# Patient Record
Sex: MALE | Race: WHITE | NOT HISPANIC OR LATINO | Employment: FULL TIME | URBAN - METROPOLITAN AREA
[De-identification: names, ages, dates, MRNs, and addresses within clinical notes are randomized per-mention and may not be internally consistent; named-entity substitution may affect disease eponyms.]

---

## 2021-04-30 ENCOUNTER — HOSPITAL ENCOUNTER (OUTPATIENT)
Dept: ULTRASOUND IMAGING | Facility: HOSPITAL | Age: 56
Discharge: HOME | End: 2021-04-30
Admitting: FAMILY MEDICINE

## 2021-04-30 DIAGNOSIS — N45.1 EPIDIDYMITIS: ICD-10-CM

## 2021-04-30 PROCEDURE — 87491 CHLMYD TRACH DNA AMP PROBE: CPT | Performed by: FAMILY MEDICINE

## 2021-04-30 PROCEDURE — 87591 N.GONORRHOEAE DNA AMP PROB: CPT | Performed by: FAMILY MEDICINE

## 2021-04-30 PROCEDURE — 93976 VASCULAR STUDY: CPT

## 2021-04-30 PROCEDURE — 76870 US EXAM SCROTUM: CPT

## 2021-12-06 PROCEDURE — U0004 COV-19 TEST NON-CDC HGH THRU: HCPCS | Performed by: NURSE PRACTITIONER

## 2022-01-17 ENCOUNTER — PATIENT ROUNDING (BHMG ONLY) (OUTPATIENT)
Dept: ORTHOPEDIC SURGERY | Facility: CLINIC | Age: 57
End: 2022-01-17

## 2022-01-17 ENCOUNTER — OFFICE VISIT (OUTPATIENT)
Dept: PODIATRY | Facility: CLINIC | Age: 57
End: 2022-01-17

## 2022-01-17 VITALS
WEIGHT: 125 LBS | HEART RATE: 94 BPM | SYSTOLIC BLOOD PRESSURE: 147 MMHG | DIASTOLIC BLOOD PRESSURE: 95 MMHG | BODY MASS INDEX: 19.62 KG/M2 | HEIGHT: 67 IN

## 2022-01-17 DIAGNOSIS — M79.672 LEFT FOOT PAIN: ICD-10-CM

## 2022-01-17 DIAGNOSIS — M25.571 ACUTE RIGHT ANKLE PAIN: Primary | ICD-10-CM

## 2022-01-17 DIAGNOSIS — S93.401A MODERATE RIGHT ANKLE SPRAIN, INITIAL ENCOUNTER: ICD-10-CM

## 2022-01-17 DIAGNOSIS — M76.71 PERONEAL TENDINITIS, RIGHT: ICD-10-CM

## 2022-01-17 PROCEDURE — 99203 OFFICE O/P NEW LOW 30 MIN: CPT | Performed by: PODIATRIST

## 2022-01-17 RX ORDER — METHYLPREDNISOLONE 4 MG/1
TABLET ORAL
Qty: 21 TABLET | Refills: 0 | Status: SHIPPED | OUTPATIENT
Start: 2022-01-17 | End: 2022-02-23

## 2022-01-17 RX ORDER — BICTEGRAVIR SODIUM, EMTRICITABINE, AND TENOFOVIR ALAFENAMIDE FUMARATE 50; 200; 25 MG/1; MG/1; MG/1
1 TABLET ORAL DAILY
COMMUNITY

## 2022-01-17 NOTE — PROGRESS NOTES
A my chart message has been sent to the patient for PATIENT ROUNDING with [facility] [place]

## 2022-01-18 NOTE — PROGRESS NOTES
01/17/2022  Foot and Ankle Surgery - New Patient   Provider: Dr. Aris Castrejon DPM  Location: [facility]    Subjective:  Felipe Nieves is a 56 y.o. male.     Chief Complaint   Patient presents with   • Right Ankle - Pain   • Establish Care     Patient has a PCP but has not went to an appt yet       HPI: Patient is a 56-year-old male that presents after injury to the right ankle.  He states that approximately 3 weeks ago he rolled his ankle describing inversion type injury.  He did not think much about the injury after the event and did not seek immediate attention.  A few days later he noticed progressive pain swelling and limitation prompting him to report to an urgent care center.  Imaging was performed showing no obvious fracture or dislocation.  He was given a cam boot at that time.  He has been wearing the boot and notices mild improvement.  He states that he has been off work since the seventh.  He continues to have moderate limitation.  He mostly notices the symptoms are along the posterior lateral aspect of the ankle.  He also complains of increasing discomfort involving the left fifth toe.  He feels that he may have injured this toe at the same time.    No Known Allergies    Past Medical History:   Diagnosis Date   • HIV (human immunodeficiency virus infection) (HCC)    • Skin cancer        Past Surgical History:   Procedure Laterality Date   • HERNIA REPAIR     • SKIN CANCER EXCISION         Family History   Problem Relation Age of Onset   • Heart disease Mother    • Cancer Mother    • Heart disease Father    • Diabetes Brother    • Heart disease Brother        Social History     Socioeconomic History   • Marital status: Single   Tobacco Use   • Smoking status: Never Smoker   • Smokeless tobacco: Never Used   Vaping Use   • Vaping Use: Never used   Substance and Sexual Activity   • Alcohol use: Not Currently   • Drug use: Defer   • Sexual activity: Defer        Current Outpatient Medications  "on File Prior to Visit   Medication Sig Dispense Refill   • Bictegravir-Emtricitab-Tenofov (Biktarvy) -25 MG per tablet Take 1 tablet by mouth Daily.       No current facility-administered medications on file prior to visit.       Review of Systems:  General: Denies fever, chills, fatigue, and weakness.  Eyes: Denies vision loss, blurry vision, and excessive redness.  ENT: Denies hearing issues and difficulty swallowing.  Cardiovascular: Denies palpitations, chest pain, or syncopal episodes.  Respiratory: Denies shortness of breath, wheezing, and coughing.  GI: Denies abdominal pain, nausea, and vomiting.   : Denies frequency, hematuria, and urgency.  Musculoskeletal: + Right ankle and left fifth toe pain  Derm: Denies rash, open wounds, or suspicious lesions.  Neuro: Denies headaches, numbness, loss of coordination, and tremors.  Psych: Denies anxiety and depression.  Endocrine: Denies temperature intolerance and changes in appetite.  Heme: Denies bleeding disorders or abnormal bruising.     Objective   /95   Pulse 94   Ht 170.2 cm (67\")   Wt 56.7 kg (125 lb)   BMI 19.58 kg/m²     Foot/Ankle Exam:       General:   Appearance: appears stated age and healthy    Orientation: AAOx3    Affect: appropriate    Gait: antalgic      VASCULAR      Right Foot Vascularity   Normal vascular exam    Dorsalis pedis:  2+  Posterior tibial:  2+  Skin Temperature: warm    Edema Gradin+  CFT:  < 3 seconds  Pedal Hair Growth:  Present  Varicosities: none       Left Foot Vascularity   Normal vascular exam    Dorsalis pedis:  2+  Posterior tibial:  2+  Skin Temperature: warm    Edema Grading:  None  CFT:  < 3 seconds  Pedal Hair Growth:  Present  Varicosities: none        NEUROLOGIC     Right Foot Neurologic   Light touch sensation:  Normal  Hot/Cold sensation: normal    Achilles reflex:  2+     Left Foot Neurologic   Light touch sensation:  Normal  Hot/cold sensation: normal    Achilles reflex:  2+     " MUSCULOSKELETAL      Right Foot Musculoskeletal   Ecchymosis:  None  Tenderness: lateral malleolus and peroneal tendon    Arch:  Normal     Left Foot Musculoskeletal   Ecchymosis:  None  Tenderness: toe 5    Arch:  Normal     MUSCLE STRENGTH     Right Foot Muscle Strength   Normal strength    Foot dorsiflexion:  5  Foot plantar flexion:  5  Foot inversion:  5  Foot eversion:  5     Left Foot Muscle Strength   Normal strength    Foot dorsiflexion:  5  Foot plantar flexion:  5  Foot inversion:  5  Foot eversion:  5     DERMATOLOGIC     Right Foot Dermatologic   Skin: skin intact       Left Foot Dermatologic   Skin: skin intact        Right Foot Additional Comments No obvious deformity.  Instability testing deferred.  No proximal fibular pain.  Range of motion and muscle strength are limited secondary to guarding.      Left Foot Additional Comments: Mild swelling noted to the left fifth toe.  No gross deformity.      Assessment/Plan   Diagnoses and all orders for this visit:    1. Acute right ankle pain (Primary)  -     XR Ankle 3+ View Right  -     methylPREDNISolone (MEDROL) 4 MG dose pack; Take as directed on package instructions.  Dispense: 21 tablet; Refill: 0    2. Left foot pain  -     XR Foot 3+ View Left  -     methylPREDNISolone (MEDROL) 4 MG dose pack; Take as directed on package instructions.  Dispense: 21 tablet; Refill: 0    3. Moderate right ankle sprain, initial encounter    4. Peroneal tendinitis, right    Other orders  -     methylPREDNISolone (MEDROL) 4 MG dose pack; Take as directed on package instructions.  Dispense: 21 tablet; Refill: 0      Patient presents with symptoms involving his right ankle as well as his left fifth toe.  He states that he did sustain an injury approximately 3 weeks ago.  Imaging was repeated today to the right ankle showing no obvious fracture or dislocation.  I explained that his discomfort is likely soft tissue in nature.  I have prescribed a Medrol Dosepak for symptom  management.  I would also like him to gradually transition to a lace up ankle brace.  We did discuss use and effects.  I do feel that he should gradually discontinue the use of the cam boot.  He feels that he needs to remain off work for symptom management.  I would like him to start gentle range of motion, stretching, and manual therapy exercises.  We did discuss rigid soled shoes involving the fifth toe.  No obvious fracture or dislocation was noted to the left foot.  We reviewed rice therapy and I would like to see patient in 2 weeks for reevaluation.  If he continues to have significant pain and limitation, may need to consider MRI for further evaluation.  Greater than 30 minutes was spent before, during, and after evaluation for patient care.    Orders Placed This Encounter   Procedures   • XR Ankle 3+ View Right     Scheduling Instructions:      Room 14      wb     Order Specific Question:   Reason for Exam:     Answer:   right ankle pain     Order Specific Question:   Does this patient have a diabetic monitoring/medication delivering device on?     Answer:   No     Order Specific Question:   Release to patient     Answer:   Immediate   • XR Foot 3+ View Left     Scheduling Instructions:      Room 14 wb     Order Specific Question:   Reason for Exam:     Answer:   left foot pain     Order Specific Question:   Does this patient have a diabetic monitoring/medication delivering device on?     Answer:   No     Order Specific Question:   Release to patient     Answer:   Immediate        Note is dictated utilizing voice recognition software. Unfortunately this leads to occasional typographical errors. I apologize in advance if the situation occurs. If questions occur please do not hesitate to call our office.

## 2022-02-01 ENCOUNTER — OFFICE VISIT (OUTPATIENT)
Dept: PODIATRY | Facility: CLINIC | Age: 57
End: 2022-02-01

## 2022-02-01 VITALS
HEIGHT: 67 IN | SYSTOLIC BLOOD PRESSURE: 138 MMHG | BODY MASS INDEX: 19.62 KG/M2 | WEIGHT: 125 LBS | HEART RATE: 112 BPM | DIASTOLIC BLOOD PRESSURE: 64 MMHG

## 2022-02-01 DIAGNOSIS — M79.89 BILATERAL SWELLING OF FEET: ICD-10-CM

## 2022-02-01 DIAGNOSIS — M76.71 PERONEAL TENDINITIS, RIGHT: ICD-10-CM

## 2022-02-01 DIAGNOSIS — S93.401D MODERATE RIGHT ANKLE SPRAIN, SUBSEQUENT ENCOUNTER: ICD-10-CM

## 2022-02-01 DIAGNOSIS — M79.672 LEFT FOOT PAIN: Primary | ICD-10-CM

## 2022-02-01 PROCEDURE — 99213 OFFICE O/P EST LOW 20 MIN: CPT | Performed by: PODIATRIST

## 2022-02-01 RX ORDER — MELOXICAM 15 MG/1
TABLET ORAL
Qty: 30 TABLET | Refills: 0 | Status: SHIPPED | OUTPATIENT
Start: 2022-02-01

## 2022-02-02 NOTE — PROGRESS NOTES
"2022  Foot and Ankle Surgery - Established Patient/Follow-up  Provider: Dr. Aris Castrejon DPM  Location: [facility]    Subjective:  Felipe Nieves is a 56 y.o. male.     Chief Complaint   Patient presents with   • Right Ankle - Pain   • Left Foot - Toe Pain   • Right Foot - Toe Pain   • Follow-up     no pcp per patient       HPI: Patient returns for follow-up regarding his right ankle and left foot.  He states that his symptoms are unchanged.  He has noticed progressive swelling involving his left fifth toe and has even noticed progressive issues involving his right fifth toe.  He has pain with any closed toed shoes or increase in activity.  He continues to have pain involving his right ankle.  Symptoms are along the peroneal tendon course as well as anterior lateral aspect of the ankle.  He has been trying to wear the lace up ankle brace on the right lower extremity.    No Known Allergies    Current Outpatient Medications on File Prior to Visit   Medication Sig Dispense Refill   • Bictegravir-Emtricitab-Tenofov (Biktarvy) -25 MG per tablet Take 1 tablet by mouth Daily.     • methylPREDNISolone (MEDROL) 4 MG dose pack Take as directed on package instructions. 21 tablet 0   • methylPREDNISolone (MEDROL) 4 MG dose pack Take as directed on package instructions. 21 tablet 0     No current facility-administered medications on file prior to visit.       Objective   /64   Pulse 112   Ht 170.2 cm (67\")   Wt 56.7 kg (125 lb)   BMI 19.58 kg/m²     General:   Appearance: appears stated age and healthy    Orientation: AAOx3    Affect: appropriate    Gait: antalgic       VASCULAR       Right Foot Vascularity   Normal vascular exam    Dorsalis pedis:  2+  Posterior tibial:  2+  Skin Temperature: warm    Edema Gradin+  CFT:  < 3 seconds  Pedal Hair Growth:  Present  Varicosities: none        Left Foot Vascularity   Normal vascular exam    Dorsalis pedis:  2+  Posterior tibial:  2+  Skin " Temperature: warm    Edema Grading:  None  CFT:  < 3 seconds  Pedal Hair Growth:  Present  Varicosities: none        NEUROLOGIC      Right Foot Neurologic   Light touch sensation:  Normal  Hot/Cold sensation: normal    Achilles reflex:  2+      Left Foot Neurologic   Light touch sensation:  Normal  Hot/cold sensation: normal    Achilles reflex:  2+      MUSCULOSKELETAL       Right Foot Musculoskeletal   Ecchymosis:  None  Tenderness: lateral malleolus and peroneal tendon    Arch:  Normal      Left Foot Musculoskeletal   Ecchymosis:  None  Tenderness: toe 5    Arch:  Normal      MUSCLE STRENGTH      Right Foot Muscle Strength   Normal strength    Foot dorsiflexion:  5  Foot plantar flexion:  5  Foot inversion:  5  Foot eversion:  5      Left Foot Muscle Strength   Normal strength    Foot dorsiflexion:  5  Foot plantar flexion:  5  Foot inversion:  5  Foot eversion:  5      DERMATOLOGIC      Right Foot Dermatologic   Skin: Mild erythema involving the right fifth toe       Left Foot Dermatologic   Skin: Moderate erythema and swelling involving the left fifth toe.  No calor.  Symptoms extend to the metatarsophalangeal joint region    Assessment/Plan   Diagnoses and all orders for this visit:    1. Left foot pain (Primary)  -     XR Foot 3+ View Left  -     meloxicam (Mobic) 15 MG tablet; One po q day. Refills must come from pcp  Dispense: 30 tablet; Refill: 0  -     CBC & Differential; Future  -     Basic Metabolic Panel; Future  -     C-reactive Protein; Future  -     Sedimentation Rate; Future  -     Uric Acid; Future    2. Bilateral swelling of feet  -     CBC & Differential; Future  -     Basic Metabolic Panel; Future  -     C-reactive Protein; Future  -     Sedimentation Rate; Future  -     Uric Acid; Future    3. Peroneal tendinitis, right    4. Moderate right ankle sprain, subsequent encounter  -     MRI Ankle Right Without Contrast; Future      Patient returns with continued issues involving both lower  extremities.  He has had progressive swelling involving the left fifth toe.  The right fifth toe has also had swelling and redness.  Imaging was reviewed showing no obvious fractures or dislocations to these regions.  I have explained that issues and symptoms are highly consistent with inflammation.  Patient states that he has recently started his antiviral Biktarvy.  I have recommended that we proceed with lab work such as CBC, BMP, ESR, CRP, and uric acid.  I have also suggested that we proceed with an MRI involving his right ankle to rule out peroneal tendon tear or other soft tissue disruption.  I have placed him on Mobic for symptom management.  I would like him to monitor closely and return in 3 weeks for lab and imaging review and further discussion.  Greater than 20 minutes was spent before, during, and after evaluation for patient care.    Orders Placed This Encounter   Procedures   • XR Foot 3+ View Left     Can leave after xray     Scheduling Instructions:      Room 14 wb     Order Specific Question:   Reason for Exam:     Answer:   left foot pain     Order Specific Question:   Does this patient have a diabetic monitoring/medication delivering device on?     Answer:   No     Order Specific Question:   Release to patient     Answer:   Immediate   • MRI Ankle Right Without Contrast     Standing Status:   Future     Standing Expiration Date:   2/1/2023     Scheduling Instructions:      Mri right ankle no contrast   • Basic Metabolic Panel     Standing Status:   Future     Standing Expiration Date:   2/2/2023     Order Specific Question:   Release to patient     Answer:   Immediate   • C-reactive Protein     Standing Status:   Future     Standing Expiration Date:   2/2/2023     Order Specific Question:   Release to patient     Answer:   Immediate   • Sedimentation Rate     Standing Status:   Future     Standing Expiration Date:   2/2/2023     Order Specific Question:   Release to patient     Answer:    Immediate   • Uric Acid     Standing Status:   Future     Standing Expiration Date:   2/2/2023     Order Specific Question:   Release to patient     Answer:   Immediate   • CBC & Differential     Standing Status:   Future     Standing Expiration Date:   2/2/2023     Order Specific Question:   Manual Differential     Answer:   No          Note is dictated utilizing voice recognition software. Unfortunately this leads to occasional typographical errors. I apologize in advance if the situation occurs. If questions occur please do not hesitate to call our office.

## 2022-02-18 ENCOUNTER — HOSPITAL ENCOUNTER (OUTPATIENT)
Dept: MRI IMAGING | Facility: HOSPITAL | Age: 57
Discharge: HOME | End: 2022-02-18
Admitting: PODIATRIST

## 2022-02-18 DIAGNOSIS — S93.401D MODERATE RIGHT ANKLE SPRAIN, SUBSEQUENT ENCOUNTER: ICD-10-CM

## 2022-02-18 PROCEDURE — 73721 MRI JNT OF LWR EXTRE W/O DYE: CPT

## 2022-02-22 ENCOUNTER — TELEPHONE (OUTPATIENT)
Dept: PODIATRY | Facility: CLINIC | Age: 57
End: 2022-02-22

## 2022-02-22 NOTE — TELEPHONE ENCOUNTER
Hub staff attempted to follow warm transfer process and was unsuccessful     Caller: JALIL DAY    Relationship to patient: SELF    Best call back number: [phone]    Patient is needing: PT CALLING ABOUT FMLA PAPERWORK, SAID HIS EMPLOYER HASN'T RECEIVED IT. NEEDS TO GET IT REFAXED ASAP SO HE CAN GET HIS PAYCHECK. PLEASE CALL HIM BACK AT THE NUMBER ABOVE.

## 2022-02-23 ENCOUNTER — TELEPHONE (OUTPATIENT)
Dept: ORTHOPEDIC SURGERY | Facility: CLINIC | Age: 57
End: 2022-02-23

## 2022-02-23 ENCOUNTER — OFFICE VISIT (OUTPATIENT)
Dept: PODIATRY | Facility: CLINIC | Age: 57
End: 2022-02-23

## 2022-02-23 VITALS
HEART RATE: 103 BPM | WEIGHT: 125 LBS | DIASTOLIC BLOOD PRESSURE: 84 MMHG | BODY MASS INDEX: 19.62 KG/M2 | HEIGHT: 67 IN | SYSTOLIC BLOOD PRESSURE: 127 MMHG

## 2022-02-23 DIAGNOSIS — M79.89 BILATERAL SWELLING OF FEET: ICD-10-CM

## 2022-02-23 DIAGNOSIS — M76.71 PERONEAL TENDINITIS, RIGHT: ICD-10-CM

## 2022-02-23 DIAGNOSIS — M79.672 LEFT FOOT PAIN: Primary | ICD-10-CM

## 2022-02-23 PROCEDURE — 99213 OFFICE O/P EST LOW 20 MIN: CPT | Performed by: PODIATRIST

## 2022-02-23 RX ORDER — DOCUSATE SODIUM, SENNOSIDES 50; 8.6 MG/1; MG/1
TABLET ORAL
COMMUNITY
Start: 2022-01-20

## 2022-02-23 NOTE — PROGRESS NOTES
"2022  Foot and Ankle Surgery - Established Patient/Follow-up  Provider: Dr. Aris Castrejon DPM  Location: [facility]    Subjective:  Felipe Nieves is a 56 y.o. male.     Chief Complaint   Patient presents with   • Right Ankle - Pain   • Left Foot - Pain, Edema   • Follow-up     Lst pcp arlet Arguelles MD 1/15/2022       HPI: Patient returns for follow-up regarding his feet and right ankle pain.  He has noticed significant improvement since last exam.  Denies new issues or concerns.  Patient did obtain the MRI but forgot to obtain the lab work.    No Known Allergies    Current Outpatient Medications on File Prior to Visit   Medication Sig Dispense Refill   • Bictegravir-Emtricitab-Tenofov (Biktarvy) -25 MG per tablet Take 1 tablet by mouth Daily.     • meloxicam (Mobic) 15 MG tablet One po q day. Refills must come from pcp 30 tablet 0   • Senna-Plus 8.6-50 MG per tablet TAKE 1 TABLET BY MOUTH 1 TIME EACH DAY.     • [DISCONTINUED] methylPREDNISolone (MEDROL) 4 MG dose pack Take as directed on package instructions. 21 tablet 0   • [DISCONTINUED] methylPREDNISolone (MEDROL) 4 MG dose pack Take as directed on package instructions. 21 tablet 0     No current facility-administered medications on file prior to visit.       Objective   /84   Pulse 103   Ht 170.2 cm (67\")   Wt 56.7 kg (125 lb)   BMI 19.58 kg/m²     General:   Appearance: appears stated age and healthy    Orientation: AAOx3    Affect: appropriate    Gait: antalgic       VASCULAR       Right Foot Vascularity   Normal vascular exam    Dorsalis pedis:  2+  Posterior tibial:  2+  Skin Temperature: warm    Edema Gradin+  CFT:  < 3 seconds  Pedal Hair Growth:  Present  Varicosities: none        Left Foot Vascularity   Normal vascular exam    Dorsalis pedis:  2+  Posterior tibial:  2+  Skin Temperature: warm    Edema Grading:  None  CFT:  < 3 seconds  Pedal Hair Growth:  Present  Varicosities: none        NEUROLOGIC      Right " Foot Neurologic   Light touch sensation:  Normal  Hot/Cold sensation: normal    Achilles reflex:  2+      Left Foot Neurologic   Light touch sensation:  Normal  Hot/cold sensation: normal    Achilles reflex:  2+      MUSCULOSKELETAL       Right Foot Musculoskeletal   Ecchymosis:  None  Tenderness: lateral malleolus and peroneal tendon    Arch:  Normal      Left Foot Musculoskeletal   Ecchymosis:  None  Tenderness: toe 5    Arch:  Normal      MUSCLE STRENGTH      Right Foot Muscle Strength   Normal strength    Foot dorsiflexion:  5  Foot plantar flexion:  5  Foot inversion:  5  Foot eversion:  5      Left Foot Muscle Strength   Normal strength    Foot dorsiflexion:  5  Foot plantar flexion:  5  Foot inversion:  5  Foot eversion:  5      DERMATOLOGIC      Right Foot Dermatologic   Skin: Mild erythema involving the right fifth toe       Left Foot Dermatologic   Skin: Moderate erythema and swelling involving the left fifth toe.  No calor.  Symptoms extend to the metatarsophalangeal joint region    Assessment/Plan   Diagnoses and all orders for this visit:    1. Left foot pain (Primary)    2. Bilateral swelling of feet    3. Peroneal tendinitis, right      Patient's physical exam is unchanged.  He continues to have swelling isolated to the fifth toes.  Pain has diminished.  His discomfort to the ankle is also improved.  MRI was independently reviewed and discussed with patient.  Findings are relatively nonspecific but could be consistent with erosive arthropathy.  I did explain that his symptoms are highly consistent with inflammatory arthritis.  I have asked that he discuss the matter with his primary care physician.  Given that he is doing better at this time, I do not feel that any further treatment is required.  Patient is to continue to monitor and call with any additional issues or concerns.  I will see him on an as-needed basis.  Greater than 20 minutes was spent before, during, and after evaluation for patient  care.    No orders of the defined types were placed in this encounter.         Note is dictated utilizing voice recognition software. Unfortunately this leads to occasional typographical errors. I apologize in advance if the situation occurs. If questions occur please do not hesitate to call our office.

## 2022-07-19 ENCOUNTER — HOSPITAL ENCOUNTER (EMERGENCY)
Facility: HOSPITAL | Age: 57
Discharge: HOME | End: 2022-07-19
Attending: EMERGENCY MEDICINE | Admitting: EMERGENCY MEDICINE

## 2022-07-19 ENCOUNTER — APPOINTMENT (OUTPATIENT)
Dept: CT IMAGING | Facility: HOSPITAL | Age: 57
End: 2022-07-19

## 2022-07-19 VITALS
DIASTOLIC BLOOD PRESSURE: 70 MMHG | TEMPERATURE: 98.3 F | HEART RATE: 75 BPM | HEIGHT: 67 IN | BODY MASS INDEX: 20.83 KG/M2 | SYSTOLIC BLOOD PRESSURE: 115 MMHG | OXYGEN SATURATION: 100 % | RESPIRATION RATE: 16 BRPM | WEIGHT: 132.72 LBS

## 2022-07-19 DIAGNOSIS — K59.00 CONSTIPATION, UNSPECIFIED CONSTIPATION TYPE: Primary | ICD-10-CM

## 2022-07-19 DIAGNOSIS — R10.84 GENERALIZED ABDOMINAL PAIN: ICD-10-CM

## 2022-07-19 LAB
ALBUMIN SERPL-MCNC: 3.5 G/DL (ref 3.5–5.2)
ALBUMIN/GLOB SERPL: 0.7 G/DL
ALP SERPL-CCNC: 76 U/L (ref 39–117)
ALT SERPL W P-5'-P-CCNC: 12 U/L (ref 1–41)
ANION GAP SERPL CALCULATED.3IONS-SCNC: 6 MMOL/L (ref 5–15)
AST SERPL-CCNC: 13 U/L (ref 1–40)
BASOPHILS # BLD AUTO: 0 10*3/MM3 (ref 0–0.2)
BASOPHILS NFR BLD AUTO: 0.5 % (ref 0–1.5)
BILIRUB SERPL-MCNC: 0.3 MG/DL (ref 0–1.2)
BUN SERPL-MCNC: 11 MG/DL (ref 6–20)
BUN/CREAT SERPL: 11 (ref 7–25)
CALCIUM SPEC-SCNC: 9 MG/DL (ref 8.6–10.5)
CHLORIDE SERPL-SCNC: 103 MMOL/L (ref 98–107)
CO2 SERPL-SCNC: 30 MMOL/L (ref 22–29)
CREAT SERPL-MCNC: 1 MG/DL (ref 0.76–1.27)
DEPRECATED RDW RBC AUTO: 49.9 FL (ref 37–54)
EGFRCR SERPLBLD CKD-EPI 2021: 89.4 ML/MIN/1.73
EOSINOPHIL # BLD AUTO: 0.2 10*3/MM3 (ref 0–0.4)
EOSINOPHIL NFR BLD AUTO: 3.1 % (ref 0.3–6.2)
ERYTHROCYTE [DISTWIDTH] IN BLOOD BY AUTOMATED COUNT: 16 % (ref 12.3–15.4)
GLOBULIN UR ELPH-MCNC: 4.8 GM/DL
GLUCOSE SERPL-MCNC: 96 MG/DL (ref 65–99)
HCT VFR BLD AUTO: 37.7 % (ref 37.5–51)
HGB BLD-MCNC: 12.2 G/DL (ref 13–17.7)
LYMPHOCYTES # BLD AUTO: 2.6 10*3/MM3 (ref 0.7–3.1)
LYMPHOCYTES NFR BLD AUTO: 34.3 % (ref 19.6–45.3)
MCH RBC QN AUTO: 28.5 PG (ref 26.6–33)
MCHC RBC AUTO-ENTMCNC: 32.3 G/DL (ref 31.5–35.7)
MCV RBC AUTO: 88.1 FL (ref 79–97)
MONOCYTES # BLD AUTO: 0.7 10*3/MM3 (ref 0.1–0.9)
MONOCYTES NFR BLD AUTO: 9.7 % (ref 5–12)
NEUTROPHILS NFR BLD AUTO: 4 10*3/MM3 (ref 1.7–7)
NEUTROPHILS NFR BLD AUTO: 52.4 % (ref 42.7–76)
NRBC BLD AUTO-RTO: 0 /100 WBC (ref 0–0.2)
PLATELET # BLD AUTO: 357 10*3/MM3 (ref 140–450)
PMV BLD AUTO: 6.9 FL (ref 6–12)
POTASSIUM SERPL-SCNC: 4.3 MMOL/L (ref 3.5–5.2)
PROT SERPL-MCNC: 8.3 G/DL (ref 6–8.5)
RBC # BLD AUTO: 4.28 10*6/MM3 (ref 4.14–5.8)
SODIUM SERPL-SCNC: 139 MMOL/L (ref 136–145)
WBC NRBC COR # BLD: 7.6 10*3/MM3 (ref 3.4–10.8)

## 2022-07-19 PROCEDURE — 99283 EMERGENCY DEPT VISIT LOW MDM: CPT

## 2022-07-19 PROCEDURE — 85025 COMPLETE CBC W/AUTO DIFF WBC: CPT | Performed by: NURSE PRACTITIONER

## 2022-07-19 PROCEDURE — 80053 COMPREHEN METABOLIC PANEL: CPT | Performed by: NURSE PRACTITIONER

## 2022-07-19 PROCEDURE — 74177 CT ABD & PELVIS W/CONTRAST: CPT

## 2022-07-19 PROCEDURE — 0 IOPAMIDOL PER 1 ML: Performed by: EMERGENCY MEDICINE

## 2022-07-19 RX ORDER — SODIUM CHLORIDE 0.9 % (FLUSH) 0.9 %
10 SYRINGE (ML) INJECTION AS NEEDED
Status: DISCONTINUED | OUTPATIENT
Start: 2022-07-19 | End: 2022-07-19 | Stop reason: HOSPADM

## 2022-07-19 RX ADMIN — IOPAMIDOL 100 ML: 755 INJECTION, SOLUTION INTRAVENOUS at 19:26

## 2022-07-19 NOTE — ED PROVIDER NOTES
"Subjective    Chief Complaint   Patient presents with   • Constipation     Provider, No Known  No LMP for male patient.  No Known Allergies    Patient is a 56-year-old male presents the emergency department complaint of abdominal pain, constipation.  Patient reports that he was seen in urgent care per recommendation from his work, and they were concerned about a \"bowel blockage\".  Patient reports he does have issues with constipation, he reports he has had a bowel movement every day, but has not been a normal bowel movement.  He denies any abdominal surgeries.  Denies any nausea or vomiting.  No fever chills.  Onset: 2 weeks  Location: Abdomen  Duration: Consistent  Character: Aching  Aggravating Factors: None  Alleviating Factors: None  Radiation: None  Treatments Tried: None          Review of Systems   Constitutional: Negative for chills and fever.   Respiratory: Negative for shortness of breath.    Cardiovascular: Negative for chest pain.   Gastrointestinal: Positive for abdominal pain and constipation. Negative for blood in stool, diarrhea, nausea and vomiting.   Genitourinary: Negative for dysuria.   Musculoskeletal: Negative for back pain and neck pain.       Past Medical History:   Diagnosis Date   • HIV (human immunodeficiency virus infection) (HCC)    • Skin cancer        No Known Allergies    Past Surgical History:   Procedure Laterality Date   • HERNIA REPAIR     • SKIN CANCER EXCISION         Family History   Problem Relation Age of Onset   • Heart disease Mother    • Cancer Mother    • Heart disease Father    • Diabetes Brother    • Heart disease Brother        Social History     Socioeconomic History   • Marital status: Single   Tobacco Use   • Smoking status: Never Smoker   • Smokeless tobacco: Never Used   Vaping Use   • Vaping Use: Never used   Substance and Sexual Activity   • Alcohol use: Not Currently   • Drug use: Defer   • Sexual activity: Defer           Objective   Physical Exam  Vitals and " "nursing note reviewed.   Constitutional:       General: He is not in acute distress.     Appearance: Normal appearance. He is not ill-appearing, toxic-appearing or diaphoretic.      Comments: Patient sleeping but easily arousable on exam   HENT:      Head: Normocephalic and atraumatic.      Nose: Nose normal.      Mouth/Throat:      Mouth: Mucous membranes are moist.      Pharynx: Oropharynx is clear.   Eyes:      Extraocular Movements: Extraocular movements intact.      Conjunctiva/sclera: Conjunctivae normal.      Pupils: Pupils are equal, round, and reactive to light.   Cardiovascular:      Rate and Rhythm: Normal rate and regular rhythm.      Heart sounds: Normal heart sounds. No murmur heard.    No friction rub. No gallop.   Pulmonary:      Effort: Pulmonary effort is normal.      Breath sounds: Normal breath sounds.   Abdominal:      General: Bowel sounds are normal.      Palpations: Abdomen is soft.      Tenderness: There is no abdominal tenderness. There is no guarding or rebound.   Musculoskeletal:         General: Normal range of motion.   Skin:     General: Skin is warm and dry.      Capillary Refill: Capillary refill takes less than 2 seconds.   Neurological:      Mental Status: He is alert and oriented to person, place, and time.   Psychiatric:         Mood and Affect: Mood normal.         Behavior: Behavior normal.         Procedures           ED Course      /70   Pulse 75   Temp 98.3 °F (36.8 °C)   Resp 16   Ht 170.2 cm (67\")   Wt 60.2 kg (132 lb 11.5 oz)   SpO2 100%   BMI 20.79 kg/m²   Labs Reviewed   COMPREHENSIVE METABOLIC PANEL - Abnormal; Notable for the following components:       Result Value    CO2 30.0 (*)     All other components within normal limits    Narrative:     GFR Normal >60  Chronic Kidney Disease <60  Kidney Failure <15     CBC WITH AUTO DIFFERENTIAL - Abnormal; Notable for the following components:    Hemoglobin 12.2 (*)     RDW 16.0 (*)     All other components " within normal limits   CBC AND DIFFERENTIAL    Narrative:     The following orders were created for panel order CBC & Differential.  Procedure                               Abnormality         Status                     ---------                               -----------         ------                     CBC Auto Differential[[id number]]        Abnormal            Final result                 Please view results for these tests on the individual orders.     Medications   iopamidol (ISOVUE-370) 76 % injection 100 mL (100 mL Intravenous Given 7/19/22 1926)     CT Abdomen Pelvis With Contrast    Result Date: 7/19/2022  No findings to indicate intestinal obstruction. There is a large amount of stool in the right side of the colon otherwise generalized gaseous distention of the colon which appears nonobstructive. No acute process is demonstrated  Electronically Signed By-Frank Collado On:7/19/2022 7:34 PM This report was finalized on [id number] by  Frank Collado, .                                         MDM  Number of Diagnoses or Management Options     Amount and/or Complexity of Data Reviewed  Clinical lab tests: reviewed  Tests in the radiology section of CPT®: reviewed    Labs and imaging ordered and reviewed above.        --Differentials: Bowel obstruction, constipation  This list is not all inclusive and does not constitute the entireity of considered causes.        --Patient was brought back to the emergency department room for evaluation and placed on appropriate monitoring.  Patient had IV established and blood work obtained        Patient underwent the above exam and work-up.  CBC CMP reviewed.  CT abdomen pelvis reviewed.  No bowel obstruction.  Large amount of stool in colon.  Likely consistent with patient's report of constipation.  I advised use of over-the-counter medication for constipation.  Patient be discharged home.         --Disposition: I spoke with the patient at the bedside regarding their  plan of care, discharge instruction, home care, prescriptions, indications to return to the emergency department, and importance follow-up.  We discussed test results at the bedside, including incidental abnormal labs, radiological findings, understands need for follow-up with primary care or specialist if indicated.     Pt is aware that discharge does not mean that nothing is wrong but it indicates no emergency is present and they must continue care with follow-up as given below or physician of their choice      This document is intended for medical expert use only. Reading of this document by patients and/or patient's family without participating medical staff guidance may result in misinterpretation and unintended morbidity.  Any interpretation of such data is the responsibility of the patient and/or family member responsible for the patient in concert with their primary or specialist providers, not to be left for sources of online searches such as [employer], [employer] or similar queries. Relying on these approaches to knowledge may result in misinterpretation, misguided goals of care and even death should patients or family members try recommendations outside of the realm of professional medical care in a supervised inpatient environment.        Appropriate PPE was worn during the duration of the care for this patient while in the emergency department per [facility] Policy                                          Final diagnoses:   Constipation, unspecified constipation type   Generalized abdominal pain       ED Disposition  ED Disposition     ED Disposition   Discharge    Condition   Stable    Comment   --             [facility] EMERGENCY DEPARTMENT  [address] [facility] [address] [phone]    As needed, If symptoms worsen    PATIENT CONNECTION - [facility] [id number] [phone]  Schedule an appointment as soon as possible for a visit   Call for assistance with follow up  with Primary care provider-call tomorrow.         Medication List      No changes were made to your prescriptions during this visit.          Nevin Gracia, APRN  07/20/22 0135

## 2022-07-19 NOTE — ED NOTES
Patient complains of constipation for 2 weeks. Has had only small amounts of stool. Had xray last week that showed possible blockage

## 2022-07-20 NOTE — DISCHARGE INSTRUCTIONS
Please follow-up with your primary care provider, if you not have a primary care provider please utilize patient connection above to establish care  Return to the ED for new or worsening symptoms  Follow up with Specialist if indicated above-call for an appointment

## 2022-10-06 ENCOUNTER — APPOINTMENT (OUTPATIENT)
Dept: CT IMAGING | Facility: HOSPITAL | Age: 57
End: 2022-10-06

## 2022-10-06 ENCOUNTER — HOSPITAL ENCOUNTER (EMERGENCY)
Facility: HOSPITAL | Age: 57
Discharge: HOME | End: 2022-10-07
Attending: EMERGENCY MEDICINE | Admitting: EMERGENCY MEDICINE
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
## 2022-10-06 DIAGNOSIS — S16.1XXA STRAIN OF NECK MUSCLE, INITIAL ENCOUNTER: ICD-10-CM

## 2022-10-06 DIAGNOSIS — R51.9 NONINTRACTABLE HEADACHE, UNSPECIFIED CHRONICITY PATTERN, UNSPECIFIED HEADACHE TYPE: ICD-10-CM

## 2022-10-06 PROCEDURE — 99282 EMERGENCY DEPT VISIT SF MDM: CPT

## 2022-10-06 PROCEDURE — 72125 CT NECK SPINE W/O DYE: CPT

## 2022-10-06 PROCEDURE — 70450 CT HEAD/BRAIN W/O DYE: CPT

## 2022-10-07 VITALS
WEIGHT: 125 LBS | SYSTOLIC BLOOD PRESSURE: 132 MMHG | HEART RATE: 77 BPM | RESPIRATION RATE: 16 BRPM | OXYGEN SATURATION: 100 % | BODY MASS INDEX: 19.62 KG/M2 | DIASTOLIC BLOOD PRESSURE: 80 MMHG | HEIGHT: 67 IN | TEMPERATURE: 97.9 F

## 2022-10-07 RX ORDER — METHOCARBAMOL 750 MG/1
750 TABLET, FILM COATED ORAL 3 TIMES DAILY PRN
Qty: 20 TABLET | Refills: 0 | Status: SHIPPED | OUTPATIENT
Start: 2022-10-07
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
## 2024-05-29 ENCOUNTER — HOSPITAL ENCOUNTER (INPATIENT)
Facility: HOSPITAL | Age: 59
LOS: 2 days | Discharge: HOME | DRG: 660 | End: 2024-06-01
Attending: EMERGENCY MEDICINE | Admitting: STUDENT IN AN ORGANIZED HEALTH CARE EDUCATION/TRAINING PROGRAM
Payer: COMMERCIAL

## 2024-05-29 ENCOUNTER — APPOINTMENT (OUTPATIENT)
Dept: CT IMAGING | Facility: HOSPITAL | Age: 59
DRG: 660 | End: 2024-05-29
Payer: COMMERCIAL

## 2024-05-29 DIAGNOSIS — B20 SYMPTOMATIC HIV INFECTION: ICD-10-CM

## 2024-05-29 DIAGNOSIS — K56.7 ILEUS: ICD-10-CM

## 2024-05-29 DIAGNOSIS — N20.0 NEPHROLITHIASIS: ICD-10-CM

## 2024-05-29 DIAGNOSIS — D50.9 IRON DEFICIENCY ANEMIA, UNSPECIFIED IRON DEFICIENCY ANEMIA TYPE: ICD-10-CM

## 2024-05-29 DIAGNOSIS — R18.8 OTHER ASCITES: ICD-10-CM

## 2024-05-29 DIAGNOSIS — N39.0 ACUTE URINARY TRACT INFECTION: ICD-10-CM

## 2024-05-29 DIAGNOSIS — N13.2 HYDRONEPHROSIS WITH URINARY OBSTRUCTION DUE TO URETERAL CALCULUS: Primary | ICD-10-CM

## 2024-05-29 LAB
ALBUMIN SERPL-MCNC: 2.9 G/DL (ref 3.5–5.2)
ALBUMIN/GLOB SERPL: 0.5 G/DL
ALP SERPL-CCNC: 114 U/L (ref 39–117)
ALT SERPL W P-5'-P-CCNC: 28 U/L (ref 1–41)
ANION GAP SERPL CALCULATED.3IONS-SCNC: 9 MMOL/L (ref 5–15)
AST SERPL-CCNC: 27 U/L (ref 1–40)
BACTERIA UR QL AUTO: ABNORMAL /HPF
BASOPHILS # BLD AUTO: 0.04 10*3/MM3 (ref 0–0.2)
BASOPHILS NFR BLD AUTO: 0.5 % (ref 0–1.5)
BILIRUB SERPL-MCNC: 0.2 MG/DL (ref 0–1.2)
BILIRUB UR QL STRIP: NEGATIVE
BUN SERPL-MCNC: 17 MG/DL (ref 6–20)
BUN/CREAT SERPL: 15.2 (ref 7–25)
CALCIUM SPEC-SCNC: 8.7 MG/DL (ref 8.6–10.5)
CHLORIDE SERPL-SCNC: 101 MMOL/L (ref 98–107)
CLARITY UR: ABNORMAL
CO2 SERPL-SCNC: 26 MMOL/L (ref 22–29)
COD CRY URNS QL: ABNORMAL /HPF
COLOR UR: ABNORMAL
CREAT SERPL-MCNC: 1.12 MG/DL (ref 0.76–1.27)
DEPRECATED RDW RBC AUTO: 42.4 FL (ref 37–54)
EGFRCR SERPLBLD CKD-EPI 2021: 77.1 ML/MIN/1.73
EOSINOPHIL # BLD AUTO: 0.11 10*3/MM3 (ref 0–0.4)
EOSINOPHIL NFR BLD AUTO: 1.3 % (ref 0.3–6.2)
ERYTHROCYTE [DISTWIDTH] IN BLOOD BY AUTOMATED COUNT: 15.4 % (ref 12.3–15.4)
GLOBULIN UR ELPH-MCNC: 5.4 GM/DL
GLUCOSE SERPL-MCNC: 101 MG/DL (ref 65–99)
GLUCOSE UR STRIP-MCNC: NEGATIVE MG/DL
HCT VFR BLD AUTO: 29.8 % (ref 37.5–51)
HGB BLD-MCNC: 8.7 G/DL (ref 13–17.7)
HGB UR QL STRIP.AUTO: ABNORMAL
HOLD SPECIMEN: NORMAL
HOLD SPECIMEN: NORMAL
HYALINE CASTS UR QL AUTO: ABNORMAL /LPF
IMM GRANULOCYTES # BLD AUTO: 0.02 10*3/MM3 (ref 0–0.05)
IMM GRANULOCYTES NFR BLD AUTO: 0.2 % (ref 0–0.5)
KETONES UR QL STRIP: ABNORMAL
LEUKOCYTE ESTERASE UR QL STRIP.AUTO: ABNORMAL
LYMPHOCYTES # BLD AUTO: 1.35 10*3/MM3 (ref 0.7–3.1)
LYMPHOCYTES NFR BLD AUTO: 15.5 % (ref 19.6–45.3)
MCH RBC QN AUTO: 22.5 PG (ref 26.6–33)
MCHC RBC AUTO-ENTMCNC: 29.2 G/DL (ref 31.5–35.7)
MCV RBC AUTO: 77 FL (ref 79–97)
MONOCYTES # BLD AUTO: 0.77 10*3/MM3 (ref 0.1–0.9)
MONOCYTES NFR BLD AUTO: 8.9 % (ref 5–12)
NEUTROPHILS NFR BLD AUTO: 6.4 10*3/MM3 (ref 1.7–7)
NEUTROPHILS NFR BLD AUTO: 73.6 % (ref 42.7–76)
NITRITE UR QL STRIP: POSITIVE
NRBC BLD AUTO-RTO: 0 /100 WBC (ref 0–0.2)
PH UR STRIP.AUTO: <=5 [PH] (ref 5–8)
PLATELET # BLD AUTO: 718 10*3/MM3 (ref 140–450)
PMV BLD AUTO: 9 FL (ref 6–12)
POTASSIUM SERPL-SCNC: 4.4 MMOL/L (ref 3.5–5.2)
PROT SERPL-MCNC: 8.3 G/DL (ref 6–8.5)
PROT UR QL STRIP: ABNORMAL
RBC # BLD AUTO: 3.87 10*6/MM3 (ref 4.14–5.8)
RBC # UR STRIP: ABNORMAL /HPF
REF LAB TEST METHOD: ABNORMAL
SODIUM SERPL-SCNC: 136 MMOL/L (ref 136–145)
SP GR UR STRIP: 1.02 (ref 1–1.03)
SQUAMOUS #/AREA URNS HPF: ABNORMAL /HPF
UROBILINOGEN UR QL STRIP: ABNORMAL
WBC # UR STRIP: ABNORMAL /HPF
WBC NRBC COR # BLD AUTO: 8.69 10*3/MM3 (ref 3.4–10.8)
WHOLE BLOOD HOLD COAG: NORMAL
WHOLE BLOOD HOLD SPECIMEN: NORMAL

## 2024-05-29 PROCEDURE — 99285 EMERGENCY DEPT VISIT HI MDM: CPT

## 2024-05-29 PROCEDURE — 87086 URINE CULTURE/COLONY COUNT: CPT | Performed by: EMERGENCY MEDICINE

## 2024-05-29 PROCEDURE — 80053 COMPREHEN METABOLIC PANEL: CPT | Performed by: EMERGENCY MEDICINE

## 2024-05-29 PROCEDURE — 85025 COMPLETE CBC W/AUTO DIFF WBC: CPT | Performed by: EMERGENCY MEDICINE

## 2024-05-29 PROCEDURE — 74176 CT ABD & PELVIS W/O CONTRAST: CPT

## 2024-05-29 PROCEDURE — 87077 CULTURE AEROBIC IDENTIFY: CPT | Performed by: EMERGENCY MEDICINE

## 2024-05-29 PROCEDURE — 25810000003 LACTATED RINGERS SOLUTION: Performed by: EMERGENCY MEDICINE

## 2024-05-29 PROCEDURE — 25010000002 HYDROMORPHONE 1 MG/ML SOLUTION: Performed by: EMERGENCY MEDICINE

## 2024-05-29 PROCEDURE — 25010000002 KETOROLAC TROMETHAMINE PER 15 MG: Performed by: EMERGENCY MEDICINE

## 2024-05-29 PROCEDURE — 25010000002 ONDANSETRON PER 1 MG: Performed by: EMERGENCY MEDICINE

## 2024-05-29 PROCEDURE — 81001 URINALYSIS AUTO W/SCOPE: CPT | Performed by: EMERGENCY MEDICINE

## 2024-05-29 PROCEDURE — 87186 SC STD MICRODIL/AGAR DIL: CPT | Performed by: EMERGENCY MEDICINE

## 2024-05-29 RX ORDER — ONDANSETRON 2 MG/ML
4 INJECTION INTRAMUSCULAR; INTRAVENOUS ONCE
Status: COMPLETED | OUTPATIENT
Start: 2024-05-29 | End: 2024-05-29

## 2024-05-29 RX ORDER — KETOROLAC TROMETHAMINE 30 MG/ML
15 INJECTION, SOLUTION INTRAMUSCULAR; INTRAVENOUS ONCE
Status: COMPLETED | OUTPATIENT
Start: 2024-05-29 | End: 2024-05-29

## 2024-05-29 RX ORDER — SODIUM CHLORIDE 0.9 % (FLUSH) 0.9 %
10 SYRINGE (ML) INJECTION AS NEEDED
Status: DISCONTINUED | OUTPATIENT
Start: 2024-05-29 | End: 2024-06-01 | Stop reason: HOSPADM

## 2024-05-29 RX ADMIN — HYDROMORPHONE HYDROCHLORIDE 0.5 MG: 1 INJECTION, SOLUTION INTRAMUSCULAR; INTRAVENOUS; SUBCUTANEOUS at 20:44

## 2024-05-29 RX ADMIN — ONDANSETRON 4 MG: 2 INJECTION INTRAMUSCULAR; INTRAVENOUS at 20:44

## 2024-05-29 RX ADMIN — KETOROLAC TROMETHAMINE 15 MG: 30 INJECTION, SOLUTION INTRAMUSCULAR at 20:43

## 2024-05-29 RX ADMIN — SODIUM CHLORIDE, POTASSIUM CHLORIDE, SODIUM LACTATE AND CALCIUM CHLORIDE 1000 ML: 600; 310; 30; 20 INJECTION, SOLUTION INTRAVENOUS at 20:44

## 2024-05-29 NOTE — ED NOTES
Pt had passed a kidney stone yesterday and has still been having left sided flank pain. Ems gave 50mcg of fentanyl an hour ago. Pt has had a distended abd for about a week. Pt has felt SOA X 2 days. Pt has also felt nauseated. Pt is HIV positive

## 2024-05-29 NOTE — LETTER
June 1, 2024     Patient: Felipe Nieves   YOB: 1965   Date of Visit: 5/29/2024       To Whom It May Concern:    It is my medical opinion that Felipe Nieves may return to work on 06/03/2024 .           Sincerely,        Josh Muñiz MD

## 2024-05-30 ENCOUNTER — INPATIENT HOSPITAL (OUTPATIENT)
Dept: URBAN - METROPOLITAN AREA HOSPITAL 84 | Facility: HOSPITAL | Age: 59
End: 2024-05-30
Payer: COMMERCIAL

## 2024-05-30 ENCOUNTER — APPOINTMENT (OUTPATIENT)
Dept: GENERAL RADIOLOGY | Facility: HOSPITAL | Age: 59
DRG: 660 | End: 2024-05-30
Payer: COMMERCIAL

## 2024-05-30 ENCOUNTER — APPOINTMENT (OUTPATIENT)
Dept: ULTRASOUND IMAGING | Facility: HOSPITAL | Age: 59
DRG: 660 | End: 2024-05-30
Payer: COMMERCIAL

## 2024-05-30 ENCOUNTER — ANESTHESIA EVENT (OUTPATIENT)
Dept: PERIOP | Facility: HOSPITAL | Age: 59
End: 2024-05-30
Payer: COMMERCIAL

## 2024-05-30 ENCOUNTER — ANESTHESIA (OUTPATIENT)
Dept: PERIOP | Facility: HOSPITAL | Age: 59
End: 2024-05-30
Payer: COMMERCIAL

## 2024-05-30 DIAGNOSIS — D50.9 IRON DEFICIENCY ANEMIA, UNSPECIFIED: ICD-10-CM

## 2024-05-30 DIAGNOSIS — R10.9 UNSPECIFIED ABDOMINAL PAIN: ICD-10-CM

## 2024-05-30 DIAGNOSIS — R93.5 ABNORMAL FINDINGS ON DIAGNOSTIC IMAGING OF OTHER ABDOMINAL R: ICD-10-CM

## 2024-05-30 DIAGNOSIS — K62.5 HEMORRHAGE OF ANUS AND RECTUM: ICD-10-CM

## 2024-05-30 PROBLEM — N13.2 HYDRONEPHROSIS WITH URINARY OBSTRUCTION DUE TO URETERAL CALCULUS: Status: ACTIVE | Noted: 2024-05-29

## 2024-05-30 PROBLEM — N13.2 HYDRONEPHROSIS WITH OBSTRUCTING CALCULUS: Status: ACTIVE | Noted: 2024-05-30

## 2024-05-30 LAB
ABO GROUP BLD: NORMAL
AMMONIA BLD-SCNC: 22 UMOL/L (ref 16–60)
ANION GAP SERPL CALCULATED.3IONS-SCNC: 7 MMOL/L (ref 5–15)
BLD GP AB SCN SERPL QL: NEGATIVE
BUN SERPL-MCNC: 17 MG/DL (ref 6–20)
BUN/CREAT SERPL: 14.7 (ref 7–25)
CALCIUM SPEC-SCNC: 8.3 MG/DL (ref 8.6–10.5)
CHLORIDE SERPL-SCNC: 103 MMOL/L (ref 98–107)
CO2 SERPL-SCNC: 26 MMOL/L (ref 22–29)
CREAT SERPL-MCNC: 1.16 MG/DL (ref 0.76–1.27)
DEPRECATED RDW RBC AUTO: 43.7 FL (ref 37–54)
EGFRCR SERPLBLD CKD-EPI 2021: 73.9 ML/MIN/1.73
ERYTHROCYTE [DISTWIDTH] IN BLOOD BY AUTOMATED COUNT: 15.5 % (ref 12.3–15.4)
FERRITIN SERPL-MCNC: 155 NG/ML (ref 30–400)
GLUCOSE SERPL-MCNC: 85 MG/DL (ref 65–99)
HCT VFR BLD AUTO: 22.8 % (ref 37.5–51)
HCT VFR BLD AUTO: 27.7 % (ref 37.5–51)
HGB BLD-MCNC: 6.7 G/DL (ref 13–17.7)
HGB BLD-MCNC: 8.1 G/DL (ref 13–17.7)
HOLD SPECIMEN: NORMAL
INR PPP: 1.07 (ref 0.93–1.1)
IRON 24H UR-MRATE: 7 MCG/DL (ref 59–158)
IRON SATN MFR SERPL: 3 % (ref 20–50)
LDH SERPL-CCNC: 175 U/L (ref 135–225)
LIPASE SERPL-CCNC: 14 U/L (ref 13–60)
MCH RBC QN AUTO: 22.9 PG (ref 26.6–33)
MCHC RBC AUTO-ENTMCNC: 29.4 G/DL (ref 31.5–35.7)
MCV RBC AUTO: 77.8 FL (ref 79–97)
PLATELET # BLD AUTO: 710 10*3/MM3 (ref 140–450)
PMV BLD AUTO: 9.1 FL (ref 6–12)
POTASSIUM SERPL-SCNC: 4.6 MMOL/L (ref 3.5–5.2)
PROTHROMBIN TIME: 11.6 SECONDS (ref 9.6–11.7)
QT INTERVAL: 349 MS
QTC INTERVAL: 451 MS
RBC # BLD AUTO: 2.93 10*6/MM3 (ref 4.14–5.8)
RETICS # AUTO: 0.03 10*6/MM3 (ref 0.02–0.13)
RETICS/RBC NFR AUTO: 0.81 % (ref 0.7–1.9)
RH BLD: POSITIVE
RPR SER QL: REACTIVE
RPR SER-TITR: ABNORMAL {TITER}
SODIUM SERPL-SCNC: 136 MMOL/L (ref 136–145)
T&S EXPIRATION DATE: NORMAL
TIBC SERPL-MCNC: 253 MCG/DL (ref 298–536)
TRANSFERRIN SERPL-MCNC: 170 MG/DL (ref 200–360)
VIT B12 BLD-MCNC: 308 PG/ML (ref 211–946)
WBC NRBC COR # BLD AUTO: 9.39 10*3/MM3 (ref 3.4–10.8)
WHOLE BLOOD HOLD COAG: NORMAL
WHOLE BLOOD HOLD SPECIMEN: NORMAL

## 2024-05-30 PROCEDURE — 86900 BLOOD TYPING SEROLOGIC ABO: CPT

## 2024-05-30 PROCEDURE — 25010000002 DEXAMETHASONE PER 1 MG: Performed by: ANESTHESIOLOGIST ASSISTANT

## 2024-05-30 PROCEDURE — 93010 ELECTROCARDIOGRAM REPORT: CPT | Performed by: INTERNAL MEDICINE

## 2024-05-30 PROCEDURE — 25010000002 HYDROMORPHONE 1 MG/ML SOLUTION: Performed by: ANESTHESIOLOGIST ASSISTANT

## 2024-05-30 PROCEDURE — 86901 BLOOD TYPING SEROLOGIC RH(D): CPT

## 2024-05-30 PROCEDURE — 25010000002 CEFTRIAXONE PER 250 MG: Performed by: UROLOGY

## 2024-05-30 PROCEDURE — 76000 FLUOROSCOPY <1 HR PHYS/QHP: CPT

## 2024-05-30 PROCEDURE — 99222 1ST HOSP IP/OBS MODERATE 55: CPT | Performed by: NURSE PRACTITIONER

## 2024-05-30 PROCEDURE — 25810000003 LACTATED RINGERS PER 1000 ML: Performed by: ANESTHESIOLOGIST ASSISTANT

## 2024-05-30 PROCEDURE — 83690 ASSAY OF LIPASE: CPT | Performed by: EMERGENCY MEDICINE

## 2024-05-30 PROCEDURE — 25010000002 HYDROMORPHONE 1 MG/ML SOLUTION: Performed by: STUDENT IN AN ORGANIZED HEALTH CARE EDUCATION/TRAINING PROGRAM

## 2024-05-30 PROCEDURE — 82747 ASSAY OF FOLIC ACID RBC: CPT | Performed by: EMERGENCY MEDICINE

## 2024-05-30 PROCEDURE — 25810000003 SODIUM CHLORIDE 0.9 % SOLUTION: Performed by: STUDENT IN AN ORGANIZED HEALTH CARE EDUCATION/TRAINING PROGRAM

## 2024-05-30 PROCEDURE — 86780 TREPONEMA PALLIDUM: CPT | Performed by: EMERGENCY MEDICINE

## 2024-05-30 PROCEDURE — 93005 ELECTROCARDIOGRAM TRACING: CPT | Performed by: ANESTHESIOLOGY

## 2024-05-30 PROCEDURE — 85018 HEMOGLOBIN: CPT | Performed by: INTERNAL MEDICINE

## 2024-05-30 PROCEDURE — 86900 BLOOD TYPING SEROLOGIC ABO: CPT | Performed by: STUDENT IN AN ORGANIZED HEALTH CARE EDUCATION/TRAINING PROGRAM

## 2024-05-30 PROCEDURE — 87040 BLOOD CULTURE FOR BACTERIA: CPT | Performed by: EMERGENCY MEDICINE

## 2024-05-30 PROCEDURE — 86593 SYPHILIS TEST NON-TREP QUANT: CPT | Performed by: EMERGENCY MEDICINE

## 2024-05-30 PROCEDURE — 25010000002 PROPOFOL 200 MG/20ML EMULSION: Performed by: ANESTHESIOLOGIST ASSISTANT

## 2024-05-30 PROCEDURE — 82140 ASSAY OF AMMONIA: CPT | Performed by: EMERGENCY MEDICINE

## 2024-05-30 PROCEDURE — 25010000002 CEFTRIAXONE PER 250 MG: Performed by: EMERGENCY MEDICINE

## 2024-05-30 PROCEDURE — C1769 GUIDE WIRE: HCPCS | Performed by: UROLOGY

## 2024-05-30 PROCEDURE — 82728 ASSAY OF FERRITIN: CPT | Performed by: STUDENT IN AN ORGANIZED HEALTH CARE EDUCATION/TRAINING PROGRAM

## 2024-05-30 PROCEDURE — 0TC78ZZ EXTIRPATION OF MATTER FROM LEFT URETER, VIA NATURAL OR ARTIFICIAL OPENING ENDOSCOPIC: ICD-10-PCS | Performed by: UROLOGY

## 2024-05-30 PROCEDURE — 86901 BLOOD TYPING SEROLOGIC RH(D): CPT | Performed by: STUDENT IN AN ORGANIZED HEALTH CARE EDUCATION/TRAINING PROGRAM

## 2024-05-30 PROCEDURE — 85014 HEMATOCRIT: CPT | Performed by: EMERGENCY MEDICINE

## 2024-05-30 PROCEDURE — 86592 SYPHILIS TEST NON-TREP QUAL: CPT | Performed by: EMERGENCY MEDICINE

## 2024-05-30 PROCEDURE — 86850 RBC ANTIBODY SCREEN: CPT | Performed by: STUDENT IN AN ORGANIZED HEALTH CARE EDUCATION/TRAINING PROGRAM

## 2024-05-30 PROCEDURE — 25010000002 HYDROMORPHONE 1 MG/ML SOLUTION: Performed by: UROLOGY

## 2024-05-30 PROCEDURE — 76705 ECHO EXAM OF ABDOMEN: CPT

## 2024-05-30 PROCEDURE — 0T778DZ DILATION OF LEFT URETER WITH INTRALUMINAL DEVICE, VIA NATURAL OR ARTIFICIAL OPENING ENDOSCOPIC: ICD-10-PCS | Performed by: UROLOGY

## 2024-05-30 PROCEDURE — 25010000002 ONDANSETRON PER 1 MG: Performed by: STUDENT IN AN ORGANIZED HEALTH CARE EDUCATION/TRAINING PROGRAM

## 2024-05-30 PROCEDURE — 86360 T CELL ABSOLUTE COUNT/RATIO: CPT | Performed by: EMERGENCY MEDICINE

## 2024-05-30 PROCEDURE — 82365 CALCULUS SPECTROSCOPY: CPT | Performed by: UROLOGY

## 2024-05-30 PROCEDURE — 85610 PROTHROMBIN TIME: CPT | Performed by: EMERGENCY MEDICINE

## 2024-05-30 PROCEDURE — 83615 LACTATE (LD) (LDH) ENZYME: CPT | Performed by: EMERGENCY MEDICINE

## 2024-05-30 PROCEDURE — 25010000002 FENTANYL CITRATE (PF) 50 MCG/ML SOLUTION: Performed by: ANESTHESIOLOGIST ASSISTANT

## 2024-05-30 PROCEDURE — 83540 ASSAY OF IRON: CPT | Performed by: EMERGENCY MEDICINE

## 2024-05-30 PROCEDURE — 82607 VITAMIN B-12: CPT | Performed by: EMERGENCY MEDICINE

## 2024-05-30 PROCEDURE — 85045 AUTOMATED RETICULOCYTE COUNT: CPT | Performed by: EMERGENCY MEDICINE

## 2024-05-30 PROCEDURE — 25810000003 LACTATED RINGERS PER 1000 ML: Performed by: UROLOGY

## 2024-05-30 PROCEDURE — 80048 BASIC METABOLIC PNL TOTAL CA: CPT | Performed by: STUDENT IN AN ORGANIZED HEALTH CARE EDUCATION/TRAINING PROGRAM

## 2024-05-30 PROCEDURE — 85014 HEMATOCRIT: CPT | Performed by: INTERNAL MEDICINE

## 2024-05-30 PROCEDURE — 87536 HIV-1 QUANT&REVRSE TRNSCRPJ: CPT | Performed by: EMERGENCY MEDICINE

## 2024-05-30 PROCEDURE — 25010000002 SUCCINYLCHOLINE PER 20 MG: Performed by: ANESTHESIOLOGIST ASSISTANT

## 2024-05-30 PROCEDURE — 84466 ASSAY OF TRANSFERRIN: CPT | Performed by: EMERGENCY MEDICINE

## 2024-05-30 PROCEDURE — C2617 STENT, NON-COR, TEM W/O DEL: HCPCS | Performed by: UROLOGY

## 2024-05-30 PROCEDURE — 25010000002 ONDANSETRON PER 1 MG: Performed by: ANESTHESIOLOGIST ASSISTANT

## 2024-05-30 PROCEDURE — 85027 COMPLETE CBC AUTOMATED: CPT | Performed by: STUDENT IN AN ORGANIZED HEALTH CARE EDUCATION/TRAINING PROGRAM

## 2024-05-30 DEVICE — VARIABLE LENGTH URETERAL STENT
Type: IMPLANTABLE DEVICE | Site: URETER | Status: FUNCTIONAL
Brand: CONTOUR VL™

## 2024-05-30 RX ORDER — SODIUM CHLORIDE 0.9 % (FLUSH) 0.9 %
10 SYRINGE (ML) INJECTION AS NEEDED
Status: DISCONTINUED | OUTPATIENT
Start: 2024-05-30 | End: 2024-06-01 | Stop reason: HOSPADM

## 2024-05-30 RX ORDER — HYDRALAZINE HYDROCHLORIDE 20 MG/ML
5 INJECTION INTRAMUSCULAR; INTRAVENOUS
Status: DISCONTINUED | OUTPATIENT
Start: 2024-05-30 | End: 2024-05-30

## 2024-05-30 RX ORDER — IBUPROFEN 600 MG/1
600 TABLET ORAL ONCE AS NEEDED
Status: DISCONTINUED | OUTPATIENT
Start: 2024-05-30 | End: 2024-05-30

## 2024-05-30 RX ORDER — FENTANYL CITRATE 50 UG/ML
100 INJECTION, SOLUTION INTRAMUSCULAR; INTRAVENOUS
Status: DISCONTINUED | OUTPATIENT
Start: 2024-05-30 | End: 2024-05-30

## 2024-05-30 RX ORDER — IPRATROPIUM BROMIDE AND ALBUTEROL SULFATE 2.5; .5 MG/3ML; MG/3ML
3 SOLUTION RESPIRATORY (INHALATION) ONCE AS NEEDED
Status: DISCONTINUED | OUTPATIENT
Start: 2024-05-30 | End: 2024-05-30

## 2024-05-30 RX ORDER — NITROGLYCERIN 0.4 MG/1
0.4 TABLET SUBLINGUAL
Status: DISCONTINUED | OUTPATIENT
Start: 2024-05-30 | End: 2024-06-01 | Stop reason: HOSPADM

## 2024-05-30 RX ORDER — SODIUM CHLORIDE 0.9 % (FLUSH) 0.9 %
10 SYRINGE (ML) INJECTION EVERY 12 HOURS SCHEDULED
Status: DISCONTINUED | OUTPATIENT
Start: 2024-05-30 | End: 2024-06-01 | Stop reason: HOSPADM

## 2024-05-30 RX ORDER — PANTOPRAZOLE SODIUM 40 MG/10ML
40 INJECTION, POWDER, LYOPHILIZED, FOR SOLUTION INTRAVENOUS
Status: DISCONTINUED | OUTPATIENT
Start: 2024-05-30 | End: 2024-06-01 | Stop reason: HOSPADM

## 2024-05-30 RX ORDER — SODIUM CHLORIDE 0.9 % (FLUSH) 0.9 %
10 SYRINGE (ML) INJECTION EVERY 12 HOURS SCHEDULED
Status: DISCONTINUED | OUTPATIENT
Start: 2024-05-30 | End: 2024-05-30

## 2024-05-30 RX ORDER — PROPOFOL 10 MG/ML
INJECTION, EMULSION INTRAVENOUS AS NEEDED
Status: DISCONTINUED | OUTPATIENT
Start: 2024-05-30 | End: 2024-05-30 | Stop reason: SURG

## 2024-05-30 RX ORDER — FENTANYL CITRATE 50 UG/ML
INJECTION, SOLUTION INTRAMUSCULAR; INTRAVENOUS AS NEEDED
Status: DISCONTINUED | OUTPATIENT
Start: 2024-05-30 | End: 2024-05-30 | Stop reason: SURG

## 2024-05-30 RX ORDER — DIPHENHYDRAMINE HCL 25 MG
25 CAPSULE ORAL
Status: DISCONTINUED | OUTPATIENT
Start: 2024-05-30 | End: 2024-05-30

## 2024-05-30 RX ORDER — ACETAMINOPHEN 650 MG/1
650 SUPPOSITORY RECTAL EVERY 4 HOURS PRN
Status: DISCONTINUED | OUTPATIENT
Start: 2024-05-30 | End: 2024-06-01 | Stop reason: HOSPADM

## 2024-05-30 RX ORDER — SODIUM CHLORIDE 9 MG/ML
40 INJECTION, SOLUTION INTRAVENOUS AS NEEDED
Status: DISCONTINUED | OUTPATIENT
Start: 2024-05-30 | End: 2024-05-30

## 2024-05-30 RX ORDER — ONDANSETRON 2 MG/ML
4 INJECTION INTRAMUSCULAR; INTRAVENOUS EVERY 6 HOURS PRN
Status: DISCONTINUED | OUTPATIENT
Start: 2024-05-30 | End: 2024-06-01 | Stop reason: HOSPADM

## 2024-05-30 RX ORDER — POLYETHYLENE GLYCOL 3350 17 G/17G
17 POWDER, FOR SOLUTION ORAL DAILY
Status: DISCONTINUED | OUTPATIENT
Start: 2024-05-30 | End: 2024-06-01 | Stop reason: HOSPADM

## 2024-05-30 RX ORDER — DIPHENHYDRAMINE HYDROCHLORIDE 50 MG/ML
12.5 INJECTION INTRAMUSCULAR; INTRAVENOUS ONCE AS NEEDED
Status: DISCONTINUED | OUTPATIENT
Start: 2024-05-30 | End: 2024-05-30

## 2024-05-30 RX ORDER — LABETALOL HYDROCHLORIDE 5 MG/ML
5 INJECTION, SOLUTION INTRAVENOUS
Status: DISCONTINUED | OUTPATIENT
Start: 2024-05-30 | End: 2024-05-30

## 2024-05-30 RX ORDER — FLUMAZENIL 0.1 MG/ML
0.5 INJECTION INTRAVENOUS AS NEEDED
Status: DISCONTINUED | OUTPATIENT
Start: 2024-05-30 | End: 2024-05-30

## 2024-05-30 RX ORDER — DEXAMETHASONE SODIUM PHOSPHATE 4 MG/ML
INJECTION, SOLUTION INTRA-ARTICULAR; INTRALESIONAL; INTRAMUSCULAR; INTRAVENOUS; SOFT TISSUE AS NEEDED
Status: DISCONTINUED | OUTPATIENT
Start: 2024-05-30 | End: 2024-05-30 | Stop reason: SURG

## 2024-05-30 RX ORDER — SODIUM CHLORIDE, SODIUM LACTATE, POTASSIUM CHLORIDE, CALCIUM CHLORIDE 600; 310; 30; 20 MG/100ML; MG/100ML; MG/100ML; MG/100ML
20 INJECTION, SOLUTION INTRAVENOUS ONCE
Status: COMPLETED | OUTPATIENT
Start: 2024-05-30 | End: 2024-05-30

## 2024-05-30 RX ORDER — SODIUM CHLORIDE 9 MG/ML
75 INJECTION, SOLUTION INTRAVENOUS CONTINUOUS
Status: DISCONTINUED | OUTPATIENT
Start: 2024-05-30 | End: 2024-06-01 | Stop reason: HOSPADM

## 2024-05-30 RX ORDER — LIDOCAINE HYDROCHLORIDE 10 MG/ML
INJECTION, SOLUTION EPIDURAL; INFILTRATION; INTRACAUDAL; PERINEURAL AS NEEDED
Status: DISCONTINUED | OUTPATIENT
Start: 2024-05-30 | End: 2024-05-30 | Stop reason: SURG

## 2024-05-30 RX ORDER — OXYCODONE HYDROCHLORIDE 5 MG/1
10 TABLET ORAL ONCE AS NEEDED
Status: DISCONTINUED | OUTPATIENT
Start: 2024-05-30 | End: 2024-05-30

## 2024-05-30 RX ORDER — SODIUM CHLORIDE 0.9 % (FLUSH) 0.9 %
10 SYRINGE (ML) INJECTION AS NEEDED
Status: DISCONTINUED | OUTPATIENT
Start: 2024-05-30 | End: 2024-05-30

## 2024-05-30 RX ORDER — ACETAMINOPHEN 325 MG/1
650 TABLET ORAL ONCE AS NEEDED
Status: DISCONTINUED | OUTPATIENT
Start: 2024-05-30 | End: 2024-06-01 | Stop reason: HOSPADM

## 2024-05-30 RX ORDER — ONDANSETRON 4 MG/1
4 TABLET, FILM COATED ORAL EVERY 8 HOURS PRN
COMMUNITY
Start: 2024-05-23 | End: 2024-06-05

## 2024-05-30 RX ORDER — ONDANSETRON 2 MG/ML
4 INJECTION INTRAMUSCULAR; INTRAVENOUS ONCE AS NEEDED
Status: DISCONTINUED | OUTPATIENT
Start: 2024-05-30 | End: 2024-05-30

## 2024-05-30 RX ORDER — MIDAZOLAM HYDROCHLORIDE 1 MG/ML
1 INJECTION INTRAMUSCULAR; INTRAVENOUS
Status: DISCONTINUED | OUTPATIENT
Start: 2024-05-30 | End: 2024-05-30

## 2024-05-30 RX ORDER — SODIUM CHLORIDE, SODIUM LACTATE, POTASSIUM CHLORIDE, CALCIUM CHLORIDE 600; 310; 30; 20 MG/100ML; MG/100ML; MG/100ML; MG/100ML
INJECTION, SOLUTION INTRAVENOUS CONTINUOUS PRN
Status: DISCONTINUED | OUTPATIENT
Start: 2024-05-30 | End: 2024-05-30 | Stop reason: SURG

## 2024-05-30 RX ORDER — PROMETHAZINE HYDROCHLORIDE 25 MG/1
25 SUPPOSITORY RECTAL ONCE AS NEEDED
Status: DISCONTINUED | OUTPATIENT
Start: 2024-05-30 | End: 2024-05-30

## 2024-05-30 RX ORDER — DIPHENHYDRAMINE HYDROCHLORIDE 50 MG/ML
12.5 INJECTION INTRAMUSCULAR; INTRAVENOUS
Status: DISCONTINUED | OUTPATIENT
Start: 2024-05-30 | End: 2024-05-30

## 2024-05-30 RX ORDER — ERGOCALCIFEROL 1.25 MG/1
1 CAPSULE ORAL WEEKLY
COMMUNITY
Start: 2024-03-18 | End: 2025-03-18

## 2024-05-30 RX ORDER — PROMETHAZINE HYDROCHLORIDE 25 MG/1
25 TABLET ORAL ONCE AS NEEDED
Status: DISCONTINUED | OUTPATIENT
Start: 2024-05-30 | End: 2024-05-30

## 2024-05-30 RX ORDER — SUCCINYLCHOLINE CHLORIDE 20 MG/ML
INJECTION INTRAMUSCULAR; INTRAVENOUS AS NEEDED
Status: DISCONTINUED | OUTPATIENT
Start: 2024-05-30 | End: 2024-05-30 | Stop reason: SURG

## 2024-05-30 RX ORDER — NALOXONE HCL 0.4 MG/ML
0.4 VIAL (ML) INJECTION AS NEEDED
Status: DISCONTINUED | OUTPATIENT
Start: 2024-05-30 | End: 2024-05-30

## 2024-05-30 RX ORDER — SODIUM CHLORIDE 9 MG/ML
40 INJECTION, SOLUTION INTRAVENOUS AS NEEDED
Status: DISCONTINUED | OUTPATIENT
Start: 2024-05-30 | End: 2024-06-01 | Stop reason: HOSPADM

## 2024-05-30 RX ORDER — ONDANSETRON 4 MG/1
4 TABLET, ORALLY DISINTEGRATING ORAL EVERY 6 HOURS PRN
Status: DISCONTINUED | OUTPATIENT
Start: 2024-05-30 | End: 2024-06-01 | Stop reason: HOSPADM

## 2024-05-30 RX ORDER — DEXAMETHASONE SODIUM PHOSPHATE 4 MG/ML
8 INJECTION, SOLUTION INTRA-ARTICULAR; INTRALESIONAL; INTRAMUSCULAR; INTRAVENOUS; SOFT TISSUE ONCE AS NEEDED
Status: DISCONTINUED | OUTPATIENT
Start: 2024-05-30 | End: 2024-05-30

## 2024-05-30 RX ORDER — PROCHLORPERAZINE EDISYLATE 5 MG/ML
10 INJECTION INTRAMUSCULAR; INTRAVENOUS ONCE AS NEEDED
Status: DISCONTINUED | OUTPATIENT
Start: 2024-05-30 | End: 2024-05-30

## 2024-05-30 RX ORDER — ONDANSETRON 2 MG/ML
INJECTION INTRAMUSCULAR; INTRAVENOUS AS NEEDED
Status: DISCONTINUED | OUTPATIENT
Start: 2024-05-30 | End: 2024-05-30 | Stop reason: SURG

## 2024-05-30 RX ORDER — SODIUM CHLORIDE, SODIUM LACTATE, POTASSIUM CHLORIDE, CALCIUM CHLORIDE 600; 310; 30; 20 MG/100ML; MG/100ML; MG/100ML; MG/100ML
75 INJECTION, SOLUTION INTRAVENOUS CONTINUOUS
Status: DISCONTINUED | OUTPATIENT
Start: 2024-05-30 | End: 2024-05-30

## 2024-05-30 RX ORDER — EPHEDRINE SULFATE 5 MG/ML
5 INJECTION INTRAVENOUS ONCE AS NEEDED
Status: DISCONTINUED | OUTPATIENT
Start: 2024-05-30 | End: 2024-05-30

## 2024-05-30 RX ADMIN — SODIUM CHLORIDE, POTASSIUM CHLORIDE, SODIUM LACTATE AND CALCIUM CHLORIDE 20 ML/HR: 600; 310; 30; 20 INJECTION, SOLUTION INTRAVENOUS at 16:18

## 2024-05-30 RX ADMIN — CEFTRIAXONE 2000 MG: 2 INJECTION, POWDER, FOR SOLUTION INTRAMUSCULAR; INTRAVENOUS at 00:53

## 2024-05-30 RX ADMIN — HYDROMORPHONE HYDROCHLORIDE 0.5 MG: 1 INJECTION, SOLUTION INTRAMUSCULAR; INTRAVENOUS; SUBCUTANEOUS at 19:50

## 2024-05-30 RX ADMIN — HYDROMORPHONE HYDROCHLORIDE 0.5 MG: 1 INJECTION, SOLUTION INTRAMUSCULAR; INTRAVENOUS; SUBCUTANEOUS at 01:33

## 2024-05-30 RX ADMIN — Medication 10 ML: at 01:33

## 2024-05-30 RX ADMIN — PANTOPRAZOLE SODIUM 40 MG: 40 INJECTION, POWDER, FOR SOLUTION INTRAVENOUS at 18:00

## 2024-05-30 RX ADMIN — DEXAMETHASONE SODIUM PHOSPHATE 8 MG: 4 INJECTION, SOLUTION INTRAMUSCULAR; INTRAVENOUS at 16:35

## 2024-05-30 RX ADMIN — CEFTRIAXONE 2000 MG: 2 INJECTION, POWDER, FOR SOLUTION INTRAMUSCULAR; INTRAVENOUS at 23:56

## 2024-05-30 RX ADMIN — Medication 10 ML: at 02:25

## 2024-05-30 RX ADMIN — SODIUM CHLORIDE 75 ML/HR: 9 INJECTION, SOLUTION INTRAVENOUS at 02:26

## 2024-05-30 RX ADMIN — HYDROMORPHONE HYDROCHLORIDE 0.5 MG: 1 INJECTION, SOLUTION INTRAMUSCULAR; INTRAVENOUS; SUBCUTANEOUS at 09:11

## 2024-05-30 RX ADMIN — ONDANSETRON 4 MG: 2 INJECTION INTRAMUSCULAR; INTRAVENOUS at 09:13

## 2024-05-30 RX ADMIN — Medication 10 ML: at 20:00

## 2024-05-30 RX ADMIN — LIDOCAINE HYDROCHLORIDE 50 MG: 10 INJECTION, SOLUTION EPIDURAL; INFILTRATION; INTRACAUDAL; PERINEURAL at 16:25

## 2024-05-30 RX ADMIN — ONDANSETRON 4 MG: 2 INJECTION INTRAMUSCULAR; INTRAVENOUS at 16:35

## 2024-05-30 RX ADMIN — PROPOFOL 200 MG: 10 INJECTION, EMULSION INTRAVENOUS at 16:25

## 2024-05-30 RX ADMIN — HYDROMORPHONE HYDROCHLORIDE 1 MG: 1 INJECTION, SOLUTION INTRAMUSCULAR; INTRAVENOUS; SUBCUTANEOUS at 16:48

## 2024-05-30 RX ADMIN — FENTANYL CITRATE 100 MCG: 50 INJECTION, SOLUTION INTRAMUSCULAR; INTRAVENOUS at 16:23

## 2024-05-30 RX ADMIN — SODIUM CHLORIDE, SODIUM LACTATE, POTASSIUM CHLORIDE, AND CALCIUM CHLORIDE: .6; .31; .03; .02 INJECTION, SOLUTION INTRAVENOUS at 16:22

## 2024-05-30 RX ADMIN — SUCCINYLCHOLINE CHLORIDE 140 MG: 20 INJECTION, SOLUTION INTRAMUSCULAR; INTRAVENOUS at 16:25

## 2024-05-30 RX ADMIN — BICTEGRAVIR SODIUM, EMTRICITABINE, AND TENOFOVIR ALAFENAMIDE FUMARATE 1 TABLET: 50; 200; 25 TABLET ORAL at 09:11

## 2024-05-30 NOTE — H&P
[facility] Medicine Services    Hospitalist History and Physical     Felipe Nieves : 1965 MRN:[id number] LOS:0 ROOM: PO03/03     Chief Complaint: left flank pain    Assessment / Plan     #Left UVJ stone  #UTI    - CT a/p shows 4mm obstructing stone at the left UVJ with mild hydronephrosis and 3mm non-obstructing right sided kidney stone    - urology consulted    - UA shows UTI    - Rocephin 2g IV daily, monitor for toxicity    - blood cultures    - urine cultures    - NPO     - NS @ 75/hr    #HIV    - Patient on Biktarvy, will continue    - CD4 count and viral load sent off    - consider ID consult    #Ascites  #Abdominal distension    - CT a/p shows new small volume scattered ascites with unclear etiology; gas filled mildly distended small bowel and gas filled non-distended colon without obvious obstructing lesion that could be related to ileus    - suspect volume is to small for paracentesis    - LFTs and tbili WNL    - NPO    - NS @ 75/hr    - GI to assess      #Rectal bleeding  #Anemia    - Admits to frequent rectal bleeding that is chronic    - type and screen    - hgb 8.7 on admission, was 12.2 on 22    - MCV 77, check iron and ferritin    - PPI BID    - GI consulted to assess rectal bleeding    #Thrombocytosis    - plt 718 on admission    - elevated from base, monitor            DVT prophylaxis: SCD           History of Present illness     Felipe Nieves is a 58 y.o. male with PMHx of anemia, HIV presented to [facility] for Left flank pain.  Admits to left flank pain that is 8-9/10 that returned today, states he had a similar pain the night prior and thought he passed a kidney stone.  He also notes generalized abdominal pain and distension going on for the past week complicated by poor PO intake.  He also states that he has had rectal bleeding which is chronic and not new.  Denies chills, vomiting, diarrhea, hematemesis, melena.  Presented to [facility] for evaluation.    ED course: In the ER,  vitals 99.2F, , /97, RR 18, 100% RA.  Labs notable for glucose 101, albumin 2.9, lipase 14, hgb 8.7, mcv 77.  CT a/p shows 4mm obstructing stone at the left UVJ with mild hydronephrosis and 3mm non-obstructing right sided kidney stone; also shows new small volume scattered ascites with unclear etiology; gas filled mildly distended small bowel and gas filled non-distended colon without obvious obstructing lesion that could be related to ileus.  [facility].     Patient was seen and examined on 05/30/24 at 00:02 EDT .    Subjective / Review of systems     Review of Systems   12 point ROS reviewed and negative except as mentioned above      Past Medical/Surgical/Social/Family History & Allergies     Past Medical History:   Diagnosis Date    HIV (human immunodeficiency virus infection)     Skin cancer       Past Surgical History:   Procedure Laterality Date    HERNIA REPAIR      SKIN CANCER EXCISION        Social History     Socioeconomic History    Marital status:    Tobacco Use    Smoking status: Never    Smokeless tobacco: Never   Vaping Use    Vaping status: Never Used   Substance and Sexual Activity    Alcohol use: Not Currently    Drug use: Defer    Sexual activity: Defer      Family History   Problem Relation Age of Onset    Heart disease Mother     Cancer Mother     Heart disease Father     Diabetes Brother     Heart disease Brother       No Known Allergies   Social Determinants of Health     Tobacco Use: Low Risk  (3/13/2024)    Received from [facility]    Patient History     Smoking Tobacco Use: Never     Smokeless Tobacco Use: Never     Passive Exposure: Not on file   Alcohol Use: Not on file   Financial Resource Strain: Not on file   Food Insecurity: Food Insecurity Present (1/20/2022)    Received from [facility]    Hunger Vital Sign     Worried About Running Out of Food in the Last Year: Sometimes true     Ran Out of Food in the  Last Year: Sometimes true   Transportation Needs: No Transportation Needs (1/20/2022)    Received from [facility]    PRAPARE - Transportation     Lack of Transportation (Medical): No     Lack of Transportation (Non-Medical): No   Physical Activity: Not on file   Stress: Not on file (2/10/2021)   Social Connections: Unknown (10/12/2023)    Family and Community Support     Help with Day-to-Day Activities: Not on file     Lonely or Isolated: Not on file   Interpersonal Safety: Not At Risk (5/29/2024)    Abuse Screen     Unsafe at Home or Work/School: no     Feels Threatened by Someone?: no     Does Anyone Keep You from Contacting Others or Doint Things Outside the Home?: no     Physical Sign of Abuse Present: no   Depression: At risk (1/20/2022)    [facility] from [facility]    Depression     PHQ-9 Total Score: 11   Housing Stability: Unknown (10/12/2023)    Housing Stability     Current Living Arrangements: Not on file     Potentially Unsafe Housing Conditions: Not on file   Utilities: Not on file   Health Literacy: Unknown (10/12/2023)    Education     Help with school or training?: Not on file     Preferred Language: Not on file   Employment: Unknown (10/12/2023)    Employment     Do you want help finding or keeping work or a job?: Not on file   Disabilities: Unknown (10/12/2023)    Disabilities     Concentrating, Remembering, or Making Decisions Difficulty: Not on file     Doing Errands Independently Difficulty: Not on file        Home Medications     Prior to Admission medications    Medication Sig Start Date End Date Taking? Authorizing Provider   Bictegravir-Emtricitab-Tenofov (Biktarvy) -25 MG per tablet Take 1 tablet by mouth Daily.    Provider, MD Rachel   meloxicam (Mobic) 15 MG tablet One po q day. Refills must come from pcp 2/1/22   ANNMARIE Castrejon DPM   methocarbamol (ROBAXIN) 750 MG tablet Take 1 tablet by mouth 3 (Three) Times a Day As Needed for Muscle Spasms. 10/7/22   Nithin  Christiano ANGUIANO MD   Senna-Plus 8.6-50 MG per tablet TAKE 1 TABLET BY MOUTH 1 TIME EACH DAY. 1/20/22   Provider, MD Rachel        Objective / Physical Exam     Vital signs:  Temp: 99.2 °F (37.3 °C)  BP: 109/81  Heart Rate: 98  Resp: 18  SpO2: 97 %  Weight: 59 kg (130 lb)    Admission Weight: Weight: 59 kg (130 lb)    Physical Exam  Constitutional:       General: He is not in acute distress.     Appearance: He is not toxic-appearing.   HENT:      Head: Normocephalic and atraumatic.      Nose: Nose normal. No congestion.      Mouth/Throat:      Pharynx: Oropharynx is clear. No oropharyngeal exudate.   Eyes:      General: No scleral icterus.  Cardiovascular:      Rate and Rhythm: Normal rate and regular rhythm.      Heart sounds: No murmur heard.     No friction rub. No gallop.   Pulmonary:      Effort: No respiratory distress.      Breath sounds: No wheezing or rales.   Abdominal:      General: There is distension.      Tenderness: There is abdominal tenderness. There is no guarding.   Musculoskeletal:         General: No swelling or deformity.      Cervical back: Normal range of motion. No rigidity.      Right lower leg: No edema.      Left lower leg: No edema.   Skin:     Coloration: Skin is not jaundiced.      Findings: No bruising or lesion.   Neurological:      General: No focal deficit present.      Mental Status: He is alert and oriented to person, place, and time.      Motor: No weakness.            Labs     Results from last 7 days   Lab Units 05/29/24 1929   WBC 10*3/mm3 8.69   HEMOGLOBIN g/dL 8.7*   HEMATOCRIT % 29.8*   PLATELETS 10*3/mm3 718*      Results from last 7 days   Lab Units 05/29/24 1929   ALK PHOS U/L 114   AST (SGOT) U/L 27   ALT (SGPT) U/L 28           Results from last 7 days   Lab Units 05/29/24 1929   SODIUM mmol/L 136   POTASSIUM mmol/L 4.4   CHLORIDE mmol/L 101   CO2 mmol/L 26.0   BUN mg/dL 17   CREATININE mg/dL 1.12   GLUCOSE mg/dL 101*        Imaging     CT Abdomen Pelvis Without  Contrast    Result Date: 5/29/2024  CT ABDOMEN PELVIS WO CONTRAST Date of Exam: 5/29/2024 10:55 PM EDT Indication: Flank pain, stone protocol. Comparison: 7/19/2022. Technique: Axial CT images were obtained of the abdomen and pelvis without the administration of contrast. Sagittal and coronal reconstructions were performed.  Automated exposure control and iterative reconstruction methods were used. Findings: The lung bases are clear. Distal esophagus is unremarkable. Heart size is normal. There are no acute findings in the superficial soft tissues. No acute osseous abnormality or destructive bone lesion. There are mild lower lumbar degenerative changes. The liver, gallbladder, bile ducts, pancreas, stomach, duodenum, spleen and adrenal glands appear within normal limits. There is a 3 mm nonobstructing stone at the superior right kidney and there is a punctate nonobstructing stone in the inferior right kidney. There is a 4 mm obstructing stone at the left UVJ with mild left-sided hydronephrosis. Urinary bladder is nondistended. Prostate is normal size. The appendix is not seen. Aorta is normal diameter. No small bowel distention. There is a small volume of scattered ascites. There is mild diffuse mesenteric congestion. There are multiple mildly dilated small bowel loops measuring up to 30 mm. There is a gas-filled nondistended colon. The transverse colon measures up to 5 cm and the cecum measures  up to 7.3 cm. There is a large amount of bowel gas. No obvious obstructing lesion.     Impression: 1. 4 mm obstructing stone at the left UVJ with mild hydronephrosis. 3 mm nonobstructing right-sided kidney stone. 2. New small volume scattered ascites with unclear etiology. 3. Gas-filled mildly distended small bowel and gas-filled nondistended colon. There is no obvious obstructing lesion. This could be related to ileus. Electronically Signed: Wale Schroeder MD  5/29/2024 11:12 PM EDT  Workstation ID: [id number]        Current  Medications     Scheduled Meds:  cefTRIAXone, 2,000 mg, Intravenous, Once         Continuous Infusions:          Wilfrid Xiong DO   [facility]  05/30/24   00:02 EDT

## 2024-05-30 NOTE — ED PROVIDER NOTES
Subjective   History of Present Illness  58-year-old male reported compliant with Biktarvy therapy has had the onset of abdominal discomfort and distention over the last 7 to 10 days.  He states he developed acute left flank pain and felt as if he may have passed a stone but then the pain returned tonight.  He states that he has had subjective fever as well as chills.  He reports no wasting or persistent diarrhea.  He reports no melena hematemesis hematochezia.  He denies gross hematuria but reports frequency and dysuria.  He reports no recent weight changes.  He states that his appetite has decreased somewhat over the last few days      Review of Systems   Constitutional:  Positive for chills, fatigue and fever. Negative for diaphoresis.   HENT:  Negative for sore throat.    Eyes:  Negative for discharge and visual disturbance.   Respiratory:  Negative for chest tightness and shortness of breath.    Gastrointestinal:  Positive for abdominal distention, abdominal pain and nausea.   Genitourinary:  Positive for dysuria, flank pain and frequency. Negative for penile discharge.   Hematological:  Does not bruise/bleed easily.   All other systems reviewed and are negative.      Past Medical History:   Diagnosis Date    HIV (human immunodeficiency virus infection)     Skin cancer    Last observable viral titer was from 2021.  In March 24 of this year the patient had hemoglobin 11.4 CD4 percentage of 16.3% with overall CD4 count of 554.  The patient at that time also had an RPR that was positive at 1.1.  A year ago the patient's hemoglobin was 12.2 with a platelet count of 357 and a white count of 4.28 normal MCV at that time of 88    No Known Allergies    Past Surgical History:   Procedure Laterality Date    HERNIA REPAIR      SKIN CANCER EXCISION         Family History   Problem Relation Age of Onset    Heart disease Mother     Cancer Mother     Heart disease Father     Diabetes Brother     Heart disease Brother         Social History     Socioeconomic History    Marital status:    Tobacco Use    Smoking status: Never    Smokeless tobacco: Never   Vaping Use    Vaping status: Never Used   Substance and Sexual Activity    Alcohol use: Not Currently    Drug use: Defer    Sexual activity: Defer         Reports no unusual recent food water travel or activity  Objective   Physical Exam  Alert [place] Coma Scale 15 patient was given fentanyl by EMS and states he feels tired now   HEENT: Pupils equal and reactive to light. Conjunctivae are not injected. Normal tympanic membranes. Oropharynx and nares are normal.   Neck: Supple. Midline trachea. No JVD. No goiter.   Chest: Clear and equal breath sounds bilaterally, regular rate and rhythm without murmur or rub.   Abdomen: Positive bowel sounds, there is diffuse periumbilical discomfort to palpation, nondistended. No rebound or peritoneal signs. No CVA tenderness.  Localizes area of pain to his left flank however there is no point tenderness  Extremities no clubbing. cyanosis or edema. Motor sensory exam is normal. The full range of motion is intact   Skin: Warm and dry, no rashes or petechia.   Lymphatic: No regional lymphadenopathy. No calf pain, swelling or Homans sign    Procedures           ED Course  ED Course as of 05/30/24 0001   Wed May 29, 2024   2224 Due to significant overcrowding in the emergency department patient was evaluated by myself in a hallway bed.  This examination might be limited by privacy concerns, noise, exposure issues, and the patient not wearing a hospital gown.  Explained to the patient our limitations and our overcrowding.  They were in agreement to continue the exam and treatment at this time. [TH]      ED Course User Index  [TH] Tj Campbell MD                       Labs Reviewed   COMPREHENSIVE METABOLIC PANEL - Abnormal; Notable for the following components:       Result Value    Glucose 101 (*)     Albumin 2.9 (*)     All other  components within normal limits    Narrative:     GFR Normal >60  Chronic Kidney Disease <60  Kidney Failure <15     URINALYSIS W/ CULTURE IF INDICATED - Abnormal; Notable for the following components:    Color, UA Dark Yellow (*)     Appearance, UA Cloudy (*)     Ketones, UA Trace (*)     Blood, UA Moderate (2+) (*)     Protein, UA 30 mg/dL (1+) (*)     Leuk Esterase, UA Moderate (2+) (*)     Nitrite, UA Positive (*)     All other components within normal limits    Narrative:     In absence of clinical symptoms, the presence of pyuria, bacteria, and/or nitrites on the urinalysis result does not correlate with infection.   CBC WITH AUTO DIFFERENTIAL - Abnormal; Notable for the following components:    RBC 3.87 (*)     Hemoglobin 8.7 (*)     Hematocrit 29.8 (*)     MCV 77.0 (*)     MCH 22.5 (*)     MCHC 29.2 (*)     Platelets 718 (*)     Lymphocyte % 15.5 (*)     All other components within normal limits   URINALYSIS, MICROSCOPIC ONLY - Abnormal; Notable for the following components:    WBC, UA Too Numerous to Count (*)     Bacteria, UA 4+ (*)     All other components within normal limits   URINE CULTURE   BLOOD CULTURE   BLOOD CULTURE   RAINBOW DRAW    Narrative:     The following orders were created for panel order [place] Draw.  Procedure                               Abnormality         Status                     ---------                               -----------         ------                     Green Top (Gel)[[id number]]                                  Final result               Lavender Top[[id number]]                                     Final result               Gold Top - SST[[id number]]                                   Final result               Light Blue Top[[id number]]                                   Final result                 Please view results for these tests on the individual orders.   T-HELPER CELLS (CD4) COUNT   HIV-1 RNA, QUANTITATIVE, PCR (GRAPH)   RPR   PROTIME-INR   AMMONIA   LIPASE    T-HELPER CELLS CD4/CD8 %   GREEN TOP   LAVENDER TOP   GOLD TOP - SST   LIGHT BLUE TOP   CBC AND DIFFERENTIAL    Narrative:     The following orders were created for panel order CBC & Differential.  Procedure                               Abnormality         Status                     ---------                               -----------         ------                     CBC Auto Differential[[id number]]        Abnormal            Final result                 Please view results for these tests on the individual orders.     Medications   sodium chloride 0.9 % flush 10 mL (has no administration in time range)   cefTRIAXone (ROCEPHIN) 2,000 mg in sodium chloride 0.9 % 100 mL MBP (has no administration in time range)   lactated ringers bolus 1,000 mL (0 mL Intravenous Stopped 5/29/24 2132)   ondansetron (ZOFRAN) injection 4 mg (4 mg Intravenous Given 5/29/24 2044)   ketorolac (TORADOL) injection 15 mg (15 mg Intravenous Given 5/29/24 2043)   HYDROmorphone (DILAUDID) injection 0.5 mg (0.5 mg Intravenous Given 5/29/24 2044)     CT Abdomen Pelvis Without Contrast    Result Date: 5/29/2024  Impression: 1. 4 mm obstructing stone at the left UVJ with mild hydronephrosis. 3 mm nonobstructing right-sided kidney stone. 2. New small volume scattered ascites with unclear etiology. 3. Gas-filled mildly distended small bowel and gas-filled nondistended colon. There is no obvious obstructing lesion. This could be related to ileus. Electronically Signed: Wale Schroeder MD  5/29/2024 11:12 PM EDT  Workstation ID: [id number]                           Medical Decision Making  Cultures were obtained and the patient was started on ceftriaxone.  We will send a CD4 for total hander ratio and will also order HIV viral titers.  The patient's RPR will be repeated.  Pain was controlled in the emergency department as well as nausea.  Particular concerns the patient's hemoglobin decreased to 8.7 with a platelet count increased to 718,000 patient's  MCV is now low at 77 the patient case was discussed with hospitalist service the patient will be admitted and will need gastroenterology, infectious disease, and urologic consultation.  The patient was agreeable to this plan of treatment.    Amount and/or Complexity of Data Reviewed  Labs: ordered. Decision-making details documented in ED Course.  Radiology: ordered and independent interpretation performed.    Risk  OTC drugs.  Prescription drug management.  Parenteral controlled substances.  Decision regarding hospitalization.        Final diagnoses:   Hydronephrosis with urinary obstruction due to ureteral calculus   Nephrolithiasis   Acute urinary tract infection   Other ascites   Ileus   Symptomatic HIV infection       ED Disposition  ED Disposition       ED Disposition   Decision to Admit    Condition   --    Comment   Level of Care: Med/Surg [1]   Diagnosis: Hydronephrosis with obstructing calculus [002683]   Admitting Physician: DOMINGA BANKS [[id number]]   Attending Physician: DOMINGA BANKS [[id number]]                 No follow-up provider specified.       Medication List      No changes were made to your prescriptions during this visit.            Tj Campbell MD  05/30/24 0001

## 2024-05-30 NOTE — OP NOTE
Urology Operative Note    5/30/2024    Felipe Nieves  58 y.o.  1965  male  [id number]      Surgeon(s) and Role:  Wale Taylor MD - Primary     Pre-operative Diagnosis: 4 mm left UVJ stone, 3 mm right renal stone    Post-operative Diagnosis: Same    Complications: None    Procedures:  Cystoscopy  Left ureteroscopy  Laser Lithotripsy  Basket-extraction of stone fragments  Stent placement  Fluoroscopy with Interpretation     Indications   Felipe Nieves is a 58 y.o. male who was found to have 4 mm left UVJ stone admitted and added on for intervention    During the informed consent process, the procedure was discussed in detail including the risks of bleeding, infection, damage to surrounding structures and need for staged procedure.      Findings     Fluoroscopy with interpretation: Wire placed the upper pole the kidney.  Stent placed with redundant curl in the kidney    Description of procedure:  The patient was properly identified in the preoperative holding area and taken to the operating room where general anesthesia was induced. The patient was placed in the cystolithotomy position and prepped and draped in a sterile fashion. The patient was given antibiotics intravenously before the start of the surgery. After ensuring that all of the required equipment was ready and available a surgical timeout was performed.     A 21 Croatian rigid cystoscope was inserted into the bladder under direct visualization.   A Sensor wire was passed up the ureter under fluoroscopic guidance.  Dual-lumen used to dilate the UVJ  Semirigid ureteroscope was passed into the ureter. The stone was visualized. The stone was lasered into tiny fragments.   Basket was used to clear any remaining fragments.   No more stones were seen within the ureter under direct visualization.   The cystoscope was then replaced over the wire and a 7 Croatian x multi cm stent was placed under direct and fluoroscopic visualization.  The bladder was then  emptied and scope removed.    There were no apparent complications. The patient woke up in the operating room and was taken to the recovery room in stable condition.     I was present and scrubbed for the entire procedure.     Specimens: Stone    Estimated Blood Loss: minimal      Plan   -Okay for discharge today, he may remove his stent via the string at home on Monday or in our office  -Follow-up 2 months renal ultrasound and KUB prior         Wale Taylor MD  [facility]  [facility], [address]  [place] [address] [phone]

## 2024-05-30 NOTE — CASE MANAGEMENT/SOCIAL WORK
Social Work Assessment  [facility]     Patient Name: Felipe Nieves  MRN: [id number]  Today's Date: 5/30/2024    Admit Date: 5/29/2024         Discharge Plan       Row Name 05/30/24 1441       Plan    Plan Comments LSW met with Pt at bedside. Pt was asleep and opened eyes briefly for conversation. Pt stated he wanted to eat and has not eaten in two days. LSW discussed resources with Pt and provided him a guide for utility and food assistance for [facility]. No further needs identified.        JULIETA Howard, MSW    Phone: [phone]  Fax: [phone]  Email: [email or web address]

## 2024-05-30 NOTE — ANESTHESIA POSTPROCEDURE EVALUATION
Patient: Felipe Nieves    Procedure Summary       Date: 05/30/24 Room / Location: [facility] OR 07 / [facility] MAIN OR    Anesthesia Start: 1622 Anesthesia Stop: 1658    Procedure: CYSTOSCOPY URETEROSCOPY HOLMIUM LASER STENT INSERTION (Left) Diagnosis:       Hydronephrosis with urinary obstruction due to ureteral calculus      (Hydronephrosis with urinary obstruction due to ureteral calculus [N13.2])    Surgeons: Wale Taylor MD Provider: Shahnaz Gonsales MD    Anesthesia Type: general ASA Status: 3            Anesthesia Type: general    Vitals  Vitals Value Taken Time   BP 90/50 05/30/24 1704   Temp     Pulse 87 05/30/24 1704   Resp     SpO2 100 % 05/30/24 1704   Vitals shown include unfiled device data.        Post Anesthesia Care and Evaluation    Patient location during evaluation: PACU  Patient participation: complete - patient participated  Level of consciousness: awake and alert  Pain management: satisfactory to patient    Airway patency: patent  Anesthetic complications: No anesthetic complications  PONV Status: none  Cardiovascular status: acceptable  Respiratory status: acceptable  Hydration status: acceptable

## 2024-05-30 NOTE — CASE MANAGEMENT/SOCIAL WORK
Discharge Planning Assessment   [facility]     Patient Name: Felipe Nieves  MRN: [id number]  Today's Date: 5/30/2024    Admit Date: 5/29/2024    Plan: Routine home   Discharge Needs Assessment       Row Name 05/30/24 1302       Living Environment    People in Home spouse    Name(s) of People in Home Spouse Chandler    Current Living Arrangements home    Potentially Unsafe Housing Conditions none    In the past 12 months has the electric, gas, oil, or water company threatened to shut off services in your home? No    Primary Care Provided by self    Provides Primary Care For no one    Family Caregiver if Needed spouse    Family Caregiver Names Chandler    Quality of Family Relationships unable to assess  Patient states he does not know where spouse is and cannot get ahold of him.    Able to Return to Prior Arrangements yes       Resource/Environmental Concerns    Resource/Environmental Concerns financial    Financial Concerns food, unable to afford;rent or mortgage, unable to afford    Transportation Concerns none       Transportation Needs    In the past 12 months, has lack of transportation kept you from medical appointments or from getting medications? no    In the past 12 months, has lack of transportation kept you from meetings, work, or from getting things needed for daily living? No       Food Insecurity    Within the past 12 months, you worried that your food would run out before you got the money to buy more. Sometimes    Within the past 12 months, the food you bought just didn't last and you didn't have money to get more. Sometimes       Transition Planning    Patient/Family Anticipates Transition to home with family    Patient/Family Anticipated Services at Transition none    Transportation Anticipated car, drives self;family or friend will provide       Discharge Needs Assessment    Readmission Within the Last 30 Days no previous admission in last 30 days    Anticipated Changes Related to Illness none    Equipment  Needed After Discharge none                   Discharge Plan       Row Name 05/30/24 1304       Plan    Plan Routine home    Plan Comments CM met with patient at the bedside. Confirmed insurance and pharmacy. Patient is not currently established with a PCP but is in progress with trying to get in to Dr. Jose Rosa's office. Declined to have CM arrange another PCP at this time. Patient denies any difficulty affording medications. Patient is not current with any HHC/OPPT/OT services. Patient lives at home with spouse, is IADLS, and drives. May need help arranging DC transport. DC Barriers: GI and Urology following, cysto today, IV abx, IV pain medicine, NPO.                  Continued Care and Services - Admitted Since 5/29/2024    No active coordination exists for this encounter.       Expected Discharge Date and Time       Expected Discharge Date Expected Discharge Time    May 31, 2024            Demographic Summary       Row Name 05/30/24 1301       General Information    Admission Type inpatient    Arrived From emergency department    Referral Source admission list    Reason for Consult discharge planning    Preferred Language English       Contact Information    Permission Granted to Share Info With     Contact Information Obtained for                    Functional Status       Row Name 05/30/24 1302       Functional Status    Usual Activity Tolerance moderate    Current Activity Tolerance moderate       Functional Status, IADL    Medications independent    Meal Preparation independent    Housekeeping independent    Laundry independent    Shopping independent       Mental Status    General Appearance WDL WDL       Mental Status Summary    Recent Changes in Mental Status/Cognitive Functioning no changes           Jaquan Hansen RN     Cell number [phone]  Office number [phone]

## 2024-05-30 NOTE — CONSULTS
GI CONSULT  NOTE:    Referring Provider:  Dr. Xiong    Chief complaint: Rectal bleeding, anemia    Subjective .     History of present illness: Felipe Nieves is a 58 y.o. male with history of HIV and skin cancer who presents with complaints of left flank pain.  He has been diagnosed with UTI and left UVJ stone.  Urology has been consulted.  GI has been asked to consult due to findings of anemia and reports of intermittent rectal bleeding.  The patient reports that he has been having intermittent rectal bleeding as an outpatient for quite some time.  Denies melena.  States that he tends to be constipated at home and is not currently on any bowel regimen.  He does complain of some left-sided abdominal pain and left flank pain.  Unable to identify any exacerbating or alleviating factors.  Denies any nausea/vomiting, heartburn, or dysphagia.  No unintentional weight loss.  Does report some scant hematuria recently.      Endo History:  No previous endoscopy.    Past Medical History:  Past Medical History:   Diagnosis Date    HIV (human immunodeficiency virus infection)     Skin cancer        Past Surgical History:  Past Surgical History:   Procedure Laterality Date    HERNIA REPAIR      SKIN CANCER EXCISION         Social History:  Social History     Tobacco Use    Smoking status: Never    Smokeless tobacco: Never   Vaping Use    Vaping status: Never Used   Substance Use Topics    Alcohol use: Not Currently    Drug use: Defer       Family History:  Family History   Problem Relation Age of Onset    Heart disease Mother     Cancer Mother     Heart disease Father     Diabetes Brother     Heart disease Brother        Medications:  Medications Prior to Admission   Medication Sig Dispense Refill Last Dose    Bictegravir-Emtricitab-Tenofov (Biktarvy) -25 MG per tablet Take 1 tablet by mouth Daily.   5/29/2024    ergocalciferol (ERGOCALCIFEROL) 1.25 MG (76327 UT) capsule Take 1 capsule by mouth 1 (One) Time Per Week.    5/21/2024    ondansetron (ZOFRAN) 4 MG tablet Take 1 tablet by mouth Every 8 (Eight) Hours As Needed.   5/29/2024       Scheduled Meds:Bictegravir-Emtricitab-Tenofov, 1 tablet, Oral, Daily  [START ON 5/31/2024] cefTRIAXone (ROCEPHIN) 2,000 mg in sodium chloride 0.9 % 100 mL MBP, 2,000 mg, Intravenous, Q24H  sodium chloride, 10 mL, Intravenous, Q12H      Continuous Infusions:sodium chloride, 75 mL/hr, Last Rate: 75 mL/hr (05/30/24 0905)      PRN Meds:.  Calcium Replacement - Follow Nurse / BPA Driven Protocol    HYDROmorphone    Magnesium Standard Dose Replacement - Follow Nurse / BPA Driven Protocol    nitroglycerin    ondansetron ODT **OR** ondansetron    Phosphorus Replacement - Follow Nurse / BPA Driven Protocol    Potassium Replacement - Follow Nurse / BPA Driven Protocol    sodium chloride    sodium chloride    sodium chloride    ALLERGIES:  Patient has no known allergies.    ROS:  The following systems were reviewed and negative;   Constitution:  No fevers, chills, no unintentional weight loss  Skin: no rash, no jaundice  Eyes:  No blurry vision, no eye pain  HENT:  No change in hearing or smell  Resp:  No dyspnea or cough  CV:  No chest pain or palpitations  :  No dysuria, hematuria  Musculoskeletal:  No leg cramps or arthralgias  Neuro:  No tremor, no numbness  Psych:  No depression or confusion    Objective     Vital Signs:   Vitals:    05/30/24 0100 05/30/24 0223 05/30/24 0400 05/30/24 0800   BP:  110/71 107/70 117/68   BP Location:  Right arm Right arm Right arm   Patient Position:    Lying   Pulse:  85 86 88   Resp:  14 18 20   Temp:  98.1 °F (36.7 °C) 97.9 °F (36.6 °C) 98.3 °F (36.8 °C)   TempSrc:  Oral  Oral   SpO2:  100% 99% 99%   Weight: 57 kg (125 lb 10.6 oz)      Height:           Physical Exam:       General Appearance:    Awake and alert, in no acute distress   Head:    Normocephalic, without obvious abnormality, atraumatic   Throat:   No oral lesions, no thrush, oral mucosa moist   Lungs:      "Respirations regular, even and unlabored   Chest Wall:    No abnormalities observed   Abdomen:     Soft, mild left-sided tenderness, no rebound or guarding, non-distended   Rectal:     Deferred   Extremities:   Moves all extremities, no edema, no cyanosis   Pulses:   Pulses palpable and equal bilaterally   Skin:   No rash, no jaundice, normal palpation   Lymph nodes:   No cervical, supraclavicular or submandibular palpable adenopathy   Neurologic:   Cranial nerves 2 - 12 grossly intact, no asterixis       Results Review:   I reviewed the patient's labs and imaging.  CBC    Results from last 7 days   Lab Units 05/30/24  0925 05/30/24 0442 05/29/24  1929   WBC 10*3/mm3  --  9.39 8.69   HEMOGLOBIN g/dL 8.1* 6.7* 8.7*   PLATELETS 10*3/mm3  --  710* 718*     CMP   Results from last 7 days   Lab Units 05/30/24  0442 05/30/24  0019 05/30/24  0003 05/29/24  1929   SODIUM mmol/L 136  --   --  136   POTASSIUM mmol/L 4.6  --   --  4.4   CHLORIDE mmol/L 103  --   --  101   CO2 mmol/L 26.0  --   --  26.0   BUN mg/dL 17  --   --  17   CREATININE mg/dL 1.16  --   --  1.12   GLUCOSE mg/dL 85  --   --  101*   ALBUMIN g/dL  --   --   --  2.9*   BILIRUBIN mg/dL  --   --   --  0.2   ALK PHOS U/L  --   --   --  114   AST (SGOT) U/L  --   --   --  27   ALT (SGPT) U/L  --   --   --  28   LIPASE U/L  --  14  --   --    AMMONIA umol/L  --   --  22  --      Cr Clearance Estimated Creatinine Clearance: 57.3 mL/min (by C-G formula based on SCr of 1.16 mg/dL).  Coag   Results from last 7 days   Lab Units 05/30/24  0003   INR  1.07     HbA1C No results found for: \"HGBA1C\"  Blood Glucose No results found for: \"POCGLU\"  Infection     UA    Results from last 7 days   Lab Units 05/29/24  2141   NITRITE UA  Positive*   WBC UA /HPF Too Numerous to Count*   BACTERIA UA /HPF 4+*   SQUAM EPITHEL UA /HPF 0-2     Imaging Results (Last 72 Hours)       Procedure Component Value Units Date/Time    CT Abdomen Pelvis Without Contrast [[id number]] Collected: " 05/29/24 2308     Updated: 05/29/24 2314    Narrative:      CT ABDOMEN PELVIS WO CONTRAST    Date of Exam: 5/29/2024 10:55 PM EDT    Indication: Flank pain, stone protocol.    Comparison: 7/19/2022.    Technique: Axial CT images were obtained of the abdomen and pelvis without the administration of contrast. Sagittal and coronal reconstructions were performed.  Automated exposure control and iterative reconstruction methods were used.      Findings:  The lung bases are clear. Distal esophagus is unremarkable. Heart size is normal. There are no acute findings in the superficial soft tissues. No acute osseous abnormality or destructive bone lesion. There are mild lower lumbar degenerative changes.    The liver, gallbladder, bile ducts, pancreas, stomach, duodenum, spleen and adrenal glands appear within normal limits. There is a 3 mm nonobstructing stone at the superior right kidney and there is a punctate nonobstructing stone in the inferior right   kidney. There is a 4 mm obstructing stone at the left UVJ with mild left-sided hydronephrosis. Urinary bladder is nondistended. Prostate is normal size. The appendix is not seen. Aorta is normal diameter. No small bowel distention. There is a small   volume of scattered ascites. There is mild diffuse mesenteric congestion. There are multiple mildly dilated small bowel loops measuring up to 30 mm. There is a gas-filled nondistended colon. The transverse colon measures up to 5 cm and the cecum measures   up to 7.3 cm. There is a large amount of bowel gas. No obvious obstructing lesion.      Impression:      Impression:    1. 4 mm obstructing stone at the left UVJ with mild hydronephrosis. 3 mm nonobstructing right-sided kidney stone.  2. New small volume scattered ascites with unclear etiology.  3. Gas-filled mildly distended small bowel and gas-filled nondistended colon. There is no obvious obstructing lesion. This could be related to ileus.            Electronically  Signed: Wale Schroeder MD    5/29/2024 11:12 PM EDT    Workstation ID: [id number]            ASSESSMENT:  -Iron deficiency anemia  -Rectal bleeding  -Abnormal CT showing possible ileus  -Left-sided abdominal pain/left flank pain  -Left UVJ stone  -UTI  -HIV  -History of skin cancer    PLAN:  Patient is a 58-year-old male with history of HIV who presented on 5/29 with complaints of left flank pain.    CT abdomen/pelvis WO shows 4 mm obstructing stone at the left UVJ with mild hydronephrosis, new small volume of ascites, and possible ileus.  Hemoglobin 6.7 from 8.7.  Plan PRBC transfusion.  Continue to monitor H/H and transfuse as needed.  MCV 77.8, serum iron 17, iron saturation 3%.  Plan IV iron infusion.  Patient would benefit from EGD/colonoscopy due to complaints of iron deficiency anemia.  However, will await urology input prior to proceeding.  Clear liquid diet.  Start daily bowel regimen.  Analgesics as needed.  Start PPI.  New ascites noted on CT.  Unlikely that there is enough fluid for paracentesis.  LFTs and INR normal.  Platelets elevated at 710.  Will plan RUQ US to assess for possible cirrhosis despite unremarkable labs.  Supportive care.      I discussed the patients findings and my recommendations with the patient.  I will discuss case with Dr. Gant and change plan accordingly.    We appreciate the referral.    Electronically signed by DRU Fields, 05/30/24, 11:38 AM EDT.

## 2024-05-30 NOTE — ANESTHESIA PROCEDURE NOTES
Airway  Date/Time: 5/30/2024 4:33 PM    Indications and Patient Condition  Indications for airway management: airway protection    Preoxygenated: yes  MILS maintained throughout  Mask difficulty assessment: 0 - not attempted    Final Airway Details  Final airway type: endotracheal airway      Successful airway: ETT     Successful intubation technique: video laryngoscopy  Facilitating devices/methods: intubating stylet  Endotracheal tube insertion site: oral  Blade: Arias  ETT size (mm): 7.5  Cormack-Lehane Classification: grade I - full view of glottis  Measured from: teeth  ETT/EBT  to teeth (cm): 22  Number of attempts at approach: 1  Assessment: lips, teeth, and gum same as pre-op and atraumatic intubation

## 2024-05-30 NOTE — ANESTHESIA PREPROCEDURE EVALUATION
Anesthesia Evaluation     Patient summary reviewed and Nursing notes reviewed   NPO Solid Status: > 8 hours  NPO Liquid Status: > 8 hours           Airway   Mallampati: II  TM distance: >3 FB  Neck ROM: full  No difficulty expected  Dental - normal exam     Pulmonary - normal exam   Cardiovascular - normal exam    ECG reviewed        Neuro/Psych  GI/Hepatic/Renal/Endo    (+) renal disease-    Musculoskeletal     Abdominal  - normal exam    Bowel sounds: normal.   Substance History      OB/GYN          Other   blood dyscrasia anemia,   history of cancer    ROS/Med Hx Other: HIV    Sinus tachycardia  Consider left ventricular hypertrophy                      Anesthesia Plan    ASA 3     general     intravenous induction     Anesthetic plan, risks, benefits, and alternatives have been provided, discussed and informed consent has been obtained with: patient.    Plan discussed with CRNA.        CODE STATUS:

## 2024-05-30 NOTE — CONSULTS
[facility] CONSULT      Patient Identification:  NAME:  Felipe Nieves  Age:  58 y.o.   Sex:  male   :  1965   MRN:  [id number]       Chief complaint/Reason for consult: Left distal stone    History of present illness:  58 y.o. male history of stones states he has passed them on his own.  History of HIV.  Has a 4 mm left UVJ stone and 3 mm right-sided renal stone.  Urinalysis with possible UTI.  No fevers or sepsis      Past medical history:  Past Medical History:   Diagnosis Date    HIV (human immunodeficiency virus infection)     Skin cancer        Past surgical history:  Past Surgical History:   Procedure Laterality Date    HERNIA REPAIR      SKIN CANCER EXCISION         Allergies:  Patient has no known allergies.    Home medications:  Medications Prior to Admission   Medication Sig Dispense Refill Last Dose    Bictegravir-Emtricitab-Tenofov (Biktarvy) -25 MG per tablet Take 1 tablet by mouth Daily.   2024    ergocalciferol (ERGOCALCIFEROL) 1.25 MG (48245 UT) capsule Take 1 capsule by mouth 1 (One) Time Per Week.   2024    ondansetron (ZOFRAN) 4 MG tablet Take 1 tablet by mouth Every 8 (Eight) Hours As Needed.   2024        Hospital medications:  Bictegravir-Emtricitab-Tenofov, 1 tablet, Oral, Daily  [START ON 2024] cefTRIAXone (ROCEPHIN) 2,000 mg in sodium chloride 0.9 % 100 mL MBP, 2,000 mg, Intravenous, Q24H  [Transfer Hold] pantoprazole, 40 mg, Intravenous, BID AC  [Transfer Hold] polyethylene glycol, 17 g, Oral, Daily  sodium chloride, 10 mL, Intravenous, Q12H      sodium chloride, 75 mL/hr, Last Rate: 75 mL/hr (24 0905)        Calcium Replacement - Follow Nurse / BPA Driven Protocol    HYDROmorphone    Magnesium Standard Dose Replacement - Follow Nurse / BPA Driven Protocol    nitroglycerin    ondansetron ODT **OR** ondansetron    Phosphorus Replacement - Follow Nurse / BPA Driven Protocol    Potassium Replacement - Follow Nurse / BPA Driven Protocol     sodium chloride    sodium chloride    sodium chloride    Family history:  Family History   Problem Relation Age of Onset    Heart disease Mother     Cancer Mother     Heart disease Father     Diabetes Brother     Heart disease Brother        Social history:  Social History     Tobacco Use    Smoking status: Never    Smokeless tobacco: Never   Vaping Use    Vaping status: Never Used   Substance Use Topics    Alcohol use: Not Currently    Drug use: Defer       Objective:  TMax 24 hours:   Temp (24hrs), Av.6 °F (37 °C), Min:97.9 °F (36.6 °C), Max:99.9 °F (37.7 °C)      Vitals Ranges:   Temp:  [97.9 °F (36.6 °C)-99.9 °F (37.7 °C)] 99.9 °F (37.7 °C)  Heart Rate:  [] 97  Resp:  [14-21] 18  BP: (103-143)/(68-97) 121/71    Intake/Output Last 3 shifts:  I/O last 3 completed shifts:  In: 1000 [IV Piggyback:1000]  Out: -      Physical Exam:    General Appearance:    Alert, cooperative, NAD   Lungs:     Respirations unlabored, no audible wheezing    Heart:    No cyanosis   Abdomen:     Soft, ND    :    No suprapubic distention              Results review:   I reviewed the patient's new clinical results.    Data review:  Lab Results (last 24 hours)       Procedure Component Value Units Date/Time    RPR Titer [[id number]]  (Abnormal) Collected: 24 0003    Specimen: Blood Updated: 24 1401     RPR Titer Pos 1:1    RPR [[id number]]  (Abnormal) Collected: 24 0003    Specimen: Blood Updated: 24 1400     RPR Reactive    Treponema Pallidum, Confirmation [[id number]] Collected: 24 0003    Specimen: Blood Updated: 24 1400    Urine Culture - Urine, Urine, Clean Catch [[id number]]  (Normal) Collected: 24 2141    Specimen: Urine, Clean Catch Updated: 24 1146     Urine Culture Culture in progress    Hemoglobin & Hematocrit, Blood [[id number]]  (Abnormal) Collected: 24 0925    Specimen: Blood Updated: 24 1025     Hemoglobin 8.1 g/dL      Hematocrit 27.7 %      Comment: Result  checked         Extra Tubes [[id number]] Collected: 05/30/24 0925    Specimen: Blood Updated: 05/30/24 1015    Narrative:      The following orders were created for panel order Extra Tubes.  Procedure                               Abnormality         Status                     ---------                               -----------         ------                     Lavender Top[[id number]]                                     Final result               Green Top (Gel)[[id number]]                                  Final result               Light Blue Top[[id number]]                                   Final result                 Please view results for these tests on the individual orders.    Lavender Top [[id number]] Collected: 05/30/24 0925    Specimen: Blood Updated: 05/30/24 1015     Extra Tube hold for add-on     Comment: Auto resulted       Green Top (Gel) [[id number]] Collected: 05/30/24 0925    Specimen: Blood Updated: 05/30/24 1015     Extra Tube Hold for add-ons.     Comment: Auto resulted.       Light Blue Top [[id number]] Collected: 05/30/24 0925    Specimen: Blood Updated: 05/30/24 1015     Extra Tube Hold for add-ons.     Comment: Auto resulted       Vitamin B12 [[id number]]  (Normal) Collected: 05/30/24 0019    Specimen: Blood Updated: 05/30/24 0945     Vitamin B-12 308 pg/mL     Narrative:      Results may be falsely increased if patient taking Biotin.      Lactate Dehydrogenase [[id number]]  (Normal) Collected: 05/30/24 0019    Specimen: Blood Updated: 05/30/24 0929      U/L     Basic Metabolic Panel [[id number]]  (Abnormal) Collected: 05/30/24 0442    Specimen: Blood Updated: 05/30/24 0514     Glucose 85 mg/dL      BUN 17 mg/dL      Creatinine 1.16 mg/dL      Sodium 136 mmol/L      Potassium 4.6 mmol/L      Chloride 103 mmol/L      CO2 26.0 mmol/L      Calcium 8.3 mg/dL      BUN/Creatinine Ratio 14.7     Anion Gap 7.0 mmol/L      eGFR 73.9 mL/min/1.73     Narrative:      GFR Normal >60  Chronic Kidney  Disease <60  Kidney Failure <15      CBC (No Diff) [[id number]]  (Abnormal) Collected: 05/30/24 0442    Specimen: Blood Updated: 05/30/24 0507     WBC 9.39 10*3/mm3      RBC 2.93 10*6/mm3      Hemoglobin 6.7 g/dL      Hematocrit 22.8 %      MCV 77.8 fL      MCH 22.9 pg      MCHC 29.4 g/dL      RDW 15.5 %      RDW-SD 43.7 fl      MPV 9.1 fL      Platelets 710 10*3/mm3     Ferritin [[id number]]  (Normal) Collected: 05/30/24 0019    Specimen: Blood Updated: 05/30/24 0122     Ferritin 155.00 ng/mL     Narrative:      Results may be falsely decreased if patient taking Biotin.      Blood Culture - Blood, Arm, Left [[id number]] Collected: 05/30/24 0045    Specimen: Blood from Arm, Left Updated: 05/30/24 0057    T-helper Cells CD4 / CD8 % [[id number]] Collected: 05/30/24 0045    Specimen: Blood Updated: 05/30/24 0056    Lipase [[id number]]  (Normal) Collected: 05/30/24 0019    Specimen: Blood Updated: 05/30/24 0052     Lipase 14 U/L     Iron Profile [[id number]]  (Abnormal) Collected: 05/30/24 0019    Specimen: Blood Updated: 05/30/24 0052     Iron 7 mcg/dL      Iron Saturation (TSAT) 3 %      Transferrin 170 mg/dL      TIBC 253 mcg/dL     Reticulocytes [[id number]]  (Normal) Collected: 05/30/24 0019    Specimen: Blood Updated: 05/30/24 0029     Reticulocyte % 0.81 %      Reticulocyte Absolute 0.0292 10*6/mm3     Ammonia [[id number]]  (Normal) Collected: 05/30/24 0003    Specimen: Blood Updated: 05/30/24 0029     Ammonia 22 umol/L     Folate RBC [[id number]] Collected: 05/30/24 0019    Specimen: Blood Updated: 05/30/24 0026    Blood Culture - Blood, Arm, Left [[id number]] Collected: 05/30/24 0019    Specimen: Blood from Arm, Left Updated: 05/30/24 0026    Protime-INR [[id number]]  (Normal) Collected: 05/30/24 0003    Specimen: Blood Updated: 05/30/24 0021     Protime 11.6 Seconds      INR 1.07    HIV-1 RNA, Quantitative, PCR (graph) [[id number]] Collected: 05/30/24 0003    Specimen: Blood Updated: 05/30/24 0008    Urinalysis,  Microscopic Only - Urine, Clean Catch [[id number]]  (Abnormal) Collected: 05/29/24 2141    Specimen: Urine, Clean Catch Updated: 05/29/24 2214     RBC, UA 0-2 /HPF      WBC, UA Too Numerous to Count /HPF      Bacteria, UA 4+ /HPF      Squamous Epithelial Cells, UA 0-2 /HPF      Hyaline Casts, UA 3-6 /LPF      Calcium Oxalate Crystals, UA Small/1+ /HPF      Methodology Manual Light Microscopy    Urinalysis With Culture If Indicated - Urine, Clean Catch [[id number]]  (Abnormal) Collected: 05/29/24 2141    Specimen: Urine, Clean Catch Updated: 05/29/24 2151     Color, UA Dark Yellow     Appearance, UA Cloudy     Comment: Result checked          pH, UA <=5.0     Specific Gravity, UA 1.019     Glucose, UA Negative     Ketones, UA Trace     Bilirubin, UA Negative     Blood, UA Moderate (2+)     Protein, UA 30 mg/dL (1+)     Leuk Esterase, UA Moderate (2+)     Nitrite, UA Positive     Urobilinogen, UA 0.2 E.U./dL    Narrative:      In absence of clinical symptoms, the presence of pyuria, bacteria, and/or nitrites on the urinalysis result does not correlate with infection.    CBC & Differential [[id number]]  (Abnormal) Collected: 05/29/24 1929    Specimen: Blood Updated: 05/29/24 2053    Narrative:      The following orders were created for panel order CBC & Differential.  Procedure                               Abnormality         Status                     ---------                               -----------         ------                     CBC Auto Differential[[id number]]        Abnormal            Final result                 Please view results for these tests on the individual orders.    CBC Auto Differential [[id number]]  (Abnormal) Collected: 05/29/24 1929    Specimen: Blood Updated: 05/29/24 2053     WBC 8.69 10*3/mm3      RBC 3.87 10*6/mm3      Hemoglobin 8.7 g/dL      Hematocrit 29.8 %      MCV 77.0 fL      MCH 22.5 pg      MCHC 29.2 g/dL      RDW 15.4 %      RDW-SD 42.4 fl      MPV 9.0 fL      Platelets 718  10*3/mm3      Neutrophil % 73.6 %      Lymphocyte % 15.5 %      Monocyte % 8.9 %      Eosinophil % 1.3 %      Basophil % 0.5 %      Immature Grans % 0.2 %      Neutrophils, Absolute 6.40 10*3/mm3      Lymphocytes, Absolute 1.35 10*3/mm3      Monocytes, Absolute 0.77 10*3/mm3      Eosinophils, Absolute 0.11 10*3/mm3      Basophils, Absolute 0.04 10*3/mm3      Immature Grans, Absolute 0.02 10*3/mm3      nRBC 0.0 /100 WBC     Comprehensive Metabolic Panel [[id number]]  (Abnormal) Collected: 05/29/24 1929    Specimen: Blood Updated: 05/29/24 2046     Glucose 101 mg/dL      BUN 17 mg/dL      Creatinine 1.12 mg/dL      Sodium 136 mmol/L      Potassium 4.4 mmol/L      Chloride 101 mmol/L      CO2 26.0 mmol/L      Calcium 8.7 mg/dL      Total Protein 8.3 g/dL      Albumin 2.9 g/dL      ALT (SGPT) 28 U/L      AST (SGOT) 27 U/L      Alkaline Phosphatase 114 U/L      Total Bilirubin 0.2 mg/dL      Globulin 5.4 gm/dL      A/G Ratio 0.5 g/dL      BUN/Creatinine Ratio 15.2     Anion Gap 9.0 mmol/L      eGFR 77.1 mL/min/1.73     Narrative:      GFR Normal >60  Chronic Kidney Disease <60  Kidney Failure <15      [place] Draw [[id number]] Collected: 05/29/24 1929    Specimen: Blood Updated: 05/29/24 1946    Narrative:      The following orders were created for panel order [place] Draw.  Procedure                               Abnormality         Status                     ---------                               -----------         ------                     Green Top (Gel)[[id number]]                                  Final result               Lavender Top[[id number]]                                     Final result               Gold Top - SST[[id number]]                                   Final result               Light Blue Top[[id number]]                                   Final result                 Please view results for these tests on the individual orders.    Green Top (Gel) [[id number]] Collected: 05/29/24 1929    Specimen: Blood  Updated: 05/29/24 1946     Extra Tube Hold for add-ons.     Comment: Auto resulted.       Gold Top - SST [[id number]] Collected: 05/29/24 1929    Specimen: Blood Updated: 05/29/24 1946     Extra Tube Hold for add-ons.     Comment: Auto resulted.       Lavender Top [[id number]] Collected: 05/29/24 1929    Specimen: Blood Updated: 05/29/24 1946     Extra Tube hold for add-on     Comment: Auto resulted       Light Blue Top [[id number]] Collected: 05/29/24 1929    Specimen: Blood Updated: 05/29/24 1946     Extra Tube Hold for add-ons.     Comment: Auto resulted                Imaging:  Imaging Results (Last 24 Hours)       Procedure Component Value Units Date/Time    US Liver [[id number]] Collected: 05/30/24 1433     Updated: 05/30/24 1437    Narrative:      US LIVER    Date of Exam: 5/30/2024 1:35 PM EDT    Indication: New ascites, assess for cirrhosis.    Comparison: No comparisons available.    Technique: Grayscale and color Doppler ultrasound evaluation of the liver was performed.      Findings:  Homogeneous hepatic echotexture. Mild increase hepatic echogenicity. No discrete contour irregularity. Hepatopetal flow of main portal vein. Trace anechoic perihepatic ascites. No visualized intrapelvic duct dilation. Common bile duct measures up to 4   mm.      Impression:      Impression:  1. Mild increase hepatic echogenicity with otherwise homogeneous echotexture. This is suggestive but not diagnostic of mild steatosis.  2. Homogeneous hepatic echotexture without discrete contour irregularity.  3. Trace perihepatic ascites.        Electronically Signed: Aris Key MD    5/30/2024 2:35 PM EDT    Workstation ID: [id number]    CT Abdomen Pelvis Without Contrast [[id number]] Collected: 05/29/24 2308     Updated: 05/29/24 2314    Narrative:      CT ABDOMEN PELVIS WO CONTRAST    Date of Exam: 5/29/2024 10:55 PM EDT    Indication: Flank pain, stone protocol.    Comparison: 7/19/2022.    Technique: Axial CT images were obtained  of the abdomen and pelvis without the administration of contrast. Sagittal and coronal reconstructions were performed.  Automated exposure control and iterative reconstruction methods were used.      Findings:  The lung bases are clear. Distal esophagus is unremarkable. Heart size is normal. There are no acute findings in the superficial soft tissues. No acute osseous abnormality or destructive bone lesion. There are mild lower lumbar degenerative changes.    The liver, gallbladder, bile ducts, pancreas, stomach, duodenum, spleen and adrenal glands appear within normal limits. There is a 3 mm nonobstructing stone at the superior right kidney and there is a punctate nonobstructing stone in the inferior right   kidney. There is a 4 mm obstructing stone at the left UVJ with mild left-sided hydronephrosis. Urinary bladder is nondistended. Prostate is normal size. The appendix is not seen. Aorta is normal diameter. No small bowel distention. There is a small   volume of scattered ascites. There is mild diffuse mesenteric congestion. There are multiple mildly dilated small bowel loops measuring up to 30 mm. There is a gas-filled nondistended colon. The transverse colon measures up to 5 cm and the cecum measures   up to 7.3 cm. There is a large amount of bowel gas. No obvious obstructing lesion.      Impression:      Impression:    1. 4 mm obstructing stone at the left UVJ with mild hydronephrosis. 3 mm nonobstructing right-sided kidney stone.  2. New small volume scattered ascites with unclear etiology.  3. Gas-filled mildly distended small bowel and gas-filled nondistended colon. There is no obvious obstructing lesion. This could be related to ileus.            Electronically Signed: Wale Schroeder MD    5/29/2024 11:12 PM EDT    Workstation ID: [id number]               Assessment:       Hydronephrosis with obstructing calculus    Hydronephrosis with urinary obstruction due to ureteral calculus      4 mm left UVJ stone  3  mm right renal stone  Possible UTI    Plan:     CT images reviewed obstructing left distal stone  Urinalysis possible UTI but no leukocytosis fevers or sepsis  Continue antibiotics and follow cultures  Add on today left ureteroscopy laser lithotripsy and stent placement, we will just place stent if gross infection present  All risks, benefits and alternatives to surgery were discussed with the patient preoperatively including but not limited to need for multiple procedures, infection, sepsis, bleeding, risk of anesthesia and damage to other organs. The details of the procedure have been fully reviewed with the patient and informed consent has been obtained.      Wale Taylor MD  [facility]  [facility], [address]  [place] [address]  Office: [phone]  Available via [employer] Secure Chat  05/30/24  16:26 EDT

## 2024-05-30 NOTE — SIGNIFICANT NOTE
#Anemia    - hgb 8.7 > 6.7    - admits to chronic rectal bleeding    - on ppi bid    - type and screen ordered at admission    - BP stable     - Patient has declined blood transfusions despite R/B/A, he is of sound mind and judgement    - patient upset that he is NPO, explained that procedure may be necessary and should he eat it could delay necessary procedures    - continue current plan    Electronically signed by Wilfrid Xiong DO, 05/30/24, 5:46 AM EDT.

## 2024-05-30 NOTE — PAYOR COMM NOTE
"Observation order 5/29/24  Inpatient order 5/30/24    ER admit -   MD notes and clinical attached.   ===========  AUTHORIZATION PENDING:   PLEASE FAX OR CALL DETERMINATION TO CONTACT BELOW:       THANK YOU,    PHANI Reina, RN  Utilization Review  [facility]  Phone: [phone]  Fax: [phone]      NPI: [id number]  Tax ID: [id number]      Jalil Nieves (58 y.o. Male)       Date of Birth   1965    Social Security Number       Address   [address]  [place] [address]    Home Phone   [phone]    MRN   [id number]       Jain   None    Marital Status                               Admission Date   5/29/24    Admission Type   Emergency    Admitting Provider   Wilfrid Xiong DO    Attending Provider   Rosendo Newton MD    Department, Room/Bed   [facility], 1115/1       Discharge Date       Discharge Disposition       Discharge Destination                                 Attending Provider: Rosendo Newton MD    Allergies: No Known Allergies    Isolation: None   Infection: None   Code Status: Not on file    Ht: 170.2 cm (67\")   Wt: 57 kg (125 lb 10.6 oz)    Admission Cmt: None   Principal Problem: Hydronephrosis with obstructing calculus [N13.2]                   Active Insurance as of 5/29/2024       Primary Coverage       Payor Plan Insurance Group Employer/Plan Group    ANTHEM BLUE CROSS ANTHEM BLUE CROSS BLUE SHIELD PPO [id number]       Payor Plan Address Payor Plan Phone Number Payor Plan Fax Number Effective Dates     [id number] [phone]  1/1/2021 - None Entered    [facility] [address]         Subscriber Name Subscriber Birth Date Member ID       SHAYJALIL DANNY 1965 [id number]               Secondary Coverage       Payor Plan Insurance Group Employer/Plan Group    ANTHEM MEDICAID [facility] -ANTHEM [id number]       Payor Plan Address Payor Plan Phone Number Payor Plan Fax Number Effective Dates    MAIL STOP:   " 2018 - None Entered    [address] [address]       [facility] [address]         Subscriber Name Subscriber Birth Date Member ID       JALIL DAY 1965 [id number]                     Emergency Contacts        (Rel.) Home Phone Work Phone Mobile Phone    NAYELI DAVIS (Spouse) -- -- [phone]                 History & Physical        Wilfrid Xiong, DO at 24 0002              [facility] Medicine Services    Hospitalist History and Physical     Jalil Day : 1965 MRN:[id number] LOS:0 ROOM: [facility]/     Chief Complaint: left flank pain    Assessment / Plan     #Left UVJ stone  #UTI    - CT a/p shows 4mm obstructing stone at the left UVJ with mild hydronephrosis and 3mm non-obstructing right sided kidney stone    - urology consulted    - UA shows UTI    - Rocephin 2g IV daily, monitor for toxicity    - blood cultures    - urine cultures    - NPO     - NS @ 75/hr    #HIV    - Patient on Biktarvy, will continue    - CD4 count and viral load sent off    - consider ID consult    #Ascites  #Abdominal distension    - CT a/p shows new small volume scattered ascites with unclear etiology; gas filled mildly distended small bowel and gas filled non-distended colon without obvious obstructing lesion that could be related to ileus    - suspect volume is to small for paracentesis    - LFTs and tbili WNL    - NPO    - NS @ 75/hr    - GI to assess      #Rectal bleeding  #Anemia    - Admits to frequent rectal bleeding that is chronic    - type and screen    - hgb 8.7 on admission, was 12.2 on 22    - MCV 77, check iron and ferritin    - PPI BID    - GI consulted to assess rectal bleeding    #Thrombocytosis    - plt 718 on admission    - elevated from base, monitor            DVT prophylaxis: SCD           History of Present illness     Jalil Day is a 58 y.o. male with PMHx of anemia, HIV presented to [facility] for Left flank pain.  Admits to left flank pain that is 8-9/10 that  returned today, states he had a similar pain the night prior and thought he passed a kidney stone.  He also notes generalized abdominal pain and distension going on for the past week complicated by poor PO intake.  He also states that he has had rectal bleeding which is chronic and not new.  Denies chills, vomiting, diarrhea, hematemesis, melena.  Presented to [facility] for evaluation.    ED course: In the ER, vitals 99.2F, , /97, RR 18, 100% RA.  Labs notable for glucose 101, albumin 2.9, lipase 14, hgb 8.7, mcv 77.  CT a/p shows 4mm obstructing stone at the left UVJ with mild hydronephrosis and 3mm non-obstructing right sided kidney stone; also shows new small volume scattered ascites with unclear etiology; gas filled mildly distended small bowel and gas filled non-distended colon without obvious obstructing lesion that could be related to ileus.  He is to be admitted to [facility] for urological and GI evaluation.     Patient was seen and examined on 05/30/24 at 00:02 EDT .    Subjective / Review of systems     Review of Systems   12 point ROS reviewed and negative except as mentioned above      Past Medical/Surgical/Social/Family History & Allergies     Past Medical History:   Diagnosis Date    HIV (human immunodeficiency virus infection)     Skin cancer       Past Surgical History:   Procedure Laterality Date    HERNIA REPAIR      SKIN CANCER EXCISION        Social History     Socioeconomic History    Marital status:    Tobacco Use    Smoking status: Never    Smokeless tobacco: Never   Vaping Use    Vaping status: Never Used   Substance and Sexual Activity    Alcohol use: Not Currently    Drug use: Defer    Sexual activity: Defer      Family History   Problem Relation Age of Onset    Heart disease Mother     Cancer Mother     Heart disease Father     Diabetes Brother     Heart disease Brother       No Known Allergies   Social Determinants of Health     Tobacco Use: Low Risk  (3/13/2024)    Received from  [facility]    Patient History     Smoking Tobacco Use: Never     Smokeless Tobacco Use: Never     Passive Exposure: Not on file   Alcohol Use: Not on file   Financial Resource Strain: Not on file   Food Insecurity: Food Insecurity Present (1/20/2022)    Received from [facility]    Hunger Vital Sign     Worried About Running Out of Food in the Last Year: Sometimes true     Ran Out of Food in the Last Year: Sometimes true   Transportation Needs: No Transportation Needs (1/20/2022)    [facility]    PRAPARE - Transportation     Lack of Transportation (Medical): No     Lack of Transportation (Non-Medical): No   Physical Activity: Not on file   Stress: Not on file (2/10/2021)   Social Connections: Unknown (10/12/2023)    Family and Community Support     Help with Day-to-Day Activities: Not on file     Lonely or Isolated: Not on file   Interpersonal Safety: Not At Risk (5/29/2024)    Abuse Screen     Unsafe at Home or Work/School: no     Feels Threatened by Someone?: no     Does Anyone Keep You from Contacting Others or Doint Things Outside the Home?: no     Physical Sign of Abuse Present: no   Depression: At risk (1/20/2022)    [facility]    Depression     PHQ-9 Total Score: 11   Housing Stability: Unknown (10/12/2023)    Housing Stability     Current Living Arrangements: Not on file     Potentially Unsafe Housing Conditions: Not on file   Utilities: Not on file   Health Literacy: Unknown (10/12/2023)    Education     Help with school or training?: Not on file     Preferred Language: Not on file   Employment: Unknown (10/12/2023)    Employment     Do you want help finding or keeping work or a job?: Not on file   Disabilities: Unknown (10/12/2023)    Disabilities     Concentrating, Remembering, or Making Decisions Difficulty: Not on file     Doing Errands Independently Difficulty: Not on file        Home Medications     Prior to Admission medications    Medication Sig Start  Date End Date Taking? Authorizing Provider   Bictegravir-Emtricitab-Tenofov (Biktarvy) -25 MG per tablet Take 1 tablet by mouth Daily.    ProviderRachel MD   meloxicam (Mobic) 15 MG tablet One po q day. Refills must come from pcp 2/1/22   ANNMARIE Castrejon DPM   methocarbamol (ROBAXIN) 750 MG tablet Take 1 tablet by mouth 3 (Three) Times a Day As Needed for Muscle Spasms. 10/7/22   Christiano Weller MD   Senna-Plus 8.6-50 MG per tablet TAKE 1 TABLET BY MOUTH 1 TIME EACH DAY. 1/20/22   ProviderRachel MD        Objective / Physical Exam     Vital signs:  Temp: 99.2 °F (37.3 °C)  BP: 109/81  Heart Rate: 98  Resp: 18  SpO2: 97 %  Weight: 59 kg (130 lb)    Admission Weight: Weight: 59 kg (130 lb)    Physical Exam  Constitutional:       General: He is not in acute distress.     Appearance: He is not toxic-appearing.   HENT:      Head: Normocephalic and atraumatic.      Nose: Nose normal. No congestion.      Mouth/Throat:      Pharynx: Oropharynx is clear. No oropharyngeal exudate.   Eyes:      General: No scleral icterus.  Cardiovascular:      Rate and Rhythm: Normal rate and regular rhythm.      Heart sounds: No murmur heard.     No friction rub. No gallop.   Pulmonary:      Effort: No respiratory distress.      Breath sounds: No wheezing or rales.   Abdominal:      General: There is distension.      Tenderness: There is abdominal tenderness. There is no guarding.   Musculoskeletal:         General: No swelling or deformity.      Cervical back: Normal range of motion. No rigidity.      Right lower leg: No edema.      Left lower leg: No edema.   Skin:     Coloration: Skin is not jaundiced.      Findings: No bruising or lesion.   Neurological:      General: No focal deficit present.      Mental Status: He is alert and oriented to person, place, and time.      Motor: No weakness.            Labs     Results from last 7 days   Lab Units 05/29/24 1929   WBC 10*3/mm3 8.69   HEMOGLOBIN g/dL 8.7*   HEMATOCRIT  % 29.8*   PLATELETS 10*3/mm3 718*      Results from last 7 days   Lab Units 05/29/24  1929   ALK PHOS U/L 114   AST (SGOT) U/L 27   ALT (SGPT) U/L 28           Results from last 7 days   Lab Units 05/29/24 1929   SODIUM mmol/L 136   POTASSIUM mmol/L 4.4   CHLORIDE mmol/L 101   CO2 mmol/L 26.0   BUN mg/dL 17   CREATININE mg/dL 1.12   GLUCOSE mg/dL 101*        Imaging     CT Abdomen Pelvis Without Contrast    Result Date: 5/29/2024  CT ABDOMEN PELVIS WO CONTRAST Date of Exam: 5/29/2024 10:55 PM EDT Indication: Flank pain, stone protocol. Comparison: 7/19/2022. Technique: Axial CT images were obtained of the abdomen and pelvis without the administration of contrast. Sagittal and coronal reconstructions were performed.  Automated exposure control and iterative reconstruction methods were used. Findings: The lung bases are clear. Distal esophagus is unremarkable. Heart size is normal. There are no acute findings in the superficial soft tissues. No acute osseous abnormality or destructive bone lesion. There are mild lower lumbar degenerative changes. The liver, gallbladder, bile ducts, pancreas, stomach, duodenum, spleen and adrenal glands appear within normal limits. There is a 3 mm nonobstructing stone at the superior right kidney and there is a punctate nonobstructing stone in the inferior right kidney. There is a 4 mm obstructing stone at the left UVJ with mild left-sided hydronephrosis. Urinary bladder is nondistended. Prostate is normal size. The appendix is not seen. Aorta is normal diameter. No small bowel distention. There is a small volume of scattered ascites. There is mild diffuse mesenteric congestion. There are multiple mildly dilated small bowel loops measuring up to 30 mm. There is a gas-filled nondistended colon. The transverse colon measures up to 5 cm and the cecum measures  up to 7.3 cm. There is a large amount of bowel gas. No obvious obstructing lesion.     Impression: 1. 4 mm obstructing stone at  the left UVJ with mild hydronephrosis. 3 mm nonobstructing right-sided kidney stone. 2. New small volume scattered ascites with unclear etiology. 3. Gas-filled mildly distended small bowel and gas-filled nondistended colon. There is no obvious obstructing lesion. This could be related to ileus. Electronically Signed: Wale Schroeder MD  5/29/2024 11:12 PM EDT  Workstation ID: [id number]        Current Medications     Scheduled Meds:  cefTRIAXone, 2,000 mg, Intravenous, Once         Continuous Infusions:          Wilfrid Xiong DO   [facility]  05/30/24   00:02 EDT       Electronically signed by Wilfrid Xiong DO at 05/30/24 6048          Emergency Department Notes        Tj Campbell MD at 05/29/24 4730          Subjective   History of Present Illness  58-year-old male reported compliant with Biktarvy therapy has had the onset of abdominal discomfort and distention over the last 7 to 10 days.  He states he developed acute left flank pain and felt as if he may have passed a stone but then the pain returned tonight.  He states that he has had subjective fever as well as chills.  He reports no wasting or persistent diarrhea.  He reports no melena hematemesis hematochezia.  He denies gross hematuria but reports frequency and dysuria.  He reports no recent weight changes.  He states that his appetite has decreased somewhat over the last few days      Review of Systems   Constitutional:  Positive for chills, fatigue and fever. Negative for diaphoresis.   HENT:  Negative for sore throat.    Eyes:  Negative for discharge and visual disturbance.   Respiratory:  Negative for chest tightness and shortness of breath.    Gastrointestinal:  Positive for abdominal distention, abdominal pain and nausea.   Genitourinary:  Positive for dysuria, flank pain and frequency. Negative for penile discharge.   Hematological:  Does not bruise/bleed easily.   All other systems reviewed and are negative.      Past Medical History:    Diagnosis Date    HIV (human immunodeficiency virus infection)     Skin cancer    Last observable viral titer was from 2021.  In March 24 of this year the patient had hemoglobin 11.4 CD4 percentage of 16.3% with overall CD4 count of 554.  The patient at that time also had an RPR that was positive at 1.1.  A year ago the patient's hemoglobin was 12.2 with a platelet count of 357 and a white count of 4.28 normal MCV at that time of 88    No Known Allergies    Past Surgical History:   Procedure Laterality Date    HERNIA REPAIR      SKIN CANCER EXCISION         Family History   Problem Relation Age of Onset    Heart disease Mother     Cancer Mother     Heart disease Father     Diabetes Brother     Heart disease Brother        Social History     Socioeconomic History    Marital status:    Tobacco Use    Smoking status: Never    Smokeless tobacco: Never   Vaping Use    Vaping status: Never Used   Substance and Sexual Activity    Alcohol use: Not Currently    Drug use: Defer    Sexual activity: Defer         Reports no unusual recent food water travel or activity  Objective   Physical Exam  Alert [place] Coma Scale 15 patient was given fentanyl by EMS and states he feels tired now   HEENT: Pupils equal and reactive to light. Conjunctivae are not injected. Normal tympanic membranes. Oropharynx and nares are normal.   Neck: Supple. Midline trachea. No JVD. No goiter.   Chest: Clear and equal breath sounds bilaterally, regular rate and rhythm without murmur or rub.   Abdomen: Positive bowel sounds, there is diffuse periumbilical discomfort to palpation, nondistended. No rebound or peritoneal signs. No CVA tenderness.  Localizes area of pain to his left flank however there is no point tenderness  Extremities no clubbing. cyanosis or edema. Motor sensory exam is normal. The full range of motion is intact   Skin: Warm and dry, no rashes or petechia.   Lymphatic: No regional lymphadenopathy. No calf pain, swelling or  Homans sign    Procedures          ED Course  ED Course as of 05/30/24 0001   Wed May 29, 2024   2224 Due to significant overcrowding in the emergency department patient was evaluated by myself in a hallway bed.  This examination might be limited by privacy concerns, noise, exposure issues, and the patient not wearing a hospital gown.  Explained to the patient our limitations and our overcrowding.  They were in agreement to continue the exam and treatment at this time. [TH]      ED Course User Index  [TH] Tj Campbell MD                       Labs Reviewed   COMPREHENSIVE METABOLIC PANEL - Abnormal; Notable for the following components:       Result Value    Glucose 101 (*)     Albumin 2.9 (*)     All other components within normal limits    Narrative:     GFR Normal >60  Chronic Kidney Disease <60  Kidney Failure <15     URINALYSIS W/ CULTURE IF INDICATED - Abnormal; Notable for the following components:    Color, UA Dark Yellow (*)     Appearance, UA Cloudy (*)     Ketones, UA Trace (*)     Blood, UA Moderate (2+) (*)     Protein, UA 30 mg/dL (1+) (*)     Leuk Esterase, UA Moderate (2+) (*)     Nitrite, UA Positive (*)     All other components within normal limits    Narrative:     In absence of clinical symptoms, the presence of pyuria, bacteria, and/or nitrites on the urinalysis result does not correlate with infection.   CBC WITH AUTO DIFFERENTIAL - Abnormal; Notable for the following components:    RBC 3.87 (*)     Hemoglobin 8.7 (*)     Hematocrit 29.8 (*)     MCV 77.0 (*)     MCH 22.5 (*)     MCHC 29.2 (*)     Platelets 718 (*)     Lymphocyte % 15.5 (*)     All other components within normal limits   URINALYSIS, MICROSCOPIC ONLY - Abnormal; Notable for the following components:    WBC, UA Too Numerous to Count (*)     Bacteria, UA 4+ (*)     All other components within normal limits   URINE CULTURE   BLOOD CULTURE   BLOOD CULTURE   RAINBOW DRAW    Narrative:     The following orders were created for  panel order [place] Draw.  Procedure                               Abnormality         Status                     ---------                               -----------         ------                     Green Top (Gel)[[id number]]                                  Final result               Lavender Top[[id number]]                                     Final result               Gold Top - SST[[id number]]                                   Final result               Light Blue Top[[id number]]                                   Final result                 Please view results for these tests on the individual orders.   T-HELPER CELLS (CD4) COUNT   HIV-1 RNA, QUANTITATIVE, PCR (GRAPH)   RPR   PROTIME-INR   AMMONIA   LIPASE   T-HELPER CELLS CD4/CD8 %   GREEN TOP   LAVENDER TOP   GOLD TOP - SST   LIGHT BLUE TOP   CBC AND DIFFERENTIAL    Narrative:     The following orders were created for panel order CBC & Differential.  Procedure                               Abnormality         Status                     ---------                               -----------         ------                     CBC Auto Differential[[id number]]        Abnormal            Final result                 Please view results for these tests on the individual orders.     Medications   sodium chloride 0.9 % flush 10 mL (has no administration in time range)   cefTRIAXone (ROCEPHIN) 2,000 mg in sodium chloride 0.9 % 100 mL MBP (has no administration in time range)   lactated ringers bolus 1,000 mL (0 mL Intravenous Stopped 5/29/24 2132)   ondansetron (ZOFRAN) injection 4 mg (4 mg Intravenous Given 5/29/24 2044)   ketorolac (TORADOL) injection 15 mg (15 mg Intravenous Given 5/29/24 2043)   HYDROmorphone (DILAUDID) injection 0.5 mg (0.5 mg Intravenous Given 5/29/24 2044)     CT Abdomen Pelvis Without Contrast    Result Date: 5/29/2024  Impression: 1. 4 mm obstructing stone at the left UVJ with mild hydronephrosis. 3 mm nonobstructing right-sided kidney stone. 2.  New small volume scattered ascites with unclear etiology. 3. Gas-filled mildly distended small bowel and gas-filled nondistended colon. There is no obvious obstructing lesion. This could be related to ileus. Electronically Signed: Wale Schroeder MD  5/29/2024 11:12 PM EDT  Workstation ID: [id number]                           Medical Decision Making  Cultures were obtained and the patient was started on ceftriaxone.  We will send a CD4 for total hander ratio and will also order HIV viral titers.  The patient's RPR will be repeated.  Pain was controlled in the emergency department as well as nausea.  Particular concerns the patient's hemoglobin decreased to 8.7 with a platelet count increased to 718,000 patient's MCV is now low at 77 the patient case was discussed with hospitalist service the patient will be admitted and will need gastroenterology, infectious disease, and urologic consultation.  The patient was agreeable to this plan of treatment.    Amount and/or Complexity of Data Reviewed  Labs: ordered. Decision-making details documented in ED Course.  Radiology: ordered and independent interpretation performed.    Risk  OTC drugs.  Prescription drug management.  Parenteral controlled substances.  Decision regarding hospitalization.        Final diagnoses:   Hydronephrosis with urinary obstruction due to ureteral calculus   Nephrolithiasis   Acute urinary tract infection   Other ascites   Ileus   Symptomatic HIV infection       ED Disposition  ED Disposition       ED Disposition   Decision to Admit    Condition   --    Comment   Level of Care: Med/Surg [1]   Diagnosis: Hydronephrosis with obstructing calculus [670148]   Admitting Physician: DOMINGA BANKS [[id number]]   Attending Physician: DOMINGA BANKS [[id number]]                 No follow-up provider specified.       Medication List      No changes were made to your prescriptions during this visit.            Tj Campbell MD  05/30/24 0001      Electronically  signed by Tj Campbell MD at 05/30/24 0001       Kenia Bustos, RN at 05/29/24 1918          Pt had passed a kidney stone yesterday and has still been having left sided flank pain. Ems gave 50mcg of fentanyl an hour ago. Pt has had a distended abd for about a week. Pt has felt SOA X 2 days. Pt has also felt nauseated. Pt is HIV positive    Electronically signed by Kenia Bustos, RN at 05/29/24 1924       Vital Signs (last day)       Date/Time Temp Temp src Pulse Resp BP Patient Position SpO2    05/30/24 1302 98.6 (37) Oral 92 19 124/72 Lying 97    05/30/24 0800 98.3 (36.8) Oral 88 20 117/68 Lying 99    05/30/24 0400 97.9 (36.6) -- 86 18 107/70 -- 99    05/30/24 0223 98.1 (36.7) Oral 85 14 110/71 -- 100    05/30/24 00:53:32 -- -- 92 -- 103/73 -- 98    05/29/24 22:45:46 -- -- 98 18 109/81 -- 97    05/29/24 2132 -- -- 98 18 104/69 -- 97    05/29/24 2007 -- -- 110 18 121/82 -- 97    05/29/24 1842 99.2 (37.3) Oral 113 18 143/97 -- 100          Facility-Administered Medications as of 5/30/2024   Medication Dose Route Frequency Provider Last Rate Last Admin    Bictegravir-Emtricitab-Tenofov (BIKTARVY) -25 MG per tablet 1 tablet  1 tablet Oral Daily Wilfrid Xiong DO   1 tablet at 05/30/24 0911    Calcium Replacement - Follow Nurse / BPA Driven Protocol   Does not apply PRN Wilfrid Xiong DO        [COMPLETED] cefTRIAXone (ROCEPHIN) 2,000 mg in sodium chloride 0.9 % 100 mL MBP  2,000 mg Intravenous Once Tj Campbell  mL/hr at 05/30/24 0053 2,000 mg at 05/30/24 0053    [START ON 5/31/2024] cefTRIAXone (ROCEPHIN) 2,000 mg in sodium chloride 0.9 % 100 mL MBP  2,000 mg Intravenous Q24H Wilfrid Xiong DO        [COMPLETED] HYDROmorphone (DILAUDID) injection 0.5 mg  0.5 mg Intravenous Once Tj Campbell MD   0.5 mg at 05/29/24 2044    HYDROmorphone (DILAUDID) injection 0.5 mg  0.5 mg Intravenous Q2H PRN Wilfrid Xiong DO   0.5 mg at 05/30/24 0911    [COMPLETED] ketorolac (TORADOL) injection  15 mg  15 mg Intravenous Once Tj Campbell MD   15 mg at 05/29/24 2043    [COMPLETED] lactated ringers bolus 1,000 mL  1,000 mL Intravenous Once Tj Campbell MD   Stopped at 05/29/24 2132    Magnesium Standard Dose Replacement - Follow Nurse / BPA Driven Protocol   Does not apply PRN Wilfird Xiong DO        nitroglycerin (NITROSTAT) SL tablet 0.4 mg  0.4 mg Sublingual Q5 Min PRN Wilfrid Xiong DO        [COMPLETED] ondansetron (ZOFRAN) injection 4 mg  4 mg Intravenous Once Tj Campbell MD   4 mg at 05/29/24 2044    ondansetron ODT (ZOFRAN-ODT) disintegrating tablet 4 mg  4 mg Oral Q6H PRN Wilfrid Xiong DO        Or    ondansetron (ZOFRAN) injection 4 mg  4 mg Intravenous Q6H PRN Wilfrid Xiong DO   4 mg at 05/30/24 0913    pantoprazole (PROTONIX) injection 40 mg  40 mg Intravenous BID AC Nona Ward APRN        Phosphorus Replacement - Follow Nurse / BPA Driven Protocol   Does not apply PRN Wilfrid Xiong DO        polyethylene glycol (MIRALAX) packet 17 g  17 g Oral Daily Nona Ward, DRU        Potassium Replacement - Follow Nurse / BPA Driven Protocol   Does not apply PRN Wilfrid Xiong DO        sodium chloride 0.9 % flush 10 mL  10 mL Intravenous PRN Wilfrid Xiong DO        sodium chloride 0.9 % flush 10 mL  10 mL Intravenous Q12H Wilfrid Xiong DO   10 mL at 05/30/24 0225    sodium chloride 0.9 % flush 10 mL  10 mL Intravenous PRN Wilfrid Xiong DO        sodium chloride 0.9 % infusion 40 mL  40 mL Intravenous PRN Wilfrid Xiong DO        sodium chloride 0.9 % infusion  75 mL/hr Intravenous Continuous Wilfrid Xiong DO 75 mL/hr at 05/30/24 0905 75 mL/hr at 05/30/24 0905     Lab Results (last 24 hours)       Procedure Component Value Units Date/Time    RPR Titer [[id number]]  (Abnormal) Collected: 05/30/24 0003    Specimen: Blood Updated: 05/30/24 1401     RPR Titer Pos 1:1    RPR [[id number]]  (Abnormal) Collected: 05/30/24 0003    Specimen: Blood Updated:  05/30/24 1400     RPR Reactive    Treponema Pallidum, Confirmation [[id number]] Collected: 05/30/24 0003    Specimen: Blood Updated: 05/30/24 1400    Urine Culture - Urine, Urine, Clean Catch [[id number]]  (Normal) Collected: 05/29/24 2141    Specimen: Urine, Clean Catch Updated: 05/30/24 1146     Urine Culture Culture in progress    Hemoglobin & Hematocrit, Blood [[id number]]  (Abnormal) Collected: 05/30/24 0925    Specimen: Blood Updated: 05/30/24 1025     Hemoglobin 8.1 g/dL      Hematocrit 27.7 %      Comment: Result checked         Extra Tubes [[id number]] Collected: 05/30/24 0925    Specimen: Blood Updated: 05/30/24 1015    Narrative:      The following orders were created for panel order Extra Tubes.  Procedure                               Abnormality         Status                     ---------                               -----------         ------                     Lavender Top[[id number]]                                     Final result               Green Top (Gel)[[id number]]                                  Final result               Light Blue Top[[id number]]                                   Final result                 Please view results for these tests on the individual orders.    Lavender Top [[id number]] Collected: 05/30/24 0925    Specimen: Blood Updated: 05/30/24 1015     Extra Tube hold for add-on     Comment: Auto resulted       Green Top (Gel) [[id number]] Collected: 05/30/24 0925    Specimen: Blood Updated: 05/30/24 1015     Extra Tube Hold for add-ons.     Comment: Auto resulted.       Light Blue Top [[id number]] Collected: 05/30/24 0925    Specimen: Blood Updated: 05/30/24 1015     Extra Tube Hold for add-ons.     Comment: Auto resulted       Vitamin B12 [[id number]]  (Normal) Collected: 05/30/24 0019    Specimen: Blood Updated: 05/30/24 0945     Vitamin B-12 308 pg/mL     Narrative:      Results may be falsely increased if patient taking Biotin.      Lactate Dehydrogenase [[id number]]  (Normal)  Collected: 05/30/24 0019    Specimen: Blood Updated: 05/30/24 0929      U/L     Basic Metabolic Panel [[id number]]  (Abnormal) Collected: 05/30/24 0442    Specimen: Blood Updated: 05/30/24 0514     Glucose 85 mg/dL      BUN 17 mg/dL      Creatinine 1.16 mg/dL      Sodium 136 mmol/L      Potassium 4.6 mmol/L      Chloride 103 mmol/L      CO2 26.0 mmol/L      Calcium 8.3 mg/dL      BUN/Creatinine Ratio 14.7     Anion Gap 7.0 mmol/L      eGFR 73.9 mL/min/1.73     Narrative:      GFR Normal >60  Chronic Kidney Disease <60  Kidney Failure <15      CBC (No Diff) [[id number]]  (Abnormal) Collected: 05/30/24 0442    Specimen: Blood Updated: 05/30/24 0507     WBC 9.39 10*3/mm3      RBC 2.93 10*6/mm3      Hemoglobin 6.7 g/dL      Hematocrit 22.8 %      MCV 77.8 fL      MCH 22.9 pg      MCHC 29.4 g/dL      RDW 15.5 %      RDW-SD 43.7 fl      MPV 9.1 fL      Platelets 710 10*3/mm3     Ferritin [[id number]]  (Normal) Collected: 05/30/24 0019    Specimen: Blood Updated: 05/30/24 0122     Ferritin 155.00 ng/mL     Narrative:      Results may be falsely decreased if patient taking Biotin.      Blood Culture - Blood, Arm, Left [[id number]] Collected: 05/30/24 0045    Specimen: Blood from Arm, Left Updated: 05/30/24 0057    T-helper Cells CD4 / CD8 % [[id number]] Collected: 05/30/24 0045    Specimen: Blood Updated: 05/30/24 0056    Lipase [[id number]]  (Normal) Collected: 05/30/24 0019    Specimen: Blood Updated: 05/30/24 0052     Lipase 14 U/L     Iron Profile [[id number]]  (Abnormal) Collected: 05/30/24 0019    Specimen: Blood Updated: 05/30/24 0052     Iron 7 mcg/dL      Iron Saturation (TSAT) 3 %      Transferrin 170 mg/dL      TIBC 253 mcg/dL     Reticulocytes [[id number]]  (Normal) Collected: 05/30/24 0019    Specimen: Blood Updated: 05/30/24 0029     Reticulocyte % 0.81 %      Reticulocyte Absolute 0.0292 10*6/mm3     Ammonia [[id number]]  (Normal) Collected: 05/30/24 0003    Specimen: Blood Updated: 05/30/24 0029     Ammonia 22  umol/L     Folate RBC [[id number]] Collected: 05/30/24 0019    Specimen: Blood Updated: 05/30/24 0026    Blood Culture - Blood, Arm, Left [[id number]] Collected: 05/30/24 0019    Specimen: Blood from Arm, Left Updated: 05/30/24 0026    Protime-INR [[id number]]  (Normal) Collected: 05/30/24 0003    Specimen: Blood Updated: 05/30/24 0021     Protime 11.6 Seconds      INR 1.07    HIV-1 RNA, Quantitative, PCR (graph) [[id number]] Collected: 05/30/24 0003    Specimen: Blood Updated: 05/30/24 0008    Urinalysis, Microscopic Only - Urine, Clean Catch [[id number]]  (Abnormal) Collected: 05/29/24 2141    Specimen: Urine, Clean Catch Updated: 05/29/24 2214     RBC, UA 0-2 /HPF      WBC, UA Too Numerous to Count /HPF      Bacteria, UA 4+ /HPF      Squamous Epithelial Cells, UA 0-2 /HPF      Hyaline Casts, UA 3-6 /LPF      Calcium Oxalate Crystals, UA Small/1+ /HPF      Methodology Manual Light Microscopy    Urinalysis With Culture If Indicated - Urine, Clean Catch [[id number]]  (Abnormal) Collected: 05/29/24 2141    Specimen: Urine, Clean Catch Updated: 05/29/24 2151     Color, UA Dark Yellow     Appearance, UA Cloudy     Comment: Result checked          pH, UA <=5.0     Specific Gravity, UA 1.019     Glucose, UA Negative     Ketones, UA Trace     Bilirubin, UA Negative     Blood, UA Moderate (2+)     Protein, UA 30 mg/dL (1+)     Leuk Esterase, UA Moderate (2+)     Nitrite, UA Positive     Urobilinogen, UA 0.2 E.U./dL    Narrative:      In absence of clinical symptoms, the presence of pyuria, bacteria, and/or nitrites on the urinalysis result does not correlate with infection.    CBC & Differential [[id number]]  (Abnormal) Collected: 05/29/24 1929    Specimen: Blood Updated: 05/29/24 2053    Narrative:      The following orders were created for panel order CBC & Differential.  Procedure                               Abnormality         Status                     ---------                               -----------         ------                      CBC Auto Differential[[id number]]        Abnormal            Final result                 Please view results for these tests on the individual orders.    CBC Auto Differential [[id number]]  (Abnormal) Collected: 05/29/24 1929    Specimen: Blood Updated: 05/29/24 2053     WBC 8.69 10*3/mm3      RBC 3.87 10*6/mm3      Hemoglobin 8.7 g/dL      Hematocrit 29.8 %      MCV 77.0 fL      MCH 22.5 pg      MCHC 29.2 g/dL      RDW 15.4 %      RDW-SD 42.4 fl      MPV 9.0 fL      Platelets 718 10*3/mm3      Neutrophil % 73.6 %      Lymphocyte % 15.5 %      Monocyte % 8.9 %      Eosinophil % 1.3 %      Basophil % 0.5 %      Immature Grans % 0.2 %      Neutrophils, Absolute 6.40 10*3/mm3      Lymphocytes, Absolute 1.35 10*3/mm3      Monocytes, Absolute 0.77 10*3/mm3      Eosinophils, Absolute 0.11 10*3/mm3      Basophils, Absolute 0.04 10*3/mm3      Immature Grans, Absolute 0.02 10*3/mm3      nRBC 0.0 /100 WBC     Comprehensive Metabolic Panel [[id number]]  (Abnormal) Collected: 05/29/24 1929    Specimen: Blood Updated: 05/29/24 2046     Glucose 101 mg/dL      BUN 17 mg/dL      Creatinine 1.12 mg/dL      Sodium 136 mmol/L      Potassium 4.4 mmol/L      Chloride 101 mmol/L      CO2 26.0 mmol/L      Calcium 8.7 mg/dL      Total Protein 8.3 g/dL      Albumin 2.9 g/dL      ALT (SGPT) 28 U/L      AST (SGOT) 27 U/L      Alkaline Phosphatase 114 U/L      Total Bilirubin 0.2 mg/dL      Globulin 5.4 gm/dL      A/G Ratio 0.5 g/dL      BUN/Creatinine Ratio 15.2     Anion Gap 9.0 mmol/L      eGFR 77.1 mL/min/1.73     Narrative:      GFR Normal >60  Chronic Kidney Disease <60  Kidney Failure <15      [place] Draw [[id number]] Collected: 05/29/24 1929    Specimen: Blood Updated: 05/29/24 1946    Narrative:      The following orders were created for panel order [place] Draw.  Procedure                               Abnormality         Status                     ---------                               -----------         ------                      Green Top (Gel)[[id number]]                                  Final result               Lavender Top[[id number]]                                     Final result               Gold Top - SST[[id number]]                                   Final result               Light Blue Top[[id number]]                                   Final result                 Please view results for these tests on the individual orders.    Green Top (Gel) [[id number]] Collected: 05/29/24 1929    Specimen: Blood Updated: 05/29/24 1946     Extra Tube Hold for add-ons.     Comment: Auto resulted.       Gold Top - SST [[id number]] Collected: 05/29/24 1929    Specimen: Blood Updated: 05/29/24 1946     Extra Tube Hold for add-ons.     Comment: Auto resulted.       Lavender Top [[id number]] Collected: 05/29/24 1929    Specimen: Blood Updated: 05/29/24 1946     Extra Tube hold for add-on     Comment: Auto resulted       Light Blue Top [[id number]] Collected: 05/29/24 1929    Specimen: Blood Updated: 05/29/24 1946     Extra Tube Hold for add-ons.     Comment: Auto resulted             Imaging Results (Last 48 Hours)       Procedure Component Value Units Date/Time    US Liver [[id number]] Resulted: 05/30/24 1335     Updated: 05/30/24 1335    CT Abdomen Pelvis Without Contrast [[id number]] Collected: 05/29/24 2308     Updated: 05/29/24 2314    Narrative:      CT ABDOMEN PELVIS WO CONTRAST    Date of Exam: 5/29/2024 10:55 PM EDT    Indication: Flank pain, stone protocol.    Comparison: 7/19/2022.    Technique: Axial CT images were obtained of the abdomen and pelvis without the administration of contrast. Sagittal and coronal reconstructions were performed.  Automated exposure control and iterative reconstruction methods were used.      Findings:  The lung bases are clear. Distal esophagus is unremarkable. Heart size is normal. There are no acute findings in the superficial soft tissues. No acute osseous abnormality or destructive bone lesion. There  are mild lower lumbar degenerative changes.    The liver, gallbladder, bile ducts, pancreas, stomach, duodenum, spleen and adrenal glands appear within normal limits. There is a 3 mm nonobstructing stone at the superior right kidney and there is a punctate nonobstructing stone in the inferior right   kidney. There is a 4 mm obstructing stone at the left UVJ with mild left-sided hydronephrosis. Urinary bladder is nondistended. Prostate is normal size. The appendix is not seen. Aorta is normal diameter. No small bowel distention. There is a small   volume of scattered ascites. There is mild diffuse mesenteric congestion. There are multiple mildly dilated small bowel loops measuring up to 30 mm. There is a gas-filled nondistended colon. The transverse colon measures up to 5 cm and the cecum measures   up to 7.3 cm. There is a large amount of bowel gas. No obvious obstructing lesion.      Impression:      Impression:    1. 4 mm obstructing stone at the left UVJ with mild hydronephrosis. 3 mm nonobstructing right-sided kidney stone.  2. New small volume scattered ascites with unclear etiology.  3. Gas-filled mildly distended small bowel and gas-filled nondistended colon. There is no obvious obstructing lesion. This could be related to ileus.            Electronically Signed: Wale Schroeder MD    5/29/2024 11:12 PM EDT    Workstation ID: [id number]             Consult Notes (last 48 hours)        Nona Ward, DRU at 05/30/24 1138        Consult Orders    1. Inpatient Gastroenterology Consult [[id number]] ordered by Wilfrid Xiong DO at 05/30/24 0055                 GI CONSULT  NOTE:    Referring Provider:  Dr. Xiong    Chief complaint: Rectal bleeding, anemia    Subjective .     History of present illness: Felipe Nieves is a 58 y.o. male with history of HIV and skin cancer who presents with complaints of left flank pain.  He has been diagnosed with UTI and left UVJ stone.  Urology has been consulted.  GI has been  asked to consult due to findings of anemia and reports of intermittent rectal bleeding.  The patient reports that he has been having intermittent rectal bleeding as an outpatient for quite some time.  Denies melena.  States that he tends to be constipated at home and is not currently on any bowel regimen.  He does complain of some left-sided abdominal pain and left flank pain.  Unable to identify any exacerbating or alleviating factors.  Denies any nausea/vomiting, heartburn, or dysphagia.  No unintentional weight loss.  Does report some scant hematuria recently.      Endo History:  No previous endoscopy.    Past Medical History:  Past Medical History:   Diagnosis Date    HIV (human immunodeficiency virus infection)     Skin cancer        Past Surgical History:  Past Surgical History:   Procedure Laterality Date    HERNIA REPAIR      SKIN CANCER EXCISION         Social History:  Social History     Tobacco Use    Smoking status: Never    Smokeless tobacco: Never   Vaping Use    Vaping status: Never Used   Substance Use Topics    Alcohol use: Not Currently    Drug use: Defer       Family History:  Family History   Problem Relation Age of Onset    Heart disease Mother     Cancer Mother     Heart disease Father     Diabetes Brother     Heart disease Brother        Medications:  Medications Prior to Admission   Medication Sig Dispense Refill Last Dose    Bictegravir-Emtricitab-Tenofov (Biktarvy) -25 MG per tablet Take 1 tablet by mouth Daily.   5/29/2024    ergocalciferol (ERGOCALCIFEROL) 1.25 MG (99167 UT) capsule Take 1 capsule by mouth 1 (One) Time Per Week.   5/21/2024    ondansetron (ZOFRAN) 4 MG tablet Take 1 tablet by mouth Every 8 (Eight) Hours As Needed.   5/29/2024       Scheduled Meds:Bictegravir-Emtricitab-Tenofov, 1 tablet, Oral, Daily  [START ON 5/31/2024] cefTRIAXone (ROCEPHIN) 2,000 mg in sodium chloride 0.9 % 100 mL MBP, 2,000 mg, Intravenous, Q24H  sodium chloride, 10 mL, Intravenous,  Q12H      Continuous Infusions:sodium chloride, 75 mL/hr, Last Rate: 75 mL/hr (05/30/24 0905)      PRN Meds:.  Calcium Replacement - Follow Nurse / BPA Driven Protocol    HYDROmorphone    Magnesium Standard Dose Replacement - Follow Nurse / BPA Driven Protocol    nitroglycerin    ondansetron ODT **OR** ondansetron    Phosphorus Replacement - Follow Nurse / BPA Driven Protocol    Potassium Replacement - Follow Nurse / BPA Driven Protocol    sodium chloride    sodium chloride    sodium chloride    ALLERGIES:  Patient has no known allergies.    ROS:  The following systems were reviewed and negative;   Constitution:  No fevers, chills, no unintentional weight loss  Skin: no rash, no jaundice  Eyes:  No blurry vision, no eye pain  HENT:  No change in hearing or smell  Resp:  No dyspnea or cough  CV:  No chest pain or palpitations  :  No dysuria, hematuria  Musculoskeletal:  No leg cramps or arthralgias  Neuro:  No tremor, no numbness  Psych:  No depression or confusion    Objective     Vital Signs:   Vitals:    05/30/24 0100 05/30/24 0223 05/30/24 0400 05/30/24 0800   BP:  110/71 107/70 117/68   BP Location:  Right arm Right arm Right arm   Patient Position:    Lying   Pulse:  85 86 88   Resp:  14 18 20   Temp:  98.1 °F (36.7 °C) 97.9 °F (36.6 °C) 98.3 °F (36.8 °C)   TempSrc:  Oral  Oral   SpO2:  100% 99% 99%   Weight: 57 kg (125 lb 10.6 oz)      Height:           Physical Exam:       General Appearance:    Awake and alert, in no acute distress   Head:    Normocephalic, without obvious abnormality, atraumatic   Throat:   No oral lesions, no thrush, oral mucosa moist   Lungs:     Respirations regular, even and unlabored   Chest Wall:    No abnormalities observed   Abdomen:     Soft, mild left-sided tenderness, no rebound or guarding, non-distended   Rectal:     Deferred   Extremities:   Moves all extremities, no edema, no cyanosis   Pulses:   Pulses palpable and equal bilaterally   Skin:   No rash, no jaundice, normal  "palpation   Lymph nodes:   No cervical, supraclavicular or submandibular palpable adenopathy   Neurologic:   Cranial nerves 2 - 12 grossly intact, no asterixis       Results Review:   I reviewed the patient's labs and imaging.  CBC    Results from last 7 days   Lab Units 05/30/24  0925 05/30/24 0442 05/29/24 1929   WBC 10*3/mm3  --  9.39 8.69   HEMOGLOBIN g/dL 8.1* 6.7* 8.7*   PLATELETS 10*3/mm3  --  710* 718*     CMP   Results from last 7 days   Lab Units 05/30/24  0442 05/30/24  0019 05/30/24  0003 05/29/24 1929   SODIUM mmol/L 136  --   --  136   POTASSIUM mmol/L 4.6  --   --  4.4   CHLORIDE mmol/L 103  --   --  101   CO2 mmol/L 26.0  --   --  26.0   BUN mg/dL 17  --   --  17   CREATININE mg/dL 1.16  --   --  1.12   GLUCOSE mg/dL 85  --   --  101*   ALBUMIN g/dL  --   --   --  2.9*   BILIRUBIN mg/dL  --   --   --  0.2   ALK PHOS U/L  --   --   --  114   AST (SGOT) U/L  --   --   --  27   ALT (SGPT) U/L  --   --   --  28   LIPASE U/L  --  14  --   --    AMMONIA umol/L  --   --  22  --      Cr Clearance Estimated Creatinine Clearance: 57.3 mL/min (by C-G formula based on SCr of 1.16 mg/dL).  Coag   Results from last 7 days   Lab Units 05/30/24  0003   INR  1.07     HbA1C No results found for: \"HGBA1C\"  Blood Glucose No results found for: \"POCGLU\"  Infection     UA    Results from last 7 days   Lab Units 05/29/24  2141   NITRITE UA  Positive*   WBC UA /HPF Too Numerous to Count*   BACTERIA UA /HPF 4+*   SQUAM EPITHEL UA /HPF 0-2     Imaging Results (Last 72 Hours)       Procedure Component Value Units Date/Time    CT Abdomen Pelvis Without Contrast [[id number]] Collected: 05/29/24 2308     Updated: 05/29/24 2314    Narrative:      CT ABDOMEN PELVIS WO CONTRAST    Date of Exam: 5/29/2024 10:55 PM EDT    Indication: Flank pain, stone protocol.    Comparison: 7/19/2022.    Technique: Axial CT images were obtained of the abdomen and pelvis without the administration of contrast. Sagittal and coronal reconstructions " were performed.  Automated exposure control and iterative reconstruction methods were used.      Findings:  The lung bases are clear. Distal esophagus is unremarkable. Heart size is normal. There are no acute findings in the superficial soft tissues. No acute osseous abnormality or destructive bone lesion. There are mild lower lumbar degenerative changes.    The liver, gallbladder, bile ducts, pancreas, stomach, duodenum, spleen and adrenal glands appear within normal limits. There is a 3 mm nonobstructing stone at the superior right kidney and there is a punctate nonobstructing stone in the inferior right   kidney. There is a 4 mm obstructing stone at the left UVJ with mild left-sided hydronephrosis. Urinary bladder is nondistended. Prostate is normal size. The appendix is not seen. Aorta is normal diameter. No small bowel distention. There is a small   volume of scattered ascites. There is mild diffuse mesenteric congestion. There are multiple mildly dilated small bowel loops measuring up to 30 mm. There is a gas-filled nondistended colon. The transverse colon measures up to 5 cm and the cecum measures   up to 7.3 cm. There is a large amount of bowel gas. No obvious obstructing lesion.      Impression:      Impression:    1. 4 mm obstructing stone at the left UVJ with mild hydronephrosis. 3 mm nonobstructing right-sided kidney stone.  2. New small volume scattered ascites with unclear etiology.  3. Gas-filled mildly distended small bowel and gas-filled nondistended colon. There is no obvious obstructing lesion. This could be related to ileus.            Electronically Signed: Wale Schroeder MD    5/29/2024 11:12 PM EDT    Workstation ID: [id number]            ASSESSMENT:  -Iron deficiency anemia  -Rectal bleeding  -Abnormal CT showing possible ileus  -Left-sided abdominal pain/left flank pain  -Left UVJ stone  -UTI  -HIV  -History of skin cancer    PLAN:  Patient is a 58-year-old male with history of HIV who  presented on 5/29 with complaints of left flank pain.    CT abdomen/pelvis WO shows 4 mm obstructing stone at the left UVJ with mild hydronephrosis, new small volume of ascites, and possible ileus.  Hemoglobin 6.7 from 8.7.  Plan PRBC transfusion.  Continue to monitor H/H and transfuse as needed.  MCV 77.8, serum iron 17, iron saturation 3%.  Plan IV iron infusion.  Patient would benefit from EGD/colonoscopy due to complaints of iron deficiency anemia.  However, will await urology input prior to proceeding.  Clear liquid diet.  Start daily bowel regimen.  Analgesics as needed.  Start PPI.  New ascites noted on CT.  Unlikely that there is enough fluid for paracentesis.  LFTs and INR normal.  Platelets elevated at 710.  Will plan RUQ US to assess for possible cirrhosis despite unremarkable labs.  Supportive care.      I discussed the patients findings and my recommendations with the patient.  I will discuss case with Dr. Gant and change plan accordingly.    We appreciate the referral.    Electronically signed by DRU Fields, 05/30/24, 11:38 AM EDT.                  Electronically signed by Nona Ward APRN at 05/30/24 6216

## 2024-05-31 ENCOUNTER — INPATIENT HOSPITAL (OUTPATIENT)
Dept: URBAN - METROPOLITAN AREA HOSPITAL 84 | Facility: HOSPITAL | Age: 59
End: 2024-05-31
Payer: COMMERCIAL

## 2024-05-31 DIAGNOSIS — Z21 ASYMPTOMATIC HUMAN IMMUNODEFICIENCY VIRUS [HIV] INFECTION ST: ICD-10-CM

## 2024-05-31 DIAGNOSIS — K62.5 HEMORRHAGE OF ANUS AND RECTUM: ICD-10-CM

## 2024-05-31 DIAGNOSIS — D50.9 IRON DEFICIENCY ANEMIA, UNSPECIFIED: ICD-10-CM

## 2024-05-31 DIAGNOSIS — R10.9 UNSPECIFIED ABDOMINAL PAIN: ICD-10-CM

## 2024-05-31 DIAGNOSIS — R93.5 ABNORMAL FINDINGS ON DIAGNOSTIC IMAGING OF OTHER ABDOMINAL R: ICD-10-CM

## 2024-05-31 LAB
ANION GAP SERPL CALCULATED.3IONS-SCNC: 7.7 MMOL/L (ref 5–15)
BASOPHILS # BLD AUTO: 0.1 X10E3/UL (ref 0–0.2)
BASOPHILS NFR BLD AUTO: 1 %
BUN SERPL-MCNC: 20 MG/DL (ref 6–20)
BUN/CREAT SERPL: 17.4 (ref 7–25)
CALCIUM SPEC-SCNC: 7.9 MG/DL (ref 8.6–10.5)
CD3+CD4+ CELLS # BLD: 418 /UL (ref 359–1519)
CD3+CD4+ CELLS NFR BLD: 23.2 % (ref 30.8–58.5)
CD3+CD4+ CELLS/CD3+CD8+ CLL BLD: 0.38 % (ref 0.92–3.72)
CD3+CD8+ CELLS # BLD: 1096 /UL (ref 109–897)
CD3+CD8+ CELLS NFR BLD: 60.9 % (ref 12–35.5)
CHLORIDE SERPL-SCNC: 102 MMOL/L (ref 98–107)
CO2 SERPL-SCNC: 26.3 MMOL/L (ref 22–29)
CREAT SERPL-MCNC: 1.15 MG/DL (ref 0.76–1.27)
DEPRECATED RDW RBC AUTO: 44.1 FL (ref 37–54)
EGFRCR SERPLBLD CKD-EPI 2021: 74.7 ML/MIN/1.73
EOSINOPHIL # BLD AUTO: 0.2 X10E3/UL (ref 0–0.4)
EOSINOPHIL NFR BLD AUTO: 2 %
ERYTHROCYTE [DISTWIDTH] IN BLOOD BY AUTOMATED COUNT: 14.2 % (ref 11.6–15.4)
ERYTHROCYTE [DISTWIDTH] IN BLOOD BY AUTOMATED COUNT: 15.6 % (ref 12.3–15.4)
FOLATE BLD-MCNC: 383 NG/ML
FOLATE RBC-MCNC: 1383 NG/ML
GLUCOSE SERPL-MCNC: 265 MG/DL (ref 65–99)
HCT VFR BLD AUTO: 25.6 % (ref 37.5–51)
HCT VFR BLD AUTO: 26.7 % (ref 37.5–51)
HCT VFR BLD AUTO: 27.7 % (ref 37.5–51)
HGB BLD-MCNC: 7.6 G/DL (ref 13–17.7)
HGB BLD-MCNC: 7.7 G/DL (ref 13–17.7)
HOLD SPECIMEN: NORMAL
IMM GRANULOCYTES # BLD AUTO: 0 X10E3/UL (ref 0–0.1)
IMM GRANULOCYTES NFR BLD AUTO: 0 %
LYMPHOCYTES # BLD AUTO: 1.8 X10E3/UL (ref 0.7–3.1)
LYMPHOCYTES NFR BLD AUTO: 22 %
MCH RBC QN AUTO: 22.5 PG (ref 26.6–33)
MCH RBC QN AUTO: 22.8 PG (ref 26.6–33)
MCHC RBC AUTO-ENTMCNC: 28.8 G/DL (ref 31.5–35.7)
MCHC RBC AUTO-ENTMCNC: 29.7 G/DL (ref 31.5–35.7)
MCV RBC AUTO: 77 FL (ref 79–97)
MCV RBC AUTO: 78.1 FL (ref 79–97)
MONOCYTES # BLD AUTO: 0.7 X10E3/UL (ref 0.1–0.9)
MONOCYTES NFR BLD AUTO: 9 %
NEUTROPHILS # BLD AUTO: 5.1 X10E3/UL (ref 1.4–7)
NEUTROPHILS NFR BLD AUTO: 66 %
PLATELET # BLD AUTO: 758 10*3/MM3 (ref 140–450)
PLATELET # BLD AUTO: 797 X10E3/UL (ref 150–450)
PMV BLD AUTO: 9.1 FL (ref 6–12)
POTASSIUM SERPL-SCNC: 5.1 MMOL/L (ref 3.5–5.2)
RBC # BLD AUTO: 3.34 X10E6/UL (ref 4.14–5.8)
RBC # BLD AUTO: 3.42 10*6/MM3 (ref 4.14–5.8)
SODIUM SERPL-SCNC: 136 MMOL/L (ref 136–145)
TREPONEMA PALLIDUM IGG+IGM AB [PRESENCE] IN SERUM OR PLASMA BY IMMUNOASSAY: REACTIVE
WBC # BLD AUTO: 7.9 X10E3/UL (ref 3.4–10.8)
WBC NRBC COR # BLD AUTO: 8.96 10*3/MM3 (ref 3.4–10.8)

## 2024-05-31 PROCEDURE — 85027 COMPLETE CBC AUTOMATED: CPT | Performed by: UROLOGY

## 2024-05-31 PROCEDURE — 99232 SBSQ HOSP IP/OBS MODERATE 35: CPT | Performed by: NURSE PRACTITIONER

## 2024-05-31 PROCEDURE — 94799 UNLISTED PULMONARY SVC/PX: CPT

## 2024-05-31 PROCEDURE — 94660 CPAP INITIATION&MGMT: CPT

## 2024-05-31 PROCEDURE — 25810000003 SODIUM CHLORIDE 0.9 % SOLUTION: Performed by: UROLOGY

## 2024-05-31 PROCEDURE — 63710000001 ONDANSETRON ODT 4 MG TABLET DISPERSIBLE: Performed by: UROLOGY

## 2024-05-31 PROCEDURE — 25010000002 HYDROMORPHONE 1 MG/ML SOLUTION: Performed by: UROLOGY

## 2024-05-31 PROCEDURE — 25010000002 ONDANSETRON PER 1 MG: Performed by: UROLOGY

## 2024-05-31 PROCEDURE — 80048 BASIC METABOLIC PNL TOTAL CA: CPT | Performed by: UROLOGY

## 2024-05-31 RX ORDER — BISACODYL 5 MG/1
10 TABLET, DELAYED RELEASE ORAL ONCE
Status: DISCONTINUED | OUTPATIENT
Start: 2024-06-01 | End: 2024-06-01 | Stop reason: HOSPADM

## 2024-05-31 RX ORDER — SODIUM, POTASSIUM,MAG SULFATES 17.5-3.13G
1 SOLUTION, RECONSTITUTED, ORAL ORAL EVERY 12 HOURS
Status: COMPLETED | OUTPATIENT
Start: 2024-05-31 | End: 2024-06-01

## 2024-05-31 RX ADMIN — SODIUM CHLORIDE 75 ML/HR: 9 INJECTION, SOLUTION INTRAVENOUS at 11:08

## 2024-05-31 RX ADMIN — HYDROMORPHONE HYDROCHLORIDE 0.5 MG: 1 INJECTION, SOLUTION INTRAMUSCULAR; INTRAVENOUS; SUBCUTANEOUS at 00:41

## 2024-05-31 RX ADMIN — PANTOPRAZOLE SODIUM 40 MG: 40 INJECTION, POWDER, FOR SOLUTION INTRAVENOUS at 09:30

## 2024-05-31 RX ADMIN — ONDANSETRON 4 MG: 4 TABLET, ORALLY DISINTEGRATING ORAL at 06:12

## 2024-05-31 RX ADMIN — PANTOPRAZOLE SODIUM 40 MG: 40 INJECTION, POWDER, FOR SOLUTION INTRAVENOUS at 16:38

## 2024-05-31 RX ADMIN — BICTEGRAVIR SODIUM, EMTRICITABINE, AND TENOFOVIR ALAFENAMIDE FUMARATE 1 TABLET: 50; 200; 25 TABLET ORAL at 13:33

## 2024-05-31 RX ADMIN — Medication 1 BOTTLE: at 16:39

## 2024-05-31 RX ADMIN — POLYETHYLENE GLYCOL 3350 17 G: 17 POWDER, FOR SOLUTION ORAL at 09:30

## 2024-05-31 RX ADMIN — HYDROMORPHONE HYDROCHLORIDE 0.5 MG: 1 INJECTION, SOLUTION INTRAMUSCULAR; INTRAVENOUS; SUBCUTANEOUS at 03:47

## 2024-05-31 RX ADMIN — Medication 10 ML: at 09:30

## 2024-05-31 RX ADMIN — HYDROMORPHONE HYDROCHLORIDE 0.5 MG: 1 INJECTION, SOLUTION INTRAMUSCULAR; INTRAVENOUS; SUBCUTANEOUS at 06:12

## 2024-05-31 RX ADMIN — ONDANSETRON 4 MG: 2 INJECTION INTRAMUSCULAR; INTRAVENOUS at 16:39

## 2024-05-31 NOTE — PLAN OF CARE
Problem: Adult Inpatient Plan of Care  Goal: Absence of Hospital-Acquired Illness or Injury  Intervention: Identify and Manage Fall Risk  Recent Flowsheet Documentation  Taken 5/31/2024 0600 by Minesh Scanlon RN  Safety Promotion/Fall Prevention:   safety round/check completed   room organization consistent   nonskid shoes/slippers when out of bed   fall prevention program maintained   clutter free environment maintained   assistive device/personal items within reach  Taken 5/31/2024 0400 by Minesh Scanlon RN  Safety Promotion/Fall Prevention:   safety round/check completed   room organization consistent   nonskid shoes/slippers when out of bed   fall prevention program maintained   clutter free environment maintained   assistive device/personal items within reach  Taken 5/31/2024 0200 by Minesh Scanlon RN  Safety Promotion/Fall Prevention:   safety round/check completed   room organization consistent   nonskid shoes/slippers when out of bed   fall prevention program maintained   clutter free environment maintained   assistive device/personal items within reach  Intervention: Prevent Skin Injury  Recent Flowsheet Documentation  Taken 5/31/2024 0400 by Minesh Scanlon RN  Body Position: position changed independently  Skin Protection:   adhesive use limited   tubing/devices free from skin contact  Intervention: Prevent Infection  Recent Flowsheet Documentation  Taken 5/31/2024 0400 by Minesh Scanlon RN  Infection Prevention:   environmental surveillance performed   hand hygiene promoted   personal protective equipment utilized   rest/sleep promoted   single patient room provided  Goal: Optimal Comfort and Wellbeing  Intervention: Provide Person-Centered Care  Recent Flowsheet Documentation  Taken 5/31/2024 0400 by Minesh Scanlon RN  Trust Relationship/Rapport: care explained     Problem: Skin Injury Risk Increased  Goal: Skin Health and Integrity  Intervention: Optimize Skin Protection  Recent  Flowsheet Documentation  Taken 5/31/2024 0400 by Minesh Scanlon, RN  Pressure Reduction Techniques: frequent weight shift encouraged  Pressure Reduction Devices: pressure-redistributing mattress utilized  Skin Protection:   adhesive use limited   tubing/devices free from skin contact     Problem: Pain Chronic (Persistent) (Comorbidity Management)  Goal: Acceptable Pain Control and Functional Ability  Intervention: Optimize Psychosocial Wellbeing  Recent Flowsheet Documentation  Taken 5/31/2024 0400 by Minesh Scanlon RN  Supportive Measures: active listening utilized  Diversional Activities:   smartphone   television  Family/Support System Care: caregiver stress acknowledged   Goal Outcome Evaluation:      Patient is terrified of needles. Patient has been pleasant and cooperative with care.

## 2024-05-31 NOTE — PROGRESS NOTES
LOS: 1 day   Patient Care Team:  Provider, No Known as PCP - General      Subjective     Interval History:     Subjective: Patient with no new complaints.  Denies any rectal bleeding overnight.  No abdominal pain.      ROS:   No chest pain, shortness of breath, or cough.        Medication Review:     Current Facility-Administered Medications:     acetaminophen (TYLENOL) tablet 650 mg, 650 mg, Oral, Once PRN **OR** acetaminophen (TYLENOL) suppository 650 mg, 650 mg, Rectal, Q4H PRN, Wale Taylor MD    Bictegravir-Emtricitab-Tenofov (BIKTARVY) -25 MG per tablet 1 tablet, 1 tablet, Oral, Daily, Wale Taylor MD, 1 tablet at 05/30/24 0911    Calcium Replacement - Follow Nurse / BPA Driven Protocol, , Does not apply, PRN, Wale Taylor MD    cefTRIAXone (ROCEPHIN) 2,000 mg in sodium chloride 0.9 % 100 mL MBP, 2,000 mg, Intravenous, Q24H, Wale Taylor MD, Last Rate: 200 mL/hr at 05/30/24 2356, 2,000 mg at 05/30/24 2356    HYDROmorphone (DILAUDID) injection 0.5 mg, 0.5 mg, Intravenous, Q2H PRN, Wale Taylor MD, 0.5 mg at 05/31/24 0612    Magnesium Standard Dose Replacement - Follow Nurse / BPA Driven Protocol, , Does not apply, PRN, Wale Taylor MD    niCARdipine (CARDENE) 25mg in 250mL NS infusion, 5-15 mg/hr, Intravenous, Continuous PRN, Wale Taylor MD    nitroglycerin (NITROSTAT) SL tablet 0.4 mg, 0.4 mg, Sublingual, Q5 Min PRN, Wale Taylor MD    ondansetron ODT (ZOFRAN-ODT) disintegrating tablet 4 mg, 4 mg, Oral, Q6H PRN, 4 mg at 05/31/24 0612 **OR** ondansetron (ZOFRAN) injection 4 mg, 4 mg, Intravenous, Q6H PRN, Wale Taylor MD, 4 mg at 05/30/24 0913    pantoprazole (PROTONIX) injection 40 mg, 40 mg, Intravenous, BID AC, Wale Taylor MD, 40 mg at 05/31/24 0930    phenylephrine (CHARLEY-SYNEPHRINE) 50 mg in sodium chloride 0.9 % 250 mL infusion, 0.5-3 mcg/kg/min, Intravenous, Continuous PRN, Wale Taylor MD    Phosphorus Replacement - Follow Nurse / BPA Driven Protocol, , Does not apply, PRN,  "Wale Taylor MD    polyethylene glycol (MIRALAX) packet 17 g, 17 g, Oral, Daily, Wale Taylor MD, 17 g at 05/31/24 0930    Potassium Replacement - Follow Nurse / BPA Driven Protocol, , Does not apply, PRN, Wale Taylor MD    sodium chloride 0.9 % flush 10 mL, 10 mL, Intravenous, PRN, Wale Taylor MD    sodium chloride 0.9 % flush 10 mL, 10 mL, Intravenous, Q12H, Wale Taylor MD, 10 mL at 05/31/24 0930    sodium chloride 0.9 % flush 10 mL, 10 mL, Intravenous, PRN, Wale Taylor MD    sodium chloride 0.9 % infusion 40 mL, 40 mL, Intravenous, PRN, Wale Taylor MD    sodium chloride 0.9 % infusion, 75 mL/hr, Intravenous, Continuous, Wale Taylor MD, Last Rate: 75 mL/hr at 05/30/24 0905, 75 mL/hr at 05/30/24 0905      Objective     Vital Signs  Vitals:    05/30/24 2325 05/31/24 0218 05/31/24 0326 05/31/24 0746   BP: 103/68 120/75 97/65 105/68   BP Location: Right arm Right arm Right arm    Patient Position: Lying Lying Lying    Pulse: 73 95 83 71   Resp: 15 15 15 12   Temp:  97.9 °F (36.6 °C) 98.1 °F (36.7 °C) 97.4 °F (36.3 °C)   TempSrc: Oral Oral Oral Oral   SpO2: 97% 97% 96% 97%   Weight:   53.6 kg (118 lb 2.7 oz)    Height:   170.2 cm (67.01\")        Physical Exam:     General Appearance:    Awake and alert, in no acute distress   Head:    Normocephalic, without obvious abnormality   Eyes:          Conjunctivae normal, anicteric sclera   Throat:   No oral lesions, no thrush, oral mucosa moist   Neck:   No adenopathy, supple, no JVD   Lungs:     respirations regular, even and unlabored   Abdomen:     Soft, non-tender, no rebound or guarding, non-distended   Rectal:     Deferred   Extremities:   No edema, no cyanosis   Skin:   No bruising or rash, no jaundice        Results Review:    CBC    Results from last 7 days   Lab Units 05/31/24  0625 05/30/24  0925 05/30/24  0442 05/29/24  1929   WBC 10*3/mm3 8.96  --  9.39 8.69   HEMOGLOBIN g/dL 7.7* 8.1* 6.7* 8.7*   PLATELETS 10*3/mm3 758*  --  710* 718* " "    CMP   Results from last 7 days   Lab Units 05/31/24  0625 05/30/24  0442 05/30/24  0019 05/30/24  0003 05/29/24  1929   SODIUM mmol/L 136 136  --   --  136   POTASSIUM mmol/L 5.1 4.6  --   --  4.4   CHLORIDE mmol/L 102 103  --   --  101   CO2 mmol/L 26.3 26.0  --   --  26.0   BUN mg/dL 20 17  --   --  17   CREATININE mg/dL 1.15 1.16  --   --  1.12   GLUCOSE mg/dL 265* 85  --   --  101*   ALBUMIN g/dL  --   --   --   --  2.9*   BILIRUBIN mg/dL  --   --   --   --  0.2   ALK PHOS U/L  --   --   --   --  114   AST (SGOT) U/L  --   --   --   --  27   ALT (SGPT) U/L  --   --   --   --  28   LIPASE U/L  --   --  14  --   --    AMMONIA umol/L  --   --   --  22  --      Cr Clearance Estimated Creatinine Clearance: 54.4 mL/min (by C-G formula based on SCr of 1.15 mg/dL).  Coag   Results from last 7 days   Lab Units 05/30/24  0003   INR  1.07     HbA1C No results found for: \"HGBA1C\"      Infection   Results from last 7 days   Lab Units 05/30/24  0045 05/30/24  0019 05/29/24  2141   BLOODCX  No growth at 24 hours No growth at 24 hours  --    URINECX   --   --  Culture in progress     UA    Results from last 7 days   Lab Units 05/29/24  2141   NITRITE UA  Positive*   WBC UA /HPF Too Numerous to Count*   BACTERIA UA /HPF 4+*   SQUAM EPITHEL UA /HPF 0-2   URINECX  Culture in progress     Microbiology Results (last 10 days)       Procedure Component Value - Date/Time    Blood Culture - Blood, Arm, Left [[id number]]  (Normal) Collected: 05/30/24 0045    Lab Status: Preliminary result Specimen: Blood from Arm, Left Updated: 05/31/24 0100     Blood Culture No growth at 24 hours    Narrative:      Less than seven (7) mL's of blood was collected.  Insufficient quantity may yield false negative results.    Blood Culture - Blood, Arm, Left [[id number]]  (Normal) Collected: 05/30/24 0019    Lab Status: Preliminary result Specimen: Blood from Arm, Left Updated: 05/31/24 0030     Blood Culture No growth at 24 hours    Urine Culture - " Urine, Urine, Clean Catch [[id number]]  (Normal) Collected: 05/29/24 2141    Lab Status: Preliminary result Specimen: Urine, Clean Catch Updated: 05/30/24 1146     Urine Culture Culture in progress          Imaging Results (Last 72 Hours)       Procedure Component Value Units Date/Time    FL < 1 Hour [[id number]] Resulted: 05/30/24 1826     Updated: 05/30/24 1826    Narrative:      This procedure was auto-finalized with no dictation required.    US Liver [[id number]] Collected: 05/30/24 1433     Updated: 05/30/24 1437    Narrative:      US LIVER    Date of Exam: 5/30/2024 1:35 PM EDT    Indication: New ascites, assess for cirrhosis.    Comparison: No comparisons available.    Technique: Grayscale and color Doppler ultrasound evaluation of the liver was performed.      Findings:  Homogeneous hepatic echotexture. Mild increase hepatic echogenicity. No discrete contour irregularity. Hepatopetal flow of main portal vein. Trace anechoic perihepatic ascites. No visualized intrapelvic duct dilation. Common bile duct measures up to 4   mm.      Impression:      Impression:  1. Mild increase hepatic echogenicity with otherwise homogeneous echotexture. This is suggestive but not diagnostic of mild steatosis.  2. Homogeneous hepatic echotexture without discrete contour irregularity.  3. Trace perihepatic ascites.        Electronically Signed: Aris Key MD    5/30/2024 2:35 PM EDT    Workstation ID: [id number]    CT Abdomen Pelvis Without Contrast [[id number]] Collected: 05/29/24 2308     Updated: 05/29/24 2314    Narrative:      CT ABDOMEN PELVIS WO CONTRAST    Date of Exam: 5/29/2024 10:55 PM EDT    Indication: Flank pain, stone protocol.    Comparison: 7/19/2022.    Technique: Axial CT images were obtained of the abdomen and pelvis without the administration of contrast. Sagittal and coronal reconstructions were performed.  Automated exposure control and iterative reconstruction methods were used.      Findings:  The lung  bases are clear. Distal esophagus is unremarkable. Heart size is normal. There are no acute findings in the superficial soft tissues. No acute osseous abnormality or destructive bone lesion. There are mild lower lumbar degenerative changes.    The liver, gallbladder, bile ducts, pancreas, stomach, duodenum, spleen and adrenal glands appear within normal limits. There is a 3 mm nonobstructing stone at the superior right kidney and there is a punctate nonobstructing stone in the inferior right   kidney. There is a 4 mm obstructing stone at the left UVJ with mild left-sided hydronephrosis. Urinary bladder is nondistended. Prostate is normal size. The appendix is not seen. Aorta is normal diameter. No small bowel distention. There is a small   volume of scattered ascites. There is mild diffuse mesenteric congestion. There are multiple mildly dilated small bowel loops measuring up to 30 mm. There is a gas-filled nondistended colon. The transverse colon measures up to 5 cm and the cecum measures   up to 7.3 cm. There is a large amount of bowel gas. No obvious obstructing lesion.      Impression:      Impression:    1. 4 mm obstructing stone at the left UVJ with mild hydronephrosis. 3 mm nonobstructing right-sided kidney stone.  2. New small volume scattered ascites with unclear etiology.  3. Gas-filled mildly distended small bowel and gas-filled nondistended colon. There is no obvious obstructing lesion. This could be related to ileus.            Electronically Signed: Wale Schroeder MD    5/29/2024 11:12 PM EDT    Workstation ID: [id number]            Assessment & Plan     ASSESSMENT:  -Iron deficiency anemia  -Rectal bleeding  -Abnormal CT showing possible ileus  -Left-sided abdominal pain/left flank pain  -Left UVJ stone  -UTI  -HIV  -History of skin cancer     PLAN:  Patient is a 58-year-old male with history of HIV who presented on 5/29 with complaints of left flank pain. CT abdomen/pelvis WO shows 4 mm obstructing  stone at the left UVJ with mild hydronephrosis, new small volume of ascites, and possible ileus.     Patient with no new complaints.  Denies any rectal bleeding overnight.  Hemoglobin 7.7 from 8.1.  Continue to monitor H/H and transfuse as needed.  Continue oral iron supplementation.  Patient was offered inpatient EGD/colonoscopy tomorrow, but prefers to have this done on an outpatient basis.  Our office will contact patient to arrange outpatient endoscopy following discharge.  Okay for diet as tolerated.  Continue PPI and daily bowel regimen.  RUQ US shows mild steatosis, but no evidence of cirrhosis.   S/p cystoscopy, ureteroscopy, laser lithotripsy, and ureteral stent placement by urology yesterday.  Not much else to add from a GI standpoint as an inpatient at this time.  Will be available as needed.      Electronically signed by DRU Fields, 05/31/24, 9:48 AM EDT.

## 2024-05-31 NOTE — PLAN OF CARE
Goal Outcome Evaluation:      Patient alert & oriented. Able to voice needs. Some complaints of nausea. Treated per MAR. Patient does not want to receive blood products. Plan for EGD/Colonoscopy tomorrow.

## 2024-05-31 NOTE — PROGRESS NOTES
CPAP ordered per [facility], pt states he does not wear a machine at home and refused to wear mask here.

## 2024-05-31 NOTE — PROGRESS NOTES
[facility] MEDICINE SERVICE  DAILY PROGRESS NOTE    NAME: Felipe Nieves  : 1965  MRN: [id number]      LOS: 1 day     PROVIDER OF SERVICE: Nichol Aldana MD    Chief Complaint: Hydronephrosis with obstructing calculus    Subjective:     Patient seen and examined.  Patient is status post day 1 left cystoscopy, urethroscopy, laser lithotripsy and stent placement.  Vital signs reviewed.  Patient has been weaned off of oxygen and currently on room air.      Review of Systems:   Review of Systems  As above  Objective:     Vital Signs  Temp:  [97.4 °F (36.3 °C)-99.9 °F (37.7 °C)] 97.4 °F (36.3 °C)  Heart Rate:  [] 71  Resp:  [10-21] 12  BP: ()/(50-75) 105/68  Flow (L/min):  [6] 6   Body mass index is 18.5 kg/m².    Physical Exam  Physical Exam  General Appearance: AOO x 4, cooperative, no distress, appropriate for age  Head:  Normocephalic, without obvious abnormality  Eyes:  PERRL, EOM's intact, conjunctivae and cornea clear  Nose:  Nares symmetrical, septum midline, mucosa pink  Throat:  Lips, tongue, and mucosa are moist, pink, and intact  Neck:  Supple; symmetrical, trachea midline, no adenopathy  Back:  Symmetrical, ROM normal, no CVA tenderness  Lungs: Respirations unlabored, no audible wheeze  Heart: Regular rate & rhythm, S1 and S2 normal  Abdomen:  Soft, nontender, bowel sounds active all four quadrants  Musculoskeletal: Tone and strength strong and symmetrical, all extremities; no joint pain or edema         Skin/Hair/Nails:  Skin warm, dry and intact, no rashes or abnormal dyspigmentation     Scheduled Meds   Bictegravir-Emtricitab-Tenofov, 1 tablet, Oral, Daily  cefTRIAXone (ROCEPHIN) 2,000 mg in sodium chloride 0.9 % 100 mL MBP, 2,000 mg, Intravenous, Q24H  pantoprazole, 40 mg, Intravenous, BID AC  polyethylene glycol, 17 g, Oral, Daily  sodium chloride, 10 mL, Intravenous, Q12H       PRN Meds     acetaminophen **OR** acetaminophen    Calcium Replacement - Follow Nurse / BPA  Driven Protocol    HYDROmorphone    Magnesium Standard Dose Replacement - Follow Nurse / BPA Driven Protocol    niCARdipine    nitroglycerin    ondansetron ODT **OR** ondansetron    phenylephrine    Phosphorus Replacement - Follow Nurse / BPA Driven Protocol    Potassium Replacement - Follow Nurse / BPA Driven Protocol    sodium chloride    sodium chloride    sodium chloride   Infusions  niCARdipine, 5-15 mg/hr  phenylephrine, 0.5-3 mcg/kg/min  sodium chloride, 75 mL/hr, Last Rate: 75 mL/hr (05/30/24 0905)          Diagnostic Data    Results from last 7 days   Lab Units 05/31/24  0625 05/30/24  0442 05/29/24  1929   WBC 10*3/mm3 8.96   < > 8.69   HEMOGLOBIN g/dL 7.7*   < > 8.7*   HEMATOCRIT % 26.7*   < > 29.8*   PLATELETS 10*3/mm3 758*   < > 718*   GLUCOSE mg/dL 265*   < > 101*   CREATININE mg/dL 1.15   < > 1.12   BUN mg/dL 20   < > 17   SODIUM mmol/L 136   < > 136   POTASSIUM mmol/L 5.1   < > 4.4   AST (SGOT) U/L  --   --  27   ALT (SGPT) U/L  --   --  28   ALK PHOS U/L  --   --  114   BILIRUBIN mg/dL  --   --  0.2   ANION GAP mmol/L 7.7   < > 9.0    < > = values in this interval not displayed.       US Liver    Result Date: 5/30/2024  Impression: 1. Mild increase hepatic echogenicity with otherwise homogeneous echotexture. This is suggestive but not diagnostic of mild steatosis. 2. Homogeneous hepatic echotexture without discrete contour irregularity. 3. Trace perihepatic ascites. Electronically Signed: Aris Key MD  5/30/2024 2:35 PM EDT  Workstation ID: [id number]    CT Abdomen Pelvis Without Contrast    Result Date: 5/29/2024  Impression: 1. 4 mm obstructing stone at the left UVJ with mild hydronephrosis. 3 mm nonobstructing right-sided kidney stone. 2. New small volume scattered ascites with unclear etiology. 3. Gas-filled mildly distended small bowel and gas-filled nondistended colon. There is no obvious obstructing lesion. This could be related to ileus. Electronically Signed: Wale Schroeder MD   5/29/2024 11:12 PM EDT  Workstation ID: [id number]       I reviewed the patient's new clinical results.    Assessment/Plan:     Active and Resolved Problems  Active Hospital Problems    Diagnosis  POA    **Hydronephrosis with obstructing calculus [N13.2]  Yes    Hydronephrosis with urinary obstruction due to ureteral calculus [N13.2]  Unknown      Resolved Hospital Problems   No resolved problems to display.     #Left UVJ stone  #UTI    - CT a/p shows 4mm obstructing stone at the left UVJ with mild hydronephrosis and 3mm non-obstructing right sided kidney stone    - urology on board  -Patient status post left ureteroscopy, cystoscopy, stent placement and laser lithotripsy on 5/30 by Dr Wood    - UA shows UTI  -Urine culture pending.  Blood culture negative x 24 hours    - Rocephin 2g IV daily, monitor for toxicity       #HIV    - Patient on Biktarvy, will continue    - CD4 count and viral load sent off       #Ascites  #Abdominal distension    - CT a/p shows new small volume scattered ascites with unclear etiology; gas filled mildly distended small bowel and gas filled non-distended colon without obvious obstructing lesion that could be related to ileus   - GI on board      #Rectal bleeding  #Anemia    - Admits to frequent rectal bleeding that is chronic   - hgb 8.7 on admission,  -Hemoglobin dropped down to 6.7.  Patient declined transfusion.  Hemoglobin this morning 7.7.   - PPI BID    - GI consulted-plans for EGD and colonoscopy planned for tomorrow     #Thrombocytosis    - plt 718 on admission    - elevated from base, monitor      DVT prophylaxis:  Mechanical DVT prophylaxis orders are present.         Code status is   There are no questions and answers to display.       Plan for disposition:Home in 1-2 days    Time: 30 minutes    Signature: Electronically signed by Nichol Aldana MD, 05/31/24, 08:29 EDT.  [facility] Hospitalist Team

## 2024-05-31 NOTE — PLAN OF CARE
Problem: Adult Inpatient Plan of Care  Goal: Absence of Hospital-Acquired Illness or Injury  Intervention: Identify and Manage Fall Risk  Recent Flowsheet Documentation  Taken 5/30/2024 2200 by Ara Oshea RN  Safety Promotion/Fall Prevention:   safety round/check completed   room organization consistent   nonskid shoes/slippers when out of bed  Taken 5/30/2024 2020 by Ara Oshea RN  Safety Promotion/Fall Prevention:   safety round/check completed   room organization consistent   nonskid shoes/slippers when out of bed  Intervention: Prevent Skin Injury  Recent Flowsheet Documentation  Taken 5/30/2024 2020 by Ara Oshea RN  Skin Protection: adhesive use limited  Intervention: Prevent and Manage VTE (Venous Thromboembolism) Risk  Recent Flowsheet Documentation  Taken 5/30/2024 2020 by rAa Oshea RN  Activity Management: activity encouraged  Goal: Optimal Comfort and Wellbeing  Intervention: Monitor Pain and Promote Comfort  Recent Flowsheet Documentation  Taken 5/30/2024 1950 by Ara Oshea RN  Pain Management Interventions: see MAR  Intervention: Provide Person-Centered Care  Recent Flowsheet Documentation  Taken 5/30/2024 2020 by Ara Oshea RN  Trust Relationship/Rapport:   care explained   questions answered   questions encouraged   thoughts/feelings acknowledged     Problem: Skin Injury Risk Increased  Goal: Skin Health and Integrity  Intervention: Optimize Skin Protection  Recent Flowsheet Documentation  Taken 5/30/2024 2020 by Ara Oshea RN  Pressure Reduction Techniques: frequent weight shift encouraged  Head of Bed (HOB) Positioning: HOB lowered  Pressure Reduction Devices: pressure-redistributing mattress utilized  Skin Protection: adhesive use limited     Problem: Pain Chronic (Persistent) (Comorbidity Management)  Goal: Acceptable Pain Control and Functional Ability  Intervention: Manage Persistent Pain  Recent Flowsheet Documentation  Taken  5/30/2024 2020 by Ara Oshea, RN  Sleep/Rest Enhancement:   awakenings minimized   consistent schedule promoted   regular sleep/rest pattern promoted  Intervention: Develop Pain Management Plan  Recent Flowsheet Documentation  Taken 5/30/2024 1950 by Ara Oshea, RN  Pain Management Interventions: see MAR  Intervention: Optimize Psychosocial Wellbeing  Recent Flowsheet Documentation  Taken 5/30/2024 2020 by Ara Oshea, RN  Supportive Measures: active listening utilized  Diversional Activities:   television   smartphone   Goal Outcome Evaluation:

## 2024-05-31 NOTE — CONSULTS
"Nutrition Services    Patient Name: Felipe Nieves  YOB: 1965  MRN: [id number]  Admission date: 5/29/2024    Comment:  -- Continue current diet and encourage good PO intakes    -- Add Boost Plus BID (Provides 720 kcals, 28 g protein if consumed)   Boost Glucose Control may be substituted for Boost Plus at this time, due to national shortage of many ONS products. If substituted, each Boost Glucose Control will provide 190 kcal and 16g PRO.      CLINICAL NUTRITION ASSESSMENT      Reason for Assessment 5/31: BMI less than 19     H&P      Past Medical History:   Diagnosis Date    HIV (human immunodeficiency virus infection)     Skin cancer        Past Surgical History:   Procedure Laterality Date    HERNIA REPAIR      SKIN CANCER EXCISION          Current Problems   Left UVJ stone  UTI  - urology following  - S/P cystoscopy 5/30    HIV     Ascites  Abdominal distension  - GI following      Rectal bleeding  Anemia     Thrombocytosis       Encounter Information        Trending Narrative     5/31: Admitted for left flank pain, abdominal pain, distention.  Patient asleep at time of RD visit.  Noted with temporal muscle loss, unable to fully assess rest of body.         Anthropometrics        Current Height, Weight Height: 170.2 cm (67.01\")  Weight: 53.6 kg (118 lb 2.7 oz) (05/31/24 0326)       Usual Body Weight (UBW) Unable to obtain from patient        Trending Weight Hx     This admission: 5/31: 118#             PTA: No recent weights to note     Wt Readings from Last 30 Encounters:   05/31/24 0326 53.6 kg (118 lb 2.7 oz)   05/30/24 0100 57 kg (125 lb 10.6 oz)   05/29/24 1842 59 kg (130 lb)   10/06/22 2120 56.7 kg (125 lb)   07/19/22 1520 60.2 kg (132 lb 11.5 oz)   02/23/22 1109 56.7 kg (125 lb)   02/01/22 0910 56.7 kg (125 lb)   01/17/22 0813 56.7 kg (125 lb)   01/07/22 0945 56.7 kg (125 lb)   12/06/21 1436 52.2 kg (115 lb)   04/30/21 1444 56.7 kg (125 lb)   12/18/20 1729 57.6 kg (127 lb)   12/15/20 1148 " "57.6 kg (127 lb)      BMI kg/m2 Body mass index is 18.5 kg/m².       Labs        Pertinent Labs    Results from last 7 days   Lab Units 05/31/24  0625 05/30/24  0442 05/29/24  1929   SODIUM mmol/L 136 136 136   POTASSIUM mmol/L 5.1 4.6 4.4   CHLORIDE mmol/L 102 103 101   CO2 mmol/L 26.3 26.0 26.0   BUN mg/dL 20 17 17   CREATININE mg/dL 1.15 1.16 1.12   CALCIUM mg/dL 7.9* 8.3* 8.7   BILIRUBIN mg/dL  --   --  0.2   ALK PHOS U/L  --   --  114   ALT (SGPT) U/L  --   --  28   AST (SGOT) U/L  --   --  27   GLUCOSE mg/dL 265* 85 101*     Results from last 7 days   Lab Units 05/31/24  0625   HEMOGLOBIN g/dL 7.7*   HEMATOCRIT % 26.7*     No results found for: \"HGBA1C\"     Medications    Scheduled Medications Bictegravir-Emtricitab-Tenofov, 1 tablet, Oral, Daily  cefTRIAXone (ROCEPHIN) 2,000 mg in sodium chloride 0.9 % 100 mL MBP, 2,000 mg, Intravenous, Q24H  pantoprazole, 40 mg, Intravenous, BID AC  polyethylene glycol, 17 g, Oral, Daily  sodium chloride, 10 mL, Intravenous, Q12H        Infusions niCARdipine, 5-15 mg/hr  phenylephrine, 0.5-3 mcg/kg/min  sodium chloride, 75 mL/hr, Last Rate: 75 mL/hr (05/30/24 0905)        PRN Medications   acetaminophen **OR** acetaminophen    Calcium Replacement - Follow Nurse / BPA Driven Protocol    HYDROmorphone    Magnesium Standard Dose Replacement - Follow Nurse / BPA Driven Protocol    niCARdipine    nitroglycerin    ondansetron ODT **OR** ondansetron    phenylephrine    Phosphorus Replacement - Follow Nurse / BPA Driven Protocol    Potassium Replacement - Follow Nurse / BPA Driven Protocol    sodium chloride    sodium chloride    sodium chloride     Physical Findings        Trending Physical   Appearance, [facility] 5/31: GARDEN[place]   --  Edema  No edema documented      Bowel Function Last documented BM 5/29 (x 2 days ago)     Tubes No feeding tube      Chewing/Swallowing No known issues      Skin Intact      --  Current Nutrition Orders & Evaluation of Intake       Oral Nutrition     Food " Allergies NKFA   Current PO Diet Diet: Regular/House; Fluid Consistency: Thin (IDDSI 0)   Supplement None ordered    PO Evaluation     Trending % PO Intake 5/31: 100% x 1 documented meals since admission    --  Nutritional Risk Screening        NRS-2002 Score          Nutrition Diagnosis         Nutrition Dx Problem 1 Underweight related to intake less than needs as evidenced by BMI less than 19.        Nutrition Dx Problem 2        Intervention Goal         Intervention Goal(s) Accept ONS  No weight loss  PO intakes continue at least 75%     Nutrition Intervention        RD Action Add ONS     Nutrition Prescription          Diet Prescription Regular    Supplement Prescription Boost Plus BID   --  Monitor/Evaluation        Monitor Per protocol, I&O, PO intake, Supplement intake, Pertinent labs, Weight, Skin status, GI status, Symptoms, POC/GOC       Electronically signed by:  Cristy Alejandre RD  05/31/24 08:04 EDT

## 2024-06-01 ENCOUNTER — ANESTHESIA EVENT (OUTPATIENT)
Dept: GASTROENTEROLOGY | Facility: HOSPITAL | Age: 59
End: 2024-06-01
Payer: COMMERCIAL

## 2024-06-01 ENCOUNTER — INPATIENT HOSPITAL (OUTPATIENT)
Dept: URBAN - METROPOLITAN AREA HOSPITAL 84 | Facility: HOSPITAL | Age: 59
End: 2024-06-01
Payer: COMMERCIAL

## 2024-06-01 ENCOUNTER — ANESTHESIA (OUTPATIENT)
Dept: GASTROENTEROLOGY | Facility: HOSPITAL | Age: 59
End: 2024-06-01
Payer: COMMERCIAL

## 2024-06-01 ENCOUNTER — READMISSION MANAGEMENT (OUTPATIENT)
Dept: CALL CENTER | Facility: HOSPITAL | Age: 59
End: 2024-06-01
Payer: COMMERCIAL

## 2024-06-01 VITALS
SYSTOLIC BLOOD PRESSURE: 104 MMHG | HEART RATE: 72 BPM | WEIGHT: 117.95 LBS | OXYGEN SATURATION: 97 % | TEMPERATURE: 98.5 F | RESPIRATION RATE: 13 BRPM | DIASTOLIC BLOOD PRESSURE: 68 MMHG | HEIGHT: 67 IN | BODY MASS INDEX: 18.51 KG/M2

## 2024-06-01 DIAGNOSIS — K29.50 UNSPECIFIED CHRONIC GASTRITIS WITHOUT BLEEDING: ICD-10-CM

## 2024-06-01 DIAGNOSIS — D50.9 IRON DEFICIENCY ANEMIA, UNSPECIFIED: ICD-10-CM

## 2024-06-01 LAB
ANION GAP SERPL CALCULATED.3IONS-SCNC: 7.7 MMOL/L (ref 5–15)
BACTERIA SPEC AEROBE CULT: ABNORMAL
BUN SERPL-MCNC: 18 MG/DL (ref 6–20)
BUN/CREAT SERPL: 17 (ref 7–25)
CALCIUM SPEC-SCNC: 8.6 MG/DL (ref 8.6–10.5)
CHLORIDE SERPL-SCNC: 106 MMOL/L (ref 98–107)
CO2 SERPL-SCNC: 28.3 MMOL/L (ref 22–29)
CREAT SERPL-MCNC: 1.06 MG/DL (ref 0.76–1.27)
DEPRECATED RDW RBC AUTO: 42.6 FL (ref 37–54)
EGFRCR SERPLBLD CKD-EPI 2021: 82.4 ML/MIN/1.73
ERYTHROCYTE [DISTWIDTH] IN BLOOD BY AUTOMATED COUNT: 15.5 % (ref 12.3–15.4)
GLUCOSE SERPL-MCNC: 91 MG/DL (ref 65–99)
HCT VFR BLD AUTO: 26.2 % (ref 37.5–51)
HGB BLD-MCNC: 7.8 G/DL (ref 13–17.7)
HIV1 RNA # SERPL NAA+PROBE: <20 COPIES/ML
HIV1 RNA SERPL NAA+PROBE-LOG#: NORMAL LOG10COPY/ML
MCH RBC QN AUTO: 23.1 PG (ref 26.6–33)
MCHC RBC AUTO-ENTMCNC: 29.8 G/DL (ref 31.5–35.7)
MCV RBC AUTO: 77.5 FL (ref 79–97)
PLATELET # BLD AUTO: 664 10*3/MM3 (ref 140–450)
PMV BLD AUTO: 8.5 FL (ref 6–12)
POTASSIUM SERPL-SCNC: 4 MMOL/L (ref 3.5–5.2)
RBC # BLD AUTO: 3.38 10*6/MM3 (ref 4.14–5.8)
SODIUM SERPL-SCNC: 142 MMOL/L (ref 136–145)
WBC NRBC COR # BLD AUTO: 7.47 10*3/MM3 (ref 3.4–10.8)

## 2024-06-01 PROCEDURE — 80048 BASIC METABOLIC PNL TOTAL CA: CPT | Performed by: UROLOGY

## 2024-06-01 PROCEDURE — 25010000002 PROPOFOL 500 MG/50ML EMULSION

## 2024-06-01 PROCEDURE — 88305 TISSUE EXAM BY PATHOLOGIST: CPT | Performed by: INTERNAL MEDICINE

## 2024-06-01 PROCEDURE — 0DB68ZX EXCISION OF STOMACH, VIA NATURAL OR ARTIFICIAL OPENING ENDOSCOPIC, DIAGNOSTIC: ICD-10-PCS | Performed by: INTERNAL MEDICINE

## 2024-06-01 PROCEDURE — 25010000002 HYDROMORPHONE 1 MG/ML SOLUTION: Performed by: UROLOGY

## 2024-06-01 PROCEDURE — 25810000003 SODIUM CHLORIDE 0.9 % SOLUTION: Performed by: UROLOGY

## 2024-06-01 PROCEDURE — 25810000003 SODIUM CHLORIDE 0.9 % SOLUTION

## 2024-06-01 PROCEDURE — 25010000002 CEFTRIAXONE PER 250 MG: Performed by: UROLOGY

## 2024-06-01 PROCEDURE — 43239 EGD BIOPSY SINGLE/MULTIPLE: CPT | Performed by: INTERNAL MEDICINE

## 2024-06-01 PROCEDURE — 0DB98ZX EXCISION OF DUODENUM, VIA NATURAL OR ARTIFICIAL OPENING ENDOSCOPIC, DIAGNOSTIC: ICD-10-PCS | Performed by: INTERNAL MEDICINE

## 2024-06-01 PROCEDURE — 85027 COMPLETE CBC AUTOMATED: CPT | Performed by: UROLOGY

## 2024-06-01 RX ORDER — ONDANSETRON 2 MG/ML
4 INJECTION INTRAMUSCULAR; INTRAVENOUS EVERY 6 HOURS PRN
Status: CANCELLED | OUTPATIENT
Start: 2024-06-01

## 2024-06-01 RX ORDER — PROPOFOL 10 MG/ML
INJECTION, EMULSION INTRAVENOUS AS NEEDED
Status: DISCONTINUED | OUTPATIENT
Start: 2024-06-01 | End: 2024-06-01 | Stop reason: SURG

## 2024-06-01 RX ORDER — SODIUM CHLORIDE 9 MG/ML
INJECTION, SOLUTION INTRAVENOUS CONTINUOUS PRN
Status: DISCONTINUED | OUTPATIENT
Start: 2024-06-01 | End: 2024-06-01 | Stop reason: SURG

## 2024-06-01 RX ORDER — LIDOCAINE HYDROCHLORIDE 20 MG/ML
INJECTION, SOLUTION EPIDURAL; INFILTRATION; INTRACAUDAL; PERINEURAL AS NEEDED
Status: DISCONTINUED | OUTPATIENT
Start: 2024-06-01 | End: 2024-06-01 | Stop reason: SURG

## 2024-06-01 RX ORDER — ONDANSETRON 4 MG/1
4 TABLET, ORALLY DISINTEGRATING ORAL EVERY 6 HOURS PRN
Status: CANCELLED | OUTPATIENT
Start: 2024-06-01

## 2024-06-01 RX ADMIN — PROPOFOL 30 MG: 10 INJECTION, EMULSION INTRAVENOUS at 12:13

## 2024-06-01 RX ADMIN — HYDROMORPHONE HYDROCHLORIDE 0.5 MG: 1 INJECTION, SOLUTION INTRAMUSCULAR; INTRAVENOUS; SUBCUTANEOUS at 14:28

## 2024-06-01 RX ADMIN — PROPOFOL 110 MG: 10 INJECTION, EMULSION INTRAVENOUS at 12:08

## 2024-06-01 RX ADMIN — LIDOCAINE HYDROCHLORIDE 60 MG: 20 INJECTION, SOLUTION EPIDURAL; INFILTRATION; INTRACAUDAL; PERINEURAL at 12:08

## 2024-06-01 RX ADMIN — POLYETHYLENE GLYCOL 3350 17 G: 17 POWDER, FOR SOLUTION ORAL at 08:45

## 2024-06-01 RX ADMIN — BICTEGRAVIR SODIUM, EMTRICITABINE, AND TENOFOVIR ALAFENAMIDE FUMARATE 1 TABLET: 50; 200; 25 TABLET ORAL at 07:56

## 2024-06-01 RX ADMIN — SODIUM CHLORIDE: 9 INJECTION, SOLUTION INTRAVENOUS at 12:02

## 2024-06-01 RX ADMIN — PANTOPRAZOLE SODIUM 40 MG: 40 INJECTION, POWDER, FOR SOLUTION INTRAVENOUS at 07:56

## 2024-06-01 RX ADMIN — Medication 1 BOTTLE: at 08:44

## 2024-06-01 RX ADMIN — HYDROMORPHONE HYDROCHLORIDE 0.5 MG: 1 INJECTION, SOLUTION INTRAMUSCULAR; INTRAVENOUS; SUBCUTANEOUS at 07:55

## 2024-06-01 RX ADMIN — Medication 10 ML: at 07:56

## 2024-06-01 RX ADMIN — SODIUM CHLORIDE 75 ML/HR: 9 INJECTION, SOLUTION INTRAVENOUS at 04:48

## 2024-06-01 RX ADMIN — CEFTRIAXONE 2000 MG: 2 INJECTION, POWDER, FOR SOLUTION INTRAMUSCULAR; INTRAVENOUS at 01:07

## 2024-06-01 RX ADMIN — PROPOFOL 40 MG: 10 INJECTION, EMULSION INTRAVENOUS at 12:11

## 2024-06-01 NOTE — OP NOTE
ESOPHAGOGASTRODUODENOSCOPY Procedure Report    Patient Name:  Felipe Nieves  YOB: 1965    Date of Surgery:  6/1/2024     Pre-Op Diagnosis:  Iron deficiency anemia, unspecified iron deficiency anemia type [D50.9]       Post Op Diagnosis:  Gastritis      Procedure/CPT® Codes:      Procedure(s):  ESOPHAGOGASTRODUODENOSCOPY, biopsy x2 areas    Staff:  Surgeon(s):  Nelly Obando MD         Anesthesia: Monitored Anesthesia Care    Implants:    Nothing was implanted during the procedure    Specimen:        See Below    No blood loss    Complications:  None     Description of Procedure:  Informed consent was obtained for the procedure, including sedation.  Risks of perforation, hemorrhage, adverse drug reaction and aspiration were discussed.  The patient was brought into the endoscopy suite. Continuous cardiopulmonary monitoring was performed. The patient was placed in the left lateral decubitus position.  The bite block was inserted into the patient's mouth. After adequate sedation was attained, the Olympus gastroscope was inserted into the patient's mouth and advanced to the second portion of the duodenum without difficulty.  Circumferential examination was performed. A retroflex exam was performed in the patient's stomach.  On completion of the exam, the bowel was decompressed, the scope was removed from the patient, the patient tolerated the procedure well, there were no immediate post-operative complications.     Examination of the esophagus showed normal mucosa  Examination of the stomach showed minimal nonerosive gastritis of the antrum.  Biopsies were obtained from the antrum body and sent for pathology to evaluate for H. pylori  Retroflex examination of the stomach was normal   Examination of the duodenum showed normal mucosa.  Biopsies were obtained from the second portion the duodenum and the duodenal bulb and sent for pathology to evaluate for celiac disease      Impression:  Iron  deficiency anemia, noted patient refusal of blood products  Recent rectal bleeding-patient refused bowel prep overnight because he was connected to an IV and had difficulty ambulating to the bathroom while connected to the IV and telemetry wires.    Recommendations:  Follow-up histopathology  Recommend outpatient colonoscopy  GI will see inpatient as needed    Nelly Obando MD     Date: 6/1/2024  Time: 12:16 EDT

## 2024-06-01 NOTE — OUTREACH NOTE
Prep Survey      Flowsheet Row Responses   [facility] patient [facility]? [facility]   Is LACE score < 7 ? No   Eligibility Readm Mgmt   Discharge diagnosis Hydronephrosis with obstructing calculus-EGD with biopsies   Does the patient have one of the following disease processes/diagnoses(primary or secondary)? Other   Does the patient have Home health ordered? No   Is there a DME ordered? No   Prep survey completed? Yes            SAGAR COHN - Registered Nurse

## 2024-06-01 NOTE — ANESTHESIA PREPROCEDURE EVALUATION
Anesthesia Evaluation                  Airway   Mallampati: II  TM distance: >3 FB  Neck ROM: full  No difficulty expected  Dental - normal exam     Pulmonary - normal exam    breath sounds clear to auscultation  Cardiovascular - normal exam    Rhythm: regular  Rate: normal        Neuro/Psych  GI/Hepatic/Renal/Endo    (+) renal disease- CRI    Musculoskeletal     Abdominal  - normal exam   Substance History      OB/GYN          Other        ROS/Med Hx Other: Assessment & Plan  ASSESSMENT:  -Iron deficiency anemia  -Rectal bleeding  -Abnormal CT showing possible ileus  -Left-sided abdominal pain/left flank pain  -Left UVJ stone  -UTI  -HIV  -History of skin cancer     PLAN:  Patient is a 58-year-old male with history of HIV who presented on 5/29 with complaints of left flank pain. CT abdomen/pelvis WO shows 4 mm obstructing stone at the left UVJ with mild hydronephrosis, new small volume of ascites, and possible ileus.      Patient with no new complaints.  Denies any rectal bleeding overnight.  Hemoglobin 7.7 from 8.1.  Continue to monitor H/H and transfuse as needed.  Continue oral iron supplementation.  Patient was offered inpatient EGD/colonoscopy tomorrow, but prefers to have this done on an outpatient basis.  Our office will contact patient to arrange outpatient endoscopy following discharge.  Okay for diet as tolerated.  Continue PPI and daily bowel regimen.  RUQ US shows mild steatosis, but no evidence of cirrhosis.   S/p cystoscopy, ureteroscopy, laser lithotripsy, and ureteral stent placement by urology yesterday.  Not much else to add from a GI standpoint as an inpatient at this time.  Will be available as needed.                   Anesthesia Plan    ASA 3     general     intravenous induction     Anesthetic plan, risks, benefits, and alternatives have been provided, discussed and informed consent has been obtained with: patient.    CODE STATUS:

## 2024-06-01 NOTE — PLAN OF CARE
Goal Outcome Evaluation:  Plan of Care Reviewed With: patient        Progress: no change  Outcome Evaluation: Patient alert and oriented. Patient is refusing any testing for today including colonoscopy and EGD. RN educated patient on importance of testing and he stated he would rather do these outpatient. No complaints through the night. IVF infusing and IV abx given.

## 2024-06-01 NOTE — ANESTHESIA POSTPROCEDURE EVALUATION
Patient: Felipe Nieves    Procedure Summary       Date: 06/01/24 Room / Location: [facility] ENDOSCOPY 1 / [facility] ENDOSCOPY    Anesthesia Start: 1202 Anesthesia Stop: 1216    Procedure: ESOPHAGOGASTRODUODENOSCOPY, biopsy x2 areas Diagnosis:       Iron deficiency anemia, unspecified iron deficiency anemia type      (Iron deficiency anemia, unspecified iron deficiency anemia type [D50.9])    Surgeons: Nelly Obando MD Provider: Rachid Zavala MD    Anesthesia Type: general ASA Status: 3            Anesthesia Type: general    Vitals  Vitals Value Taken Time   /72 06/01/24 1347   Temp     Pulse 77 06/01/24 1348   Resp 13 06/01/24 1240   SpO2 99 % 06/01/24 1348   Vitals shown include unfiled device data.        Post Anesthesia Care and Evaluation    Patient location during evaluation: PACU  Patient participation: complete - patient participated  Level of consciousness: awake  Pain scale: See nurse's notes for pain score.  Pain management: adequate    Airway patency: patent  Anesthetic complications: No anesthetic complications  PONV Status: none  Cardiovascular status: acceptable  Respiratory status: acceptable and spontaneous ventilation  Hydration status: acceptable    Comments: Patient seen and examined postoperatively; vital signs stable; SpO2 greater than or equal to 90%; cardiopulmonary status stable; nausea/vomiting adequately controlled; pain adequately controlled; no apparent anesthesia complications; patient discharged from anesthesia care when discharge criteria were met

## 2024-06-01 NOTE — PROGRESS NOTES
[facility]     Progress Note    Patient Name: Felipe Nieves  : 1965  MRN: [id number]  Primary Care Physician:  Provider, No Known  Date of admission: 2024  Service date and time: 24 11:49 EDT  Subjective   Subjective     Chief Complaint: Hydronephrosis with obstructing calculus     HPI:  Patient has no complaints this morning.      Objective   Objective     Vitals:   Temp:  [97.3 °F (36.3 °C)-98.5 °F (36.9 °C)] 98.5 °F (36.9 °C)  Heart Rate:  [66-87] 80  Resp:  [13-20] 20  BP: (108-119)/(72-81) 118/80  Physical Exam    Constitutional: Awake, alert in no distress at the time of examination.   Eyes: PERRLA, sclerae anicteric, no conjunctival injection   HENT: NCAT, mucous membranes moist   Neck: Supple, no thyromegaly, no lymphadenopathy, trachea midline   Respiratory: Clear to auscultation bilaterally, nonlabored respirations    Cardiovascular: RRR, no murmurs, rubs, or gallops, palpable pedal pulses bilaterally   Gastrointestinal: Positive bowel sounds, soft, nontender, nondistended   Musculoskeletal: No bilateral ankle edema, no clubbing or cyanosis to extremities   Psychiatric: Appropriate affect, cooperative   Neurologic: Oriented x 3, strength symmetric in all extremities, Cranial Nerves grossly intact to confrontation, speech clear   Skin: No rashes     Result Review    Result Review:  I have personally reviewed the results from the time of this admission to 2024 11:49 EDT and agree with these findings:  [x]  Laboratory list / accordion  []  Microbiology  []  Radiology  []  EKG/Telemetry   []  Cardiology/Vascular   []  Pathology  []  Old records  []  Other:  Most notable findings include: Hemoglobin 7.8, hematocrit 26.2, WBC count 7.47, platelet count 664, BUN 18, creatinine 1.06, glucose 91, CO2 28.3      Assessment & Plan   Assessment / Plan       Active Hospital Problems:  Active Hospital Problems    Diagnosis     **Hydronephrosis with obstructing calculus     Hydronephrosis  with urinary obstruction due to ureteral calculus     Iron deficiency anemia      Plan:    #Left UVJ stone  #UTI    - CT a/p shows 4mm obstructing stone at the left UVJ with mild hydronephrosis and 3mm non-obstructing right sided kidney stone    - urology on board  -Patient status post left ureteroscopy, cystoscopy, stent placement and laser lithotripsy on 5/30 by Dr Wood    - UA shows UTI  -Urine culture pending.  Blood culture negative x 24 hours    - Rocephin 2g IV daily, monitor for toxicity        #HIV    - Patient on Biktarvy, will continue    - CD4 count and viral load sent off       #Ascites  #Abdominal distension    - CT a/p shows new small volume scattered ascites with unclear etiology; gas filled mildly distended small bowel and gas filled non-distended colon without obvious obstructing lesion that could be related to ileus   - GI on board      #Rectal bleeding  #Anemia    - Admits to frequent rectal bleeding that is chronic   - hgb 8.7 on admission,  -Hemoglobin dropped down to 6.7.  Patient declined transfusion.  Hemoglobin this morning 7.7.   - PPI BID    - GI consulted-plans for EGD and colonoscopy planned for today     #Thrombocytosis    - plt 718 on admission    - elevated from base, monitor       DVT prophylaxis:  Mechanical DVT prophylaxis orders are present.        CODE STATUS:      Disposition:  I expect patient to be discharged on 6/2/2024.    Josh Muñiz MD

## 2024-06-01 NOTE — PLAN OF CARE
Goal Outcome Evaluation:      Patient is for discharge today.

## 2024-06-02 NOTE — DISCHARGE SUMMARY
[facility]         DISCHARGE SUMMARY    Patient Name: Felipe Nieves  : 1965  MRN: [id number]    Date of Admission: 2024  Date of Discharge:  6  Primary Care Physician: Provider, No Known    Consults       Date and Time Order Name Status Description    2024 12:59 AM Inpatient Gastroenterology Consult Completed     2024 12:59 AM Inpatient Urology Consult Completed             Presenting Problem:   Ileus [K56.7]  Nephrolithiasis [N20.0]  Other ascites [R18.8]  Acute urinary tract infection [N39.0]  Symptomatic HIV infection [B20]  Hydronephrosis with obstructing calculus [N13.2]  Hydronephrosis with urinary obstruction due to ureteral calculus [N13.2]    Active and Resolved Hospital Problems:  Active Hospital Problems    Diagnosis POA    **Hydronephrosis with obstructing calculus [N13.2] Yes    Hydronephrosis with urinary obstruction due to ureteral calculus [N13.2] Unknown    Iron deficiency anemia [D50.9] Unknown      Resolved Hospital Problems   No resolved problems to display.         Hospital Course     Hospital Course:  Felipe Nieves is a 58 y.o. male with PMHx of anemia, HIV [facility].  Admits to left flank pain that is 8-9/10 that returned today, states he had a similar pain the night prior and thought he passed a kidney stone.  He also notes generalized abdominal pain and distension going on for the past week complicated by poor PO intake.  He also states that he has had rectal bleeding which is chronic and not new.  Denies chills, vomiting, diarrhea, hematemesis, melena.  [facility]     ED course: In the ER, vitals 99.2F, , /97, RR 18, 100% RA.  Labs notable for glucose 101, albumin 2.9, lipase 14, hgb 8.7, mcv 77.  CT a/p shows 4mm obstructing stone at the left UVJ with mild hydronephrosis and 3mm non-obstructing right sided kidney stone; also shows new small volume scattered ascites with unclear  etiology; gas filled mildly distended small bowel and gas filled non-distended colon without obvious obstructing lesion that could be related to ileus.  He is to be admitted to [facility] for urological and GI evaluation.    Patient was admitted, GI consultation was placed as well as urology consultation, on 5/30/2024 patient has this procedure done    Cystoscopy  Left ureteroscopy  Laser Lithotripsy  Basket-extraction of stone fragments  Stent placement  Fluoroscopy with Interpretation   Also GI consultation was placed, patient had an EGD done that showed  Examination of the esophagus showed normal mucosa  Examination of the stomach showed minimal nonerosive gastritis of the antrum.  Biopsies were obtained from the antrum body and sent for pathology to evaluate for H. pylori  Retroflex examination of the stomach was normal   Examination of the duodenum showed normal mucosa.  Biopsies were obtained from the second portion the duodenum and the duodenal bulb and sent for pathology to evaluate for celiac disease    Patient was cleared to be discharged to home.        Day of Discharge     Vital Signs:  Heart Rate:  [70-75] 72  Resp:  [13-14] 13  BP: ()/(42-68) 104/68  Flow (L/min):  [8-15] 8  Physical Exam:  Constitutional: Awake, alert   Eyes: PERRLA, sclerae anicteric, no conjunctival injection   HENT: NCAT, mucous membranes moist   Neck: Supple, no thyromegaly, no lymphadenopathy, trachea midline   Respiratory: Clear to auscultation bilaterally, nonlabored respirations    Cardiovascular: RRR, no murmurs, rubs, or gallops, palpable pedal pulses bilaterally   Gastrointestinal: Positive bowel sounds, soft, nontender, nondistended   Musculoskeletal: No bilateral ankle edema, no clubbing or cyanosis to extremities   Psychiatric: Appropriate affect, cooperative   Neurologic: Oriented x 3, strength symmetric in all extremities, Cranial Nerves grossly intact to confrontation, speech clear   Skin: No rashes     Pertinent   and/or Most Recent Results     LAB RESULTS:      Lab 06/01/24  1110 05/31/24  0625 05/30/24  0925 05/30/24 0442 05/30/24  0045 05/30/24  0019 05/30/24  0019 05/30/24  0003 05/29/24 1929   WBC 7.47 8.96  --  9.39 7.9  --   --   --  8.69   HEMOGLOBIN 7.8* 7.7* 8.1* 6.7* 7.6*  --   --   --  8.7*   HEMATOCRIT 26.2* 26.7* 27.7* 22.8* 25.6*   < > 27.7*  --  29.8*   PLATELETS 664* 758*  --  710* 797*  --   --   --  718*   NEUTROS ABS  --   --   --   --  5.1  --   --   --  6.40   IMMATURE GRANS (ABS)  --   --   --   --   --   --   --   --  0.02   LYMPHS ABS  --   --   --   --  1.8  --   --   --  1.35   MONOS ABS  --   --   --   --  0.7  --   --   --  0.77   EOS ABS  --   --   --   --  0.2  --   --   --  0.11   MCV 77.5* 78.1*  --  77.8* 77*  --   --   --  77.0*   LDH  --   --   --   --   --   --  175  --   --    PROTIME  --   --   --   --   --   --   --  11.6  --     < > = values in this interval not displayed.         Lab 06/01/24  1110 05/31/24 0625 05/30/24 0442 05/29/24 1929   SODIUM 142 136 136 136   POTASSIUM 4.0 5.1 4.6 4.4   CHLORIDE 106 102 103 101   CO2 28.3 26.3 26.0 26.0   ANION GAP 7.7 7.7 7.0 9.0   BUN 18 20 17 17   CREATININE 1.06 1.15 1.16 1.12   EGFR 82.4 74.7 73.9 77.1   GLUCOSE 91 265* 85 101*   CALCIUM 8.6 7.9* 8.3* 8.7         Lab 05/30/24  0019 05/29/24  1929   TOTAL PROTEIN  --  8.3   ALBUMIN  --  2.9*   GLOBULIN  --  5.4   ALT (SGPT)  --  28   AST (SGOT)  --  27   BILIRUBIN  --  0.2   ALK PHOS  --  114   LIPASE 14  --          Lab 05/30/24  0003   PROTIME 11.6   INR 1.07             Lab 05/30/24  0925 05/30/24  0019   IRON  --  7*   IRON SATURATION (TSAT)  --  3*   TIBC  --  253*   TRANSFERRIN  --  170*   FERRITIN  --  155.00   VITAMIN B 12  --  308   ABO TYPING A  --    RH TYPING Positive  --    ANTIBODY SCREEN Negative  --          Brief Urine Lab Results  (Last result in the past 365 days)        Color   Clarity   Blood   Leuk Est   Nitrite   Protein   CREAT   Urine HCG        05/29/24 6484  Dark Yellow   Cloudy  Comment: Result checked     Moderate (2+)   Moderate (2+)   Positive   30 mg/dL (1+)                 Microbiology Results (last 10 days)       Procedure Component Value - Date/Time    Blood Culture - Blood, Arm, Left [[id number]]  (Normal) Collected: 05/30/24 0045    Lab Status: Preliminary result Specimen: Blood from Arm, Left Updated: 06/02/24 0100     Blood Culture No growth at 3 days    Narrative:      Less than seven (7) mL's of blood was collected.  Insufficient quantity may yield false negative results.    Blood Culture - Blood, Arm, Left [[id number]]  (Normal) Collected: 05/30/24 0019    Lab Status: Preliminary result Specimen: Blood from Arm, Left Updated: 06/02/24 0030     Blood Culture No growth at 3 days    Urine Culture - Urine, Urine, Clean Catch [[id number]]  (Abnormal)  (Susceptibility) Collected: 05/29/24 2141    Lab Status: Final result Specimen: Urine, Clean Catch Updated: 06/01/24 1240     Urine Culture >100,000 CFU/mL Escherichia coli    Narrative:      Colonization of the urinary tract without infection is common. Treatment is discouraged unless the patient is symptomatic, pregnant, or undergoing an invasive urologic procedure.    Susceptibility        Escherichia coli      KAILA      Amoxicillin + Clavulanate Susceptible      Ampicillin Susceptible      Ampicillin + Sulbactam Susceptible      Cefazolin Susceptible      Cefepime Susceptible      Ceftazidime Susceptible      Ceftriaxone Susceptible      Gentamicin Susceptible      Levofloxacin Susceptible      Nitrofurantoin Susceptible      Piperacillin + Tazobactam Susceptible      Trimethoprim + Sulfamethoxazole Resistant                                   US Liver    Result Date: 5/30/2024  Impression: Impression: 1. Mild increase hepatic echogenicity with otherwise homogeneous echotexture. This is suggestive but not diagnostic of mild steatosis. 2. Homogeneous hepatic echotexture without discrete contour irregularity. 3.  Trace perihepatic ascites. Electronically Signed: Aris Key MD  5/30/2024 2:35 PM EDT  Workstation ID: [id number]    CT Abdomen Pelvis Without Contrast    Result Date: 5/29/2024  Impression: Impression: 1. 4 mm obstructing stone at the left UVJ with mild hydronephrosis. 3 mm nonobstructing right-sided kidney stone. 2. New small volume scattered ascites with unclear etiology. 3. Gas-filled mildly distended small bowel and gas-filled nondistended colon. There is no obvious obstructing lesion. This could be related to ileus. Electronically Signed: Wale Schroeder MD  5/29/2024 11:12 PM EDT  Workstation ID: [id number]                 Labs Pending at Discharge:  Pending Labs       Order Current Status    Tissue Pathology Exam Collected (06/01/24 1212)    STONE ANALYSIS - Calculus, Ureter, Left In process    Blood Culture - Blood, Arm, Left Preliminary result    Blood Culture - Blood, Arm, Left Preliminary result              Discharge Details        Discharge Medications        Continue These Medications        Instructions Start Date   Biktarvy -25 MG per tablet  Generic drug: Bictegravir-Emtricitab-Tenofov   1 tablet, Oral, Daily      ergocalciferol 1.25 MG (39410 UT) capsule  Commonly known as: ERGOCALCIFEROL   1 capsule, Oral, Weekly      ondansetron 4 MG tablet  Commonly known as: ZOFRAN   4 mg, Oral, Every 8 Hours PRN               No Known Allergies      Discharge Disposition:   Home or Self Care    Discharge Condition: .cond    Diet:  Hospital:No active diet order        Discharge Activity:   Activity Instructions       Activity as Tolerated                CODE STATUS:  Code Status and Medical Interventions:   Ordered at: 06/01/24 1153     Level Of Support Discussed With:    Patient     Code Status (Patient has no pulse and is not breathing):    CPR (Attempt to Resuscitate)     Medical Interventions (Patient has pulse or is breathing):    Full Support         No future appointments.    Additional  Instructions for the Follow-ups that You Need to Schedule       Discharge Follow-up with PCP   As directed       Currently Documented PCP:    Provider, No Known    PCP Phone Number:    None     Follow Up Details: in 3 to 5 days                Time spent on Discharge including face to face service:  30 minutes    Josh Muñiz MD

## 2024-06-03 NOTE — PAYOR COMM NOTE
"This is discharge notification for Jalil Nieves  Reference/Auth # [id number]   Pt discharged on 6/1/24    Chen Anthony, RN, BSN  Utilization Review Nurse  [facility]  Direct & confidential phone # [phone]  Fax # [phone]      Jalil Nieves (58 y.o. Male)       Date of Birth   1965    Social Security Number       Address   [id number] [place] [address]  [place] [address]    Home Phone   [phone]    MRN   [id number]       Pentecostalism   None    Marital Status                               Admission Date   5/29/24    Admission Type   Emergency    Admitting Provider   Wilfrid Xiong DO    Attending Provider       [facility]       Discharge Date   6/1/2024    Discharge Disposition   Home or Self Care    Discharge Destination                                 Attending Provider: (none)   Allergies: No Known Allergies    Isolation: None   Infection: None   Code Status: Prior    Ht: 170.2 cm (67.01\")   Wt: 53.5 kg (117 lb 15.1 oz)    Admission Cmt: None   Principal Problem: Hydronephrosis with obstructing calculus [N13.2]                   Active Insurance as of 5/29/2024       Primary Coverage       Payor Plan Insurance Group Employer/Plan Group    ANTHEM BLUE CROSS ANTHEM BLUE CROSS BLUE SHIELD PPO [id number]       Payor Plan Address Payor Plan Phone Number Payor Plan Fax Number Effective Dates    [phone]  1/1/2021 - None Entered    [facility] [address]         Subscriber Name Subscriber Birth Date Member ID       JALIL NIEVES 1965 [id number]               Secondary Coverage       Payor Plan Insurance Group Employer/Plan Group    ANTHEM MEDICAID [facility] -ANTHEM [id number]       Payor Plan Address Payor Plan Phone Number Payor Plan Fax Number Effective Dates    MAIL STOP:   9/4/2018 - None Entered    [address] [address]       [facility] [address]         Subscriber Name Subscriber Birth Date Member ID       " JALIL DAY 1965 [id number]                     Emergency Contacts        (Rel.) Home Phone Work Phone Mobile Phone    NAYELI DAVIS (Spouse) -- -- [phone]                 Discharge Summary        Josh Muñiz MD at 24 1638                         [facility]         DISCHARGE SUMMARY    Patient Name: Jalil Day  : 1965  MRN: [id number]    Date of Admission: 2024  Date of Discharge:  6  Primary Care Physician: Provider, No Known    Consults       Date and Time Order Name Status Description    2024 12:59 AM Inpatient Gastroenterology Consult Completed     2024 12:59 AM Inpatient Urology Consult Completed             Presenting Problem:   Ileus [K56.7]  Nephrolithiasis [N20.0]  Other ascites [R18.8]  Acute urinary tract infection [N39.0]  Symptomatic HIV infection [B20]  Hydronephrosis with obstructing calculus [N13.2]  Hydronephrosis with urinary obstruction due to ureteral calculus [N13.2]    Active and Resolved Hospital Problems:  Active Hospital Problems    Diagnosis POA    **Hydronephrosis with obstructing calculus [N13.2] Yes    Hydronephrosis with urinary obstruction due to ureteral calculus [N13.2] Unknown    Iron deficiency anemia [D50.9] Unknown      Resolved Hospital Problems   No resolved problems to display.         Hospital Course     Hospital Course:  Jalil Day is a 58 y.o. male with PMHx of anemia, HIV presented to [facility] for Left flank pain.  Admits to left flank pain that is 8-9/10 that returned today, states he had a similar pain the night prior and thought he passed a kidney stone.  He also notes generalized abdominal pain and distension going on for the past week complicated by poor PO intake.  He also states that he has had rectal bleeding which is chronic and not new.  Denies chills, vomiting, diarrhea, hematemesis, melena.  Presented to [facility] for evaluation.     ED course: In the ER, vitals 99.2F, , /97,  RR 18, 100% RA.  Labs notable for glucose 101, albumin 2.9, lipase 14, hgb 8.7, mcv 77.  CT a/p shows 4mm obstructing stone at the left UVJ with mild hydronephrosis and 3mm non-obstructing right sided kidney stone; also shows new small volume scattered ascites with unclear etiology; gas filled mildly distended small bowel and gas filled non-distended colon without obvious obstructing lesion that could be related to ileus.  He is to be admitted to [facility] for urological and GI evaluation.    Patient was admitted, GI consultation was placed as well as urology consultation, on 5/30/2024 patient has this procedure done    Cystoscopy  Left ureteroscopy  Laser Lithotripsy  Basket-extraction of stone fragments  Stent placement  Fluoroscopy with Interpretation   Also GI consultation was placed, patient had an EGD done that showed  Examination of the esophagus showed normal mucosa  Examination of the stomach showed minimal nonerosive gastritis of the antrum.  Biopsies were obtained from the antrum body and sent for pathology to evaluate for H. pylori  Retroflex examination of the stomach was normal   Examination of the duodenum showed normal mucosa.  Biopsies were obtained from the second portion the duodenum and the duodenal bulb and sent for pathology to evaluate for celiac disease    Patient was cleared to be discharged to home.        Day of Discharge     Vital Signs:  Heart Rate:  [70-75] 72  Resp:  [13-14] 13  BP: ()/(42-68) 104/68  Flow (L/min):  [8-15] 8  Physical Exam:  Constitutional: Awake, alert   Eyes: PERRLA, sclerae anicteric, no conjunctival injection   HENT: NCAT, mucous membranes moist   Neck: Supple, no thyromegaly, no lymphadenopathy, trachea midline   Respiratory: Clear to auscultation bilaterally, nonlabored respirations    Cardiovascular: RRR, no murmurs, rubs, or gallops, palpable pedal pulses bilaterally   Gastrointestinal: Positive bowel sounds, soft, nontender, nondistended   Musculoskeletal: No  bilateral ankle edema, no clubbing or cyanosis to extremities   Psychiatric: Appropriate affect, cooperative   Neurologic: Oriented x 3, strength symmetric in all extremities, Cranial Nerves grossly intact to confrontation, speech clear   Skin: No rashes     Pertinent  and/or Most Recent Results     LAB RESULTS:      Lab 06/01/24  1110 05/31/24 0625 05/30/24 0925 05/30/24 0442 05/30/24  0045 05/30/24  0019 05/30/24  0019 05/30/24  0003 05/29/24 1929   WBC 7.47 8.96  --  9.39 7.9  --   --   --  8.69   HEMOGLOBIN 7.8* 7.7* 8.1* 6.7* 7.6*  --   --   --  8.7*   HEMATOCRIT 26.2* 26.7* 27.7* 22.8* 25.6*   < > 27.7*  --  29.8*   PLATELETS 664* 758*  --  710* 797*  --   --   --  718*   NEUTROS ABS  --   --   --   --  5.1  --   --   --  6.40   IMMATURE GRANS (ABS)  --   --   --   --   --   --   --   --  0.02   LYMPHS ABS  --   --   --   --  1.8  --   --   --  1.35   MONOS ABS  --   --   --   --  0.7  --   --   --  0.77   EOS ABS  --   --   --   --  0.2  --   --   --  0.11   MCV 77.5* 78.1*  --  77.8* 77*  --   --   --  77.0*   LDH  --   --   --   --   --   --  175  --   --    PROTIME  --   --   --   --   --   --   --  11.6  --     < > = values in this interval not displayed.         Lab 06/01/24 1110 05/31/24 0625 05/30/24 0442 05/29/24 1929   SODIUM 142 136 136 136   POTASSIUM 4.0 5.1 4.6 4.4   CHLORIDE 106 102 103 101   CO2 28.3 26.3 26.0 26.0   ANION GAP 7.7 7.7 7.0 9.0   BUN 18 20 17 17   CREATININE 1.06 1.15 1.16 1.12   EGFR 82.4 74.7 73.9 77.1   GLUCOSE 91 265* 85 101*   CALCIUM 8.6 7.9* 8.3* 8.7         Lab 05/30/24  0019 05/29/24  1929   TOTAL PROTEIN  --  8.3   ALBUMIN  --  2.9*   GLOBULIN  --  5.4   ALT (SGPT)  --  28   AST (SGOT)  --  27   BILIRUBIN  --  0.2   ALK PHOS  --  114   LIPASE 14  --          Lab 05/30/24  0003   PROTIME 11.6   INR 1.07             Lab 05/30/24  0925 05/30/24  0019   IRON  --  7*   IRON SATURATION (TSAT)  --  3*   TIBC  --  253*   TRANSFERRIN  --  170*   FERRITIN  --  155.00    VITAMIN B 12  --  308   ABO TYPING A  --    RH TYPING Positive  --    ANTIBODY SCREEN Negative  --          Brief Urine Lab Results  (Last result in the past 365 days)        Color   Clarity   Blood   Leuk Est   Nitrite   Protein   CREAT   Urine HCG        05/29/24 2141 Dark Yellow   Cloudy  Comment: Result checked     Moderate (2+)   Moderate (2+)   Positive   30 mg/dL (1+)                 Microbiology Results (last 10 days)       Procedure Component Value - Date/Time    Blood Culture - Blood, Arm, Left [[id number]]  (Normal) Collected: 05/30/24 0045    Lab Status: Preliminary result Specimen: Blood from Arm, Left Updated: 06/02/24 0100     Blood Culture No growth at 3 days    Narrative:      Less than seven (7) mL's of blood was collected.  Insufficient quantity may yield false negative results.    Blood Culture - Blood, Arm, Left [[id number]]  (Normal) Collected: 05/30/24 0019    Lab Status: Preliminary result Specimen: Blood from Arm, Left Updated: 06/02/24 0030     Blood Culture No growth at 3 days    Urine Culture - Urine, Urine, Clean Catch [[id number]]  (Abnormal)  (Susceptibility) Collected: 05/29/24 2141    Lab Status: Final result Specimen: Urine, Clean Catch Updated: 06/01/24 1240     Urine Culture >100,000 CFU/mL Escherichia coli    Narrative:      Colonization of the urinary tract without infection is common. Treatment is discouraged unless the patient is symptomatic, pregnant, or undergoing an invasive urologic procedure.    Susceptibility        Escherichia coli      KAILA      Amoxicillin + Clavulanate Susceptible      Ampicillin Susceptible      Ampicillin + Sulbactam Susceptible      Cefazolin Susceptible      Cefepime Susceptible      Ceftazidime Susceptible      Ceftriaxone Susceptible      Gentamicin Susceptible      Levofloxacin Susceptible      Nitrofurantoin Susceptible      Piperacillin + Tazobactam Susceptible      Trimethoprim + Sulfamethoxazole Resistant                                   US  Liver    Result Date: 5/30/2024  Impression: Impression: 1. Mild increase hepatic echogenicity with otherwise homogeneous echotexture. This is suggestive but not diagnostic of mild steatosis. 2. Homogeneous hepatic echotexture without discrete contour irregularity. 3. Trace perihepatic ascites. Electronically Signed: Aris Key MD  5/30/2024 2:35 PM EDT  Workstation ID: [id number]    CT Abdomen Pelvis Without Contrast    Result Date: 5/29/2024  Impression: Impression: 1. 4 mm obstructing stone at the left UVJ with mild hydronephrosis. 3 mm nonobstructing right-sided kidney stone. 2. New small volume scattered ascites with unclear etiology. 3. Gas-filled mildly distended small bowel and gas-filled nondistended colon. There is no obvious obstructing lesion. This could be related to ileus. Electronically Signed: Wale Schroeder MD  5/29/2024 11:12 PM EDT  Workstation ID: [id number]                 Labs Pending at Discharge:  Pending Labs       Order Current Status    Tissue Pathology Exam Collected (06/01/24 1212)    STONE ANALYSIS - Calculus, Ureter, Left In process    Blood Culture - Blood, Arm, Left Preliminary result    Blood Culture - Blood, Arm, Left Preliminary result              Discharge Details        Discharge Medications        Continue These Medications        Instructions Start Date   Biktarvy -25 MG per tablet  Generic drug: Bictegravir-Emtricitab-Tenofov   1 tablet, Oral, Daily      ergocalciferol 1.25 MG (95642 UT) capsule  Commonly known as: ERGOCALCIFEROL   1 capsule, Oral, Weekly      ondansetron 4 MG tablet  Commonly known as: ZOFRAN   4 mg, Oral, Every 8 Hours PRN               No Known Allergies      Discharge Disposition:   Home or Self Care    Discharge Condition: .cond    Diet:  Hospital:No active diet order        Discharge Activity:   Activity Instructions       Activity as Tolerated                CODE STATUS:  Code Status and Medical Interventions:   Ordered at: 06/01/24 0498      Level Of Support Discussed With:    Patient     Code Status (Patient has no pulse and is not breathing):    CPR (Attempt to Resuscitate)     Medical Interventions (Patient has pulse or is breathing):    Full Support         No future appointments.    Additional Instructions for the Follow-ups that You Need to Schedule       Discharge Follow-up with PCP   As directed       Currently Documented PCP:    Provider, No Known    PCP Phone Number:    None     Follow Up Details: in 3 to 5 days                Time spent on Discharge including face to face service:  30 minutes    Josh Muñiz MD      Electronically signed by Josh Muñiz MD at 06/02/24 1043

## 2024-06-03 NOTE — PAYOR COMM NOTE
"This is discharge notification for Jalil Nieves   Reference/Auth # [id number]   Pt discharged on 6/1/24    Chen Anthony, RN, BSN  Utilization Review Nurse  [facility]  Direct & confidential phone # [phone]  Fax # [phone]      Jalil Nieves (58 y.o. Male)       Date of Birth   1965    Social Security Number       Address   [id number] [place] [address]  [place] [address]    Home Phone   [phone]    MRN   [id number]       Mosque   None    Marital Status                               Admission Date   5/29/24    Admission Type   Emergency    Admitting Provider   Wilfrid Xiong DO    Attending Provider       [facility]       Discharge Date   6/1/2024    Discharge Disposition   Home or Self Care    Discharge Destination                                 Attending Provider: (none)   Allergies: No Known Allergies    Isolation: None   Infection: None   Code Status: Prior    Ht: 170.2 cm (67.01\")   Wt: 53.5 kg (117 lb 15.1 oz)    Admission Cmt: None   Principal Problem: Hydronephrosis with obstructing calculus [N13.2]                   Active Insurance as of 5/29/2024       Primary Coverage       Payor Plan Insurance Group Employer/Plan Group    ANTHEM BLUE CROSS ANTHEM BLUE CROSS BLUE SHIELD PPO [id number]       Payor Plan Address Payor Plan Phone Number Payor Plan Fax Number Effective Dates    [id number] [phone]  1/1/2021 - None Entered    [facility] [id number]         Subscriber Name Subscriber Birth Date Member ID       JALIL NIEVES 1965 [id number]               Secondary Coverage       Payor Plan Insurance Group Employer/Plan Group    ANTHEM MEDICAID [facility] -ANTHEM [id number]       Payor Plan Address Payor Plan Phone Number Payor Plan Fax Number Effective Dates    MAIL STOP:   9/4/2018 - None Entered    [address] [address]       [facility] [address]         Subscriber Name Subscriber Birth Date Member ID       " JALIL DAY 1965 [id number]                     Emergency Contacts        (Rel.) Home Phone Work Phone Mobile Phone    NAYELI DAVIS (Spouse) -- -- [phone]                 Discharge Summary        Josh Muñiz MD at 24 1638                         [facility]         DISCHARGE SUMMARY    Patient Name: Jalil Day  : 1965  MRN: [id number]    Date of Admission: 2024  Date of Discharge:  6  Primary Care Physician: Provider, No Known    Consults       Date and Time Order Name Status Description    2024 12:59 AM Inpatient Gastroenterology Consult Completed     2024 12:59 AM Inpatient Urology Consult Completed             Presenting Problem:   Ileus [K56.7]  Nephrolithiasis [N20.0]  Other ascites [R18.8]  Acute urinary tract infection [N39.0]  Symptomatic HIV infection [B20]  Hydronephrosis with obstructing calculus [N13.2]  Hydronephrosis with urinary obstruction due to ureteral calculus [N13.2]    Active and Resolved Hospital Problems:  Active Hospital Problems    Diagnosis POA    **Hydronephrosis with obstructing calculus [N13.2] Yes    Hydronephrosis with urinary obstruction due to ureteral calculus [N13.2] Unknown    Iron deficiency anemia [D50.9] Unknown      Resolved Hospital Problems   No resolved problems to display.         Hospital Course     Hospital Course:  Jalil Day is a 58 y.o. male with PMHx of anemia, HIV presented to [facility] for Left flank pain.  Admits to left flank pain that is 8-9/10 that returned today, states he had a similar pain the night prior and thought he passed a kidney stone.  He also notes generalized abdominal pain and distension going on for the past week complicated by poor PO intake.  He also states that he has had rectal bleeding which is chronic and not new.  Denies chills, vomiting, diarrhea, hematemesis, melena.  Presented to [facility] for evaluation.     ED course: In the ER, vitals 99.2F, , /97,  RR 18, 100% RA.  Labs notable for glucose 101, albumin 2.9, lipase 14, hgb 8.7, mcv 77.  CT a/p shows 4mm obstructing stone at the left UVJ with mild hydronephrosis and 3mm non-obstructing right sided kidney stone; also shows new small volume scattered ascites with unclear etiology; gas filled mildly distended small bowel and gas filled non-distended colon without obvious obstructing lesion that could be related to ileus.  He is to be admitted to [facility] for urological and GI evaluation.    Patient was admitted, GI consultation was placed as well as urology consultation, on 5/30/2024 patient has this procedure done    Cystoscopy  Left ureteroscopy  Laser Lithotripsy  Basket-extraction of stone fragments  Stent placement  Fluoroscopy with Interpretation   Also GI consultation was placed, patient had an EGD done that showed  Examination of the esophagus showed normal mucosa  Examination of the stomach showed minimal nonerosive gastritis of the antrum.  Biopsies were obtained from the antrum body and sent for pathology to evaluate for H. pylori  Retroflex examination of the stomach was normal   Examination of the duodenum showed normal mucosa.  Biopsies were obtained from the second portion the duodenum and the duodenal bulb and sent for pathology to evaluate for celiac disease    Patient was cleared to be discharged to home.        Day of Discharge     Vital Signs:  Heart Rate:  [70-75] 72  Resp:  [13-14] 13  BP: ()/(42-68) 104/68  Flow (L/min):  [8-15] 8  Physical Exam:  Constitutional: Awake, alert   Eyes: PERRLA, sclerae anicteric, no conjunctival injection   HENT: NCAT, mucous membranes moist   Neck: Supple, no thyromegaly, no lymphadenopathy, trachea midline   Respiratory: Clear to auscultation bilaterally, nonlabored respirations    Cardiovascular: RRR, no murmurs, rubs, or gallops, palpable pedal pulses bilaterally   Gastrointestinal: Positive bowel sounds, soft, nontender, nondistended   Musculoskeletal: No  bilateral ankle edema, no clubbing or cyanosis to extremities   Psychiatric: Appropriate affect, cooperative   Neurologic: Oriented x 3, strength symmetric in all extremities, Cranial Nerves grossly intact to confrontation, speech clear   Skin: No rashes     Pertinent  and/or Most Recent Results     LAB RESULTS:      Lab 06/01/24  1110 05/31/24 0625 05/30/24 0925 05/30/24 0442 05/30/24  0045 05/30/24  0019 05/30/24  0019 05/30/24  0003 05/29/24 1929   WBC 7.47 8.96  --  9.39 7.9  --   --   --  8.69   HEMOGLOBIN 7.8* 7.7* 8.1* 6.7* 7.6*  --   --   --  8.7*   HEMATOCRIT 26.2* 26.7* 27.7* 22.8* 25.6*   < > 27.7*  --  29.8*   PLATELETS 664* 758*  --  710* 797*  --   --   --  718*   NEUTROS ABS  --   --   --   --  5.1  --   --   --  6.40   IMMATURE GRANS (ABS)  --   --   --   --   --   --   --   --  0.02   LYMPHS ABS  --   --   --   --  1.8  --   --   --  1.35   MONOS ABS  --   --   --   --  0.7  --   --   --  0.77   EOS ABS  --   --   --   --  0.2  --   --   --  0.11   MCV 77.5* 78.1*  --  77.8* 77*  --   --   --  77.0*   LDH  --   --   --   --   --   --  175  --   --    PROTIME  --   --   --   --   --   --   --  11.6  --     < > = values in this interval not displayed.         Lab 06/01/24 1110 05/31/24 0625 05/30/24 0442 05/29/24 1929   SODIUM 142 136 136 136   POTASSIUM 4.0 5.1 4.6 4.4   CHLORIDE 106 102 103 101   CO2 28.3 26.3 26.0 26.0   ANION GAP 7.7 7.7 7.0 9.0   BUN 18 20 17 17   CREATININE 1.06 1.15 1.16 1.12   EGFR 82.4 74.7 73.9 77.1   GLUCOSE 91 265* 85 101*   CALCIUM 8.6 7.9* 8.3* 8.7         Lab 05/30/24  0019 05/29/24  1929   TOTAL PROTEIN  --  8.3   ALBUMIN  --  2.9*   GLOBULIN  --  5.4   ALT (SGPT)  --  28   AST (SGOT)  --  27   BILIRUBIN  --  0.2   ALK PHOS  --  114   LIPASE 14  --          Lab 05/30/24  0003   PROTIME 11.6   INR 1.07             Lab 05/30/24  0925 05/30/24  0019   IRON  --  7*   IRON SATURATION (TSAT)  --  3*   TIBC  --  253*   TRANSFERRIN  --  170*   FERRITIN  --  155.00    VITAMIN B 12  --  308   ABO TYPING A  --    RH TYPING Positive  --    ANTIBODY SCREEN Negative  --          Brief Urine Lab Results  (Last result in the past 365 days)        Color   Clarity   Blood   Leuk Est   Nitrite   Protein   CREAT   Urine HCG        05/29/24 2141 Dark Yellow   Cloudy  Comment: Result checked     Moderate (2+)   Moderate (2+)   Positive   30 mg/dL (1+)                 Microbiology Results (last 10 days)       Procedure Component Value - Date/Time    Blood Culture - Blood, Arm, Left [[id number]]  (Normal) Collected: 05/30/24 0045    Lab Status: Preliminary result Specimen: Blood from Arm, Left Updated: 06/02/24 0100     Blood Culture No growth at 3 days    Narrative:      Less than seven (7) mL's of blood was collected.  Insufficient quantity may yield false negative results.    Blood Culture - Blood, Arm, Left [[id number]]  (Normal) Collected: 05/30/24 0019    Lab Status: Preliminary result Specimen: Blood from Arm, Left Updated: 06/02/24 0030     Blood Culture No growth at 3 days    Urine Culture - Urine, Urine, Clean Catch [[id number]]  (Abnormal)  (Susceptibility) Collected: 05/29/24 2141    Lab Status: Final result Specimen: Urine, Clean Catch Updated: 06/01/24 1240     Urine Culture >100,000 CFU/mL Escherichia coli    Narrative:      Colonization of the urinary tract without infection is common. Treatment is discouraged unless the patient is symptomatic, pregnant, or undergoing an invasive urologic procedure.    Susceptibility        Escherichia coli      KAILA      Amoxicillin + Clavulanate Susceptible      Ampicillin Susceptible      Ampicillin + Sulbactam Susceptible      Cefazolin Susceptible      Cefepime Susceptible      Ceftazidime Susceptible      Ceftriaxone Susceptible      Gentamicin Susceptible      Levofloxacin Susceptible      Nitrofurantoin Susceptible      Piperacillin + Tazobactam Susceptible      Trimethoprim + Sulfamethoxazole Resistant                                   US  Liver    Result Date: 5/30/2024  Impression: Impression: 1. Mild increase hepatic echogenicity with otherwise homogeneous echotexture. This is suggestive but not diagnostic of mild steatosis. 2. Homogeneous hepatic echotexture without discrete contour irregularity. 3. Trace perihepatic ascites. Electronically Signed: Aris Key MD  5/30/2024 2:35 PM EDT  Workstation ID: [id number]    CT Abdomen Pelvis Without Contrast    Result Date: 5/29/2024  Impression: Impression: 1. 4 mm obstructing stone at the left UVJ with mild hydronephrosis. 3 mm nonobstructing right-sided kidney stone. 2. New small volume scattered ascites with unclear etiology. 3. Gas-filled mildly distended small bowel and gas-filled nondistended colon. There is no obvious obstructing lesion. This could be related to ileus. Electronically Signed: Wale Schroeder MD  5/29/2024 11:12 PM EDT  Workstation ID: [id number]                 Labs Pending at Discharge:  Pending Labs       Order Current Status    Tissue Pathology Exam Collected (06/01/24 1212)    STONE ANALYSIS - Calculus, Ureter, Left In process    Blood Culture - Blood, Arm, Left Preliminary result    Blood Culture - Blood, Arm, Left Preliminary result              Discharge Details        Discharge Medications        Continue These Medications        Instructions Start Date   Biktarvy -25 MG per tablet  Generic drug: Bictegravir-Emtricitab-Tenofov   1 tablet, Oral, Daily      ergocalciferol 1.25 MG (11804 UT) capsule  Commonly known as: ERGOCALCIFEROL   1 capsule, Oral, Weekly      ondansetron 4 MG tablet  Commonly known as: ZOFRAN   4 mg, Oral, Every 8 Hours PRN               No Known Allergies      Discharge Disposition:   Home or Self Care    Discharge Condition: .cond    Diet:  Hospital:No active diet order        Discharge Activity:   Activity Instructions       Activity as Tolerated                CODE STATUS:  Code Status and Medical Interventions:   Ordered at: 06/01/24 6587      Level Of Support Discussed With:    Patient     Code Status (Patient has no pulse and is not breathing):    CPR (Attempt to Resuscitate)     Medical Interventions (Patient has pulse or is breathing):    Full Support         No future appointments.    Additional Instructions for the Follow-ups that You Need to Schedule       Discharge Follow-up with PCP   As directed       Currently Documented PCP:    Provider, No Known    PCP Phone Number:    None     Follow Up Details: in 3 to 5 days                Time spent on Discharge including face to face service:  30 minutes    Josh Muñiz MD      Electronically signed by Josh Muñiz MD at 06/02/24 1049

## 2024-06-04 LAB
BACTERIA SPEC AEROBE CULT: NORMAL
BACTERIA SPEC AEROBE CULT: NORMAL
LAB AP CASE REPORT: NORMAL
LAB AP CLINICAL INFORMATION: NORMAL
PATH REPORT.FINAL DX SPEC: NORMAL
PATH REPORT.GROSS SPEC: NORMAL

## 2024-06-06 LAB
CALCIUM OXALATE DIHYDRATE MFR STONE IR: 30 %
COLOR STONE: NORMAL
COM MFR STONE: 60 %
COMPN STONE: NORMAL
HYDROXYAPATITE: 10 %
LABORATORY COMMENT REPORT: NORMAL
Lab: NORMAL
Lab: NORMAL
PHOTO: NORMAL
SIZE STONE: NORMAL MM
SPEC SOURCE SUBJ: NORMAL
STONE ANALYSIS-IMP: NORMAL
WT STONE: 6 MG

## 2024-06-07 ENCOUNTER — READMISSION MANAGEMENT (OUTPATIENT)
Dept: CALL CENTER | Facility: HOSPITAL | Age: 59
End: 2024-06-07
Payer: COMMERCIAL

## 2024-06-07 NOTE — OUTREACH NOTE
Medical Week 1 Survey      Flowsheet Row Responses   [facility] facility patient discharged from? [facility]   Does the patient have one of the following disease processes/diagnoses(primary or secondary)? Other   Week 1 attempt successful? No   Unsuccessful attempts Attempt 1            Maral ANGUIANO - Registered Nurse

## 2024-06-10 ENCOUNTER — READMISSION MANAGEMENT (OUTPATIENT)
Dept: CALL CENTER | Facility: HOSPITAL | Age: 59
End: 2024-06-10
Payer: COMMERCIAL

## 2024-06-10 NOTE — OUTREACH NOTE
Medical Week 1 Survey      Flowsheet Row Responses   [facility] facility patient discharged from? [facility]   Does the patient have one of the following disease processes/diagnoses(primary or secondary)? Other   Week 1 attempt successful? No   Unsuccessful attempts Attempt 2            Lesly BOYER - Registered Nurse

## 2024-06-13 ENCOUNTER — READMISSION MANAGEMENT (OUTPATIENT)
Dept: CALL CENTER | Facility: HOSPITAL | Age: 59
End: 2024-06-13
Payer: COMMERCIAL

## 2024-06-13 NOTE — OUTREACH NOTE
Medical Week 1 Survey      Flowsheet Row Responses   [facility] facility patient discharged from? [facility]   Does the patient have one of the following disease processes/diagnoses(primary or secondary)? Other   Week 1 attempt successful? No   Unsuccessful attempts Attempt 3            Albertina SANTOS - Registered Nurse

## 2024-10-15 ENCOUNTER — HOSPITAL ENCOUNTER (INPATIENT)
Facility: HOSPITAL | Age: 59
LOS: 4 days | Discharge: HOME | DRG: 389 | End: 2024-10-19
Attending: STUDENT IN AN ORGANIZED HEALTH CARE EDUCATION/TRAINING PROGRAM | Admitting: STUDENT IN AN ORGANIZED HEALTH CARE EDUCATION/TRAINING PROGRAM
Payer: COMMERCIAL

## 2024-10-15 ENCOUNTER — APPOINTMENT (OUTPATIENT)
Dept: CT IMAGING | Facility: HOSPITAL | Age: 59
DRG: 389 | End: 2024-10-15
Payer: COMMERCIAL

## 2024-10-15 DIAGNOSIS — K56.609 INTESTINAL OBSTRUCTION, UNSPECIFIED CAUSE, UNSPECIFIED WHETHER PARTIAL OR COMPLETE: Primary | ICD-10-CM

## 2024-10-15 DIAGNOSIS — R10.84 GENERALIZED ABDOMINAL PAIN: ICD-10-CM

## 2024-10-15 DIAGNOSIS — R11.2 NAUSEA AND VOMITING, UNSPECIFIED VOMITING TYPE: ICD-10-CM

## 2024-10-15 PROBLEM — Z21 HIV (HUMAN IMMUNODEFICIENCY VIRUS INFECTION): Status: ACTIVE | Noted: 2024-10-15

## 2024-10-15 LAB
ALBUMIN SERPL-MCNC: 3.3 G/DL (ref 3.5–5.2)
ALBUMIN/GLOB SERPL: 0.6 G/DL
ALP SERPL-CCNC: 70 U/L (ref 39–117)
ALT SERPL W P-5'-P-CCNC: 5 U/L (ref 1–41)
ANION GAP SERPL CALCULATED.3IONS-SCNC: 7.3 MMOL/L (ref 5–15)
AST SERPL-CCNC: 11 U/L (ref 1–40)
BACTERIA UR QL AUTO: NORMAL /HPF
BASOPHILS # BLD AUTO: 0.03 10*3/MM3 (ref 0–0.2)
BASOPHILS NFR BLD AUTO: 0.3 % (ref 0–1.5)
BILIRUB SERPL-MCNC: 0.2 MG/DL (ref 0–1.2)
BILIRUB UR QL STRIP: NEGATIVE
BUN SERPL-MCNC: 13 MG/DL (ref 6–20)
BUN/CREAT SERPL: 13.7 (ref 7–25)
CALCIUM SPEC-SCNC: 9 MG/DL (ref 8.6–10.5)
CHLORIDE SERPL-SCNC: 103 MMOL/L (ref 98–107)
CLARITY UR: CLEAR
CO2 SERPL-SCNC: 27.7 MMOL/L (ref 22–29)
COLOR UR: YELLOW
CREAT SERPL-MCNC: 0.95 MG/DL (ref 0.76–1.27)
DEPRECATED RDW RBC AUTO: 43.7 FL (ref 37–54)
EGFRCR SERPLBLD CKD-EPI 2021: 93.9 ML/MIN/1.73
EOSINOPHIL # BLD AUTO: 0.2 10*3/MM3 (ref 0–0.4)
EOSINOPHIL NFR BLD AUTO: 2.2 % (ref 0.3–6.2)
ERYTHROCYTE [DISTWIDTH] IN BLOOD BY AUTOMATED COUNT: 16.5 % (ref 12.3–15.4)
GLOBULIN UR ELPH-MCNC: 6 GM/DL
GLUCOSE SERPL-MCNC: 107 MG/DL (ref 65–99)
GLUCOSE UR STRIP-MCNC: NEGATIVE MG/DL
HCT VFR BLD AUTO: 33.7 % (ref 37.5–51)
HGB BLD-MCNC: 9.4 G/DL (ref 13–17.7)
HGB UR QL STRIP.AUTO: NEGATIVE
HYALINE CASTS UR QL AUTO: NORMAL /LPF
IMM GRANULOCYTES # BLD AUTO: 0.03 10*3/MM3 (ref 0–0.05)
IMM GRANULOCYTES NFR BLD AUTO: 0.3 % (ref 0–0.5)
KETONES UR QL STRIP: NEGATIVE
LEUKOCYTE ESTERASE UR QL STRIP.AUTO: NEGATIVE
LIPASE SERPL-CCNC: 20 U/L (ref 13–60)
LYMPHOCYTES # BLD AUTO: 3.81 10*3/MM3 (ref 0.7–3.1)
LYMPHOCYTES NFR BLD AUTO: 42.7 % (ref 19.6–45.3)
MCH RBC QN AUTO: 20.6 PG (ref 26.6–33)
MCHC RBC AUTO-ENTMCNC: 27.9 G/DL (ref 31.5–35.7)
MCV RBC AUTO: 73.7 FL (ref 79–97)
MONOCYTES # BLD AUTO: 0.86 10*3/MM3 (ref 0.1–0.9)
MONOCYTES NFR BLD AUTO: 9.6 % (ref 5–12)
NEUTROPHILS NFR BLD AUTO: 3.99 10*3/MM3 (ref 1.7–7)
NEUTROPHILS NFR BLD AUTO: 44.9 % (ref 42.7–76)
NITRITE UR QL STRIP: NEGATIVE
NRBC BLD AUTO-RTO: 0 /100 WBC (ref 0–0.2)
PH UR STRIP.AUTO: 6 [PH] (ref 5–8)
PLATELET # BLD AUTO: 637 10*3/MM3 (ref 140–450)
PMV BLD AUTO: 8.2 FL (ref 6–12)
POTASSIUM SERPL-SCNC: 4 MMOL/L (ref 3.5–5.2)
PROT SERPL-MCNC: 9.3 G/DL (ref 6–8.5)
PROT UR QL STRIP: ABNORMAL
RBC # BLD AUTO: 4.57 10*6/MM3 (ref 4.14–5.8)
RBC # UR STRIP: NORMAL /HPF
REF LAB TEST METHOD: NORMAL
SODIUM SERPL-SCNC: 138 MMOL/L (ref 136–145)
SP GR UR STRIP: 1.03 (ref 1–1.03)
SQUAMOUS #/AREA URNS HPF: NORMAL /HPF
UROBILINOGEN UR QL STRIP: ABNORMAL
WBC # UR STRIP: NORMAL /HPF
WBC NRBC COR # BLD AUTO: 8.92 10*3/MM3 (ref 3.4–10.8)

## 2024-10-15 PROCEDURE — 93005 ELECTROCARDIOGRAM TRACING: CPT | Performed by: NURSE PRACTITIONER

## 2024-10-15 PROCEDURE — 81001 URINALYSIS AUTO W/SCOPE: CPT | Performed by: NURSE PRACTITIONER

## 2024-10-15 PROCEDURE — 82103 ALPHA-1-ANTITRYPSIN TOTAL: CPT | Performed by: NURSE PRACTITIONER

## 2024-10-15 PROCEDURE — 74177 CT ABD & PELVIS W/CONTRAST: CPT

## 2024-10-15 PROCEDURE — 25510000001 IOPAMIDOL PER 1 ML: Performed by: NURSE PRACTITIONER

## 2024-10-15 PROCEDURE — 99285 EMERGENCY DEPT VISIT HI MDM: CPT

## 2024-10-15 PROCEDURE — 80053 COMPREHEN METABOLIC PANEL: CPT | Performed by: NURSE PRACTITIONER

## 2024-10-15 PROCEDURE — 82390 ASSAY OF CERULOPLASMIN: CPT | Performed by: NURSE PRACTITIONER

## 2024-10-15 PROCEDURE — 83690 ASSAY OF LIPASE: CPT | Performed by: NURSE PRACTITIONER

## 2024-10-15 PROCEDURE — 25010000002 MORPHINE PER 10 MG: Performed by: NURSE PRACTITIONER

## 2024-10-15 PROCEDURE — 25010000002 ONDANSETRON PER 1 MG: Performed by: NURSE PRACTITIONER

## 2024-10-15 PROCEDURE — 82977 ASSAY OF GGT: CPT | Performed by: NURSE PRACTITIONER

## 2024-10-15 PROCEDURE — 85025 COMPLETE CBC W/AUTO DIFF WBC: CPT | Performed by: NURSE PRACTITIONER

## 2024-10-15 RX ORDER — SODIUM CHLORIDE 9 MG/ML
60 INJECTION, SOLUTION INTRAVENOUS CONTINUOUS
Status: DISCONTINUED | OUTPATIENT
Start: 2024-10-16 | End: 2024-10-16

## 2024-10-15 RX ORDER — ONDANSETRON 2 MG/ML
4 INJECTION INTRAMUSCULAR; INTRAVENOUS ONCE
Status: COMPLETED | OUTPATIENT
Start: 2024-10-15 | End: 2024-10-15

## 2024-10-15 RX ORDER — IOPAMIDOL 755 MG/ML
100 INJECTION, SOLUTION INTRAVASCULAR
Status: COMPLETED | OUTPATIENT
Start: 2024-10-15 | End: 2024-10-15

## 2024-10-15 RX ORDER — SODIUM CHLORIDE 0.9 % (FLUSH) 0.9 %
10 SYRINGE (ML) INJECTION AS NEEDED
Status: DISCONTINUED | OUTPATIENT
Start: 2024-10-15 | End: 2024-10-19 | Stop reason: HOSPADM

## 2024-10-15 RX ADMIN — IOPAMIDOL 100 ML: 755 INJECTION, SOLUTION INTRAVENOUS at 22:53

## 2024-10-15 RX ADMIN — ONDANSETRON 4 MG: 2 INJECTION, SOLUTION INTRAMUSCULAR; INTRAVENOUS at 22:55

## 2024-10-15 RX ADMIN — MORPHINE SULFATE 4 MG: 4 INJECTION, SOLUTION INTRAMUSCULAR; INTRAVENOUS at 22:55

## 2024-10-15 NOTE — Clinical Note
Level of Care: Med/Surg [1]   Admitting Physician: LLANES ALVAREZ, CARLOS [[id number]]   Attending Physician: LLANES ALVAREZ, CARLOS [[id number]]

## 2024-10-16 ENCOUNTER — INPATIENT HOSPITAL (AMBULATORY)
Age: 59
End: 2024-10-16
Payer: COMMERCIAL

## 2024-10-16 ENCOUNTER — APPOINTMENT (OUTPATIENT)
Dept: GENERAL RADIOLOGY | Facility: HOSPITAL | Age: 59
DRG: 389 | End: 2024-10-16
Payer: COMMERCIAL

## 2024-10-16 ENCOUNTER — APPOINTMENT (OUTPATIENT)
Dept: ULTRASOUND IMAGING | Facility: HOSPITAL | Age: 59
DRG: 389 | End: 2024-10-16
Payer: COMMERCIAL

## 2024-10-16 ENCOUNTER — INPATIENT HOSPITAL (AMBULATORY)
Dept: URBAN - METROPOLITAN AREA HOSPITAL 84 | Facility: HOSPITAL | Age: 59
End: 2024-10-16
Payer: COMMERCIAL

## 2024-10-16 DIAGNOSIS — D50.9 IRON DEFICIENCY ANEMIA, UNSPECIFIED: ICD-10-CM

## 2024-10-16 DIAGNOSIS — Z21 ASYMPTOMATIC HUMAN IMMUNODEFICIENCY VIRUS [HIV] INFECTION ST: ICD-10-CM

## 2024-10-16 DIAGNOSIS — R18.8 OTHER ASCITES: ICD-10-CM

## 2024-10-16 DIAGNOSIS — K56.609 UNSPECIFIED INTESTINAL OBSTRUCTION, UNSPECIFIED AS TO PARTIA: ICD-10-CM

## 2024-10-16 PROBLEM — R15.9 FECAL INCONTINENCE: Status: ACTIVE | Noted: 2024-10-16

## 2024-10-16 LAB
ALPHA-FETOPROTEIN: 2.64 NG/ML (ref 0–8.3)
ALPHA1 GLOB MFR UR ELPH: 281 MG/DL (ref 90–200)
AMMONIA BLD-SCNC: 22 UMOL/L (ref 16–60)
ANION GAP SERPL CALCULATED.3IONS-SCNC: 6.1 MMOL/L (ref 5–15)
BASOPHILS # BLD AUTO: 0.03 10*3/MM3 (ref 0–0.2)
BASOPHILS NFR BLD AUTO: 0.4 % (ref 0–1.5)
BUN SERPL-MCNC: 12 MG/DL (ref 6–20)
BUN/CREAT SERPL: 12.6 (ref 7–25)
CALCIUM SPEC-SCNC: 8.5 MG/DL (ref 8.6–10.5)
CERULOPLASMIN SERPL-MCNC: 41 MG/DL (ref 16–31)
CHLORIDE SERPL-SCNC: 103 MMOL/L (ref 98–107)
CO2 SERPL-SCNC: 28.9 MMOL/L (ref 22–29)
CREAT SERPL-MCNC: 0.95 MG/DL (ref 0.76–1.27)
DEPRECATED RDW RBC AUTO: 44.1 FL (ref 37–54)
EGFRCR SERPLBLD CKD-EPI 2021: 93.9 ML/MIN/1.73
EOSINOPHIL # BLD AUTO: 0.26 10*3/MM3 (ref 0–0.4)
EOSINOPHIL NFR BLD AUTO: 3.2 % (ref 0.3–6.2)
ERYTHROCYTE [DISTWIDTH] IN BLOOD BY AUTOMATED COUNT: 16.6 % (ref 12.3–15.4)
FERRITIN SERPL-MCNC: 32.7 NG/ML (ref 30–400)
GGT SERPL-CCNC: 16 U/L (ref 8–61)
GLUCOSE SERPL-MCNC: 98 MG/DL (ref 65–99)
HAV IGM SERPL QL IA: NORMAL
HBV CORE IGM SERPL QL IA: NORMAL
HBV SURFACE AG SERPL QL IA: NORMAL
HCT VFR BLD AUTO: 29.4 % (ref 37.5–51)
HCV AB SER QL: NORMAL
HGB BLD-MCNC: 8.2 G/DL (ref 13–17.7)
IMM GRANULOCYTES # BLD AUTO: 0.02 10*3/MM3 (ref 0–0.05)
IMM GRANULOCYTES NFR BLD AUTO: 0.2 % (ref 0–0.5)
INR PPP: 1.05 (ref 0.93–1.1)
IRON 24H UR-MRATE: 21 MCG/DL (ref 59–158)
IRON SATN MFR SERPL: 6 % (ref 20–50)
LYMPHOCYTES # BLD AUTO: 3.22 10*3/MM3 (ref 0.7–3.1)
LYMPHOCYTES NFR BLD AUTO: 40 % (ref 19.6–45.3)
MCH RBC QN AUTO: 20.5 PG (ref 26.6–33)
MCHC RBC AUTO-ENTMCNC: 27.9 G/DL (ref 31.5–35.7)
MCV RBC AUTO: 73.5 FL (ref 79–97)
MONOCYTES # BLD AUTO: 0.94 10*3/MM3 (ref 0.1–0.9)
MONOCYTES NFR BLD AUTO: 11.7 % (ref 5–12)
NEUTROPHILS NFR BLD AUTO: 3.59 10*3/MM3 (ref 1.7–7)
NEUTROPHILS NFR BLD AUTO: 44.5 % (ref 42.7–76)
NRBC BLD AUTO-RTO: 0 /100 WBC (ref 0–0.2)
PLATELET # BLD AUTO: 553 10*3/MM3 (ref 140–450)
PMV BLD AUTO: 8.5 FL (ref 6–12)
POTASSIUM SERPL-SCNC: 3.8 MMOL/L (ref 3.5–5.2)
PROTHROMBIN TIME: 11.4 SECONDS (ref 9.6–11.7)
RBC # BLD AUTO: 4 10*6/MM3 (ref 4.14–5.8)
SODIUM SERPL-SCNC: 138 MMOL/L (ref 136–145)
TIBC SERPL-MCNC: 341 MCG/DL (ref 298–536)
TRANSFERRIN SERPL-MCNC: 229 MG/DL (ref 200–360)
WBC NRBC COR # BLD AUTO: 8.06 10*3/MM3 (ref 3.4–10.8)

## 2024-10-16 PROCEDURE — 82140 ASSAY OF AMMONIA: CPT | Performed by: SURGERY

## 2024-10-16 PROCEDURE — 80048 BASIC METABOLIC PNL TOTAL CA: CPT | Performed by: NURSE PRACTITIONER

## 2024-10-16 PROCEDURE — 25810000003 SODIUM CHLORIDE 0.9 % SOLUTION: Performed by: NURSE PRACTITIONER

## 2024-10-16 PROCEDURE — 99223 1ST HOSP IP/OBS HIGH 75: CPT | Performed by: NURSE PRACTITIONER

## 2024-10-16 PROCEDURE — 82105 ALPHA-FETOPROTEIN SERUM: CPT | Performed by: NURSE PRACTITIONER

## 2024-10-16 PROCEDURE — 83540 ASSAY OF IRON: CPT | Performed by: NURSE PRACTITIONER

## 2024-10-16 PROCEDURE — 86015 ACTIN ANTIBODY EACH: CPT | Performed by: NURSE PRACTITIONER

## 2024-10-16 PROCEDURE — 99221 1ST HOSP IP/OBS SF/LOW 40: CPT | Performed by: SURGERY

## 2024-10-16 PROCEDURE — 86038 ANTINUCLEAR ANTIBODIES: CPT | Performed by: NURSE PRACTITIONER

## 2024-10-16 PROCEDURE — 25010000002 LORAZEPAM PER 2 MG: Performed by: NURSE PRACTITIONER

## 2024-10-16 PROCEDURE — 84466 ASSAY OF TRANSFERRIN: CPT | Performed by: NURSE PRACTITIONER

## 2024-10-16 PROCEDURE — 85610 PROTHROMBIN TIME: CPT | Performed by: NURSE PRACTITIONER

## 2024-10-16 PROCEDURE — 25010000002 PROCHLORPERAZINE 10 MG/2ML SOLUTION: Performed by: NURSE PRACTITIONER

## 2024-10-16 PROCEDURE — 86361 T CELL ABSOLUTE COUNT: CPT | Performed by: STUDENT IN AN ORGANIZED HEALTH CARE EDUCATION/TRAINING PROGRAM

## 2024-10-16 PROCEDURE — 82728 ASSAY OF FERRITIN: CPT | Performed by: NURSE PRACTITIONER

## 2024-10-16 PROCEDURE — 25010000002 METOCLOPRAMIDE PER 10 MG: Performed by: SURGERY

## 2024-10-16 PROCEDURE — 74018 RADEX ABDOMEN 1 VIEW: CPT

## 2024-10-16 PROCEDURE — 85025 COMPLETE CBC W/AUTO DIFF WBC: CPT | Performed by: NURSE PRACTITIONER

## 2024-10-16 PROCEDURE — 25010000002 DIPHENHYDRAMINE PER 50 MG: Performed by: NURSE PRACTITIONER

## 2024-10-16 PROCEDURE — 25010000002 MORPHINE PER 10 MG: Performed by: NURSE PRACTITIONER

## 2024-10-16 PROCEDURE — 25010000002 FUROSEMIDE PER 20 MG: Performed by: STUDENT IN AN ORGANIZED HEALTH CARE EDUCATION/TRAINING PROGRAM

## 2024-10-16 PROCEDURE — 86381 MITOCHONDRIAL ANTIBODY EACH: CPT | Performed by: NURSE PRACTITIONER

## 2024-10-16 PROCEDURE — 25010000002 NA FERRIC GLUC CPLX PER 12.5 MG: Performed by: NURSE PRACTITIONER

## 2024-10-16 PROCEDURE — 80074 ACUTE HEPATITIS PANEL: CPT | Performed by: NURSE PRACTITIONER

## 2024-10-16 PROCEDURE — 76705 ECHO EXAM OF ABDOMEN: CPT

## 2024-10-16 RX ORDER — ALBUMIN (HUMAN) 12.5 G/50ML
62.5 SOLUTION INTRAVENOUS ONCE
Status: DISCONTINUED | OUTPATIENT
Start: 2024-10-16 | End: 2024-10-19 | Stop reason: HOSPADM

## 2024-10-16 RX ORDER — ALBUMIN (HUMAN) 12.5 G/50ML
37.5 SOLUTION INTRAVENOUS ONCE
Status: DISCONTINUED | OUTPATIENT
Start: 2024-10-16 | End: 2024-10-19 | Stop reason: HOSPADM

## 2024-10-16 RX ORDER — PROCHLORPERAZINE EDISYLATE 5 MG/ML
5 INJECTION INTRAMUSCULAR; INTRAVENOUS ONCE
Status: COMPLETED | OUTPATIENT
Start: 2024-10-16 | End: 2024-10-16

## 2024-10-16 RX ORDER — ALBUMIN (HUMAN) 12.5 G/50ML
87.5 SOLUTION INTRAVENOUS ONCE
Status: DISCONTINUED | OUTPATIENT
Start: 2024-10-16 | End: 2024-10-19 | Stop reason: HOSPADM

## 2024-10-16 RX ORDER — SPIRONOLACTONE 100 MG/1
100 TABLET, FILM COATED ORAL DAILY
Status: DISCONTINUED | OUTPATIENT
Start: 2024-10-16 | End: 2024-10-16

## 2024-10-16 RX ORDER — DIPHENHYDRAMINE HYDROCHLORIDE 50 MG/ML
25 INJECTION INTRAMUSCULAR; INTRAVENOUS ONCE
Status: COMPLETED | OUTPATIENT
Start: 2024-10-16 | End: 2024-10-16

## 2024-10-16 RX ORDER — FUROSEMIDE 40 MG
40 TABLET ORAL DAILY
Status: DISCONTINUED | OUTPATIENT
Start: 2024-10-17 | End: 2024-10-16

## 2024-10-16 RX ORDER — SPIRONOLACTONE 100 MG/1
100 TABLET, FILM COATED ORAL DAILY
Status: DISCONTINUED | OUTPATIENT
Start: 2024-10-17 | End: 2024-10-16

## 2024-10-16 RX ORDER — SPIRONOLACTONE 100 MG/1
100 TABLET, FILM COATED ORAL DAILY
Status: DISCONTINUED | OUTPATIENT
Start: 2024-10-17 | End: 2024-10-19 | Stop reason: HOSPADM

## 2024-10-16 RX ORDER — ALBUMIN (HUMAN) 12.5 G/50ML
50 SOLUTION INTRAVENOUS ONCE
Status: DISCONTINUED | OUTPATIENT
Start: 2024-10-16 | End: 2024-10-19 | Stop reason: HOSPADM

## 2024-10-16 RX ORDER — ALBUMIN (HUMAN) 12.5 G/50ML
100 SOLUTION INTRAVENOUS ONCE
Status: DISCONTINUED | OUTPATIENT
Start: 2024-10-16 | End: 2024-10-19 | Stop reason: HOSPADM

## 2024-10-16 RX ORDER — FUROSEMIDE 40 MG
40 TABLET ORAL DAILY
Status: DISCONTINUED | OUTPATIENT
Start: 2024-10-16 | End: 2024-10-16

## 2024-10-16 RX ORDER — LORAZEPAM 2 MG/ML
1 INJECTION INTRAMUSCULAR ONCE AS NEEDED
Status: COMPLETED | OUTPATIENT
Start: 2024-10-16 | End: 2024-10-16

## 2024-10-16 RX ORDER — DIPHENHYDRAMINE HYDROCHLORIDE 50 MG/ML
25 INJECTION INTRAMUSCULAR; INTRAVENOUS NIGHTLY PRN
Status: DISCONTINUED | OUTPATIENT
Start: 2024-10-16 | End: 2024-10-18

## 2024-10-16 RX ORDER — FUROSEMIDE 10 MG/ML
20 INJECTION INTRAMUSCULAR; INTRAVENOUS EVERY 24 HOURS
Status: DISCONTINUED | OUTPATIENT
Start: 2024-10-16 | End: 2024-10-18

## 2024-10-16 RX ORDER — ALBUMIN (HUMAN) 12.5 G/50ML
112.5 SOLUTION INTRAVENOUS ONCE
Status: DISCONTINUED | OUTPATIENT
Start: 2024-10-16 | End: 2024-10-19 | Stop reason: HOSPADM

## 2024-10-16 RX ORDER — METOCLOPRAMIDE HYDROCHLORIDE 5 MG/ML
10 INJECTION INTRAMUSCULAR; INTRAVENOUS EVERY 6 HOURS
Status: DISCONTINUED | OUTPATIENT
Start: 2024-10-16 | End: 2024-10-19 | Stop reason: HOSPADM

## 2024-10-16 RX ORDER — ONDANSETRON 2 MG/ML
4 INJECTION INTRAMUSCULAR; INTRAVENOUS EVERY 6 HOURS PRN
Status: DISCONTINUED | OUTPATIENT
Start: 2024-10-16 | End: 2024-10-19 | Stop reason: HOSPADM

## 2024-10-16 RX ORDER — ALBUMIN (HUMAN) 12.5 G/50ML
75 SOLUTION INTRAVENOUS ONCE
Status: DISCONTINUED | OUTPATIENT
Start: 2024-10-16 | End: 2024-10-19 | Stop reason: HOSPADM

## 2024-10-16 RX ADMIN — LORAZEPAM 1 MG: 2 INJECTION INTRAMUSCULAR; INTRAVENOUS at 01:44

## 2024-10-16 RX ADMIN — METOCLOPRAMIDE 10 MG: 5 INJECTION, SOLUTION INTRAMUSCULAR; INTRAVENOUS at 23:22

## 2024-10-16 RX ADMIN — SODIUM CHLORIDE 250 MG: 9 INJECTION, SOLUTION INTRAVENOUS at 13:02

## 2024-10-16 RX ADMIN — FUROSEMIDE 20 MG: 10 INJECTION, SOLUTION INTRAMUSCULAR; INTRAVENOUS at 16:26

## 2024-10-16 RX ADMIN — SODIUM CHLORIDE 100 ML/HR: 9 INJECTION, SOLUTION INTRAVENOUS at 02:20

## 2024-10-16 RX ADMIN — DIPHENHYDRAMINE HYDROCHLORIDE 25 MG: 50 INJECTION, SOLUTION INTRAMUSCULAR; INTRAVENOUS at 01:44

## 2024-10-16 RX ADMIN — PROCHLORPERAZINE EDISYLATE 5 MG: 5 INJECTION INTRAMUSCULAR; INTRAVENOUS at 01:44

## 2024-10-16 RX ADMIN — Medication 10 ML: at 01:49

## 2024-10-16 RX ADMIN — MORPHINE SULFATE 4 MG: 4 INJECTION, SOLUTION INTRAMUSCULAR; INTRAVENOUS at 16:49

## 2024-10-16 RX ADMIN — METOCLOPRAMIDE 10 MG: 5 INJECTION, SOLUTION INTRAMUSCULAR; INTRAVENOUS at 18:24

## 2024-10-16 RX ADMIN — PHENOL 1 SPRAY: 1.5 LIQUID ORAL at 13:15

## 2024-10-16 NOTE — CONSULTS
GI CONSULT  NOTE:    Referring Provider:  Odessa Soni NP     Chief complaint: Abdominal pain, nausea/vomiting    Subjective .     History of present illness: Felipe Nieves is a 58 y.o. male with history of HIV and hernia repair x 3 who presents with complaints of abdominal pain and nausea/vomiting.  CT abdomen/pelvis shows small bowel obstruction with high-grade transition point in the mid abdomen, but also shows a large volume of ascites.  The patient has no documented history of liver disease.  LFTs normal and platelet count elevated.  INR also normal.  He had a previous RUQ US in 5/2024 that shows mild steatosis and trace ascites.  The patient reports that he has been having progressively worsening abdominal distention over the past 3 months.  Denies any previous paracentesis.  Not on diuretics at home.  States that he began to have abdominal pain 4 days ago.  Reports associated nausea, but denies any vomiting.  No heartburn or dysphagia.  States that he does struggle with constipation at home, but last bowel movement was yesterday.  No bright red blood per rectum or melena.  Denies recent confusion.  Denies alcohol use or a family history of liver disease.  Of note, the patient was recently seen by our service in 6/2024 for iron deficiency anemia.  He did undergo EGD at that time with results below.  Colonoscopy was offered at that time, but patient refused colonoscopy prep.      Endo History:  6/2024 EGD (Dr. Obando) -gastritis  No previous colonoscopy.    Past Medical History:  Past Medical History:   Diagnosis Date    HIV (human immunodeficiency virus infection)     Skin cancer        Past Surgical History:  Past Surgical History:   Procedure Laterality Date    CYSTOSCOPY, URETEROSCOPY, RETROGRADE PYELOGRAM, STENT INSERTION Left 5/30/2024    Procedure: CYSTOSCOPY URETEROSCOPY HOLMIUM LASER STENT INSERTION;  Surgeon: Wale Taylor MD;  [facility];  Service: Urology;  Laterality:  Left;    ENDOSCOPY N/A 6/1/2024    Procedure: ESOPHAGOGASTRODUODENOSCOPY, biopsy x2 areas;  Surgeon: Nelly Obando MD;  Location: [facility] ENDOSCOPY;  Service: Gastroenterology;  Laterality: N/A;  post: gastritis    HERNIA REPAIR      SKIN CANCER EXCISION         Social History:  Social History     Tobacco Use    Smoking status: Never    Smokeless tobacco: Never   Vaping Use    Vaping status: Never Used   Substance Use Topics    Alcohol use: Not Currently    Drug use: Defer       Family History:  Family History   Problem Relation Age of Onset    Heart disease Mother     Cancer Mother     Heart disease Father     Diabetes Brother     Heart disease Brother        Medications:  Medications Prior to Admission   Medication Sig Dispense Refill Last Dose/Taking    Bictegravir-Emtricitab-Tenofov (Biktarvy) -25 MG per tablet Take 1 tablet by mouth Daily.   10/14/2024    ergocalciferol (ERGOCALCIFEROL) 1.25 MG (92923 UT) capsule Take 1 capsule by mouth 1 (One) Time Per Week. Tuesdays   Past Week       Scheduled Meds:albumin human, 37.5 g, Intravenous, Once   Or  albumin human, 50 g, Intravenous, Once   Or  albumin human, 62.5 g, Intravenous, Once   Or  albumin human, 75 g, Intravenous, Once   Or  albumin human, 87.5 g, Intravenous, Once   Or  albumin human, 100 g, Intravenous, Once   Or  albumin human, 112.5 g, Intravenous, Once  [Held by provider] Bictegravir-Emtricitab-Tenofov, 1 tablet, Oral, Daily      Continuous Infusions:sodium chloride, 100 mL/hr, Last Rate: 100 mL/hr (10/16/24 0220)      PRN Meds:.  Morphine    [COMPLETED] Insert Peripheral IV **AND** sodium chloride    ALLERGIES:  Patient has no known allergies.    ROS:  The following systems were reviewed and negative;   Constitution:  No fevers, chills, no unintentional weight loss  Skin: no rash, no jaundice  Eyes:  No blurry vision, no eye pain  HENT:  No change in hearing or smell  Resp:  No dyspnea or cough  CV:  No chest pain or palpitations  :   No dysuria, hematuria  Musculoskeletal:  No leg cramps or arthralgias  Neuro:  No tremor, no numbness  Psych:  No depression or confusion    Objective     Vital Signs:   Vitals:    10/15/24 2231 10/15/24 2301 10/15/24 2331 10/16/24 0113   BP: 128/93 130/94 123/86 137/80   BP Location:    Left arm   Patient Position:    Lying   Pulse: 91 89 89 95   Resp:    15   Temp:    97.9 °F (36.6 °C)   TempSrc:    Oral   SpO2: 98% 100% 99% 98%   Weight:       Height:           Physical Exam:       General Appearance:    Awake and alert, in no acute distress   Head:    Normocephalic, without obvious abnormality, atraumatic   Throat:   No oral lesions, no thrush, oral mucosa moist   Lungs:     Respirations regular, even and unlabored   Chest Wall:    No abnormalities observed   Abdomen:     Soft, generalized tenderness, no rebound or guarding, mild distention, NG tube in place to low intermittent wall suction   Rectal:     Deferred   Extremities:   Moves all extremities, no edema, no cyanosis   Pulses:   Pulses palpable and equal bilaterally   Skin:   No rash, no jaundice, normal palpation   Lymph nodes:   No cervical, supraclavicular or submandibular palpable adenopathy   Neurologic:   Cranial nerves 2 - 12 grossly intact, no asterixis       Results Review:   I reviewed the patient's labs and imaging.  CBC    Results from last 7 days   Lab Units 10/16/24  0428 10/15/24  2149   WBC 10*3/mm3 8.06 8.92   HEMOGLOBIN g/dL 8.2* 9.4*   PLATELETS 10*3/mm3 553* 637*     CMP   Results from last 7 days   Lab Units 10/16/24  0428 10/15/24  2149   SODIUM mmol/L 138 138   POTASSIUM mmol/L 3.8 4.0   CHLORIDE mmol/L 103 103   CO2 mmol/L 28.9 27.7   BUN mg/dL 12 13   CREATININE mg/dL 0.95 0.95   GLUCOSE mg/dL 98 107*   ALBUMIN g/dL  --  3.3*   BILIRUBIN mg/dL  --  0.2   ALK PHOS U/L  --  70   AST (SGOT) U/L  --  11   ALT (SGPT) U/L  --  5   LIPASE U/L  --  20     Cr Clearance Estimated Creatinine Clearance: 72.5 mL/min (by C-G formula based on  "SCr of 0.95 mg/dL).  Coag   Results from last 7 days   Lab Units 10/16/24  0753   INR  1.05     HbA1C No results found for: \"HGBA1C\"  Blood Glucose No results found for: \"POCGLU\"  Infection     UA    Results from last 7 days   Lab Units 10/15/24  2239   NITRITE UA  Negative   WBC UA /HPF 0-2   BACTERIA UA /HPF None Seen   SQUAM EPITHEL UA /HPF 0-2     Imaging Results (Last 72 Hours)       Procedure Component Value Units Date/Time    US Liver [[id number]] Resulted: 10/16/24 0929     Updated: 10/16/24 0943    XR Abdomen KUB [[id number]] Collected: 10/16/24 0217     Updated: 10/16/24 0220    Narrative:      XR ABDOMEN KUB    Date of Exam: 10/16/2024 2:09 AM EDT    Indication: ng placement    Comparison: None available.    Findings:  There is an esophagogastric tube in the stomach. The gas pattern is nonspecific. There is no definite obstructive change. Contrast is seen in the renal collecting systems bilaterally.      Impression:      Impression:  Esophagogastric tube is in the stomach.        Electronically Signed: Mendoza Mitchell MD    10/16/2024 2:18 AM EDT    Workstation ID: [id number]    CT Abdomen Pelvis With Contrast [[id number]] Collected: 10/15/24 2255     Updated: 10/15/24 2305    Narrative:      CT ABDOMEN PELVIS W CONTRAST    Date of Exam: 10/15/2024 10:35 PM EDT    Indication: abdominal pain, nausea.    Comparison: CT abdomen pelvis 5/29/2024    Technique: Axial CT images were obtained of the abdomen and pelvis following the uneventful intravenous administration of iodinated contrast. Sagittal and coronal reconstructions were performed.  Automated exposure control and iterative reconstruction   methods were used.        Findings:  Lung Bases: No significant abnormality.    Liver: No significant abnormality.     Gallbladder and biliary tree: No significant abnormality.     Spleen: No significant abnormality.     Pancreas: No significant abnormality.     Adrenal glands: No significant abnormality.     Kidneys and " ureters: Nonobstructing right nephrolithiasis. No hydroureteronephrosis.     Stomach and duodenum: Distended stomach.    Small and large bowel: There are multiple dilated loops of small bowel with abrupt appearing transition point in the mid abdomen on axial image 76. There is no evidence of pneumatosis or portal venous gas. Normal caliber large bowel.    Peritoneal cavity: There is a large volume of ascites. No free air is seen.    Bladder: Under distended incompletely evaluated.     Pelvic organs: No significant abnormality.     Vasculature: No significant abnormality.     Lymph nodes: No pathologic appearing lymph nodes by imaging criteria.     Bones and soft tissues: Degenerative changes of the imaged spine. No acute osseous abnormality.      Impression:      Impression:  Findings suspicious for a small bowel obstruction with likely high-grade transition point in the mid abdomen.    Large volume ascites.            Electronically Signed: Kobe Noble    10/15/2024 11:03 PM EDT    Workstation ID: [id number]            ASSESSMENT:  -Ascites  -Iron deficiency anemia  -Small bowel obstruction  -Abdominal pain with nausea -due to above  -HIV  -History of hernia repair x 3    PLAN:  Patient is a 58-year-old male with history of HIV and iron deficiency anemia who presented on 10/15 with complaints of abdominal pain and nausea.    CT abdomen/pelvis W on admission shows small bowel obstruction with high-grade transition point in the mid abdomen and a large volume of ascites.  Patient has no known history of cirrhosis.  RUQ US in 5/2024 shows mild steatosis and trace ascites.  LFTs and INR normal.  Platelet count elevated.  Will plan paracentesis.  Send fluid studies to help determine etiology of ascites.  Replace albumin per protocol.  Start diuretics.  Monitor renal function closely.  Creatinine currently 0.95.  Repeat RUQ US and send liver serologies.  Hemoglobin 8.2, MCV 73.5.  Serum iron 21, ferritin 32, iron  saturation 6%.  Continue to monitor H/H and transfuse as needed.  Plan IV iron infusion.  Patient would benefit from outpatient colonoscopy at some point.  Continue NG tube to low intermittent wall suction.  Maintain NPO.  General surgery has been consulted.  Await input.  Antiemetics/analgesics as needed.  Supportive care.      I discussed the patients findings and my recommendations with the patient.  I will discuss the case with Dr. Krishna and change the plan accordingly.    We appreciate the referral.    Electronically signed by DRU Fields, 10/16/24, 9:47 AM EDT.

## 2024-10-16 NOTE — PLAN OF CARE
Goal Outcome Evaluation:  Plan of Care Reviewed With: patient   Pt family reports pt has low medication tolerance. Pt has been especially lethargic, moderately responsive to voice for the majority of shift. Pt expressed discomfort in throat and stomach r/t NG tube. Small bowel follow-through planned tomorrow.     Progress: no change

## 2024-10-16 NOTE — PAYOR COMM NOTE
"AUTHORIZATION PENDING:   PLEASE CALL OR FAX DETERMINATION TO CONTACT BELOW. THANK YOU.        Rosalee Ogden RN MSN  /UR  [facility]  [phone] office  [phone] fax  [email or web address]    [facility]  NPI: [id number]  Tax: [phone]          Jalil Day (58 y.o. Male)       Date of Birth   1965    Social Security Number       Address   [address] [place] [address]  [place] [address]    Home Phone   [phone]    MRN   [id number]       Mu-ism   None    Marital Status                               Admission Date   10/15/24    Admission Type   Emergency    Admitting Provider   Llanes Alvarez, Carlos, MD    Attending Provider   Anali Pena MD    Department, Room/Bed   [facility] MEDICAL INPATIENT, 365/1       Discharge Date       Discharge Disposition       Discharge Destination                                 Attending Provider: Anali Pena MD    Allergies: No Known Allergies    Isolation: None   Infection: None   Code Status: CPR    Ht: 170.2 cm (67\")   Wt: 59 kg (130 lb)    Admission Cmt: None   Principal Problem: Small bowel obstruction [K56.609]                   Active Insurance as of 10/15/2024       Primary Coverage       Payor Plan Insurance Group Employer/Plan Group    [employer]HEM BLUE CROSS AN[employer] CROSS [facility] PPO [id number]       Payor Plan Address Payor Plan Phone Number Payor Plan Fax Number Effective Dates    [address] [phone]  1/1/2021 - None Entered    [place] [address]         Subscriber Name Subscriber Birth Date Member ID       JALIL DAY 1965 [id number]               Secondary Coverage       Payor Plan Insurance Group Employer/Plan Group    ANTHEM MEDICAID [facility] [id number]       Payor Plan Address Payor Plan Phone Number Payor Plan Fax Number Effective Dates    MAIL STOP:   9/4/2018 - None Entered    [address] [address]       [facility] [address]   "       Subscriber Name Subscriber Birth Date Member ID       JALIL DAY 1965 [id number]                     Emergency Contacts        (Rel.) Home Phone Work Phone Mobile Phone    NAYELI DAVIS (Spouse) -- -- [phone]          0/15/24 2353  Inpatient Admission  Once     Completed     Level of Care: Med/Surg  Diagnosis: Small bowel obstruction [662338]  Admitting Physician: LLANES ALVAREZ, CARLOS [[id number]]  Attending Physician: LLANES ALVAREZ, CARLOS [[id number]]  Bed Request Comments: cardiac monitor  Certification: I Certify That Inpatient Hospital Services Are Medically Necessary For Greater Than 2 Midnights             History & Physical        Larrying-Odessa Leo APRN at 10/15/24 2353       Attestation signed by Llanes Alvarez, Carlos, MD at 10/16/24 0449    I have reviewed this documentation and agree.                      [facility] Medicine Services  History & Physical    Patient Name: Jalil Day  : 1965  MRN: [id number]  Primary Care Physician:  Provider, No Known  Date of admission: 10/15/2024  Date and Time of Service: 10/15/2024 at 2355    Subjective      Chief Complaint: abdominal pain , nausea and vomiting     History of Present Illness: Jalil Day is a 58 y.o. male with a CMH of HIV previous history of abdominal surgery with 3 hernia repair [id number] who presented to [facility] on 10/15/2024 with complaints of abdominal pain nausea vomiting.  He denies any hematemesis melena or hematochezia.  He reports he has chronic diarrhea with fecal incontinence.  He reports he has to wear a pad and afraid he is going to get fired for going to the bathroom so much.  He denies any fever, shortness of breath or chest pain.  He reports the abdominal pain has been ongoing for 2 years however it became intensely painful on Friday.  Last bowel movement was today.  's are essentially unremarkable except for glucose 107 albumin 3.3 WBC not elevated  hemoglobin 9.4 urinalysis negative.  CT abdomen and pelvis with contrast per radiology shows findings suspicious for small bowel obstruction with likely high-grade transition point in the mid abdomen large volume ascites.  LFTs and bilirubin are normal.  He was given 4 mg Zofran 4 mg morphine in the emergency department and general surgery was consulted from the emergency department.  NG tube was attempted to be placed but patient expressed anxiety during the procedure need to be stopped.  1 mg IV Ativan ordered and reattempt will be made.    Review of Systems   Constitutional: Negative.    HENT: Negative.     Eyes: Negative.    Respiratory: Negative.     Cardiovascular: Negative.    Gastrointestinal:  Positive for abdominal distention, abdominal pain, diarrhea, nausea and vomiting.   Endocrine: Negative.    Genitourinary: Negative.    Musculoskeletal: Negative.    Skin: Negative.    Allergic/Immunologic: Positive for immunocompromised state.   Neurological: Negative.    Hematological: Negative.    Psychiatric/Behavioral:  The patient is nervous/anxious.    All other systems reviewed and are negative.      Personal History     Past Medical History:   Diagnosis Date    HIV (human immunodeficiency virus infection)     Skin cancer        Past Surgical History:   Procedure Laterality Date    CYSTOSCOPY, URETEROSCOPY, RETROGRADE PYELOGRAM, STENT INSERTION Left 5/30/2024    Procedure: CYSTOSCOPY URETEROSCOPY HOLMIUM LASER STENT INSERTION;  Surgeon: Wale Taylor MD;  Location: [facility] MAIN OR;  Service: Urology;  Laterality: Left;    ENDOSCOPY N/A 6/1/2024    Procedure: ESOPHAGOGASTRODUODENOSCOPY, biopsy x2 areas;  Surgeon: Nelly Obando MD;  Location: [facility] ENDOSCOPY;  Service: Gastroenterology;  Laterality: N/A;  post: gastritis    HERNIA REPAIR      SKIN CANCER EXCISION         Family History: family history includes Cancer in his mother; Diabetes in his brother; Heart disease in his brother, father, and  mother. Otherwise pertinent FHx was reviewed and not pertinent to current issue.    Social History:  reports that he has never smoked. He has never used smokeless tobacco. He reports that he does not currently use alcohol. Drug use questions deferred to the physician.    Home Medications:  Prior to Admission Medications       Prescriptions Last Dose Informant Patient Reported? Taking?    Bictegravir-Emtricitab-Tenofov (Biktarvy) -25 MG per tablet 10/14/2024  Yes Yes    Take 1 tablet by mouth Daily.    ergocalciferol (ERGOCALCIFEROL) 1.25 MG (40846 UT) capsule Past Week  Yes Yes    Take 1 capsule by mouth 1 (One) Time Per Week. Tuesdays              Allergies:  No Known Allergies    Objective      Vitals:   Temp:  [97.8 °F (36.6 °C)-97.9 °F (36.6 °C)] 97.9 °F (36.6 °C)  Heart Rate:  [87-95] 95  Resp:  [15-16] 15  BP: (123-147)/(80-98) 137/80  Body mass index is 20.36 kg/m².  Physical Exam  Vitals reviewed.   Constitutional:       Appearance: Normal appearance. He is normal weight.   HENT:      Head: Normocephalic and atraumatic.      Right Ear: External ear normal.      Left Ear: External ear normal.      Nose: Nose normal.      Mouth/Throat:      Mouth: Mucous membranes are moist.   Eyes:      Extraocular Movements: Extraocular movements intact.   Cardiovascular:      Rate and Rhythm: Normal rate and regular rhythm.      Pulses: Normal pulses.      Heart sounds: Normal heart sounds.   Pulmonary:      Effort: Pulmonary effort is normal.      Breath sounds: Normal breath sounds.   Abdominal:      General: There is distension.      Palpations: Abdomen is soft.   Genitourinary:     Comments: deferred  Musculoskeletal:         General: Normal range of motion.      Cervical back: Normal range of motion and neck supple.   Skin:     General: Skin is warm and dry.   Neurological:      General: No focal deficit present.      Mental Status: He is alert and oriented to person, place, and time.   Psychiatric:         Mood  and Affect: Mood normal.         Behavior: Behavior normal.         Thought Content: Thought content normal.         Judgment: Judgment normal.         Diagnostic Data:  Lab Results (last 24 hours)       Procedure Component Value Units Date/Time    Urinalysis With Microscopic If Indicated (No Culture) - Urine, Clean Catch [[id number]]  (Abnormal) Collected: 10/15/24 2239    Specimen: Urine, Clean Catch Updated: 10/15/24 2246     Color, UA Yellow     Appearance, UA Clear     pH, UA 6.0     Specific Gravity, UA 1.026     Glucose, UA Negative     Ketones, UA Negative     Bilirubin, UA Negative     Blood, UA Negative     Protein, UA 30 mg/dL (1+)     Leuk Esterase, UA Negative     Nitrite, UA Negative     Urobilinogen, UA 1.0 E.U./dL    Urinalysis, Microscopic Only - Urine, Clean Catch [[id number]] Collected: 10/15/24 2239    Specimen: Urine, Clean Catch Updated: 10/15/24 2246     RBC, UA 0-2 /HPF      WBC, UA 0-2 /HPF      Bacteria, UA None Seen /HPF      Squamous Epithelial Cells, UA 0-2 /HPF      Hyaline Casts, UA 0-2 /LPF      Methodology Automated Microscopy    Comprehensive Metabolic Panel [[id number]]  (Abnormal) Collected: 10/15/24 2149    Specimen: Blood Updated: 10/15/24 2221     Glucose 107 mg/dL      BUN 13 mg/dL      Creatinine 0.95 mg/dL      Sodium 138 mmol/L      Potassium 4.0 mmol/L      Chloride 103 mmol/L      CO2 27.7 mmol/L      Calcium 9.0 mg/dL      Total Protein 9.3 g/dL      Albumin 3.3 g/dL      ALT (SGPT) 5 U/L      AST (SGOT) 11 U/L      Alkaline Phosphatase 70 U/L      Total Bilirubin 0.2 mg/dL      Globulin 6.0 gm/dL      A/G Ratio 0.6 g/dL      BUN/Creatinine Ratio 13.7     Anion Gap 7.3 mmol/L      eGFR 93.9 mL/min/1.73     Narrative:      GFR Normal >60  Chronic Kidney Disease <60  Kidney Failure <15      Lipase [[id number]]  (Normal) Collected: 10/15/24 2149    Specimen: Blood Updated: 10/15/24 2221     Lipase 20 U/L     CBC & Differential [[id number]]  (Abnormal) Collected: 10/15/24 2149     Specimen: Blood Updated: 10/15/24 2156    Narrative:      The following orders were created for panel order CBC & Differential.  Procedure                               Abnormality         Status                     ---------                               -----------         ------                     CBC Auto Differential[[id number]]        Abnormal            Final result               Scan Slide[[id number]]                                                                    Please view results for these tests on the individual orders.    CBC Auto Differential [[id number]]  (Abnormal) Collected: 10/15/24 2149    Specimen: Blood Updated: 10/15/24 2156     WBC 8.92 10*3/mm3      RBC 4.57 10*6/mm3      Hemoglobin 9.4 g/dL      Hematocrit 33.7 %      MCV 73.7 fL      MCH 20.6 pg      MCHC 27.9 g/dL      RDW 16.5 %      RDW-SD 43.7 fl      MPV 8.2 fL      Platelets 637 10*3/mm3      Neutrophil % 44.9 %      Lymphocyte % 42.7 %      Monocyte % 9.6 %      Eosinophil % 2.2 %      Basophil % 0.3 %      Immature Grans % 0.3 %      Neutrophils, Absolute 3.99 10*3/mm3      Lymphocytes, Absolute 3.81 10*3/mm3      Monocytes, Absolute 0.86 10*3/mm3      Eosinophils, Absolute 0.20 10*3/mm3      Basophils, Absolute 0.03 10*3/mm3      Immature Grans, Absolute 0.03 10*3/mm3      nRBC 0.0 /100 WBC              Imaging Results (Last 24 Hours)       Procedure Component Value Units Date/Time    XR Abdomen KUB [[id number]] Collected: 10/16/24 0217     Updated: 10/16/24 0220    Narrative:      XR ABDOMEN KUB    Date of Exam: 10/16/2024 2:09 AM EDT    Indication: ng placement    Comparison: None available.    Findings:  There is an esophagogastric tube in the stomach. The gas pattern is nonspecific. There is no definite obstructive change. Contrast is seen in the renal collecting systems bilaterally.      Impression:      Impression:  Esophagogastric tube is in the stomach.        Electronically Signed: Mendoza Mitchell MD    10/16/2024 2:18 AM  EDT    Workstation ID: [id number]    CT Abdomen Pelvis With Contrast [[id number]] Collected: 10/15/24 2255     Updated: 10/15/24 2305    Narrative:      CT ABDOMEN PELVIS W CONTRAST    Date of Exam: 10/15/2024 10:35 PM EDT    Indication: abdominal pain, nausea.    Comparison: CT abdomen pelvis 5/29/2024    Technique: Axial CT images were obtained of the abdomen and pelvis following the uneventful intravenous administration of iodinated contrast. Sagittal and coronal reconstructions were performed.  Automated exposure control and iterative reconstruction   methods were used.        Findings:  Lung Bases: No significant abnormality.    Liver: No significant abnormality.     Gallbladder and biliary tree: No significant abnormality.     Spleen: No significant abnormality.     Pancreas: No significant abnormality.     Adrenal glands: No significant abnormality.     Kidneys and ureters: Nonobstructing right nephrolithiasis. No hydroureteronephrosis.     Stomach and duodenum: Distended stomach.    Small and large bowel: There are multiple dilated loops of small bowel with abrupt appearing transition point in the mid abdomen on axial image 76. There is no evidence of pneumatosis or portal venous gas. Normal caliber large bowel.    Peritoneal cavity: There is a large volume of ascites. No free air is seen.    Bladder: Under distended incompletely evaluated.     Pelvic organs: No significant abnormality.     Vasculature: No significant abnormality.     Lymph nodes: No pathologic appearing lymph nodes by imaging criteria.     Bones and soft tissues: Degenerative changes of the imaged spine. No acute osseous abnormality.      Impression:      Impression:  Findings suspicious for a small bowel obstruction with likely high-grade transition point in the mid abdomen.    Large volume ascites.            Electronically Signed: Kobe Noble    10/15/2024 11:03 PM EDT    Workstation ID: [id number]              Assessment & Plan         This is a 58 y.o. male with:    Active and Resolved Problems  Active Hospital Problems    Diagnosis  POA    **Small bowel obstruction [K56.609]  Yes    Fecal incontinence [R15.9]  Yes    SBO (small bowel obstruction) [K56.609]  Yes    HIV (human immunodeficiency virus infection) [Z21]  Yes      Resolved Hospital Problems   No resolved problems to display.     Small bowel obstruction with likely high-grade transition point in the mid abdomen per CT abdomen large ascites with contrast radiology report, n.p.o., normal saline at 100 cc/h, 4 mg IV morphine every 4 hours as needed pain, 4 mg IV Zofran every 6 hours. For nausea, general surgery consulted from the emergency department, second  Attempt at NG tube pending    Fecal incontinence chronic, has never had colonoscopy, gastroenterology consulted to establish care    HIV, on Biktarvy, hold all n.p.o.      VTE Prophylaxis:  Mechanical VTE prophylaxis orders are present.        The patient desires to be as follows:    CODE STATUS:    Code Status (Patient has no pulse and is not breathing): CPR (Attempt to Resuscitate)  Medical Interventions (Patient has pulse or is breathing): Full Support            Admission Status:  I believe this patient meets inpatient  status.    Expected Length of Stay: pending further evaluation and clinical course     PDMP and Medication Dispenses via Sidebar reviewed and consistent with patient reported medications.    I discussed the patient's findings and my recommendations with patient.      Signature:     This document has been electronically signed by DRU Ayala on October 16, 2024 03:46 EDT   [facility] Hospitalist Team    Electronically signed by Llanes Alvarez, Carlos, MD at 10/16/24 0449          Emergency Department Notes        Lala Solano RN at 10/16/24 0026          Nursing report ED to floor  Felipe Nieves  58 y.o.  male    HPI:   Chief Complaint   Patient presents with    Abdominal Pain        Admitting doctor:   Carlos Llanes Alvarez, MD    Admitting diagnosis:   The primary encounter diagnosis was Intestinal obstruction, unspecified cause, unspecified whether partial or complete. Diagnoses of Generalized abdominal pain and Nausea and vomiting, unspecified vomiting type were also pertinent to this visit.    Code status:   Current Code Status       Date Active Code Status Order ID Comments User Context       10/15/2024 2357 CPR (Attempt to Resuscitate) [id number]  Odessa Soni APRN ED        Question Answer    Code Status (Patient has no pulse and is not breathing) CPR (Attempt to Resuscitate)    Medical Interventions (Patient has pulse or is breathing) Full Support                    Allergies:   Patient has no known allergies.    Isolation:  No active isolations     Fall Risk:  Fall Risk Assessment was completed, and patient is at low risk for falls.   Predictive Model Details         4 (Low) Factor Value    Calculated 10/16/2024 00:22 Age 58    Risk of Fall Model Active Peripheral IV Present     Imaging order in this encounter Present     Magnesium not on file     Number of Distinct Medication Classes administered 3     Drug Use Not Asked     Albumin 3.3 g/dL     Caleb Scale not on file     Calcium 9 mg/dL     Clinically Relevant Sex Not Female     Diastolic BP 86     Number of administrations of Analgesic Narcotics 1     Gastrointestinal Assessment X     Respiratory Rate 16     ALT 5 U/L     Chloride 103 mmol/L     Days after Admission 0.113     Potassium 4 mmol/L     Creatinine 0.95 mg/dL     Total Bilirubin 0.2 mg/dL         Weight:       10/15/24  2108   Weight: 59 kg (130 lb)       Intake and Output  No intake or output data in the 24 hours ending 10/16/24 0026    Diet:   Dietary Orders (From admission, onward)       Start     Ordered    10/15/24 2356  NPO Diet NPO Type: Strict NPO  Diet Effective Now        Question:  NPO Type  Answer:  Strict NPO    10/15/24 9477                      Most recent vitals:   Vitals:    10/15/24 2201 10/15/24 2231 10/15/24 2301 10/15/24 2331   BP: 139/98 128/93 130/94 123/86   BP Location:       Patient Position:       Pulse: 87 91 89 89   Resp:       Temp:       TempSrc:       SpO2: 99% 98% 100% 99%   Weight:       Height:           Active LDAs/IV Access:   Lines, Drains & Airways       Active LDAs       Name Placement date Placement time Site Days    Peripheral IV 10/15/24 2150 Right Antecubital 10/15/24  2150  Antecubital  less than 1    Ureteral Drain/Stent Left ureter 7 Fr. 05/30/24  1640  Left ureter  STRING  138                    Skin Condition:   Skin Assessments (last day)       None             Labs (abnormal labs have a star):   Labs Reviewed   COMPREHENSIVE METABOLIC PANEL - Abnormal; Notable for the following components:       Result Value    Glucose 107 (*)     Total Protein 9.3 (*)     Albumin 3.3 (*)     All other components within normal limits    Narrative:     GFR Normal >60  Chronic Kidney Disease <60  Kidney Failure <15     URINALYSIS W/ MICROSCOPIC IF INDICATED (NO CULTURE) - Abnormal; Notable for the following components:    Protein, UA 30 mg/dL (1+) (*)     All other components within normal limits   CBC WITH AUTO DIFFERENTIAL - Abnormal; Notable for the following components:    Hemoglobin 9.4 (*)     Hematocrit 33.7 (*)     MCV 73.7 (*)     MCH 20.6 (*)     MCHC 27.9 (*)     RDW 16.5 (*)     Platelets 637 (*)     Lymphocytes, Absolute 3.81 (*)     All other components within normal limits   LIPASE - Normal   URINALYSIS, MICROSCOPIC ONLY   BASIC METABOLIC PANEL   CBC WITH AUTO DIFFERENTIAL   CBC AND DIFFERENTIAL    Narrative:     The following orders were created for panel order CBC & Differential.  Procedure                               Abnormality         Status                     ---------                               -----------         ------                     CBC Auto Differential[[id number]]        Abnormal            Final  result               Scan Slide[[id number]]                                                                    Please view results for these tests on the individual orders.   CBC AND DIFFERENTIAL    Narrative:     The following orders were created for panel order CBC & Differential.  Procedure                               Abnormality         Status                     ---------                               -----------         ------                     CBC Auto Differential[[id number]]                                                         Please view results for these tests on the individual orders.       LOC: Person, Place, Time, and Situation    Telemetry:  Med/Surg    Cardiac Monitoring Ordered: yes    EKG:   ECG 12 Lead Other; QTC eval for meds    (Results Pending)       Medications Given in the ED:   Medications   sodium chloride 0.9 % flush 10 mL (has no administration in time range)   sodium chloride 0.9 % infusion (has no administration in time range)   morphine injection 4 mg (has no administration in time range)   diphenhydrAMINE (BENADRYL) injection 25 mg (has no administration in time range)   prochlorperazine (COMPAZINE) injection 5 mg (has no administration in time range)   LORazepam (ATIVAN) injection 1 mg (has no administration in time range)   morphine injection 4 mg (4 mg Intravenous Given 10/15/24 2255)   ondansetron (ZOFRAN) injection 4 mg (4 mg Intravenous Given 10/15/24 2255)   iopamidol (ISOVUE-370) 76 % injection 100 mL (100 mL Intravenous Given 10/15/24 2253)       Imaging results:  CT Abdomen Pelvis With Contrast    Result Date: 10/15/2024  Impression: Findings suspicious for a small bowel obstruction with likely high-grade transition point in the mid abdomen. Large volume ascites. Electronically Signed: Kobe Noble  10/15/2024 11:03 PM EDT  Workstation ID: [id number]     Social issues:   Social History     Socioeconomic History    Marital status:    Tobacco Use    Smoking  status: Never    Smokeless tobacco: Never   Vaping Use    Vaping status: Never Used   Substance and Sexual Activity    Alcohol use: Not Currently    Drug use: Defer    Sexual activity: Defer       NIH Stroke Scale:  Interval: (not recorded)  1a. Level of Consciousness: (not recorded)  1b. LOC Questions: (not recorded)  1c. LOC Commands: (not recorded)  2. Best Gaze: (not recorded)  3. Visual: (not recorded)  4. Facial Palsy: (not recorded)  5a. Motor Arm, Left: (not recorded)  5b. Motor Arm, Right: (not recorded)  6a. Motor Leg, Left: (not recorded)  6b. Motor Leg, Right: (not recorded)  7. Limb Ataxia: (not recorded)  8. Sensory: (not recorded)  9. Best Language: (not recorded)  10. Dysarthria: (not recorded)  11. Extinction and Inattention (formerly Neglect): (not recorded)    Total (NIH Stroke Scale): (not recorded)     Additional notable assessment information:     Nursing report ED to floor:  Charles Solano RN   10/16/24 00:26 EDT     Electronically signed by Lala Solano RN at 10/16/24 0027       Nevin Gracia APRN at 10/15/24 2224          Subjective   Chief Complaint   Patient presents with    Abdominal Pain     History of Present Illness  Patient is a 58-year-old male with history of HIV, on Biktarvy who reports he has undetectable levels, presents to the emergency department with a complaint of abdominal pain, swelling, nausea, vomiting.  He reports has been feeling better over the past week, is worse with eating.  He does report bright red blood in the stool which she has had previously.    Denies any fevers.  No melena.  No hematemesis.  Review of Systems    Past Medical History:   Diagnosis Date    HIV (human immunodeficiency virus infection)     Skin cancer        No Known Allergies    Past Surgical History:   Procedure Laterality Date    CYSTOSCOPY, URETEROSCOPY, RETROGRADE PYELOGRAM, STENT INSERTION Left 5/30/2024    Procedure: CYSTOSCOPY URETEROSCOPY HOLMIUM LASER STENT  INSERTION;  Surgeon: Wale Taylor MD;  Location: [facility] MAIN OR;  Service: Urology;  Laterality: Left;    ENDOSCOPY N/A 6/1/2024    Procedure: ESOPHAGOGASTRODUODENOSCOPY, biopsy x2 areas;  Surgeon: Nelly Obando MD;  Location: [facility] ENDOSCOPY;  Service: Gastroenterology;  Laterality: N/A;  post: gastritis    HERNIA REPAIR      SKIN CANCER EXCISION         Family History   Problem Relation Age of Onset    Heart disease Mother     Cancer Mother     Heart disease Father     Diabetes Brother     Heart disease Brother        Social History     Socioeconomic History    Marital status:    Tobacco Use    Smoking status: Never    Smokeless tobacco: Never   Vaping Use    Vaping status: Never Used   Substance and Sexual Activity    Alcohol use: Not Currently    Drug use: Defer    Sexual activity: Defer           Objective   Physical Exam  Vitals and nursing note reviewed.   Constitutional:       Appearance: He is well-developed.   HENT:      Head: Normocephalic and atraumatic.      Mouth/Throat:      Mouth: Mucous membranes are moist.      Pharynx: Oropharynx is clear.   Eyes:      Extraocular Movements: Extraocular movements intact.      Pupils: Pupils are equal, round, and reactive to light.   Cardiovascular:      Rate and Rhythm: Normal rate and regular rhythm.      Heart sounds: No murmur heard.     No friction rub. No gallop.   Pulmonary:      Effort: Pulmonary effort is normal.      Breath sounds: Normal breath sounds.   Abdominal:      General: Abdomen is protuberant. Bowel sounds are normal. There is distension.      Palpations: Abdomen is soft.      Tenderness: There is no abdominal tenderness. There is no guarding or rebound.   Skin:     General: Skin is warm.      Capillary Refill: Capillary refill takes less than 2 seconds.   Neurological:      General: No focal deficit present.      Mental Status: He is alert and oriented to person, place, and time.         Procedures          ED Course  ED  Course as of 10/16/24 0330   Tue Oct 15, 2024   7665 CONSULT WITH HOSPITALIST MAURA GONSALES [LB]   5782 Spoke with Dr. Koch agrees with current treatment plan [LB]      ED Course User Index  [LB] Nevin Gracia APRN                                             Medical Decision Making  Amount and/or Complexity of Data Reviewed  Labs: ordered.  Radiology: ordered.    Risk  Prescription drug management.    Discussed with Dr. Nix    Chart Review: Discharge summary 6/1/2024 for ileus, ascites, UTI, hydronephrosis with urinary obstruction  Imaging reviewed and interpreted by Dr. Nix  ED attending physician. Small bowel obstruction.   Further radiologist interpretation as above.     Pt was Placed on appropriate monitoring.  Differential diagnoses considered for patient presentation, this list is not all inclusive of diagnoses considered: Gastritis, cholecystitis, bowel obstruction, ascites  Patient presents to the ED for the above complaint, underwent the above, exam and workup.  CBC CMP reviewed.  Lipase normal.  UA shows no signs of infection.  CT abdomen pelvis reveals findings concerning for bowel obstruction.  Patient does have contacts noted in his stomach, he has abdominal distention and nausea.  NG tube was ordered.  I discussed this with the patient.  I do think it would help him symptomatically and help with decompression.  Please see nursing note regarding NG tube placement.  Will be admitted to the hospitalist service for further evaluation management.  Consultation was made to Dr. Koch with general surgery who agrees with current treatment plan.      Note Disclaimer: At [facility], we believe that sharing information builds trust and better relationships. You are receiving this note because you recently visited [facility]. It is possible you will see health information before a provider has talked with you about it. This kind of information can be easy to misunderstand. To help  you fully understand what it means for your health, we urge you to discuss this note with your provider  Note dictated utilizing Dragon Dictation.  Appropriate PPE worn during patient interactions.        Final diagnoses:   Intestinal obstruction, unspecified cause, unspecified whether partial or complete   Generalized abdominal pain   Nausea and vomiting, unspecified vomiting type       ED Disposition  ED Disposition       ED Disposition   Decision to Admit    Condition   --    Comment   Level of Care: Med/Surg [1]   Diagnosis: Small bowel obstruction [701094]   Admitting Physician: LLANES ALVAREZ, CARLOS [[id number]]   Attending Physician: LLANES ALVAREZ, CARLOS [[id number]]   Bed Request Comments: cardiac monitor   Certification: I Certify That Inpatient Hospital Services Are Medically Necessary For Greater Than 2 Midnights                 No follow-up provider specified.       Medication List      No changes were made to your prescriptions during this visit.            Nevin Gracia APRN  10/16/24 0330      Electronically signed by Nevin Gracia APRN at 10/16/24 0330       Physician Progress Notes (all)    No notes of this type exist for this encounter.          Consult Notes (all)        Nona Ward APRN at 10/16/24 0947        Consult Orders    1. Inpatient Gastroenterology Consult [[id number]] ordered by Odessa Soni APRN at 10/16/24 0535                 GI CONSULT  NOTE:    Referring Provider:  Odessa Soni NP     Chief complaint: Abdominal pain, nausea/vomiting    Subjective .     History of present illness: Felipe Nieves is a 58 y.o. male with history of HIV and hernia repair x 3 who presents with complaints of abdominal pain and nausea/vomiting.  CT abdomen/pelvis shows small bowel obstruction with high-grade transition point in the mid abdomen, but also shows a large volume of ascites.  The patient has no documented history of liver disease.  LFTs normal and  platelet count elevated.  INR also normal.  He had a previous RUQ US in 5/2024 that shows mild steatosis and trace ascites.  The patient reports that he has been having progressively worsening abdominal distention over the past 3 months.  Denies any previous paracentesis.  Not on diuretics at home.  States that he began to have abdominal pain 4 days ago.  Reports associated nausea, but denies any vomiting.  No heartburn or dysphagia.  States that he does struggle with constipation at home, but last bowel movement was yesterday.  No bright red blood per rectum or melena.  Denies recent confusion.  Denies alcohol use or a family history of liver disease.  Of note, the patient was recently seen by our service in 6/2024 for iron deficiency anemia.  He did undergo EGD at that time with results below.  Colonoscopy was offered at that time, but patient refused colonoscopy prep.      Endo History:  6/2024 EGD (Dr. Obando) -gastritis  No previous colonoscopy.    Past Medical History:  Past Medical History:   Diagnosis Date    HIV (human immunodeficiency virus infection)     Skin cancer        Past Surgical History:  Past Surgical History:   Procedure Laterality Date    CYSTOSCOPY, URETEROSCOPY, RETROGRADE PYELOGRAM, STENT INSERTION Left 5/30/2024    Procedure: CYSTOSCOPY URETEROSCOPY HOLMIUM LASER STENT INSERTION;  Surgeon: Wale Taylor MD;  Location: [facility] MAIN OR;  Service: Urology;  Laterality: Left;    ENDOSCOPY N/A 6/1/2024    Procedure: ESOPHAGOGASTRODUODENOSCOPY, biopsy x2 areas;  Surgeon: Nelly Obando MD;  Location: [facility] ENDOSCOPY;  Service: Gastroenterology;  Laterality: N/A;  post: gastritis    HERNIA REPAIR      SKIN CANCER EXCISION         Social History:  Social History     Tobacco Use    Smoking status: Never    Smokeless tobacco: Never   Vaping Use    Vaping status: Never Used   Substance Use Topics    Alcohol use: Not Currently    Drug use: Defer       Family History:  Family History   Problem  Relation Age of Onset    Heart disease Mother     Cancer Mother     Heart disease Father     Diabetes Brother     Heart disease Brother        Medications:  Medications Prior to Admission   Medication Sig Dispense Refill Last Dose/Taking    Bictegravir-Emtricitab-Tenofov (Biktarvy) -25 MG per tablet Take 1 tablet by mouth Daily.   10/14/2024    ergocalciferol (ERGOCALCIFEROL) 1.25 MG (44089 UT) capsule Take 1 capsule by mouth 1 (One) Time Per Week. Tuesdays   Past Week       Scheduled Meds:albumin human, 37.5 g, Intravenous, Once   Or  albumin human, 50 g, Intravenous, Once   Or  albumin human, 62.5 g, Intravenous, Once   Or  albumin human, 75 g, Intravenous, Once   Or  albumin human, 87.5 g, Intravenous, Once   Or  albumin human, 100 g, Intravenous, Once   Or  albumin human, 112.5 g, Intravenous, Once  [Held by provider] Bictegravir-Emtricitab-Tenofov, 1 tablet, Oral, Daily      Continuous Infusions:sodium chloride, 100 mL/hr, Last Rate: 100 mL/hr (10/16/24 0220)      PRN Meds:.  Morphine    [COMPLETED] Insert Peripheral IV **AND** sodium chloride    ALLERGIES:  Patient has no known allergies.    ROS:  The following systems were reviewed and negative;   Constitution:  No fevers, chills, no unintentional weight loss  Skin: no rash, no jaundice  Eyes:  No blurry vision, no eye pain  HENT:  No change in hearing or smell  Resp:  No dyspnea or cough  CV:  No chest pain or palpitations  :  No dysuria, hematuria  Musculoskeletal:  No leg cramps or arthralgias  Neuro:  No tremor, no numbness  Psych:  No depression or confusion    Objective     Vital Signs:   Vitals:    10/15/24 2231 10/15/24 2301 10/15/24 2331 10/16/24 0113   BP: 128/93 130/94 123/86 137/80   BP Location:    Left arm   Patient Position:    Lying   Pulse: 91 89 89 95   Resp:    15   Temp:    97.9 °F (36.6 °C)   TempSrc:    Oral   SpO2: 98% 100% 99% 98%   Weight:       Height:           Physical Exam:       General Appearance:    Awake and alert, in  "no acute distress   Head:    Normocephalic, without obvious abnormality, atraumatic   Throat:   No oral lesions, no thrush, oral mucosa moist   Lungs:     Respirations regular, even and unlabored   Chest Wall:    No abnormalities observed   Abdomen:     Soft, generalized tenderness, no rebound or guarding, mild distention, NG tube in place to low intermittent wall suction   Rectal:     Deferred   Extremities:   Moves all extremities, no edema, no cyanosis   Pulses:   Pulses palpable and equal bilaterally   Skin:   No rash, no jaundice, normal palpation   Lymph nodes:   No cervical, supraclavicular or submandibular palpable adenopathy   Neurologic:   Cranial nerves 2 - 12 grossly intact, no asterixis       Results Review:   I reviewed the patient's labs and imaging.  CBC    Results from last 7 days   Lab Units 10/16/24  0428 10/15/24  2149   WBC 10*3/mm3 8.06 8.92   HEMOGLOBIN g/dL 8.2* 9.4*   PLATELETS 10*3/mm3 553* 637*     CMP   Results from last 7 days   Lab Units 10/16/24  0428 10/15/24  2149   SODIUM mmol/L 138 138   POTASSIUM mmol/L 3.8 4.0   CHLORIDE mmol/L 103 103   CO2 mmol/L 28.9 27.7   BUN mg/dL 12 13   CREATININE mg/dL 0.95 0.95   GLUCOSE mg/dL 98 107*   ALBUMIN g/dL  --  3.3*   BILIRUBIN mg/dL  --  0.2   ALK PHOS U/L  --  70   AST (SGOT) U/L  --  11   ALT (SGPT) U/L  --  5   LIPASE U/L  --  20     Cr Clearance Estimated Creatinine Clearance: 72.5 mL/min (by C-G formula based on SCr of 0.95 mg/dL).  Coag   Results from last 7 days   Lab Units 10/16/24  0753   INR  1.05     HbA1C No results found for: \"HGBA1C\"  Blood Glucose No results found for: \"POCGLU\"  Infection     UA    Results from last 7 days   Lab Units 10/15/24  2239   NITRITE UA  Negative   WBC UA /HPF 0-2   BACTERIA UA /HPF None Seen   SQUAM EPITHEL UA /HPF 0-2     Imaging Results (Last 72 Hours)       Procedure Component Value Units Date/Time    US Liver [[id number]] Resulted: 10/16/24 0929     Updated: 10/16/24 0943    XR Abdomen KUB " [[id number]] Collected: 10/16/24 0217     Updated: 10/16/24 0220    Narrative:      XR ABDOMEN KUB    Date of Exam: 10/16/2024 2:09 AM EDT    Indication: ng placement    Comparison: None available.    Findings:  There is an esophagogastric tube in the stomach. The gas pattern is nonspecific. There is no definite obstructive change. Contrast is seen in the renal collecting systems bilaterally.      Impression:      Impression:  Esophagogastric tube is in the stomach.        Electronically Signed: Mendoza Mitchell MD    10/16/2024 2:18 AM EDT    Workstation ID: [id number]    CT Abdomen Pelvis With Contrast [[id number]] Collected: 10/15/24 2255     Updated: 10/15/24 2305    Narrative:      CT ABDOMEN PELVIS W CONTRAST    Date of Exam: 10/15/2024 10:35 PM EDT    Indication: abdominal pain, nausea.    Comparison: CT abdomen pelvis 5/29/2024    Technique: Axial CT images were obtained of the abdomen and pelvis following the uneventful intravenous administration of iodinated contrast. Sagittal and coronal reconstructions were performed.  Automated exposure control and iterative reconstruction   methods were used.        Findings:  Lung Bases: No significant abnormality.    Liver: No significant abnormality.     Gallbladder and biliary tree: No significant abnormality.     Spleen: No significant abnormality.     Pancreas: No significant abnormality.     Adrenal glands: No significant abnormality.     Kidneys and ureters: Nonobstructing right nephrolithiasis. No hydroureteronephrosis.     Stomach and duodenum: Distended stomach.    Small and large bowel: There are multiple dilated loops of small bowel with abrupt appearing transition point in the mid abdomen on axial image 76. There is no evidence of pneumatosis or portal venous gas. Normal caliber large bowel.    Peritoneal cavity: There is a large volume of ascites. No free air is seen.    Bladder: Under distended incompletely evaluated.     Pelvic organs: No significant  abnormality.     Vasculature: No significant abnormality.     Lymph nodes: No pathologic appearing lymph nodes by imaging criteria.     Bones and soft tissues: Degenerative changes of the imaged spine. No acute osseous abnormality.      Impression:      Impression:  Findings suspicious for a small bowel obstruction with likely high-grade transition point in the mid abdomen.    Large volume ascites.            Electronically Signed: Kobe Noble    10/15/2024 11:03 PM EDT    Workstation ID: [id number]            ASSESSMENT:  -Ascites  -Iron deficiency anemia  -Small bowel obstruction  -Abdominal pain with nausea -due to above  -HIV  -History of hernia repair x 3    PLAN:  Patient is a 58-year-old male with history of HIV and iron deficiency anemia who presented on 10/15 with complaints of abdominal pain and nausea.    CT abdomen/pelvis W on admission shows small bowel obstruction with high-grade transition point in the mid abdomen and a large volume of ascites.  Patient has no known history of cirrhosis.  RUQ US in 5/2024 shows mild steatosis and trace ascites.  LFTs and INR normal.  Platelet count elevated.  Will plan paracentesis.  Send fluid studies to help determine etiology of ascites.  Replace albumin per protocol.  Start diuretics.  Monitor renal function closely.  Creatinine currently 0.95.  Repeat RUQ US and send liver serologies.  Hemoglobin 8.2, MCV 73.5.  Serum iron 21, ferritin 32, iron saturation 6%.  Continue to monitor H/H and transfuse as needed.  Plan IV iron infusion.  Patient would benefit from outpatient colonoscopy at some point.  Continue NG tube to low intermittent wall suction.  Maintain NPO.  General surgery has been consulted.  Await input.  Antiemetics/analgesics as needed.  Supportive care.      I discussed the patients findings and my recommendations with the patient.  I will discuss the case with Dr. Krishna and change the plan accordingly.    We appreciate the  referral.    Electronically signed by DRU Fields, 10/16/24, 9:47 AM EDT.                  Electronically signed by Nona Ward APRN at 10/16/24 0992

## 2024-10-16 NOTE — CONSULTS
GENERAL SURGERY CONSULT      Patient Care Team:  Provider, No Known as PCP - General    Chief complaint abdominal distention  Subjective     58-year-old gentleman presents with abdominal distention with nausea and vomiting.  He has a history of cirrhosis and HIV.  CT scan demonstrates ascites and small bowel obstruction with transition point.  Past surgical history significant for umbilical hernia.  Right inguinal hernia repair.  Nasogastric tube has been placed.    I/O last 3 completed shifts:  In: 1000 [I.V.:1000]  Out: 300 [Emesis/NG output:300]  Intake/Output                   10/15/24 0701 - 10/16/24 0700     2150-2221 4809-1735 Total              Intake    I.V.  --  1000 1000    Total Intake -- 1000 1000       Output    Emesis/NG output  --  300 300    Total Output -- 300 300              Review of Systems   All systems were reviewed and negative except for:  Gastrointestinal: positive for  nausea, pain and See HPI    History  Past Medical History:   Diagnosis Date    HIV (human immunodeficiency virus infection)     Skin cancer      Past Surgical History:   Procedure Laterality Date    CYSTOSCOPY, URETEROSCOPY, RETROGRADE PYELOGRAM, STENT INSERTION Left 5/30/2024    Procedure: CYSTOSCOPY URETEROSCOPY HOLMIUM LASER STENT INSERTION;  Surgeon: Wale Taylor MD;  Location: [facility] MAIN OR;  Service: Urology;  Laterality: Left;    ENDOSCOPY N/A 6/1/2024    Procedure: ESOPHAGOGASTRODUODENOSCOPY, biopsy x2 areas;  Surgeon: Nelly Obando MD;  Location: [facility] ENDOSCOPY;  Service: Gastroenterology;  Laterality: N/A;  post: gastritis    HERNIA REPAIR      SKIN CANCER EXCISION       Family History   Problem Relation Age of Onset    Heart disease Mother     Cancer Mother     Heart disease Father     Diabetes Brother     Heart disease Brother      Social History     Tobacco Use    Smoking status: Never    Smokeless tobacco: Never   Vaping Use    Vaping status: Never Used   Substance Use Topics    Alcohol use: Not  Currently    Drug use: Defer     Medications Prior to Admission   Medication Sig Dispense Refill Last Dose/Taking    Bictegravir-Emtricitab-Tenofov (Biktarvy) -25 MG per tablet Take 1 tablet by mouth Daily.   10/14/2024    ergocalciferol (ERGOCALCIFEROL) 1.25 MG (45807 UT) capsule Take 1 capsule by mouth 1 (One) Time Per Week. Tuesdays   Past Week     Allergies:  Patient has no known allergies.    Objective     Vital Signs  Temp:  [97.8 °F (36.6 °C)-98 °F (36.7 °C)] 98 °F (36.7 °C)  Heart Rate:  [87-95] 95  Resp:  [15-16] 16  BP: (112-147)/(80-98) 112/81    Physical Exam:      General Appearance:  Very drowsy and unable to hold his eyes open.  He was able to answer a couple of questions.  But he falls asleep a lot.   Head:    Normocephalic, without obvious abnormality, atraumatic,    Eyes:            Lids and lashes normal, conjunctivae and sclerae normal, no   icterus, no pallor, corneas clear, PERRLA   Ears:    Ears appear intact with no abnormalities noted   Throat:   No oral lesions, no thrush, oral mucosa moist   Neck:   No adenopathy, supple, trachea midline, no thyromegaly, no   carotid bruit, no JVD   Back:     No kyphosis present, no scoliosis present, no skin lesions,      erythema or scars, no tenderness to percussion or                   palpation,   range of motion normal   Lungs:     Clear to auscultation,respirations regular, even and                  unlabored    Heart:    Regular rhythm and normal rate, normal S1 and S2, no            murmur, no gallop, no rub, no click   Chest Wall:    No abnormalities observed   Abdomen:   Very distended and tender, no inguinal hernia or umbilical hernia palpated   Rectal:     Deferred   Extremities: No edema, good ROM   Pulses:   Pulses palpable and equal bilaterally   Skin:   No bleeding, bruising or rash   Lymph nodes:   No palpable adenopathy   Neurologic:   Cranial nerves 2 - 12 grossly intact, sensation intact, DTR       present and equal bilaterally      Lab Results (last 24 hours)       Procedure Component Value Units Date/Time    AFP Tumor Marker [[id number]]  (Normal) Collected: 10/16/24 0753    Specimen: Blood from Arm, Left Updated: 10/16/24 1127     ALPHA-FETOPROTEIN 2.64 ng/mL     Narrative:      Alpha Fetoprotein Tumor Marker Reference Range:    0.0-8.3 ng/mL    Note: Normal values apply only to males and nonpregnant females. These results are not interpretable for pregnant females.    Testing Method: [employer] Electrochemiluminescence Immunoassay(ECLIA)  Values obtained with different assay methods or kits cannot be used interchangeably.    Alpha - 1 - Antitrypsin [[id number]]  (Abnormal) Collected: 10/15/24 2149    Specimen: Blood Updated: 10/16/24 1120     ALPHA -1 ANTITRYPSIN 281 mg/dL     Ceruloplasmin [[id number]]  (Abnormal) Collected: 10/15/24 2149    Specimen: Blood Updated: 10/16/24 1120     Ceruloplasmin 41 mg/dL     Gamma GT [[id number]]  (Normal) Collected: 10/15/24 2149    Specimen: Blood Updated: 10/16/24 1120     GGT 16 U/L     Hepatitis Panel, Acute [[id number]]  (Normal) Collected: 10/16/24 0753    Specimen: Blood from Arm, Left Updated: 10/16/24 0908     Hepatitis B Surface Ag Non-Reactive     Hep A IgM Non-Reactive     Hep B C IgM Non-Reactive     Hepatitis C Ab Non-Reactive    Narrative:      Results may be falsely decreased if patient taking Biotin.     Protime-INR [[id number]]  (Normal) Collected: 10/16/24 0753    Specimen: Blood from Arm, Left Updated: 10/16/24 0832     Protime 11.4 Seconds      INR 1.05    STANLEY [[id number]] Collected: 10/16/24 0753    Specimen: Blood from Arm, Left Updated: 10/16/24 0815    Anti-Smooth Muscle Antibody Titer [[id number]] Collected: 10/16/24 0753    Specimen: Blood from Arm, Left Updated: 10/16/24 0815    Mitochondrial Antibodies, M2 [[id number]] Collected: 10/16/24 0753    Specimen: Blood from Arm, Left Updated: 10/16/24 0815    Ferritin [[id number]]  (Normal) Collected: 10/16/24 0428    Specimen:  Blood from Arm, Left Updated: 10/16/24 0743     Ferritin 32.70 ng/mL     Narrative:      Results may be falsely decreased if patient taking Biotin.      Iron Profile [[id number]]  (Abnormal) Collected: 10/16/24 0428    Specimen: Blood from Arm, Left Updated: 10/16/24 0743     Iron 21 mcg/dL      Iron Saturation (TSAT) 6 %      Transferrin 229 mg/dL      TIBC 341 mcg/dL     CBC & Differential [[id number]]  (Abnormal) Collected: 10/16/24 0428    Specimen: Blood from Arm, Left Updated: 10/16/24 0521    Narrative:      The following orders were created for panel order CBC & Differential.  Procedure                               Abnormality         Status                     ---------                               -----------         ------                     CBC Auto Differential[[id number]]        Abnormal            Final result               Scan Slide[[id number]]                                                                    Please view results for these tests on the individual orders.    CBC Auto Differential [[id number]]  (Abnormal) Collected: 10/16/24 0428    Specimen: Blood from Arm, Left Updated: 10/16/24 0521     WBC 8.06 10*3/mm3      RBC 4.00 10*6/mm3      Hemoglobin 8.2 g/dL      Hematocrit 29.4 %      MCV 73.5 fL      MCH 20.5 pg      MCHC 27.9 g/dL      RDW 16.6 %      RDW-SD 44.1 fl      MPV 8.5 fL      Platelets 553 10*3/mm3      Neutrophil % 44.5 %      Lymphocyte % 40.0 %      Monocyte % 11.7 %      Eosinophil % 3.2 %      Basophil % 0.4 %      Immature Grans % 0.2 %      Neutrophils, Absolute 3.59 10*3/mm3      Lymphocytes, Absolute 3.22 10*3/mm3      Monocytes, Absolute 0.94 10*3/mm3      Eosinophils, Absolute 0.26 10*3/mm3      Basophils, Absolute 0.03 10*3/mm3      Immature Grans, Absolute 0.02 10*3/mm3      nRBC 0.0 /100 WBC     Basic Metabolic Panel [[id number]]  (Abnormal) Collected: 10/16/24 0428    Specimen: Blood from Arm, Left Updated: 10/16/24 0521     Glucose 98 mg/dL      BUN 12 mg/dL       Creatinine 0.95 mg/dL      Sodium 138 mmol/L      Potassium 3.8 mmol/L      Chloride 103 mmol/L      CO2 28.9 mmol/L      Calcium 8.5 mg/dL      BUN/Creatinine Ratio 12.6     Anion Gap 6.1 mmol/L      eGFR 93.9 mL/min/1.73     Narrative:      GFR Normal >60  Chronic Kidney Disease <60  Kidney Failure <15      Urinalysis With Microscopic If Indicated (No Culture) - Urine, Clean Catch [[id number]]  (Abnormal) Collected: 10/15/24 2239    Specimen: Urine, Clean Catch Updated: 10/15/24 2246     Color, UA Yellow     Appearance, UA Clear     pH, UA 6.0     Specific Gravity, UA 1.026     Glucose, UA Negative     Ketones, UA Negative     Bilirubin, UA Negative     Blood, UA Negative     Protein, UA 30 mg/dL (1+)     Leuk Esterase, UA Negative     Nitrite, UA Negative     Urobilinogen, UA 1.0 E.U./dL    Urinalysis, Microscopic Only - Urine, Clean Catch [[id number]] Collected: 10/15/24 2239    Specimen: Urine, Clean Catch Updated: 10/15/24 2246     RBC, UA 0-2 /HPF      WBC, UA 0-2 /HPF      Bacteria, UA None Seen /HPF      Squamous Epithelial Cells, UA 0-2 /HPF      Hyaline Casts, UA 0-2 /LPF      Methodology Automated Microscopy    Comprehensive Metabolic Panel [[id number]]  (Abnormal) Collected: 10/15/24 2149    Specimen: Blood Updated: 10/15/24 2221     Glucose 107 mg/dL      BUN 13 mg/dL      Creatinine 0.95 mg/dL      Sodium 138 mmol/L      Potassium 4.0 mmol/L      Chloride 103 mmol/L      CO2 27.7 mmol/L      Calcium 9.0 mg/dL      Total Protein 9.3 g/dL      Albumin 3.3 g/dL      ALT (SGPT) 5 U/L      AST (SGOT) 11 U/L      Alkaline Phosphatase 70 U/L      Total Bilirubin 0.2 mg/dL      Globulin 6.0 gm/dL      A/G Ratio 0.6 g/dL      BUN/Creatinine Ratio 13.7     Anion Gap 7.3 mmol/L      eGFR 93.9 mL/min/1.73     Narrative:      GFR Normal >60  Chronic Kidney Disease <60  Kidney Failure <15      Lipase [[id number]]  (Normal) Collected: 10/15/24 2149    Specimen: Blood Updated: 10/15/24 2221     Lipase 20 U/L     CBC &  Differential [[id number]]  (Abnormal) Collected: 10/15/24 2149    Specimen: Blood Updated: 10/15/24 2156    Narrative:      The following orders were created for panel order CBC & Differential.  Procedure                               Abnormality         Status                     ---------                               -----------         ------                     CBC Auto Differential[[id number]]        Abnormal            Final result               Scan Slide[[id number]]                                                                    Please view results for these tests on the individual orders.    CBC Auto Differential [[id number]]  (Abnormal) Collected: 10/15/24 2149    Specimen: Blood Updated: 10/15/24 2156     WBC 8.92 10*3/mm3      RBC 4.57 10*6/mm3      Hemoglobin 9.4 g/dL      Hematocrit 33.7 %      MCV 73.7 fL      MCH 20.6 pg      MCHC 27.9 g/dL      RDW 16.5 %      RDW-SD 43.7 fl      MPV 8.2 fL      Platelets 637 10*3/mm3      Neutrophil % 44.9 %      Lymphocyte % 42.7 %      Monocyte % 9.6 %      Eosinophil % 2.2 %      Basophil % 0.3 %      Immature Grans % 0.3 %      Neutrophils, Absolute 3.99 10*3/mm3      Lymphocytes, Absolute 3.81 10*3/mm3      Monocytes, Absolute 0.86 10*3/mm3      Eosinophils, Absolute 0.20 10*3/mm3      Basophils, Absolute 0.03 10*3/mm3      Immature Grans, Absolute 0.03 10*3/mm3      nRBC 0.0 /100 WBC             Imaging Results (Last 24 Hours)       Procedure Component Value Units Date/Time    US Liver [[id number]] Collected: 10/16/24 0955     Updated: 10/16/24 0959    Narrative:      US LIVER    Date of Exam: 10/16/2024 9:29 AM EDT    Indication: ascites, assess for cirrhosis and/or PVT.    Comparison: No comparisons available.    Technique: Grayscale and color Doppler ultrasound evaluation of the right upper quadrant was performed.      Findings:  The liver is normal in size and echogenicity. There are no liver lesions. There is no bile duct dilatation. The portal vein and  visualized hepatic veins are patent with normal direction of flow. There is small pockets of ascites in the right lower and   left lower quadrant. There is minimal ascites in the left upper quadrant.      Impression:      Impression:  Normal appearance of the liver. Small amount of abdominal and pelvic ascites.        Electronically Signed: Fernanda Garrido MD    10/16/2024 9:57 AM EDT    Workstation ID: [id number]    XR Abdomen KUB [[id number]] Collected: 10/16/24 0217     Updated: 10/16/24 0220    Narrative:      XR ABDOMEN KUB    Date of Exam: 10/16/2024 2:09 AM EDT    Indication: ng placement    Comparison: None available.    Findings:  There is an esophagogastric tube in the stomach. The gas pattern is nonspecific. There is no definite obstructive change. Contrast is seen in the renal collecting systems bilaterally.      Impression:      Impression:  Esophagogastric tube is in the stomach.        Electronically Signed: Mendoza Mitchell MD    10/16/2024 2:18 AM EDT    Workstation ID: [id number]    CT Abdomen Pelvis With Contrast [[id number]] Collected: 10/15/24 2255     Updated: 10/15/24 2305    Narrative:      CT ABDOMEN PELVIS W CONTRAST    Date of Exam: 10/15/2024 10:35 PM EDT    Indication: abdominal pain, nausea.    Comparison: CT abdomen pelvis 5/29/2024    Technique: Axial CT images were obtained of the abdomen and pelvis following the uneventful intravenous administration of iodinated contrast. Sagittal and coronal reconstructions were performed.  Automated exposure control and iterative reconstruction   methods were used.        Findings:  Lung Bases: No significant abnormality.    Liver: No significant abnormality.     Gallbladder and biliary tree: No significant abnormality.     Spleen: No significant abnormality.     Pancreas: No significant abnormality.     Adrenal glands: No significant abnormality.     Kidneys and ureters: Nonobstructing right nephrolithiasis. No hydroureteronephrosis.     Stomach and  duodenum: Distended stomach.    Small and large bowel: There are multiple dilated loops of small bowel with abrupt appearing transition point in the mid abdomen on axial image 76. There is no evidence of pneumatosis or portal venous gas. Normal caliber large bowel.    Peritoneal cavity: There is a large volume of ascites. No free air is seen.    Bladder: Under distended incompletely evaluated.     Pelvic organs: No significant abnormality.     Vasculature: No significant abnormality.     Lymph nodes: No pathologic appearing lymph nodes by imaging criteria.     Bones and soft tissues: Degenerative changes of the imaged spine. No acute osseous abnormality.      Impression:      Impression:  Findings suspicious for a small bowel obstruction with likely high-grade transition point in the mid abdomen.    Large volume ascites.            Electronically Signed: Kobe Noble    10/15/2024 11:03 PM EDT    Workstation ID: [id number]            Results Review:    I reviewed the patient's new clinical results.  I reviewed the patient's new imaging results and agree with the interpretation.    Assessment & Plan       Small bowel obstruction    SBO (small bowel obstruction)    HIV (human immunodeficiency virus infection)    Fecal incontinence    58-year-old gentleman presents with abdominal distention and nausea and vomiting.  He has a history of cirrhosis and HIV.  He also has a history of umbilical and right inguinal hernia repair.  CT scan demonstrates evidence of small bowel obstruction and ascites.  He has been seen by gastroenterology.  He has normal LFTs.  He was very drowsy during my interview.  Continue nasogastric tube decompression.  I like to check an ammonia level.    He did get Benadryl and Ativan very early this morning.      Yudy Hudson MD  10/16/24  11:43 EDT

## 2024-10-16 NOTE — ED NOTES
Nursing report ED to floor  Felipe Nieves  58 y.o.  male    HPI:   Chief Complaint   Patient presents with    Abdominal Pain       Admitting doctor:   Carlos Llanes Alvarez, MD    Admitting diagnosis:   The primary encounter diagnosis was Intestinal obstruction, unspecified cause, unspecified whether partial or complete. Diagnoses of Generalized abdominal pain and Nausea and vomiting, unspecified vomiting type were also pertinent to this visit.    Code status:   Current Code Status       Date Active Code Status Order ID Comments User Context       10/15/2024 7967 CPR (Attempt to Resuscitate) [id number]  Odessa Soni APRN ED        Question Answer    Code Status (Patient has no pulse and is not breathing) CPR (Attempt to Resuscitate)    Medical Interventions (Patient has pulse or is breathing) Full Support                    Allergies:   Patient has no known allergies.    Isolation:  No active isolations     Fall Risk:  Fall Risk Assessment was completed, and patient is at low risk for falls.   Predictive Model Details         4 (Low) Factor Value    Calculated 10/16/2024 00:22 Age 58    Risk of Fall Model Active Peripheral IV Present     Imaging order in this encounter Present     Magnesium not on file     Number of Distinct Medication Classes administered 3     Drug Use Not Asked     Albumin 3.3 g/dL     Caleb Scale not on file     Calcium 9 mg/dL     Clinically Relevant Sex Not Female     Diastolic BP 86     Number of administrations of Analgesic Narcotics 1     Gastrointestinal Assessment X     Respiratory Rate 16     ALT 5 U/L     Chloride 103 mmol/L     Days after Admission 0.113     Potassium 4 mmol/L     Creatinine 0.95 mg/dL     Total Bilirubin 0.2 mg/dL         Weight:       10/15/24  2108   Weight: 59 kg (130 lb)       Intake and Output  No intake or output data in the 24 hours ending 10/16/24 0026    Diet:   Dietary Orders (From admission, onward)       Start     Ordered    10/15/24 6577  NPO  Diet NPO Type: Strict NPO  Diet Effective Now        Question:  NPO Type  Answer:  Strict NPO    10/15/24 2357                     Most recent vitals:   Vitals:    10/15/24 2201 10/15/24 2231 10/15/24 2301 10/15/24 2331   BP: 139/98 128/93 130/94 123/86   BP Location:       Patient Position:       Pulse: 87 91 89 89   Resp:       Temp:       TempSrc:       SpO2: 99% 98% 100% 99%   Weight:       Height:           Active LDAs/IV Access:   Lines, Drains & Airways       Active LDAs       Name Placement date Placement time Site Days    Peripheral IV 10/15/24 2150 Right Antecubital 10/15/24  2150  Antecubital  less than 1    Ureteral Drain/Stent Left ureter 7 Fr. 05/30/24  1640  Left ureter  STRING  138                    Skin Condition:   Skin Assessments (last day)       None             Labs (abnormal labs have a star):   Labs Reviewed   COMPREHENSIVE METABOLIC PANEL - Abnormal; Notable for the following components:       Result Value    Glucose 107 (*)     Total Protein 9.3 (*)     Albumin 3.3 (*)     All other components within normal limits    Narrative:     GFR Normal >60  Chronic Kidney Disease <60  Kidney Failure <15     URINALYSIS W/ MICROSCOPIC IF INDICATED (NO CULTURE) - Abnormal; Notable for the following components:    Protein, UA 30 mg/dL (1+) (*)     All other components within normal limits   CBC WITH AUTO DIFFERENTIAL - Abnormal; Notable for the following components:    Hemoglobin 9.4 (*)     Hematocrit 33.7 (*)     MCV 73.7 (*)     MCH 20.6 (*)     MCHC 27.9 (*)     RDW 16.5 (*)     Platelets 637 (*)     Lymphocytes, Absolute 3.81 (*)     All other components within normal limits   LIPASE - Normal   URINALYSIS, MICROSCOPIC ONLY   BASIC METABOLIC PANEL   CBC WITH AUTO DIFFERENTIAL   CBC AND DIFFERENTIAL    Narrative:     The following orders were created for panel order CBC & Differential.  Procedure                               Abnormality         Status                     ---------                                -----------         ------                     CBC Auto Differential[[id number]]        Abnormal            Final result               Scan Slide[[id number]]                                                                    Please view results for these tests on the individual orders.   CBC AND DIFFERENTIAL    Narrative:     The following orders were created for panel order CBC & Differential.  Procedure                               Abnormality         Status                     ---------                               -----------         ------                     CBC Auto Differential[[id number]]                                                         Please view results for these tests on the individual orders.       LOC: Person, Place, Time, and Situation    Telemetry:  Med/Surg    Cardiac Monitoring Ordered: yes    EKG:   ECG 12 Lead Other; QTC eval for meds    (Results Pending)       Medications Given in the ED:   Medications   sodium chloride 0.9 % flush 10 mL (has no administration in time range)   sodium chloride 0.9 % infusion (has no administration in time range)   morphine injection 4 mg (has no administration in time range)   diphenhydrAMINE (BENADRYL) injection 25 mg (has no administration in time range)   prochlorperazine (COMPAZINE) injection 5 mg (has no administration in time range)   LORazepam (ATIVAN) injection 1 mg (has no administration in time range)   morphine injection 4 mg (4 mg Intravenous Given 10/15/24 2255)   ondansetron (ZOFRAN) injection 4 mg (4 mg Intravenous Given 10/15/24 2255)   iopamidol (ISOVUE-370) 76 % injection 100 mL (100 mL Intravenous Given 10/15/24 2253)       Imaging results:  CT Abdomen Pelvis With Contrast    Result Date: 10/15/2024  Impression: Findings suspicious for a small bowel obstruction with likely high-grade transition point in the mid abdomen. Large volume ascites. Electronically Signed: Kobe Noble  10/15/2024 11:03 PM EDT  Workstation ID:  [id number]     Social issues:   Social History     Socioeconomic History    Marital status:    Tobacco Use    Smoking status: Never    Smokeless tobacco: Never   Vaping Use    Vaping status: Never Used   Substance and Sexual Activity    Alcohol use: Not Currently    Drug use: Defer    Sexual activity: Defer       NIH Stroke Scale:  Interval: (not recorded)  1a. Level of Consciousness: (not recorded)  1b. LOC Questions: (not recorded)  1c. LOC Commands: (not recorded)  2. Best Gaze: (not recorded)  3. Visual: (not recorded)  4. Facial Palsy: (not recorded)  5a. Motor Arm, Left: (not recorded)  5b. Motor Arm, Right: (not recorded)  6a. Motor Leg, Left: (not recorded)  6b. Motor Leg, Right: (not recorded)  7. Limb Ataxia: (not recorded)  8. Sensory: (not recorded)  9. Best Language: (not recorded)  10. Dysarthria: (not recorded)  11. Extinction and Inattention (formerly Neglect): (not recorded)    Total (NIH Stroke Scale): (not recorded)     Additional notable assessment information:     Nursing report ED to floor:  Charles Solano RN   10/16/24 00:26 EDT

## 2024-10-16 NOTE — H&P
[facility] Medicine Services  History & Physical    Patient Name: Felipe Nieves  : 1965  MRN: [id number]  Primary Care Physician:  Provider, No Known  Date of admission: 10/15/2024  Date and Time of Service: 10/15/2024 at 2355    Subjective      Chief Complaint: abdominal pain , nausea and vomiting     History of Present Illness: Felipe Nieves is a 58 y.o. male with a CMH of HIV previous history of abdominal surgery with 3 hernia repair [id number] who presented to [facility] on 10/15/2024 with complaints of abdominal pain nausea vomiting.  He denies any hematemesis melena or hematochezia.  He reports he has chronic diarrhea with fecal incontinence.  He reports he has to wear a pad and afraid he is going to get fired for going to the bathroom so much.  He denies any fever, shortness of breath or chest pain.  He reports the abdominal pain has been ongoing for 2 years however it became intensely painful on Friday.  Last bowel movement was today.  's are essentially unremarkable except for glucose 107 albumin 3.3 WBC not elevated hemoglobin 9.4 urinalysis negative.  CT abdomen and pelvis with contrast per radiology shows findings suspicious for small bowel obstruction with likely high-grade transition point in the mid abdomen large volume ascites.  LFTs and bilirubin are normal.  He was given 4 mg Zofran 4 mg morphine in the emergency department and general surgery was consulted from the emergency department.  NG tube was attempted to be placed but patient expressed anxiety during the procedure need to be stopped.  1 mg IV Ativan ordered and reattempt will be made.    Review of Systems   Constitutional: Negative.    HENT: Negative.     Eyes: Negative.    Respiratory: Negative.     Cardiovascular: Negative.    Gastrointestinal:  Positive for abdominal distention, abdominal pain, diarrhea, nausea and vomiting.   Endocrine: Negative.    Genitourinary: Negative.    Musculoskeletal: Negative.     Skin: Negative.    Allergic/Immunologic: Positive for immunocompromised state.   Neurological: Negative.    Hematological: Negative.    Psychiatric/Behavioral:  The patient is nervous/anxious.    All other systems reviewed and are negative.      Personal History     Past Medical History:   Diagnosis Date    HIV (human immunodeficiency virus infection)     Skin cancer        Past Surgical History:   Procedure Laterality Date    CYSTOSCOPY, URETEROSCOPY, RETROGRADE PYELOGRAM, STENT INSERTION Left 5/30/2024    Procedure: CYSTOSCOPY URETEROSCOPY HOLMIUM LASER STENT INSERTION;  Surgeon: Wale Taylor MD;  Location: [facility] MAIN OR;  Service: Urology;  Laterality: Left;    ENDOSCOPY N/A 6/1/2024    Procedure: ESOPHAGOGASTRODUODENOSCOPY, biopsy x2 areas;  Surgeon: Nelly Obando MD;  Location: [facility] ENDOSCOPY;  Service: Gastroenterology;  Laterality: N/A;  post: gastritis    HERNIA REPAIR      SKIN CANCER EXCISION         Family History: family history includes Cancer in his mother; Diabetes in his brother; Heart disease in his brother, father, and mother. Otherwise pertinent FHx was reviewed and not pertinent to current issue.    Social History:  reports that he has never smoked. He has never used smokeless tobacco. He reports that he does not currently use alcohol. Drug use questions deferred to the physician.    Home Medications:  Prior to Admission Medications       Prescriptions Last Dose Informant Patient Reported? Taking?    Bictegravir-Emtricitab-Tenofov (Biktarvy) -25 MG per tablet 10/14/2024  Yes Yes    Take 1 tablet by mouth Daily.    ergocalciferol (ERGOCALCIFEROL) 1.25 MG (47155 UT) capsule Past Week  Yes Yes    Take 1 capsule by mouth 1 (One) Time Per Week. Tuesdays              Allergies:  No Known Allergies    Objective      Vitals:   Temp:  [97.8 °F (36.6 °C)-97.9 °F (36.6 °C)] 97.9 °F (36.6 °C)  Heart Rate:  [87-95] 95  Resp:  [15-16] 15  BP: (123-147)/(80-98) 137/80  Body mass index is  20.36 kg/m².  Physical Exam  Vitals reviewed.   Constitutional:       Appearance: Normal appearance. He is normal weight.   HENT:      Head: Normocephalic and atraumatic.      Right Ear: External ear normal.      Left Ear: External ear normal.      Nose: Nose normal.      Mouth/Throat:      Mouth: Mucous membranes are moist.   Eyes:      Extraocular Movements: Extraocular movements intact.   Cardiovascular:      Rate and Rhythm: Normal rate and regular rhythm.      Pulses: Normal pulses.      Heart sounds: Normal heart sounds.   Pulmonary:      Effort: Pulmonary effort is normal.      Breath sounds: Normal breath sounds.   Abdominal:      General: There is distension.      Palpations: Abdomen is soft.   Genitourinary:     Comments: deferred  Musculoskeletal:         General: Normal range of motion.      Cervical back: Normal range of motion and neck supple.   Skin:     General: Skin is warm and dry.   Neurological:      General: No focal deficit present.      Mental Status: He is alert and oriented to person, place, and time.   Psychiatric:         Mood and Affect: Mood normal.         Behavior: Behavior normal.         Thought Content: Thought content normal.         Judgment: Judgment normal.         Diagnostic Data:  Lab Results (last 24 hours)       Procedure Component Value Units Date/Time    Urinalysis With Microscopic If Indicated (No Culture) - Urine, Clean Catch [[id number]]  (Abnormal) Collected: 10/15/24 2239    Specimen: Urine, Clean Catch Updated: 10/15/24 2246     Color, UA Yellow     Appearance, UA Clear     pH, UA 6.0     Specific Gravity, UA 1.026     Glucose, UA Negative     Ketones, UA Negative     Bilirubin, UA Negative     Blood, UA Negative     Protein, UA 30 mg/dL (1+)     Leuk Esterase, UA Negative     Nitrite, UA Negative     Urobilinogen, UA 1.0 E.U./dL    Urinalysis, Microscopic Only - Urine, Clean Catch [[id number]] Collected: 10/15/24 2239    Specimen: Urine, Clean Catch Updated: 10/15/24  2246     RBC, UA 0-2 /HPF      WBC, UA 0-2 /HPF      Bacteria, UA None Seen /HPF      Squamous Epithelial Cells, UA 0-2 /HPF      Hyaline Casts, UA 0-2 /LPF      Methodology Automated Microscopy    Comprehensive Metabolic Panel [[id number]]  (Abnormal) Collected: 10/15/24 2149    Specimen: Blood Updated: 10/15/24 2221     Glucose 107 mg/dL      BUN 13 mg/dL      Creatinine 0.95 mg/dL      Sodium 138 mmol/L      Potassium 4.0 mmol/L      Chloride 103 mmol/L      CO2 27.7 mmol/L      Calcium 9.0 mg/dL      Total Protein 9.3 g/dL      Albumin 3.3 g/dL      ALT (SGPT) 5 U/L      AST (SGOT) 11 U/L      Alkaline Phosphatase 70 U/L      Total Bilirubin 0.2 mg/dL      Globulin 6.0 gm/dL      A/G Ratio 0.6 g/dL      BUN/Creatinine Ratio 13.7     Anion Gap 7.3 mmol/L      eGFR 93.9 mL/min/1.73     Narrative:      GFR Normal >60  Chronic Kidney Disease <60  Kidney Failure <15      Lipase [[id number]]  (Normal) Collected: 10/15/24 2149    Specimen: Blood Updated: 10/15/24 2221     Lipase 20 U/L     CBC & Differential [[id number]]  (Abnormal) Collected: 10/15/24 2149    Specimen: Blood Updated: 10/15/24 2156    Narrative:      The following orders were created for panel order CBC & Differential.  Procedure                               Abnormality         Status                     ---------                               -----------         ------                     CBC Auto Differential[[id number]]        Abnormal            Final result               Scan Slide[[id number]]                                                                    Please view results for these tests on the individual orders.    CBC Auto Differential [[id number]]  (Abnormal) Collected: 10/15/24 2149    Specimen: Blood Updated: 10/15/24 2156     WBC 8.92 10*3/mm3      RBC 4.57 10*6/mm3      Hemoglobin 9.4 g/dL      Hematocrit 33.7 %      MCV 73.7 fL      MCH 20.6 pg      MCHC 27.9 g/dL      RDW 16.5 %      RDW-SD 43.7 fl      MPV 8.2 fL      Platelets 637  10*3/mm3      Neutrophil % 44.9 %      Lymphocyte % 42.7 %      Monocyte % 9.6 %      Eosinophil % 2.2 %      Basophil % 0.3 %      Immature Grans % 0.3 %      Neutrophils, Absolute 3.99 10*3/mm3      Lymphocytes, Absolute 3.81 10*3/mm3      Monocytes, Absolute 0.86 10*3/mm3      Eosinophils, Absolute 0.20 10*3/mm3      Basophils, Absolute 0.03 10*3/mm3      Immature Grans, Absolute 0.03 10*3/mm3      nRBC 0.0 /100 WBC              Imaging Results (Last 24 Hours)       Procedure Component Value Units Date/Time    XR Abdomen KUB [[id number]] Collected: 10/16/24 0217     Updated: 10/16/24 0220    Narrative:      XR ABDOMEN KUB    Date of Exam: 10/16/2024 2:09 AM EDT    Indication: ng placement    Comparison: None available.    Findings:  There is an esophagogastric tube in the stomach. The gas pattern is nonspecific. There is no definite obstructive change. Contrast is seen in the renal collecting systems bilaterally.      Impression:      Impression:  Esophagogastric tube is in the stomach.        Electronically Signed: Mendoza Mitchell MD    10/16/2024 2:18 AM EDT    Workstation ID: [id number]    CT Abdomen Pelvis With Contrast [[id number]] Collected: 10/15/24 2255     Updated: 10/15/24 2305    Narrative:      CT ABDOMEN PELVIS W CONTRAST    Date of Exam: 10/15/2024 10:35 PM EDT    Indication: abdominal pain, nausea.    Comparison: CT abdomen pelvis 5/29/2024    Technique: Axial CT images were obtained of the abdomen and pelvis following the uneventful intravenous administration of iodinated contrast. Sagittal and coronal reconstructions were performed.  Automated exposure control and iterative reconstruction   methods were used.        Findings:  Lung Bases: No significant abnormality.    Liver: No significant abnormality.     Gallbladder and biliary tree: No significant abnormality.     Spleen: No significant abnormality.     Pancreas: No significant abnormality.     Adrenal glands: No significant abnormality.     Kidneys  and ureters: Nonobstructing right nephrolithiasis. No hydroureteronephrosis.     Stomach and duodenum: Distended stomach.    Small and large bowel: There are multiple dilated loops of small bowel with abrupt appearing transition point in the mid abdomen on axial image 76. There is no evidence of pneumatosis or portal venous gas. Normal caliber large bowel.    Peritoneal cavity: There is a large volume of ascites. No free air is seen.    Bladder: Under distended incompletely evaluated.     Pelvic organs: No significant abnormality.     Vasculature: No significant abnormality.     Lymph nodes: No pathologic appearing lymph nodes by imaging criteria.     Bones and soft tissues: Degenerative changes of the imaged spine. No acute osseous abnormality.      Impression:      Impression:  Findings suspicious for a small bowel obstruction with likely high-grade transition point in the mid abdomen.    Large volume ascites.            Electronically Signed: Kobe Noble    10/15/2024 11:03 PM EDT    Workstation ID: [id number]              Assessment & Plan        This is a 58 y.o. male with:    Active and Resolved Problems  Active Hospital Problems    Diagnosis  POA    **Small bowel obstruction [K56.609]  Yes    Fecal incontinence [R15.9]  Yes    SBO (small bowel obstruction) [K56.609]  Yes    HIV (human immunodeficiency virus infection) [Z21]  Yes      Resolved Hospital Problems   No resolved problems to display.     Small bowel obstruction with likely high-grade transition point in the mid abdomen per CT abdomen large ascites with contrast radiology report, n.p.o., normal saline at 100 cc/h, 4 mg IV morphine every 4 hours as needed pain, 4 mg IV Zofran every 6 hours. For nausea, general surgery consulted from the emergency department, second  Attempt at NG tube pending    Fecal incontinence chronic, has never had colonoscopy, gastroenterology consulted to establish care    HIV, on Biktarvy, hold all n.p.o.      VTE  Prophylaxis:  Mechanical VTE prophylaxis orders are present.        The patient desires to be as follows:    CODE STATUS:    Code Status (Patient has no pulse and is not breathing): CPR (Attempt to Resuscitate)  Medical Interventions (Patient has pulse or is breathing): Full Support            Admission Status:  I believe this patient meets inpatient  status.    Expected Length of Stay: pending further evaluation and clinical course     PDMP and Medication Dispenses via Sidebar reviewed and consistent with patient reported medications.    I discussed the patient's findings and my recommendations with patient.      Signature:     This document has been electronically signed by DRU Ayala on October 16, 2024 03:46 EDT   [facility]

## 2024-10-16 NOTE — PLAN OF CARE
Goal Outcome Evaluation:   Pt resting, NG tube placed, no issues at this time

## 2024-10-16 NOTE — ED PROVIDER NOTES
Subjective   Chief Complaint   Patient presents with    Abdominal Pain     History of Present Illness  Patient is a 58-year-old male with history of HIV, on Biktarvy who reports he has undetectable levels, presents to the emergency department with a complaint of abdominal pain, swelling, nausea, vomiting.  He reports has been feeling better over the past week, is worse with eating.  He does report bright red blood in the stool which she has had previously.    Denies any fevers.  No melena.  No hematemesis.  Review of Systems    Past Medical History:   Diagnosis Date    HIV (human immunodeficiency virus infection)     Skin cancer        No Known Allergies    Past Surgical History:   Procedure Laterality Date    CYSTOSCOPY, URETEROSCOPY, RETROGRADE PYELOGRAM, STENT INSERTION Left 5/30/2024    Procedure: CYSTOSCOPY URETEROSCOPY HOLMIUM LASER STENT INSERTION;  Surgeon: Wale Taylor MD;  Location: [facility] MAIN OR;  Service: Urology;  Laterality: Left;    ENDOSCOPY N/A 6/1/2024    Procedure: ESOPHAGOGASTRODUODENOSCOPY, biopsy x2 areas;  Surgeon: Nelly Obando MD;  Location: [facility] ENDOSCOPY;  Service: Gastroenterology;  Laterality: N/A;  post: gastritis    HERNIA REPAIR      SKIN CANCER EXCISION         Family History   Problem Relation Age of Onset    Heart disease Mother     Cancer Mother     Heart disease Father     Diabetes Brother     Heart disease Brother        Social History     Socioeconomic History    Marital status:    Tobacco Use    Smoking status: Never    Smokeless tobacco: Never   Vaping Use    Vaping status: Never Used   Substance and Sexual Activity    Alcohol use: Not Currently    Drug use: Defer    Sexual activity: Defer           Objective   Physical Exam  Vitals and nursing note reviewed.   Constitutional:       Appearance: He is well-developed.   HENT:      Head: Normocephalic and atraumatic.      Mouth/Throat:      Mouth: Mucous membranes are moist.      Pharynx: Oropharynx is clear.    Eyes:      Extraocular Movements: Extraocular movements intact.      Pupils: Pupils are equal, round, and reactive to light.   Cardiovascular:      Rate and Rhythm: Normal rate and regular rhythm.      Heart sounds: No murmur heard.     No friction rub. No gallop.   Pulmonary:      Effort: Pulmonary effort is normal.      Breath sounds: Normal breath sounds.   Abdominal:      General: Abdomen is protuberant. Bowel sounds are normal. There is distension.      Palpations: Abdomen is soft.      Tenderness: There is no abdominal tenderness. There is no guarding or rebound.   Skin:     General: Skin is warm.      Capillary Refill: Capillary refill takes less than 2 seconds.   Neurological:      General: No focal deficit present.      Mental Status: He is alert and oriented to person, place, and time.         Procedures           ED Course  ED Course as of 10/16/24 0330   e Oct 15, 2024   2352 CONSULT WITH HOSPITALIST MAURA GONSALES [LB]   2355 Spoke with Dr. Koch agrees with current treatment plan [LB]      ED Course User Index  [LB] Nevin Gracia APRN                                             Medical Decision Making  Amount and/or Complexity of Data Reviewed  Labs: ordered.  Radiology: ordered.    Risk  Prescription drug management.    Discussed with Dr. Nix    Chart Review: Discharge summary 6/1/2024 for ileus, ascites, UTI, hydronephrosis with urinary obstruction  Imaging reviewed and interpreted by Dr. Nix  ED attending physician. Small bowel obstruction.   Further radiologist interpretation as above.     Pt was Placed on appropriate monitoring.  Differential diagnoses considered for patient presentation, this list is not all inclusive of diagnoses considered: Gastritis, cholecystitis, bowel obstruction, ascites  Patient presents to the ED for the above complaint, underwent the above, exam and workup.  CBC CMP reviewed.  Lipase normal.  UA shows no signs of infection.  CT abdomen pelvis  reveals findings concerning for bowel obstruction.  Patient does have contacts noted in his stomach, he has abdominal distention and nausea.  NG tube was ordered.  I discussed this with the patient.  I do think it would help him symptomatically and help with decompression.  Please see nursing note regarding NG tube placement.  Will be admitted to the hospitalist service for further evaluation management.  Consultation was made to Dr. Koch with general surgery who agrees with current treatment plan.      Note Disclaimer: At [facility], we believe that sharing information builds trust and better relationships. You are receiving this note because you recently visited [facility]. It is possible you will see health information before a provider has talked with you about it. This kind of information can be easy to misunderstand. To help you fully understand what it means for your health, we urge you to discuss this note with your provider  Note dictated utilizing Dragon Dictation.  Appropriate PPE worn during patient interactions.        Final diagnoses:   Intestinal obstruction, unspecified cause, unspecified whether partial or complete   Generalized abdominal pain   Nausea and vomiting, unspecified vomiting type       ED Disposition  ED Disposition       ED Disposition   Decision to Admit    Condition   --    Comment   Level of Care: Med/Surg [1]   Diagnosis: Small bowel obstruction [016420]   Admitting Physician: LLANES ALVAREZ, CARLOS [[id number]]   Attending Physician: LLANES ALVAREZ, CARLOS [[id number]]   Bed Request Comments: cardiac monitor   Certification: I Certify That Inpatient Hospital Services Are Medically Necessary For Greater Than 2 Midnights                 No follow-up provider specified.       Medication List      No changes were made to your prescriptions during this visit.            Nevin Gracia, APRN  10/16/24 3707

## 2024-10-16 NOTE — CASE MANAGEMENT/SOCIAL WORK
Discharge Planning Assessment   Roel     Patient Name: Felipe Nieves  MRN: [id number]  Today's Date: 10/16/2024    Admit Date: 10/15/2024    Plan: Return home with spouse   Discharge Needs Assessment       Row Name 10/16/24 1354       Living Environment    People in Home spouse    Name(s) of People in Home Spouse Chandler    Current Living Arrangements home    Potentially Unsafe Housing Conditions none    In the past 12 months has the electric, gas, oil, or water company threatened to shut off services in your home? No    Primary Care Provided by self    Provides Primary Care For no one    Family Caregiver if Needed spouse    Family Caregiver Names spouse Chandler    Quality of Family Relationships helpful;involved;supportive    Able to Return to Prior Arrangements yes       Resource/Environmental Concerns    Resource/Environmental Concerns none    Transportation Concerns none       Transportation Needs    In the past 12 months, has lack of transportation kept you from medical appointments or from getting medications? no    In the past 12 months, has lack of transportation kept you from meetings, work, or from getting things needed for daily living? No       Food Insecurity    Within the past 12 months, you worried that your food would run out before you got the money to buy more. Never true    Within the past 12 months, the food you bought just didn't last and you didn't have money to get more. Never true       Transition Planning    Patient/Family Anticipates Transition to home with family    Patient/Family Anticipated Services at Transition none    Transportation Anticipated car, drives self;family or friend will provide       Discharge Needs Assessment    Readmission Within the Last 30 Days no previous admission in last 30 days    Equipment Currently Used at Home bp cuff    Concerns to be Addressed no discharge needs identified;denies needs/concerns at this time    Anticipated Changes Related to Illness none     Equipment Needed After Discharge none    Provided Post Acute Provider List? N/A    Provided Post Acute Provider Quality & Resource List? N/A    Offered/Gave Vendor List no                   Discharge Plan       Row Name 10/16/24 6190       Plan    Plan Return home with spouse    Patient/Family in Agreement with Plan yes    Provided Post Acute Provider List? N/A    Provided Post Acute Provider Quality & Resource List? N/A    Plan Comments CM attempted to meet with patient at bedside multiple times today but patient is very fatigued and unable to answer questions. CM called spouse Chandler and he provided assessment information. He states they live at home together and patient is independent, works full time and drives. No transportation issues. Confirmed pharmacy and he is agreeable to [facility] meds to beds program. Chandler states patient used to see Dr. Nicholas but his insurance through employer put that group out of network so he no longer went to routine appts. He declined CM assist with PCP and stated they could call insurance to set up a new one with someone in network. The only DME at home is a BP cuff, denies further needs currently. Spouse will provide transportation at discharge.                  Continued Care and Services - Admitted Since 10/15/2024    No active coordination exists for this encounter.        Demographic Summary       Row Name 10/16/24 0874       General Information    Admission Type inpatient    Arrived From emergency department    Referral Source admission list    Reason for Consult care coordination/care conference;discharge planning    Preferred Language English       Contact Information    Permission Granted to Share Info With                    Functional Status       Row Name 10/16/24 3037       Functional Status    Usual Activity Tolerance moderate    Current Activity Tolerance moderate       Functional Status, IADL    Medications independent    Meal Preparation independent     Housekeeping independent    Laundry independent    Shopping independent       Employment/    Employment Status employed full-time             Fior Beauchamp RN     [facility]  Phone # [phone]  Fax # [phone]

## 2024-10-16 NOTE — PROGRESS NOTES
[facility] MEDICINE SERVICE  DAILY PROGRESS NOTE    NAME: Felipe Nieves  : 1965  MRN: [id number]      LOS: 1 day     PROVIDER OF SERVICE: Anali Pena MD    Chief Complaint: Small bowel obstruction    Subjective:     Interval History:  History taken from: Patient and patient's chart     C/o abdominal pain and distension         Review of Systems:   Review of Systems    All negative except above     Vital Signs  Temp:  [97.8 °F (36.6 °C)-98 °F (36.7 °C)] 98 °F (36.7 °C)  Heart Rate:  [87-95] 95  Resp:  [15-16] 16  BP: (112-147)/(80-98) 112/81   Body mass index is 20.36 kg/m².    Physical Exam  Physical Exam  General: Alert and oriented, no acute distress. NG in place   HENT: Normocephalic, moist oral mucosa, no scleral icterus.  Neck: Supple, nontender, no carotid bruits, no JVD, no LAD.  Lungs: Clear to auscultation, nonlabored respiration.  Heart: RRR, no murmur, gallop or edema.  Abdomen: Soft, distended, + bowel sounds.  Musculoskeletal: Normal range of motion and strength, no tenderness or swelling.  Neuro: alert and awake, moving all 4 extremities   Skin: Skin is warm, dry and pink, no rashes or lesions.  Psychiatric: Cooperative, appropriate mood and affect.         Diagnostic Data    Results from last 7 days   Lab Units 10/16/24  0428 10/15/24  2149   WBC 10*3/mm3 8.06 8.92   HEMOGLOBIN g/dL 8.2* 9.4*   HEMATOCRIT % 29.4* 33.7*   PLATELETS 10*3/mm3 553* 637*   GLUCOSE mg/dL 98 107*   CREATININE mg/dL 0.95 0.95   BUN mg/dL 12 13   SODIUM mmol/L 138 138   POTASSIUM mmol/L 3.8 4.0   AST (SGOT) U/L  --  11   ALT (SGPT) U/L  --  5   ALK PHOS U/L  --  70   BILIRUBIN mg/dL  --  0.2   ANION GAP mmol/L 6.1 7.3       US Liver    Result Date: 10/16/2024  Impression: Normal appearance of the liver. Small amount of abdominal and pelvic ascites. Electronically Signed: Fernanda Garrido MD  10/16/2024 9:57 AM EDT  Workstation ID: [id number]    XR Abdomen KUB    Result Date: 10/16/2024  Impression:  Esophagogastric tube is in the stomach. Electronically Signed: Mendoza Mitchell MD  10/16/2024 2:18 AM EDT  Workstation ID: [id number]    CT Abdomen Pelvis With Contrast    Result Date: 10/15/2024  Impression: Findings suspicious for a small bowel obstruction with likely high-grade transition point in the mid abdomen. Large volume ascites. Electronically Signed: Kobe Noble  10/15/2024 11:03 PM EDT  Workstation ID: [id number]       I have reviewed patient labs and imaging     Assessment/Plan:     Active and Resolved Problems  Felipe Nieves is a 58 y.o. male with a CMH of HIV previous history of abdominal surgery with 3 hernia repair [id number] who presented to [facility] on 10/15/2024 with complaints of abdominal pain nausea vomiting.     # Small bowel obstruction  likely high-grade transition point in the mid abdomen per CT abdomen large ascites with contrast radiology report  n.p.o.  IV pain meds PRN, anit emetics PRN  Surgery and GI following  Continue nasogastric tube decompression     # Fecal incontinence chronic  has never had colonoscopy  GI following     # HIV  on Biktarvy  Check CD4 count    VTE Prophylaxis:  Mechanical VTE prophylaxis orders are present.             Disposition Planning:     Barriers to Discharge: medical clearance   Anticipated Date of Discharge: TBD based on clinical course  Place of Discharge:  home      Time: 40 minutes     Code Status and Medical Interventions: CPR (Attempt to Resuscitate); Full Support   Ordered at: 10/15/24 9636     Code Status (Patient has no pulse and is not breathing):    CPR (Attempt to Resuscitate)     Medical Interventions (Patient has pulse or is breathing):    Full Support       Signature: Electronically signed by Anali Pena MD, 10/16/24, 12:59 EDT.  [facility] Hospitalist Team

## 2024-10-17 ENCOUNTER — APPOINTMENT (OUTPATIENT)
Dept: GENERAL RADIOLOGY | Facility: HOSPITAL | Age: 59
DRG: 389 | End: 2024-10-17
Payer: COMMERCIAL

## 2024-10-17 ENCOUNTER — ANESTHESIA EVENT (OUTPATIENT)
Facility: HOSPITAL | Age: 59
End: 2024-10-17

## 2024-10-17 ENCOUNTER — INPATIENT HOSPITAL (AMBULATORY)
Age: 59
End: 2024-10-17
Payer: COMMERCIAL

## 2024-10-17 ENCOUNTER — INPATIENT HOSPITAL (AMBULATORY)
Dept: URBAN - METROPOLITAN AREA HOSPITAL 84 | Facility: HOSPITAL | Age: 59
End: 2024-10-17
Payer: COMMERCIAL

## 2024-10-17 DIAGNOSIS — R18.8 OTHER ASCITES: ICD-10-CM

## 2024-10-17 DIAGNOSIS — Z21 ASYMPTOMATIC HUMAN IMMUNODEFICIENCY VIRUS [HIV] INFECTION ST: ICD-10-CM

## 2024-10-17 DIAGNOSIS — D50.9 IRON DEFICIENCY ANEMIA, UNSPECIFIED: ICD-10-CM

## 2024-10-17 DIAGNOSIS — K56.609 UNSPECIFIED INTESTINAL OBSTRUCTION, UNSPECIFIED AS TO PARTIA: ICD-10-CM

## 2024-10-17 LAB
ALBUMIN SERPL-MCNC: 3 G/DL (ref 3.5–5.2)
ALBUMIN/GLOB SERPL: 0.5 G/DL
ALP SERPL-CCNC: 64 U/L (ref 39–117)
ALT SERPL W P-5'-P-CCNC: 7 U/L (ref 1–41)
ANION GAP SERPL CALCULATED.3IONS-SCNC: 11.7 MMOL/L (ref 5–15)
AST SERPL-CCNC: 14 U/L (ref 1–40)
BASOPHILS # BLD AUTO: 0 X10E3/UL (ref 0–0.2)
BASOPHILS # BLD AUTO: 0.04 10*3/MM3 (ref 0–0.2)
BASOPHILS NFR BLD AUTO: 0 %
BASOPHILS NFR BLD AUTO: 0.4 % (ref 0–1.5)
BILIRUB SERPL-MCNC: 0.3 MG/DL (ref 0–1.2)
BUN SERPL-MCNC: 11 MG/DL (ref 6–20)
BUN/CREAT SERPL: 11.3 (ref 7–25)
CALCIUM SPEC-SCNC: 8.4 MG/DL (ref 8.6–10.5)
CD3+CD4+ CELLS # BLD: 432 /UL (ref 359–1519)
CD3+CD4+ CELLS NFR BLD: 14.9 % (ref 30.8–58.5)
CHLORIDE SERPL-SCNC: 101 MMOL/L (ref 98–107)
CO2 SERPL-SCNC: 25.3 MMOL/L (ref 22–29)
CREAT SERPL-MCNC: 0.97 MG/DL (ref 0.76–1.27)
DEPRECATED RDW RBC AUTO: 43.1 FL (ref 37–54)
EGFRCR SERPLBLD CKD-EPI 2021: 91.1 ML/MIN/1.73
EOSINOPHIL # BLD AUTO: 0.2 X10E3/UL (ref 0–0.4)
EOSINOPHIL # BLD AUTO: 0.22 10*3/MM3 (ref 0–0.4)
EOSINOPHIL NFR BLD AUTO: 2 %
EOSINOPHIL NFR BLD AUTO: 2.3 % (ref 0.3–6.2)
ERYTHROCYTE [DISTWIDTH] IN BLOOD BY AUTOMATED COUNT: 15.7 % (ref 11.6–15.4)
ERYTHROCYTE [DISTWIDTH] IN BLOOD BY AUTOMATED COUNT: 16.5 % (ref 12.3–15.4)
GLOBULIN UR ELPH-MCNC: 5.7 GM/DL
GLUCOSE SERPL-MCNC: 80 MG/DL (ref 65–99)
HCT VFR BLD AUTO: 30.3 % (ref 37.5–51)
HCT VFR BLD AUTO: 33.1 % (ref 37.5–51)
HGB BLD-MCNC: 8.8 G/DL (ref 13–17.7)
HGB BLD-MCNC: 9.3 G/DL (ref 13–17.7)
IMM GRANULOCYTES # BLD AUTO: 0 X10E3/UL (ref 0–0.1)
IMM GRANULOCYTES # BLD AUTO: 0.02 10*3/MM3 (ref 0–0.05)
IMM GRANULOCYTES NFR BLD AUTO: 0 %
IMM GRANULOCYTES NFR BLD AUTO: 0.2 % (ref 0–0.5)
INR PPP: 1.06 (ref 0.93–1.1)
LYMPHOCYTES # BLD AUTO: 2.28 10*3/MM3 (ref 0.7–3.1)
LYMPHOCYTES # BLD AUTO: 2.9 X10E3/UL (ref 0.7–3.1)
LYMPHOCYTES NFR BLD AUTO: 23.6 % (ref 19.6–45.3)
LYMPHOCYTES NFR BLD AUTO: 36 %
MCH RBC QN AUTO: 20.5 PG (ref 26.6–33)
MCH RBC QN AUTO: 21.1 PG (ref 26.6–33)
MCHC RBC AUTO-ENTMCNC: 28.1 G/DL (ref 31.5–35.7)
MCHC RBC AUTO-ENTMCNC: 29 G/DL (ref 31.5–35.7)
MCV RBC AUTO: 72.9 FL (ref 79–97)
MCV RBC AUTO: 73 FL (ref 79–97)
MITOCHONDRIA M2 IGG SER-ACNC: 44.3 UNITS (ref 0–20)
MONOCYTES # BLD AUTO: 0.7 X10E3/UL (ref 0.1–0.9)
MONOCYTES # BLD AUTO: 0.85 10*3/MM3 (ref 0.1–0.9)
MONOCYTES NFR BLD AUTO: 8 %
MONOCYTES NFR BLD AUTO: 8.8 % (ref 5–12)
NEUTROPHILS # BLD AUTO: 4.2 X10E3/UL (ref 1.4–7)
NEUTROPHILS NFR BLD AUTO: 54 %
NEUTROPHILS NFR BLD AUTO: 6.26 10*3/MM3 (ref 1.7–7)
NEUTROPHILS NFR BLD AUTO: 64.7 % (ref 42.7–76)
NRBC BLD AUTO-RTO: 0 /100 WBC (ref 0–0.2)
PLATELET # BLD AUTO: 592 10*3/MM3 (ref 140–450)
PLATELET # BLD AUTO: 603 X10E3/UL (ref 150–450)
PMV BLD AUTO: 8.2 FL (ref 6–12)
POTASSIUM SERPL-SCNC: 3.9 MMOL/L (ref 3.5–5.2)
PROT SERPL-MCNC: 8.7 G/DL (ref 6–8.5)
PROTHROMBIN TIME: 11.5 SECONDS (ref 9.6–11.7)
RBC # BLD AUTO: 4.18 X10E6/UL (ref 4.14–5.8)
RBC # BLD AUTO: 4.54 10*6/MM3 (ref 4.14–5.8)
SMA IGG SER-ACNC: 77 UNITS (ref 0–19)
SODIUM SERPL-SCNC: 138 MMOL/L (ref 136–145)
WBC # BLD AUTO: 8 X10E3/UL (ref 3.4–10.8)
WBC NRBC COR # BLD AUTO: 9.67 10*3/MM3 (ref 3.4–10.8)

## 2024-10-17 PROCEDURE — 74250 X-RAY XM SM INT 1CNTRST STD: CPT

## 2024-10-17 PROCEDURE — 99233 SBSQ HOSP IP/OBS HIGH 50: CPT | Performed by: NURSE PRACTITIONER

## 2024-10-17 PROCEDURE — 25510000001 IOPAMIDOL PER 1 ML: Performed by: STUDENT IN AN ORGANIZED HEALTH CARE EDUCATION/TRAINING PROGRAM

## 2024-10-17 PROCEDURE — 25010000002 MORPHINE PER 10 MG: Performed by: NURSE PRACTITIONER

## 2024-10-17 PROCEDURE — 99233 SBSQ HOSP IP/OBS HIGH 50: CPT | Performed by: SURGERY

## 2024-10-17 PROCEDURE — 80053 COMPREHEN METABOLIC PANEL: CPT | Performed by: NURSE PRACTITIONER

## 2024-10-17 PROCEDURE — 25010000002 LIDOCAINE 1 % SOLUTION: Performed by: NURSE PRACTITIONER

## 2024-10-17 PROCEDURE — 25010000002 LORAZEPAM PER 2 MG: Performed by: STUDENT IN AN ORGANIZED HEALTH CARE EDUCATION/TRAINING PROGRAM

## 2024-10-17 PROCEDURE — 85610 PROTHROMBIN TIME: CPT | Performed by: NURSE PRACTITIONER

## 2024-10-17 PROCEDURE — 25010000002 DIPHENHYDRAMINE PER 50 MG: Performed by: NURSE PRACTITIONER

## 2024-10-17 PROCEDURE — 74018 RADEX ABDOMEN 1 VIEW: CPT

## 2024-10-17 PROCEDURE — 25810000003 DEXTROSE 5 % AND SODIUM CHLORIDE 0.9 % 5-0.9 % SOLUTION: Performed by: SURGERY

## 2024-10-17 PROCEDURE — 85025 COMPLETE CBC W/AUTO DIFF WBC: CPT | Performed by: NURSE PRACTITIONER

## 2024-10-17 PROCEDURE — 25010000002 METOCLOPRAMIDE PER 10 MG: Performed by: SURGERY

## 2024-10-17 RX ORDER — LIDOCAINE HYDROCHLORIDE 20 MG/ML
5 SOLUTION OROPHARYNGEAL
Status: DISCONTINUED | OUTPATIENT
Start: 2024-10-17 | End: 2024-10-19 | Stop reason: HOSPADM

## 2024-10-17 RX ORDER — IOPAMIDOL 755 MG/ML
150 INJECTION, SOLUTION INTRAVASCULAR
Status: COMPLETED | OUTPATIENT
Start: 2024-10-17 | End: 2024-10-17

## 2024-10-17 RX ORDER — LIDOCAINE HYDROCHLORIDE 10 MG/ML
10 INJECTION, SOLUTION INFILTRATION; PERINEURAL ONCE
Status: DISCONTINUED | OUTPATIENT
Start: 2024-10-17 | End: 2024-10-19 | Stop reason: HOSPADM

## 2024-10-17 RX ORDER — LORAZEPAM 2 MG/ML
1 INJECTION INTRAMUSCULAR ONCE
Status: COMPLETED | OUTPATIENT
Start: 2024-10-17 | End: 2024-10-17

## 2024-10-17 RX ORDER — DEXTROSE MONOHYDRATE AND SODIUM CHLORIDE 5; .9 G/100ML; G/100ML
100 INJECTION, SOLUTION INTRAVENOUS CONTINUOUS
Status: DISCONTINUED | OUTPATIENT
Start: 2024-10-17 | End: 2024-10-18

## 2024-10-17 RX ADMIN — LORAZEPAM 1 MG: 2 INJECTION INTRAMUSCULAR; INTRAVENOUS at 16:21

## 2024-10-17 RX ADMIN — METOCLOPRAMIDE 10 MG: 5 INJECTION, SOLUTION INTRAMUSCULAR; INTRAVENOUS at 04:32

## 2024-10-17 RX ADMIN — DIPHENHYDRAMINE HYDROCHLORIDE 25 MG: 50 INJECTION, SOLUTION INTRAMUSCULAR; INTRAVENOUS at 00:04

## 2024-10-17 RX ADMIN — IOPAMIDOL 150 ML: 755 INJECTION, SOLUTION INTRAVENOUS at 10:04

## 2024-10-17 RX ADMIN — MORPHINE SULFATE 4 MG: 4 INJECTION, SOLUTION INTRAMUSCULAR; INTRAVENOUS at 00:04

## 2024-10-17 RX ADMIN — METOCLOPRAMIDE 10 MG: 5 INJECTION, SOLUTION INTRAMUSCULAR; INTRAVENOUS at 13:57

## 2024-10-17 RX ADMIN — METOCLOPRAMIDE 10 MG: 5 INJECTION, SOLUTION INTRAMUSCULAR; INTRAVENOUS at 23:13

## 2024-10-17 RX ADMIN — DEXTROSE AND SODIUM CHLORIDE 100 ML/HR: 5; 900 INJECTION, SOLUTION INTRAVENOUS at 17:39

## 2024-10-17 RX ADMIN — METOCLOPRAMIDE 10 MG: 5 INJECTION, SOLUTION INTRAMUSCULAR; INTRAVENOUS at 17:39

## 2024-10-17 NOTE — PROGRESS NOTES
Bariatric Surgery Progress Note    Name: Felipe Nieves ADMIT: 10/15/2024   : 1965  PCP: Provider, No Known    MRN: [id number] LOS: 2 days   AGE/SEX: 59 y.o. male  ROOM: 365/1   Subjective   Complains nasogastric tube is very uncomfortable    Objective      Vital Signs  Vital Signs (range)  Temp:  [96 °F (35.6 °C)-98 °F (36.7 °C)] 97.5 °F (36.4 °C)  Heart Rate:  [] 104  Resp:  [16-20] 16  BP: ()/(58-80) 92/58    Physical Exam:    Awake and alert  Normal mental status  Normal pulmonary effort  Abdomen distended and diffusely tender Extremities no tenderness or swelling      CBC    Results from last 7 days   Lab Units 10/17/24  0040 10/16/24  0428 10/15/24  2149   WBC 10*3/mm3 9.67 8.06 8.92   HEMOGLOBIN g/dL 9.3* 8.2* 9.4*   PLATELETS 10*3/mm3 592* 553* 637*     CMP   Results from last 7 days   Lab Units 10/17/24  0040 10/16/24  1335 10/16/24  0428 10/15/24  2149   SODIUM mmol/L 138  --  138 138   POTASSIUM mmol/L 3.9  --  3.8 4.0   CHLORIDE mmol/L 101  --  103 103   CO2 mmol/L 25.3  --  28.9 27.7   BUN mg/dL 11  --  12 13   CREATININE mg/dL 0.97  --  0.95 0.95   GLUCOSE mg/dL 80  --  98 107*   ALBUMIN g/dL 3.0*  --   --  3.3*   BILIRUBIN mg/dL 0.3  --   --  0.2   ALK PHOS U/L 64  --   --  70   AST (SGOT) U/L 14  --   --  11   ALT (SGPT) U/L 7  --   --  5   LIPASE U/L  --   --   --  20   AMMONIA umol/L  --  22  --   --        Assessment & Plan     Small bowel obstruction    SBO (small bowel obstruction)    HIV (human immunodeficiency virus infection)    Fecal incontinence      59-year-old gentleman presents with small bowel obstruction.  Today small bowel follow-through demonstrated high-grade small bowel obstruction with no passage of contrast to the colon after 4 hours.  Plan for exploratory laparotomy tomorrow.  I have explained to him that we will perform a lysis of adhesions and possibly perform a small bowel resection.  I also explained to him of the possibility of an anastomotic leak.  He  understands and agrees to proceed.          This note was created using Dragon Voice Recognition software.    Yudy Hudson MD  10/17/24  15:52 EDT

## 2024-10-17 NOTE — PROGRESS NOTES
[facility] MEDICINE SERVICE  DAILY PROGRESS NOTE    NAME: Felipe Nieves  : 1965  MRN: [id number]      LOS: 2 days     PROVIDER OF SERVICE: Anali Pena MD    Chief Complaint: Small bowel obstruction    Subjective:     Interval History:  History taken from: Patient and patient's chart     C/o abdominal pain and distension , NG tube in place         Review of Systems:   Review of Systems    All negative except above     Vital Signs  Temp:  [96 °F (35.6 °C)-98 °F (36.7 °C)] 98 °F (36.7 °C)  Heart Rate:  [92] 92  Resp:  [16-20] 18  BP: (112)/(80-81) 112/80   Body mass index is 20.36 kg/m².    Physical Exam  Physical Exam  General: Alert and oriented, no acute distress. NG in place   HENT: Normocephalic, moist oral mucosa, no scleral icterus.  Neck: Supple, nontender, no carotid bruits, no JVD, no LAD.  Lungs: Clear to auscultation, nonlabored respiration.  Heart: RRR, no murmur, gallop or edema.  Abdomen: Soft, distended, + bowel sounds.  Musculoskeletal: Normal range of motion and strength, no tenderness or swelling.  Neuro: alert and awake, moving all 4 extremities   Skin: Skin is warm, dry and pink, no rashes or lesions.  Psychiatric: Cooperative, appropriate mood and affect.         Diagnostic Data    Results from last 7 days   Lab Units 10/17/24  0040   WBC 10*3/mm3 9.67   HEMOGLOBIN g/dL 9.3*   HEMATOCRIT % 33.1*   PLATELETS 10*3/mm3 592*   GLUCOSE mg/dL 80   CREATININE mg/dL 0.97   BUN mg/dL 11   SODIUM mmol/L 138   POTASSIUM mmol/L 3.9   AST (SGOT) U/L 14   ALT (SGPT) U/L 7   ALK PHOS U/L 64   BILIRUBIN mg/dL 0.3   ANION GAP mmol/L 11.7       US Liver    Result Date: 10/16/2024  Impression: Normal appearance of the liver. Small amount of abdominal and pelvic ascites. Electronically Signed: Fernanda Garrido MD  10/16/2024 9:57 AM EDT  Workstation ID: [id number]    XR Abdomen KUB    Result Date: 10/16/2024  Impression: Esophagogastric tube is in the stomach. Electronically Signed: Mendoza  MD Stephen  10/16/2024 2:18 AM EDT  Workstation ID: [id number]    CT Abdomen Pelvis With Contrast    Result Date: 10/15/2024  Impression: Findings suspicious for a small bowel obstruction with likely high-grade transition point in the mid abdomen. Large volume ascites. Electronically Signed: Kobe Noble  10/15/2024 11:03 PM EDT  Workstation ID: [id number]       I have reviewed patient labs and imaging     Assessment/Plan:     Active and Resolved Problems  Felipe Nieves is a 58 y.o. male with a CMH of HIV previous history of abdominal surgery with 3 hernia repair [id number] who presented to [facility] on 10/15/2024 with complaints of abdominal pain nausea vomiting.     # Small bowel obstruction  likely high-grade transition point in the mid abdomen per CT abdomen large ascites with contrast radiology report  n.p.o.  IV pain meds PRN, anit emetics PRN  Surgery and GI following  Continue nasogastric tube decompression     # Fecal incontinence chronic  has never had colonoscopy  GI following     # HIV  on Biktarvy  Check CD4 count    VTE Prophylaxis:  Mechanical VTE prophylaxis orders are present.             Disposition Planning:     Barriers to Discharge: medical clearance   Anticipated Date of Discharge: TBD based on clinical course  Place of Discharge:  home      Time: 40 minutes     Code Status and Medical Interventions: CPR (Attempt to Resuscitate); Full Support   Ordered at: 10/15/24 4713     Code Status (Patient has no pulse and is not breathing):    CPR (Attempt to Resuscitate)     Medical Interventions (Patient has pulse or is breathing):    Full Support       Signature: Electronically signed by Anali Pena MD, 10/17/24, 10:27 EDT.  [facility] Hospitalist Team

## 2024-10-17 NOTE — PLAN OF CARE
Goal Outcome Evaluation:   Pt is resting, vss, c/o pain med given, see mar. Call light within reach, POC ongoing

## 2024-10-17 NOTE — PROGRESS NOTES
LOS: 2 days   Patient Care Team:  Provider, No Known as PCP - General      Subjective     Interval History:     Subjective: Patient is agitated today.  States that he wants NG tube out due to discomfort.  However, he denies passing any gas or having any bowel movements.  Continues to have abdominal pain.  No vomiting.      ROS:   No chest pain, shortness of breath, or cough.        Medication Review:     Current Facility-Administered Medications:     albumin human 25 % IV SOLN 37.5 g, 37.5 g, Intravenous, Once **OR** albumin human 25 % IV SOLN 50 g, 50 g, Intravenous, Once **OR** albumin human 25 % IV SOLN 62.5 g, 62.5 g, Intravenous, Once **OR** albumin human 25 % IV SOLN 75 g, 75 g, Intravenous, Once **OR** albumin human 25 % IV SOLN 87.5 g, 87.5 g, Intravenous, Once **OR** albumin human 25 % IV SOLN 100 g, 100 g, Intravenous, Once **OR** albumin human 25 % IV SOLN 112.5 g, 112.5 g, Intravenous, Once, Nona Ward APRN    [Held by provider] Bictegravir-Emtricitab-Tenofov (BIKTARVY) -25 MG per tablet 1 tablet, 1 tablet, Oral, Daily, Odessa Soni APRN    diphenhydrAMINE (BENADRYL) injection 25 mg, 25 mg, Intravenous, Nightly PRN, Chey Howard APRN, 25 mg at 10/17/24 0004    furosemide (LASIX) injection 20 mg, 20 mg, Intravenous, Q24H, Anali Pena MD, 20 mg at 10/16/24 1626    metoclopramide (REGLAN) injection 10 mg, 10 mg, Intravenous, Q6H, Yudy Hudson MD, 10 mg at 10/17/24 0432    morphine injection 4 mg, 4 mg, Intravenous, Q4H PRN, Odessa Soni APRN, 4 mg at 10/17/24 0004    ondansetron (ZOFRAN) injection 4 mg, 4 mg, Intravenous, Q6H PRN, Yudy Hudson MD    phenol (CHLORASEPTIC) 1.4 % liquid 1 spray, 1 spray, Mouth/Throat, Q2H PRN, Yudy Hudson MD, 1 spray at 10/16/24 1315    [COMPLETED] Insert Peripheral IV, , , Once **AND** sodium chloride 0.9 % flush 10 mL, 10 mL, Intravenous, PRN, Nevin Gracia, DRU, 10 mL at 10/16/24 0149    [Held by provider]  spironolactone (ALDACTONE) tablet 100 mg, 100 mg, Oral, Daily, Anali Pena MD      Objective     Vital Signs  Vitals:    10/16/24 2014 10/16/24 2329 10/17/24 0315 10/17/24 1213   BP:   112/80 92/58   BP Location:   Left arm Left arm   Patient Position:   Lying Lying   Pulse:   92 104   Resp: 20 17 18 16   Temp: 96 °F (35.6 °C) 97.4 °F (36.3 °C) 98 °F (36.7 °C) 97.5 °F (36.4 °C)   TempSrc: Axillary Axillary Axillary Axillary   SpO2:   93% 94%   Weight:       Height:           Physical Exam:     General Appearance:    Awake and alert, in no acute distress   Head:    Normocephalic, without obvious abnormality   Eyes:          Conjunctivae normal, anicteric sclera   Throat:   No oral lesions, no thrush, oral mucosa moist   Neck:   No adenopathy, supple, no JVD   Lungs:     respirations regular, even and unlabored   Abdomen:     Soft, non-tender, no rebound or guarding, non-distended, NG tube to low intermittent wall suction with brown output   Rectal:     Deferred   Extremities:   No edema, no cyanosis   Skin:   No bruising or rash, no jaundice        Results Review:    CBC    Results from last 7 days   Lab Units 10/17/24  0040 10/16/24  0428 10/15/24  2149   WBC 10*3/mm3 9.67 8.06 8.92   HEMOGLOBIN g/dL 9.3* 8.2* 9.4*   PLATELETS 10*3/mm3 592* 553* 637*     CMP   Results from last 7 days   Lab Units 10/17/24  0040 10/16/24  1335 10/16/24  0428 10/15/24  2149   SODIUM mmol/L 138  --  138 138   POTASSIUM mmol/L 3.9  --  3.8 4.0   CHLORIDE mmol/L 101  --  103 103   CO2 mmol/L 25.3  --  28.9 27.7   BUN mg/dL 11  --  12 13   CREATININE mg/dL 0.97  --  0.95 0.95   GLUCOSE mg/dL 80  --  98 107*   ALBUMIN g/dL 3.0*  --   --  3.3*   BILIRUBIN mg/dL 0.3  --   --  0.2   ALK PHOS U/L 64  --   --  70   AST (SGOT) U/L 14  --   --  11   ALT (SGPT) U/L 7  --   --  5   LIPASE U/L  --   --   --  20   AMMONIA umol/L  --  22  --   --      Cr Clearance Estimated Creatinine Clearance: 70.1 mL/min (by C-G formula based on SCr of  "0.97 mg/dL).  Coag   Results from last 7 days   Lab Units 10/17/24  0040 10/16/24  0753   INR  1.06 1.05     HbA1C No results found for: \"HGBA1C\"      Infection     UA    Results from last 7 days   Lab Units 10/15/24  2239   NITRITE UA  Negative   WBC UA /HPF 0-2   BACTERIA UA /HPF None Seen   SQUAM EPITHEL UA /HPF 0-2     Microbiology Results (last 10 days)       ** No results found for the last 240 hours. **          Imaging Results (Last 72 Hours)       Procedure Component Value Units Date/Time    FL Small Bowel Follow Through Single-Contrast [[id number]] Resulted: 10/17/24 1004     Updated: 10/17/24 1004    US Liver [[id number]] Collected: 10/16/24 0955     Updated: 10/16/24 0959    Narrative:      US LIVER    Date of Exam: 10/16/2024 9:29 AM EDT    Indication: ascites, assess for cirrhosis and/or PVT.    Comparison: No comparisons available.    Technique: Grayscale and color Doppler ultrasound evaluation of the right upper quadrant was performed.      Findings:  The liver is normal in size and echogenicity. There are no liver lesions. There is no bile duct dilatation. The portal vein and visualized hepatic veins are patent with normal direction of flow. There is small pockets of ascites in the right lower and   left lower quadrant. There is minimal ascites in the left upper quadrant.      Impression:      Impression:  Normal appearance of the liver. Small amount of abdominal and pelvic ascites.        Electronically Signed: Fernanda Garrido MD    10/16/2024 9:57 AM EDT    Workstation ID: [id number]    XR Abdomen KUB [[id number]] Collected: 10/16/24 0217     Updated: 10/16/24 0220    Narrative:      XR ABDOMEN KUB    Date of Exam: 10/16/2024 2:09 AM EDT    Indication: ng placement    Comparison: None available.    Findings:  There is an esophagogastric tube in the stomach. The gas pattern is nonspecific. There is no definite obstructive change. Contrast is seen in the renal collecting systems bilaterally.      " Impression:      Impression:  Esophagogastric tube is in the stomach.        Electronically Signed: Mendoza Mitchell MD    10/16/2024 2:18 AM EDT    Workstation ID: [id number]    CT Abdomen Pelvis With Contrast [[id number]] Collected: 10/15/24 2255     Updated: 10/15/24 2305    Narrative:      CT ABDOMEN PELVIS W CONTRAST    Date of Exam: 10/15/2024 10:35 PM EDT    Indication: abdominal pain, nausea.    Comparison: CT abdomen pelvis 5/29/2024    Technique: Axial CT images were obtained of the abdomen and pelvis following the uneventful intravenous administration of iodinated contrast. Sagittal and coronal reconstructions were performed.  Automated exposure control and iterative reconstruction   methods were used.        Findings:  Lung Bases: No significant abnormality.    Liver: No significant abnormality.     Gallbladder and biliary tree: No significant abnormality.     Spleen: No significant abnormality.     Pancreas: No significant abnormality.     Adrenal glands: No significant abnormality.     Kidneys and ureters: Nonobstructing right nephrolithiasis. No hydroureteronephrosis.     Stomach and duodenum: Distended stomach.    Small and large bowel: There are multiple dilated loops of small bowel with abrupt appearing transition point in the mid abdomen on axial image 76. There is no evidence of pneumatosis or portal venous gas. Normal caliber large bowel.    Peritoneal cavity: There is a large volume of ascites. No free air is seen.    Bladder: Under distended incompletely evaluated.     Pelvic organs: No significant abnormality.     Vasculature: No significant abnormality.     Lymph nodes: No pathologic appearing lymph nodes by imaging criteria.     Bones and soft tissues: Degenerative changes of the imaged spine. No acute osseous abnormality.      Impression:      Impression:  Findings suspicious for a small bowel obstruction with likely high-grade transition point in the mid abdomen.    Large volume  ascites.            Electronically Signed: Kobe Noble    10/15/2024 11:03 PM EDT    Workstation ID: [id number]            Assessment & Plan     ASSESSMENT:  -Ascites  -Iron deficiency anemia  -Small bowel obstruction  -Abdominal pain with nausea -due to above  -HIV  -History of hernia repair x 3     PLAN:  Patient is a 58-year-old male with history of HIV and iron deficiency anemia who presented on 10/15 with complaints of abdominal pain and nausea. CT abdomen/pelvis W on admission shows small bowel obstruction with high-grade transition point in the mid abdomen and a large volume of ascites.     Patient is agitated today.  Once NG tube out secondary to throat discomfort.  However, he has not had any bowel movements or passed any gas.  SBFT in progress.  Await results.  Maintain NPO.  Paracentesis has not yet been performed.  Send ascites fluid to help determine etiology of ascites as patient has no known history of cirrhosis.  Replace albumin per protocol.  LFTs and INR remain normal.  No thrombocytopenia.  RUQ US shows normal liver.  Smooth muscle antibody 77, mitochondrial antibody 44.3.  STANLEY pending.  May need to consider EUS with liver biopsy as outpatient if LFTs become elevated.  Hemoglobin 9.3 from 8.2.  Continue to monitor H/H and transfuse as needed.  Patient has received IV iron infusion this admission.  Patient would benefit from outpatient colonoscopy at some point.  General surgery following.  Antiemetics/analgesics as needed.  Continue supportive care.      Electronically signed by DRU Fields, 10/17/24, 1:12 PM EDT.

## 2024-10-17 NOTE — SIGNIFICANT NOTE
Personally visualized patient's abdomen with ultrasound and unable to visualize adequate pocket of fluid to safely undergo diagnostic paracentesis.  Discussed with patient, and patient expressed verbal understanding.  Procedure was cancelled due to inadequate fluid.    Electronically signed by DRU Rojas, 10/17/24, 4:03 PM EDT.

## 2024-10-18 ENCOUNTER — INPATIENT HOSPITAL (AMBULATORY)
Age: 59
End: 2024-10-18
Payer: COMMERCIAL

## 2024-10-18 ENCOUNTER — ANESTHESIA (OUTPATIENT)
Facility: HOSPITAL | Age: 59
End: 2024-10-18

## 2024-10-18 ENCOUNTER — APPOINTMENT (OUTPATIENT)
Dept: GENERAL RADIOLOGY | Facility: HOSPITAL | Age: 59
DRG: 389 | End: 2024-10-18
Payer: COMMERCIAL

## 2024-10-18 ENCOUNTER — INPATIENT HOSPITAL (AMBULATORY)
Dept: URBAN - METROPOLITAN AREA HOSPITAL 84 | Facility: HOSPITAL | Age: 59
End: 2024-10-18
Payer: COMMERCIAL

## 2024-10-18 DIAGNOSIS — R18.8 OTHER ASCITES: ICD-10-CM

## 2024-10-18 DIAGNOSIS — D50.9 IRON DEFICIENCY ANEMIA, UNSPECIFIED: ICD-10-CM

## 2024-10-18 DIAGNOSIS — K56.609 UNSPECIFIED INTESTINAL OBSTRUCTION, UNSPECIFIED AS TO PARTIA: ICD-10-CM

## 2024-10-18 DIAGNOSIS — Z21 ASYMPTOMATIC HUMAN IMMUNODEFICIENCY VIRUS [HIV] INFECTION ST: ICD-10-CM

## 2024-10-18 LAB
ABO GROUP BLD: NORMAL
ALBUMIN SERPL-MCNC: 2.9 G/DL (ref 3.5–5.2)
ALBUMIN/GLOB SERPL: 0.5 G/DL
ALP SERPL-CCNC: 66 U/L (ref 39–117)
ALT SERPL W P-5'-P-CCNC: 6 U/L (ref 1–41)
ANA SER QL: NEGATIVE
ANION GAP SERPL CALCULATED.3IONS-SCNC: 8 MMOL/L (ref 5–15)
AST SERPL-CCNC: 12 U/L (ref 1–40)
BASOPHILS # BLD AUTO: 0.05 10*3/MM3 (ref 0–0.2)
BASOPHILS NFR BLD AUTO: 0.5 % (ref 0–1.5)
BILIRUB SERPL-MCNC: 0.3 MG/DL (ref 0–1.2)
BLD GP AB SCN SERPL QL: NEGATIVE
BUN SERPL-MCNC: 18 MG/DL (ref 6–20)
BUN/CREAT SERPL: 17.1 (ref 7–25)
CALCIUM SPEC-SCNC: 8.3 MG/DL (ref 8.6–10.5)
CHLORIDE SERPL-SCNC: 104 MMOL/L (ref 98–107)
CO2 SERPL-SCNC: 27 MMOL/L (ref 22–29)
CREAT SERPL-MCNC: 1.05 MG/DL (ref 0.76–1.27)
DEPRECATED RDW RBC AUTO: 42.7 FL (ref 37–54)
EGFRCR SERPLBLD CKD-EPI 2021: 82.8 ML/MIN/1.73
EOSINOPHIL # BLD AUTO: 0.18 10*3/MM3 (ref 0–0.4)
EOSINOPHIL NFR BLD AUTO: 1.8 % (ref 0.3–6.2)
ERYTHROCYTE [DISTWIDTH] IN BLOOD BY AUTOMATED COUNT: 16.3 % (ref 12.3–15.4)
GLOBULIN UR ELPH-MCNC: 5.7 GM/DL
GLUCOSE BLDC GLUCOMTR-MCNC: 115 MG/DL (ref 70–105)
GLUCOSE SERPL-MCNC: 130 MG/DL (ref 65–99)
HCT VFR BLD AUTO: 31.4 % (ref 37.5–51)
HGB BLD-MCNC: 9 G/DL (ref 13–17.7)
IMM GRANULOCYTES # BLD AUTO: 0.03 10*3/MM3 (ref 0–0.05)
IMM GRANULOCYTES NFR BLD AUTO: 0.3 % (ref 0–0.5)
INR PPP: 1.15 (ref 0.93–1.1)
LYMPHOCYTES # BLD AUTO: 2.5 10*3/MM3 (ref 0.7–3.1)
LYMPHOCYTES NFR BLD AUTO: 25.2 % (ref 19.6–45.3)
MCH RBC QN AUTO: 21 PG (ref 26.6–33)
MCHC RBC AUTO-ENTMCNC: 28.7 G/DL (ref 31.5–35.7)
MCV RBC AUTO: 73.2 FL (ref 79–97)
MONOCYTES # BLD AUTO: 1.24 10*3/MM3 (ref 0.1–0.9)
MONOCYTES NFR BLD AUTO: 12.5 % (ref 5–12)
NEUTROPHILS NFR BLD AUTO: 5.92 10*3/MM3 (ref 1.7–7)
NEUTROPHILS NFR BLD AUTO: 59.7 % (ref 42.7–76)
NRBC BLD AUTO-RTO: 0 /100 WBC (ref 0–0.2)
PLATELET # BLD AUTO: 541 10*3/MM3 (ref 140–450)
PMV BLD AUTO: 8.3 FL (ref 6–12)
POTASSIUM SERPL-SCNC: 3.9 MMOL/L (ref 3.5–5.2)
PROT SERPL-MCNC: 8.6 G/DL (ref 6–8.5)
PROTHROMBIN TIME: 12.4 SECONDS (ref 9.6–11.7)
RBC # BLD AUTO: 4.29 10*6/MM3 (ref 4.14–5.8)
RH BLD: POSITIVE
SODIUM SERPL-SCNC: 139 MMOL/L (ref 136–145)
T&S EXPIRATION DATE: NORMAL
WBC NRBC COR # BLD AUTO: 9.92 10*3/MM3 (ref 3.4–10.8)

## 2024-10-18 PROCEDURE — 85610 PROTHROMBIN TIME: CPT | Performed by: NURSE PRACTITIONER

## 2024-10-18 PROCEDURE — 86900 BLOOD TYPING SEROLOGIC ABO: CPT | Performed by: SURGERY

## 2024-10-18 PROCEDURE — 99232 SBSQ HOSP IP/OBS MODERATE 35: CPT | Performed by: NURSE PRACTITIONER

## 2024-10-18 PROCEDURE — 80053 COMPREHEN METABOLIC PANEL: CPT | Performed by: NURSE PRACTITIONER

## 2024-10-18 PROCEDURE — 25010000002 METOCLOPRAMIDE PER 10 MG: Performed by: SURGERY

## 2024-10-18 PROCEDURE — 86850 RBC ANTIBODY SCREEN: CPT | Performed by: SURGERY

## 2024-10-18 PROCEDURE — 86901 BLOOD TYPING SEROLOGIC RH(D): CPT | Performed by: SURGERY

## 2024-10-18 PROCEDURE — 82948 REAGENT STRIP/BLOOD GLUCOSE: CPT

## 2024-10-18 PROCEDURE — 99233 SBSQ HOSP IP/OBS HIGH 50: CPT | Performed by: SURGERY

## 2024-10-18 PROCEDURE — 74018 RADEX ABDOMEN 1 VIEW: CPT

## 2024-10-18 PROCEDURE — 85025 COMPLETE CBC W/AUTO DIFF WBC: CPT | Performed by: NURSE PRACTITIONER

## 2024-10-18 PROCEDURE — G0463 HOSPITAL OUTPT CLINIC VISIT: HCPCS | Performed by: SURGERY

## 2024-10-18 RX ORDER — SODIUM CHLORIDE 0.9 % (FLUSH) 0.9 %
10 SYRINGE (ML) INJECTION AS NEEDED
Status: DISCONTINUED | OUTPATIENT
Start: 2024-10-18 | End: 2024-10-18 | Stop reason: HOSPADM

## 2024-10-18 RX ORDER — SODIUM CHLORIDE, SODIUM LACTATE, POTASSIUM CHLORIDE, CALCIUM CHLORIDE 600; 310; 30; 20 MG/100ML; MG/100ML; MG/100ML; MG/100ML
1000 INJECTION, SOLUTION INTRAVENOUS ONCE
Status: DISCONTINUED | OUTPATIENT
Start: 2024-10-18 | End: 2024-10-18 | Stop reason: HOSPADM

## 2024-10-18 RX ORDER — LIDOCAINE HYDROCHLORIDE 10 MG/ML
0.5 INJECTION, SOLUTION EPIDURAL; INFILTRATION; INTRACAUDAL; PERINEURAL ONCE AS NEEDED
Status: DISCONTINUED | OUTPATIENT
Start: 2024-10-18 | End: 2024-10-18 | Stop reason: HOSPADM

## 2024-10-18 RX ADMIN — METOCLOPRAMIDE 10 MG: 5 INJECTION, SOLUTION INTRAMUSCULAR; INTRAVENOUS at 23:53

## 2024-10-18 RX ADMIN — METOCLOPRAMIDE 10 MG: 5 INJECTION, SOLUTION INTRAMUSCULAR; INTRAVENOUS at 16:52

## 2024-10-18 RX ADMIN — METOCLOPRAMIDE 10 MG: 5 INJECTION, SOLUTION INTRAMUSCULAR; INTRAVENOUS at 05:41

## 2024-10-18 NOTE — PLAN OF CARE
Goal Outcome Evaluation:  Plan of Care Reviewed With: patient        Progress: no change  Outcome Evaluation: Patient is being treated for a small bowel obstruction. Patient accidentally pulled his NG tube out over night and did not want me to replace it. He has been NPO since midnight. He is scheduled for a Ex Lap today at 11am. No complaints of nausea or pain over night. Rested well over night.

## 2024-10-18 NOTE — PROGRESS NOTES
LOS: 3 days   Patient Care Team:  Provider, No Known as PCP - General      Subjective     Interval History:     Subjective: Patient with no new complaints.  NG tube was discontinued by the patient overnight.  He denies nausea/vomiting.  Plan for surgery today.      ROS:   No chest pain, shortness of breath, or cough.        Medication Review:     Current Facility-Administered Medications:     [Transfer Hold] albumin human 25 % IV SOLN 37.5 g, 37.5 g, Intravenous, Once **OR** [Transfer Hold] albumin human 25 % IV SOLN 50 g, 50 g, Intravenous, Once **OR** [Transfer Hold] albumin human 25 % IV SOLN 62.5 g, 62.5 g, Intravenous, Once **OR** [Transfer Hold] albumin human 25 % IV SOLN 75 g, 75 g, Intravenous, Once **OR** [Transfer Hold] albumin human 25 % IV SOLN 87.5 g, 87.5 g, Intravenous, Once **OR** [Transfer Hold] albumin human 25 % IV SOLN 100 g, 100 g, Intravenous, Once **OR** [Transfer Hold] albumin human 25 % IV SOLN 112.5 g, 112.5 g, Intravenous, Once, Nona Ward APRN    [Held by provider] Bictegravir-Emtricitab-Tenofov (BIKTARVY) -25 MG per tablet 1 tablet, 1 tablet, Oral, Daily, Odessa Soni APRN    dextrose 5 % and sodium chloride 0.9 % infusion, 100 mL/hr, Intravenous, Continuous, Yudy Hduson MD, Stopped at 10/18/24 0945    [Transfer Hold] diphenhydrAMINE (BENADRYL) injection 25 mg, 25 mg, Intravenous, Nightly PRN, Chey Howard APRN, 25 mg at 10/17/24 0004    [Transfer Hold] furosemide (LASIX) injection 20 mg, 20 mg, Intravenous, Q24H, Anali Pena MD, 20 mg at 10/16/24 1626    lactated ringers infusion 1,000 mL, 1,000 mL, Intravenous, Once, Yudy Hudson MD    [Transfer Hold] lidocaine (XYLOCAINE) 1 % injection 10 mL, 10 mL, Infiltration, Once, Boris Zimmer APRN    lidocaine PF 1% (XYLOCAINE) injection 0.5 mL, 0.5 mL, Intradermal, Once PRN, Yuyd Hudson MD    [Transfer Hold] Lidocaine Viscous HCl (XYLOCAINE) 2 % solution 5 mL, 5 mL, Mouth/Throat, Q3H PRN,  Nona Ward, APRN    [Transfer Hold] metoclopramide (REGLAN) injection 10 mg, 10 mg, Intravenous, Q6H, Yudy Hudson MD, 10 mg at 10/18/24 0541    [Transfer Hold] morphine injection 4 mg, 4 mg, Intravenous, Q4H PRN, Odessa Soni APRN, 4 mg at 10/17/24 0004    [Transfer Hold] ondansetron (ZOFRAN) injection 4 mg, 4 mg, Intravenous, Q6H PRN, Yudy Hudson MD    [Transfer Hold] phenol (CHLORASEPTIC) 1.4 % liquid 1 spray, 1 spray, Mouth/Throat, Q2H PRN, Yudy Hudson MD, 1 spray at 10/16/24 1315    [COMPLETED] Insert Peripheral IV, , , Once **AND** [Transfer Hold] sodium chloride 0.9 % flush 10 mL, 10 mL, Intravenous, PRN, Nevin Gracia APRN, 10 mL at 10/16/24 0149    sodium chloride 0.9 % flush 10 mL, 10 mL, Intravenous, PRN, Yudy Hudson MD    [Held by provider] spironolactone (ALDACTONE) tablet 100 mg, 100 mg, Oral, Daily, Anali Pena MD      Objective     Vital Signs  Vitals:    10/17/24 0315 10/17/24 1213 10/17/24 2051 10/18/24 0430   BP: 112/80 92/58 133/85 108/70   BP Location: Left arm Left arm Left arm Left arm   Patient Position: Lying Lying Lying Lying   Pulse: 92 104 102 111   Resp: 18 16 16 12   Temp: 98 °F (36.7 °C) 97.5 °F (36.4 °C) 97.6 °F (36.4 °C) 98.4 °F (36.9 °C)   TempSrc: Axillary Axillary Oral Oral   SpO2: 93% 94% 98% 94%   Weight:       Height:           Physical Exam:     General Appearance:    Awake and alert, in no acute distress, lethargic   Head:    Normocephalic, without obvious abnormality   Eyes:          Conjunctivae normal, anicteric sclera   Throat:   No oral lesions, no thrush, oral mucosa moist   Neck:   No adenopathy, supple, no JVD   Lungs:     respirations regular, even and unlabored   Abdomen:     Soft, non-tender, no rebound or guarding, non-distended   Rectal:     Deferred   Extremities:   No edema, no cyanosis   Skin:   No bruising or rash, no jaundice        Results Review:    CBC    Results from last 7 days   Lab Units 10/18/24  3629  "10/17/24  0040 10/16/24  1610 10/16/24  0428 10/15/24  2149   WBC 10*3/mm3 9.92 9.67 8.0 8.06 8.92   HEMOGLOBIN g/dL 9.0* 9.3* 8.8* 8.2* 9.4*   PLATELETS 10*3/mm3 541* 592* 603* 553* 637*     CMP   Results from last 7 days   Lab Units 10/18/24  0540 10/17/24  0040 10/16/24  1335 10/16/24  0428 10/15/24  2149   SODIUM mmol/L 139 138  --  138 138   POTASSIUM mmol/L 3.9 3.9  --  3.8 4.0   CHLORIDE mmol/L 104 101  --  103 103   CO2 mmol/L 27.0 25.3  --  28.9 27.7   BUN mg/dL 18 11  --  12 13   CREATININE mg/dL 1.05 0.97  --  0.95 0.95   GLUCOSE mg/dL 130* 80  --  98 107*   ALBUMIN g/dL 2.9* 3.0*  --   --  3.3*   BILIRUBIN mg/dL 0.3 0.3  --   --  0.2   ALK PHOS U/L 66 64  --   --  70   AST (SGOT) U/L 12 14  --   --  11   ALT (SGPT) U/L 6 7  --   --  5   LIPASE U/L  --   --   --   --  20   AMMONIA umol/L  --   --  22  --   --      Cr Clearance Estimated Creatinine Clearance: 64.8 mL/min (by C-G formula based on SCr of 1.05 mg/dL).  Coag   Results from last 7 days   Lab Units 10/18/24  0540 10/17/24  0040 10/16/24  0753   INR  1.15* 1.06 1.05     HbA1C No results found for: \"HGBA1C\"      Infection     UA    Results from last 7 days   Lab Units 10/15/24  2239   NITRITE UA  Negative   WBC UA /HPF 0-2   BACTERIA UA /HPF None Seen   SQUAM EPITHEL UA /HPF 0-2     Microbiology Results (last 10 days)       ** No results found for the last 240 hours. **          Imaging Results (Last 72 Hours)       Procedure Component Value Units Date/Time    XR Abdomen KUB [[id number]] Collected: 10/17/24 2343     Updated: 10/17/24 2346    Narrative:      XR ABDOMEN KUB    Date of Exam: 10/17/2024 11:39 PM EDT    Indication: tube placement    Comparison: None available.    Findings:  The tip of an NG tube is seen just at the top of the image in the lower cervical region. There are distended loops of bowel in the upper abdomen. The lungs are clear.      Impression:      Impression:  NG tube tip is in the lower cervical " region.        Electronically Signed: Mendoza Mitchell MD    10/17/2024 11:44 PM EDT    Workstation ID: [id number]    FL Small Bowel Follow Through Single-Contrast [[id number]] Collected: 10/17/24 1430     Updated: 10/17/24 1435    Narrative:      FL SMALL BOWEL FOLLOW THROUGH SINGLE-CONTRAST    Date of Exam: 10/17/2024 10:03 AM EDT    Indication: small bowel obstruct.    Comparison: KUB 10/16/2024. CT abdomen and pelvis 10/15/2024.    Technique:   image of the abdomen was obtained. Patient ingested medium density barium for single contrast imaging of the small bowel.  Examination was recorded with multiple large field of view radiographs. Fluoroscopy was not utilized.    Findings:   film the abdomen demonstrates multiple abnormally dilated small bowel loops. NG tube is seen in the proximal stomach. Lung bases are clear. No pneumatosis or free air is evident.    Water-soluble contrast was injected into the NG tube by the technologist. The technologist stated that the patient was unable to tolerate the entirety of the contrast due to complaints of pain.      Initial postcontrast image demonstrates contrast opacification of the stomach and first-second duodenal segment. Delayed 4-hour image demonstrates contrast passage into right lower quadrant dilated small bowel loops, presumably distal ileum. However,   there is no apparent contrast opacification of the colon.        Impression:      Impression:  Abnormally dilated small bowel loops with lack of contrast opacification of the colon at 4 hours, suggesting high-grade small bowel obstruction.      Electronically Signed: Nadeen Paris MD    10/17/2024 2:32 PM EDT    Workstation ID: [id number]    US Liver [[id number]] Collected: 10/16/24 0955     Updated: 10/16/24 0959    Narrative:      US LIVER    Date of Exam: 10/16/2024 9:29 AM EDT    Indication: ascites, assess for cirrhosis and/or PVT.    Comparison: No comparisons available.    Technique: Grayscale and color  Doppler ultrasound evaluation of the right upper quadrant was performed.      Findings:  The liver is normal in size and echogenicity. There are no liver lesions. There is no bile duct dilatation. The portal vein and visualized hepatic veins are patent with normal direction of flow. There is small pockets of ascites in the right lower and   left lower quadrant. There is minimal ascites in the left upper quadrant.      Impression:      Impression:  Normal appearance of the liver. Small amount of abdominal and pelvic ascites.        Electronically Signed: Fernanda Garrido MD    10/16/2024 9:57 AM EDT    Workstation ID: [id number]    XR Abdomen KUB [[id number]] Collected: 10/16/24 0217     Updated: 10/16/24 0220    Narrative:      XR ABDOMEN KUB    Date of Exam: 10/16/2024 2:09 AM EDT    Indication: ng placement    Comparison: None available.    Findings:  There is an esophagogastric tube in the stomach. The gas pattern is nonspecific. There is no definite obstructive change. Contrast is seen in the renal collecting systems bilaterally.      Impression:      Impression:  Esophagogastric tube is in the stomach.        Electronically Signed: Mendoza Mitchell MD    10/16/2024 2:18 AM EDT    Workstation ID: [id number]    CT Abdomen Pelvis With Contrast [[id number]] Collected: 10/15/24 2255     Updated: 10/15/24 2305    Narrative:      CT ABDOMEN PELVIS W CONTRAST    Date of Exam: 10/15/2024 10:35 PM EDT    Indication: abdominal pain, nausea.    Comparison: CT abdomen pelvis 5/29/2024    Technique: Axial CT images were obtained of the abdomen and pelvis following the uneventful intravenous administration of iodinated contrast. Sagittal and coronal reconstructions were performed.  Automated exposure control and iterative reconstruction   methods were used.        Findings:  Lung Bases: No significant abnormality.    Liver: No significant abnormality.     Gallbladder and biliary tree: No significant abnormality.     Spleen: No  significant abnormality.     Pancreas: No significant abnormality.     Adrenal glands: No significant abnormality.     Kidneys and ureters: Nonobstructing right nephrolithiasis. No hydroureteronephrosis.     Stomach and duodenum: Distended stomach.    Small and large bowel: There are multiple dilated loops of small bowel with abrupt appearing transition point in the mid abdomen on axial image 76. There is no evidence of pneumatosis or portal venous gas. Normal caliber large bowel.    Peritoneal cavity: There is a large volume of ascites. No free air is seen.    Bladder: Under distended incompletely evaluated.     Pelvic organs: No significant abnormality.     Vasculature: No significant abnormality.     Lymph nodes: No pathologic appearing lymph nodes by imaging criteria.     Bones and soft tissues: Degenerative changes of the imaged spine. No acute osseous abnormality.      Impression:      Impression:  Findings suspicious for a small bowel obstruction with likely high-grade transition point in the mid abdomen.    Large volume ascites.            Electronically Signed: Kobe Noble    10/15/2024 11:03 PM EDT    Workstation ID: [id number]            Assessment & Plan     ASSESSMENT:  -Ascites  -Iron deficiency anemia  -Small bowel obstruction  -Abdominal pain with nausea -due to above  -HIV  -History of hernia repair x 3     PLAN:  Patient is a 58-year-old male with history of HIV and iron deficiency anemia who presented on 10/15 with complaints of abdominal pain and nausea. CT abdomen/pelvis W on admission shows small bowel obstruction with high-grade transition point in the mid abdomen and a large volume of ascites.     Patient with no new complaints.  NG tube was discontinued by the patient overnight.  Denies nausea/vomiting or worsening in abdominal pain since.  SBFT yesterday confirms high-grade small bowel obstruction.  General surgery is following and are planning bowel resection today.  Paracentesis was  unable to be performed secondary to insufficient pocket of fluid.  LFTs remain normal.  No thrombocytopenia.  RUQ US shows normal liver.  Smooth muscle antibody 77, mitochondrial antibody 44.3.  STANLEY pending.  May need to consider EUS with liver biopsy as outpatient if LFTs become elevated.   Patient would benefit from outpatient colonoscopy at some point.  Will defer postoperative care to general surgery.  The patient can follow-up in our office in 4 to 6 weeks after discharge for further evaluation of ascites, elevated AMA, and elevated smooth muscle antibody.  GI will be available as inpatient as needed.    Electronically signed by DRU Fields, 10/18/24, 10:58 AM EDT.

## 2024-10-18 NOTE — CASE MANAGEMENT/SOCIAL WORK
Continued Stay Note   Roel     Patient Name: Felipe Nieves  MRN: [id number]  Today's Date: 10/18/2024    Admit Date: 10/15/2024    Plan: Return home with spouse   Discharge Plan       Row Name 10/18/24 9769       Plan    Plan Comments Patient accidentally pulled his NG tube overnight. NG left discontinued and patient has not c/o nausea/vomiting or worsening in abdominal pain since. Gen sx was planning for ex lap today with poss bowel resection but cancelled because robbie had sm BM this AM and possibly one last night, they started patient on clear liquids to see how he manages. Paracentesis was unable to be performed secondary to insufficient pocket of fluid. KUB shows gas in the colon. Patient to refrain from opioids and ambulate in the hallway as tolerated. PT ordered. Anticipate return home with spouse at discharge.             Fior Beauchamp RN     [facility]  Phone # [phone]  Fax # [phone]

## 2024-10-18 NOTE — PLAN OF CARE
Goal Outcome Evaluation:  Plan of Care Reviewed With: patient        Progress: no change  Outcome Evaluation: Patient has been resting well throughout the shift without any complaints of pain or nausea. Went down for the exploratory lap this morning and it ended up being cancelled d/t patient having a BM. Patient's IVF's were d/c'd and started on clear liquids. Need to F/U with GI in 4-6 weeks when discharged. Colonoscopy outpatient. No other issues at this time.

## 2024-10-18 NOTE — PROGRESS NOTES
Bariatric Surgery Progress Note    Name: Felipe Nieves ADMIT: 10/15/2024   : 1965  PCP: Provider, No Known    MRN: [id number] LOS: 3 days   AGE/SEX: 59 y.o. male  ROOM: [facility] MAIN OR/MAIN OR   Subjective   Complains nasogastric tube is very uncomfortable    Objective      Vital Signs  Vital Signs (range)  Temp:  [97.5 °F (36.4 °C)-98.4 °F (36.9 °C)] 97.5 °F (36.4 °C)  Heart Rate:  [] 85  Resp:  [12-16] 13  BP: ()/(58-85) 102/73    Physical Exam:    Awake and alert  Normal mental status  Normal pulmonary effort  Abdomen distended less tender, softer  Extremities no tenderness or swelling      CBC    Results from last 7 days   Lab Units 10/18/24  0540 10/17/24  0040 10/16/24  1610 10/16/24  0428 10/15/24  2149   WBC 10*3/mm3 9.92 9.67 8.0 8.06 8.92   HEMOGLOBIN g/dL 9.0* 9.3* 8.8* 8.2* 9.4*   PLATELETS 10*3/mm3 541* 592* 603* 553* 637*     CMP   Results from last 7 days   Lab Units 10/18/24  0540 10/17/24  0040 10/16/24  1335 10/16/24  0428 10/15/24  2149   SODIUM mmol/L 139 138  --  138 138   POTASSIUM mmol/L 3.9 3.9  --  3.8 4.0   CHLORIDE mmol/L 104 101  --  103 103   CO2 mmol/L 27.0 25.3  --  28.9 27.7   BUN mg/dL 18 11  --  12 13   CREATININE mg/dL 1.05 0.97  --  0.95 0.95   GLUCOSE mg/dL 130* 80  --  98 107*   ALBUMIN g/dL 2.9* 3.0*  --   --  3.3*   BILIRUBIN mg/dL 0.3 0.3  --   --  0.2   ALK PHOS U/L 66 64  --   --  70   AST (SGOT) U/L 12 14  --   --  11   ALT (SGPT) U/L 6 7  --   --  5   LIPASE U/L  --   --   --   --  20   AMMONIA umol/L  --   --  22  --   --        Assessment & Plan     Small bowel obstruction    SBO (small bowel obstruction)    HIV (human immunodeficiency virus infection)    Fecal incontinence      59-year-old gentleman presents with small bowel obstruction.  yesterday small bowel follow-through demonstrated high-grade small bowel obstruction with no passage of contrast to the colon after 4 hours.  I saw the patient in preop in preparation for exploratory laparotomy and  he told me that he just had a bowel movement and believes he had a bowel movement last night when his nasogastric tube came out.  I ordered a stat KUB and I see a lot of gas in the colon.  It is difficult for me to see the contrast from the small bowel follow-through yesterday.  He also says he feels like he has to have another bowel movement right now.    I have decided to cancel his exploratory laparotomy and start him on clear liquids and see how he does.  I would like him to refrain from opioids and ambulate in the hallway.          This note was created using Dragon Voice Recognition software.    Yudy Hudson MD  10/18/24  11:45 EDT

## 2024-10-18 NOTE — NURSING NOTE
NG tube was making a weird noise and seemed to be displaced, so  STAT KUB was ordered. While patient was up to the bathroom, he ripped it out. Patient stated it was not going back in since he is having surgery in the morning.

## 2024-10-18 NOTE — PROGRESS NOTES
[facility] MEDICINE SERVICE  DAILY PROGRESS NOTE    NAME: Felipe Nieves  : 1965  MRN: [id number]      LOS: 3 days     PROVIDER OF SERVICE: Anali Pena MD    Chief Complaint: Small bowel obstruction    Subjective:     Interval History:  History taken from: Patient and patient's chart     Explorative laparotomy canceled today.  Surgical planning to give trial of clear liquid diet today.        Review of Systems:   Review of Systems    All negative except above     Vital Signs  Temp:  [97.5 °F (36.4 °C)-98.4 °F (36.9 °C)] 97.5 °F (36.4 °C)  Heart Rate:  [] 85  Resp:  [12-16] 13  BP: ()/(58-85) 102/73   Body mass index is 20.36 kg/m².    Physical Exam  Physical Exam  General: Alert and oriented, no acute distress. NG in place   HENT: Normocephalic, moist oral mucosa, no scleral icterus.  Neck: Supple, nontender, no carotid bruits, no JVD, no LAD.  Lungs: Clear to auscultation, nonlabored respiration.  Heart: RRR, no murmur, gallop or edema.  Abdomen: Soft, distended, + bowel sounds.  Musculoskeletal: Normal range of motion and strength, no tenderness or swelling.  Neuro: alert and awake, moving all 4 extremities   Skin: Skin is warm, dry and pink, no rashes or lesions.  Psychiatric: Cooperative, appropriate mood and affect.         Diagnostic Data    Results from last 7 days   Lab Units 10/18/24  0540   WBC 10*3/mm3 9.92   HEMOGLOBIN g/dL 9.0*   HEMATOCRIT % 31.4*   PLATELETS 10*3/mm3 541*   GLUCOSE mg/dL 130*   CREATININE mg/dL 1.05   BUN mg/dL 18   SODIUM mmol/L 139   POTASSIUM mmol/L 3.9   AST (SGOT) U/L 12   ALT (SGPT) U/L 6   ALK PHOS U/L 66   BILIRUBIN mg/dL 0.3   ANION GAP mmol/L 8.0       XR Abdomen KUB    Result Date: 10/18/2024  Impression: Interval dilution of contrast material from yesterday's small bowel follow-through, overall not well visualized. Gas and formed stool is noted in the colon, possibly increased compared to yesterday's exams, with otherwise similar  bowel gas pattern overall with persistent dilated small bowel loops. Electronically Signed: Daren Heredia MD  10/18/2024 12:08 PM EDT  Workstation ID: [id number]    XR Abdomen KUB    Result Date: 10/17/2024  Impression: NG tube tip is in the lower cervical region. Electronically Signed: Mendoza Mitchell MD  10/17/2024 11:44 PM EDT  Workstation ID: [id number]    FL Small Bowel Follow Through Single-Contrast    Result Date: 10/17/2024  Impression: Abnormally dilated small bowel loops with lack of contrast opacification of the colon at 4 hours, suggesting high-grade small bowel obstruction. Electronically Signed: Nadeen Paris MD  10/17/2024 2:32 PM EDT  Workstation ID: [id number]       I have reviewed patient labs and imaging     Assessment/Plan:     Active and Resolved Problems  Felipe Nieves is a 58 y.o. male with a CMH of HIV previous history of abdominal surgery with 3 hernia repair [id number] who presented to [facility] on 10/15/2024 with complaints of abdominal pain nausea vomiting.     # Small bowel obstruction  likely high-grade transition point in the mid abdomen per CT abdomen large ascites with contrast radiology report  Clear liquid diet  IV pain meds PRN, anit emetics PRN  Surgery and GI following  Noted surgery plan for trial of clear liquid diet today    # Fecal incontinence chronic  has never had colonoscopy  GI following-no intervention, follow-up with GI in 4 to 6 weeks     # HIV  on Biktarvy  CD4 count 432    VTE Prophylaxis:  Mechanical VTE prophylaxis orders are present.             Disposition Planning:     Barriers to Discharge: medical clearance   Anticipated Date of Discharge: TBD based on clinical course  Place of Discharge:  home      Time: 40 minutes     Code Status and Medical Interventions: CPR (Attempt to Resuscitate); Full Support   Ordered at: 10/15/24 5617     Code Status (Patient has no pulse and is not breathing):    CPR (Attempt to Resuscitate)     Medical Interventions (Patient  has pulse or is breathing):    Full Support       Signature: Electronically signed by Anali Pena MD, 10/18/24, 12:10 EDT.  [facility] Hospitalist Team

## 2024-10-19 ENCOUNTER — READMISSION MANAGEMENT (OUTPATIENT)
Dept: CALL CENTER | Facility: HOSPITAL | Age: 59
End: 2024-10-19
Payer: COMMERCIAL

## 2024-10-19 VITALS
WEIGHT: 130 LBS | DIASTOLIC BLOOD PRESSURE: 70 MMHG | HEIGHT: 67 IN | TEMPERATURE: 97.9 F | RESPIRATION RATE: 17 BRPM | SYSTOLIC BLOOD PRESSURE: 108 MMHG | HEART RATE: 88 BPM | OXYGEN SATURATION: 94 % | BODY MASS INDEX: 20.4 KG/M2

## 2024-10-19 LAB
BASOPHILS # BLD AUTO: 0.04 10*3/MM3 (ref 0–0.2)
BASOPHILS NFR BLD AUTO: 0.4 % (ref 0–1.5)
DEPRECATED RDW RBC AUTO: 42.5 FL (ref 37–54)
EOSINOPHIL # BLD AUTO: 0.23 10*3/MM3 (ref 0–0.4)
EOSINOPHIL NFR BLD AUTO: 2.2 % (ref 0.3–6.2)
ERYTHROCYTE [DISTWIDTH] IN BLOOD BY AUTOMATED COUNT: 16.3 % (ref 12.3–15.4)
HCT VFR BLD AUTO: 29.6 % (ref 37.5–51)
HGB BLD-MCNC: 8.3 G/DL (ref 13–17.7)
IMM GRANULOCYTES # BLD AUTO: 0.04 10*3/MM3 (ref 0–0.05)
IMM GRANULOCYTES NFR BLD AUTO: 0.4 % (ref 0–0.5)
LYMPHOCYTES # BLD AUTO: 4.15 10*3/MM3 (ref 0.7–3.1)
LYMPHOCYTES NFR BLD AUTO: 40.5 % (ref 19.6–45.3)
MCH RBC QN AUTO: 20.5 PG (ref 26.6–33)
MCHC RBC AUTO-ENTMCNC: 28 G/DL (ref 31.5–35.7)
MCV RBC AUTO: 73.3 FL (ref 79–97)
MONOCYTES # BLD AUTO: 0.93 10*3/MM3 (ref 0.1–0.9)
MONOCYTES NFR BLD AUTO: 9.1 % (ref 5–12)
NEUTROPHILS NFR BLD AUTO: 4.85 10*3/MM3 (ref 1.7–7)
NEUTROPHILS NFR BLD AUTO: 47.4 % (ref 42.7–76)
NRBC BLD AUTO-RTO: 0 /100 WBC (ref 0–0.2)
PLATELET # BLD AUTO: 520 10*3/MM3 (ref 140–450)
PMV BLD AUTO: 8.4 FL (ref 6–12)
RBC # BLD AUTO: 4.04 10*6/MM3 (ref 4.14–5.8)
WBC NRBC COR # BLD AUTO: 10.24 10*3/MM3 (ref 3.4–10.8)

## 2024-10-19 PROCEDURE — 25010000002 METOCLOPRAMIDE PER 10 MG: Performed by: SURGERY

## 2024-10-19 PROCEDURE — 97162 PT EVAL MOD COMPLEX 30 MIN: CPT

## 2024-10-19 PROCEDURE — 85025 COMPLETE CBC W/AUTO DIFF WBC: CPT | Performed by: SURGERY

## 2024-10-19 PROCEDURE — 99232 SBSQ HOSP IP/OBS MODERATE 35: CPT | Performed by: SURGERY

## 2024-10-19 RX ADMIN — METOCLOPRAMIDE 10 MG: 5 INJECTION, SOLUTION INTRAMUSCULAR; INTRAVENOUS at 05:14

## 2024-10-19 NOTE — DISCHARGE INSTR - LAB
The patient can follow-up in our office in 4 to 6 weeks after discharge for further evaluation of ascites, elevated AMA, and elevated smooth muscle antibody.     [phone]

## 2024-10-19 NOTE — PLAN OF CARE
Goal Outcome Evaluation:  Plan of Care Reviewed With: patient           Outcome Evaluation: Pt is a 60 YO M admitted with SBO. Pt initially planned for ex lap, but cancelled due to pt having bowel movement. Pt reports living at home wiht spouse, is typically independent with all ADLs, ambulation without AD, drives and works. Pt this date demonstrates excellent mobitliy and good strength. Pt appears to be safe for return home at d/c and has no further PT need. PT to sign off a thtis time, will require new orders if status changes.

## 2024-10-19 NOTE — THERAPY EVALUATION
Patient Name: Felipe Nieves  : 1965    MRN: [id number]                              Today's Date: 10/19/2024       Admit Date: 10/15/2024    Visit Dx:     ICD-10-CM ICD-9-CM   1. Intestinal obstruction, unspecified cause, unspecified whether partial or complete  K56.609 560.9   2. Generalized abdominal pain  R10.84 789.07   3. Nausea and vomiting, unspecified vomiting type  R11.2 787.01     Patient Active Problem List   Diagnosis    Hydronephrosis with obstructing calculus    Hydronephrosis with urinary obstruction due to ureteral calculus    Iron deficiency anemia    SBO (small bowel obstruction)    HIV (human immunodeficiency virus infection)    Small bowel obstruction    Fecal incontinence     Past Medical History:   Diagnosis Date    HIV (human immunodeficiency virus infection)     Skin cancer      Past Surgical History:   Procedure Laterality Date    CYSTOSCOPY, URETEROSCOPY, RETROGRADE PYELOGRAM, STENT INSERTION Left 2024    Procedure: CYSTOSCOPY URETEROSCOPY HOLMIUM LASER STENT INSERTION;  Surgeon: Wale Taylor MD;  Location: [facility] MAIN OR;  Service: Urology;  Laterality: Left;    ENDOSCOPY N/A 2024    Procedure: ESOPHAGOGASTRODUODENOSCOPY, biopsy x2 areas;  Surgeon: Nelly Obando MD;  Location: [facility] ENDOSCOPY;  Service: Gastroenterology;  Laterality: N/A;  post: gastritis    HERNIA REPAIR      SKIN CANCER EXCISION        General Information       Row Name 10/19/24 1314          Physical Therapy Time and Intention    Document Type evaluation  -EL     Mode of Treatment individual therapy  -EL       Row Name 10/19/24 1314          General Information    Prior Level of Function independent:  -EL       Row Name 10/19/24 1314          Living Environment    People in Home spouse  -EL       Row Name 10/19/24 1314          Home Main Entrance    Number of Stairs, Main Entrance one  -EL       Row Name 10/19/24 1314          Cognition    Orientation Status (Cognition) oriented x 4  -EL                User Key  (r) = Recorded By, (t) = Taken By, (c) = Cosigned By      Initials Name Provider Type    Aldo Herrera PT Physical Therapist                   Mobility       Row Name 10/19/24 1318          Bed Mobility    Bed Mobility supine-sit;sit-supine  -EL     Supine-Sit Burr (Bed Mobility) independent  -EL     Sit-Supine Burr (Bed Mobility) independent  -EL       Row Name 10/19/24 1318          Sit-Stand Transfer    Sit-Stand Burr (Transfers) independent  -EL       Row Name 10/19/24 1318          Gait/Stairs (Locomotion)    Burr Level (Gait) independent  -EL     Distance in Feet (Gait) 120  -EL     Comment, (Gait/Stairs) No significant balance or endurance deficit  -EL               User Key  (r) = Recorded By, (t) = Taken By, (c) = Cosigned By      Initials Name Provider Type    Aldo Herrera PT Physical Therapist                   Obj/Interventions       Row Name 10/19/24 1318          Range of Motion Comprehensive    General Range of Motion bilateral lower extremity ROM WFL  -EL       Row Name 10/19/24 1318          Strength Comprehensive (MMT)    General Manual Muscle Testing (MMT) Assessment no strength deficits identified  -EL       Row Name 10/19/24 1318          Sensory Assessment (Somatosensory)    Sensory Assessment (Somatosensory) sensation intact  -EL               User Key  (r) = Recorded By, (t) = Taken By, (c) = Cosigned By      Initials Name Provider Type    Aldo Herrera PT Physical Therapist                   Goals/Plan    No documentation.                  Clinical Impression       Row Name 10/19/24 1319          Pain    Pretreatment Pain Rating 0/10 - no pain  -EL     Posttreatment Pain Rating 0/10 - no pain  -EL       Row Name 10/19/24 1319          Plan of Care Review    Plan of Care Reviewed With patient  -EL     Outcome Evaluation Pt is a 60 YO M admitted with SBO. Pt initially planned for ex lap, but cancelled due to pt having bowel movement.  Pt reports living at home wiht spouse, is typically independent with all ADLs, ambulation without AD, drives and works. Pt this date demonstrates excellent mobitliy and good strength. Pt appears to be safe for return home at d/c and has no further PT need. PT to sign off a thtis time, will require new orders if status changes.  -       Row Name 10/19/24 1319          Therapy Assessment/Plan (PT)    Criteria for Skilled Interventions Met (PT) no problems identified which require skilled intervention  -EL     Therapy Frequency (PT) evaluation only  -       Row Name 10/19/24 1319          Vital Signs    O2 Delivery Pre Treatment room air  -EL     O2 Delivery Intra Treatment room air  -EL     O2 Delivery Post Treatment room air  -EL       Row Name 10/19/24 1319          Positioning and Restraints    Pre-Treatment Position in bed  -EL     Post Treatment Position bed  -EL     In Bed notified nsg;supine;call light within reach;exit alarm on;encouraged to call for assist  -EL               User Key  (r) = Recorded By, (t) = Taken By, (c) = Cosigned By      Initials Name Provider Type    Aldo Herrera, PT Physical Therapist                   Outcome Measures       Row Name 10/19/24 1321 10/19/24 0819       How much help from another person do you currently need...    Turning from your back to your side while in flat bed without using bedrails? 4  -EL 4  -TM    Moving from lying on back to sitting on the side of a flat bed without bedrails? 4  -EL 4  -TM    Moving to and from a bed to a chair (including a wheelchair)? 4  -EL 4  -TM    Standing up from a chair using your arms (e.g., wheelchair, bedside chair)? 4  -EL 4  -TM    Climbing 3-5 steps with a railing? 4  -EL 4  -TM    To walk in hospital room? 4  -EL 4  -TM    AM-PAC 6 Clicks Score (PT) 24  -EL 24  -TM    Highest Level of Mobility Goal 8 --> Walked 250 feet or more  -EL 8 --> Walked 250 feet or more  -TM      Row Name 10/19/24 1321          Functional Assessment     Outcome Measure Options AM-PAC 6 Clicks Basic Mobility (PT)  -EL               User Key  (r) = Recorded By, (t) = Taken By, (c) = Cosigned By      Initials Name Provider Type    EL Aldo Duarte PT Physical Therapist    Mckayla Jones LPN Licensed Nurse                                 Physical Therapy Education       Title: PT OT SLP Therapies (Done)       Topic: Physical Therapy (Done)       Point: Mobility training (Done)       Learning Progress Summary            Patient Acceptance, E,TB, VU by  at 10/19/2024 1326                      Point: Precautions (Done)       Learning Progress Summary            Patient Acceptance, E,TB, VU by  at 10/19/2024 1326                                      User Key       Initials Effective Dates Name Provider Type Discipline     06/23/20 -  Aldo Duarte PT Physical Therapist PT                  PT Recommendation and Plan     Outcome Evaluation: Pt is a 60 YO M admitted with SBO. Pt initially planned for ex lap, but cancelled due to pt having bowel movement. Pt reports living at home wiht spouse, is typically independent with all ADLs, ambulation without AD, drives and works. Pt this date demonstrates excellent mobitliy and good strength. Pt appears to be safe for return home at d/c and has no further PT need. PT to sign off a thtis time, will require new orders if status changes.     Time Calculation:   PT Evaluation Complexity  History, PT Evaluation Complexity: 1-2 personal factors and/or comorbidities  Examination of Body Systems (PT Eval Complexity): total of 3 or more elements  Clinical Presentation (PT Evaluation Complexity): evolving  Clinical Decision Making (PT Evaluation Complexity): moderate complexity  Overall Complexity (PT Evaluation Complexity): moderate complexity     PT Charges       Row Name 10/19/24 1326             Time Calculation    Start Time 0835  -EL      Stop Time 0845  -EL      Time Calculation (min) 10 min  -EL      PT Received On 10/19/24  -EL                 User Key  (r) = Recorded By, (t) = Taken By, (c) = Cosigned By      Initials Name Provider Type    Aldo Herrera, PT Physical Therapist                  Therapy Charges for Today       Code Description Service Date Service Provider Modifiers Qty    [id number]  PT EVAL MOD COMPLEXITY 2 10/19/2024 Aldo Duarte, PT GP 1            PT G-Codes  Outcome Measure Options: AM-PAC 6 Clicks Basic Mobility (PT)  AM-PAC 6 Clicks Score (PT): 24       Aldo Duarte PT  10/19/2024

## 2024-10-19 NOTE — PLAN OF CARE
Problem: Adult Inpatient Plan of Care  Goal: Plan of Care Review  Outcome: Not Progressing  Flowsheets (Taken 10/19/2024 0227)  Progress: no change  Plan of Care Reviewed With: patient  Goal: Patient-Specific Goal (Individualized)  Outcome: Not Progressing  Goal: Absence of Hospital-Acquired Illness or Injury  Outcome: Not Progressing  Intervention: Identify and Manage Fall Risk  Recent Flowsheet Documentation  Taken 10/19/2024 0015 by Jacey Pride RN  Safety Promotion/Fall Prevention:   assistive device/personal items within reach   clutter free environment maintained   nonskid shoes/slippers when out of bed   room organization consistent   safety round/check completed  Taken 10/18/2024 2217 by Jacey Pride RN  Safety Promotion/Fall Prevention:   assistive device/personal items within reach   clutter free environment maintained   nonskid shoes/slippers when out of bed   room organization consistent   safety round/check completed  Taken 10/18/2024 2018 by Jacey Pride RN  Safety Promotion/Fall Prevention:   assistive device/personal items within reach   clutter free environment maintained   nonskid shoes/slippers when out of bed   safety round/check completed   room organization consistent  Intervention: Prevent Skin Injury  Recent Flowsheet Documentation  Taken 10/18/2024 2018 by Jacey Pride RN  Body Position:   position changed independently   supine  Intervention: Prevent and Manage VTE (Venous Thromboembolism) Risk  Recent Flowsheet Documentation  Taken 10/18/2024 2018 by Jacey Pride RN  VTE Prevention/Management:   SCDs (sequential compression devices) off   patient refused intervention  Intervention: Prevent Infection  Recent Flowsheet Documentation  Taken 10/19/2024 0015 by Jacey Pride RN  Infection Prevention:   environmental surveillance performed   hand hygiene promoted   single patient room provided  Taken 10/18/2024 2217 by Jacey Pride RN  Infection Prevention:   environmental  surveillance performed   hand hygiene promoted   rest/sleep promoted   single patient room provided  Taken 10/18/2024 2018 by Jacey Pride, RN  Infection Prevention:   environmental surveillance performed   hand hygiene promoted   single patient room provided   visitors restricted/screened   rest/sleep promoted  Goal: Optimal Comfort and Wellbeing  Outcome: Not Progressing  Intervention: Provide Person-Centered Care  Recent Flowsheet Documentation  Taken 10/18/2024 2018 by Jacey Pride, RN  Trust Relationship/Rapport:   care explained   choices provided  Goal: Readiness for Transition of Care  Outcome: Not Progressing  Intervention: Mutually Develop Transition Plan  Recent Flowsheet Documentation  Taken 10/18/2024 2337 by Jacey Pride, RN  Equipment Needed After Discharge: none  Equipment Currently Used at Home: bp cuff  Anticipated Changes Related to Illness: none  Transportation Anticipated:   family or friend will provide   car, drives self  Transportation Concerns: none  Concerns to be Addressed: denies needs/concerns at this time  Readmission Within the Last 30 Days: no previous admission in last 30 days  Patient/Family Anticipated Services at Transition: none  Patient/Family Anticipates Transition to: home with family     Problem: Intestinal Obstruction  Goal: Optimal Bowel Function  Outcome: Not Progressing  Intervention: Promote Bowel Function  Recent Flowsheet Documentation  Taken 10/18/2024 2018 by Jacey Pride, RN  Body Position:   position changed independently   supine  Head of Bed (HOB) Positioning: HOB at 20-30 degrees  Positioning/Transfer Devices:   pillows   in use  Goal: Fluid and Electrolyte Balance  Outcome: Not Progressing  Goal: Absence of Infection Signs and Symptoms  Outcome: Not Progressing  Goal: Optimize Nutrition Status  Outcome: Not Progressing  Goal: Optimal Pain Control and Function  Outcome: Not Progressing  Intervention: Prevent or Manage Pain  Recent Flowsheet  Documentation  Taken 10/18/2024 2018 by Jacey Pride RN  Diversional Activities: television  Sleep/Rest Enhancement:   awakenings minimized   family presence promoted     Problem: Pain Acute  Goal: Optimal Pain Control and Function  Outcome: Not Progressing  Intervention: Optimize Psychosocial Wellbeing  Recent Flowsheet Documentation  Taken 10/18/2024 2018 by Jacey Pride RN  Supportive Measures: active listening utilized  Diversional Activities: television  Intervention: Prevent or Manage Pain  Recent Flowsheet Documentation  Taken 10/19/2024 0015 by Jacey Pride RN  Medication Review/Management:   medications reviewed   high-risk medications identified  Taken 10/18/2024 2018 by Jacey Pride RN  Sensory Stimulation Regulation: television on  Bowel Elimination Promotion:   adequate fluid intake promoted   ambulation promoted  Sleep/Rest Enhancement:   awakenings minimized   family presence promoted  Medication Review/Management:   medications reviewed   high-risk medications identified   Goal Outcome Evaluation:  Plan of Care Reviewed With: patient        Progress: no change     Alert and oriented x 4. Able to verbalize needs and wants. Takes medication whole and tolerates well. Continent of bowel and bladder. Independent for transfers/ambulation. Does not require O2 therapy at this time. No c/o pain/discomfort/SOB. Continues to be followed by surgery and gastroenterology. Currently in bed, eyes closed. Rise and fall of chest observed. Call bell in reach. Plans on discharging home when appropriate.

## 2024-10-19 NOTE — DISCHARGE SUMMARY
"             [facility]  Discharge Summary    Date of Service: 10/19/2024  Patient Name: Felipe Nieves  : 1965  MRN: [id number]    Date of Admission: 10/15/2024  Discharge Diagnosis:     SBO evaluated per surgery, tolerating diet, cleared per surgery for discharge, conservative management  HIV +  Ascites, follow up outpatient with gastroenterology      Date of Discharge:  10/19/2024  Primary Care Physician: Provider, No Known      Presenting Problem:   Small bowel obstruction [K56.609]  Generalized abdominal pain [R10.84]  Intestinal obstruction, unspecified cause, unspecified whether partial or complete [K56.609]  Nausea and vomiting, unspecified vomiting type [R11.2]    Active and Resolved Hospital Problems:  Active Hospital Problems    Diagnosis POA    **Small bowel obstruction [K56.609] Yes    Fecal incontinence [R15.9] Yes    SBO (small bowel obstruction) [K56.609] Yes    HIV (human immunodeficiency virus infection) [Z21] Yes      Resolved Hospital Problems   No resolved problems to display.         Hospital Course     HPI:  Per the H&P written by Odessa Soni APRN , dated 10/15/24 :  \"Abdominal pain, Vomiting.Nasuea\"    Hospital Course:  Felipe Nieves is a 58 y.o. male with a CMH of HIV previous history of abdominal surgery with 3 hernia repair [id number] who presented to [facility] on 10/15/2024 with complaints of abdominal pain nausea vomiting.  He denies any hematemesis melena or hematochezia.  CT abdomen and pelvis with contrast per radiology shows findings suspicious for small bowel obstruction with likely high-grade transition point in the mid abdomen large volume ascites.  LFTs and bilirubin are normal.  He was given 4 mg Zofran 4 mg morphine in the emergency department and general surgery was consulted from the emergency department.     Surgery was consulted, patient was managed conservatively per surgery, tolerated the diet, he was clear from " surgery standpoint for discharge.  GI evaluated the patient for ascites, paracentesis was unable to be performed secondary due to insufficient pocket of fluid, patient will need to follow-up outpatient for outpatient colonoscopy and further workup  and evaluation of ascites.  Patient blood pressure was soft, so diuretics were held for now.  Patient need to follow-up outpatient with gastroenterology outpatient for initiation of diuretics for ascites when deemed safe or appropriate.    Patient is currently clinically stable and ambulating. Plan for discharge discussed with the patient prior to discharge, patient agreed with the plan. Patient advised to return to hospital or go near by hospital or call 911, should patient's symptoms worsen or recur. Patient also advised to follow up with PCP within 1-5 days for recent hospitalization, monitoring and further management of patient's health problems.  Patient acknowledged understanding and agreed with the discharge plan.     DISCHARGE Follow Up Recommendations for labs and diagnostics:     -Follow-up with the gastroenterology outpatient for workup for ascites    -Follow-up with the surgery outpatient    -Continue to take Biktarvy for HIV, follow-up with your infectious disease physician    -Follow-up with your PCP for recent hospitalization      Reasons For Change In Medications and Indications for New Medications:      Day of Discharge     Vital Signs:  Temp:  [97.7 °F (36.5 °C)-98.5 °F (36.9 °C)] 97.9 °F (36.6 °C)  Heart Rate:  [78-95] 88  Resp:  [14-18] 17  BP: ()/(60-75) 108/70    Physical Exam:  General Appearance:  Awake alert   Head:  Atraumatic normocephalic   Eyes:        No scleral icterus   Neck: Normal range of motion   Pulm: Clear to auscultation   Cardio: Heart sounds   Extremities: No edema on bilateral lower extremities   Abdomen: Soft,  nondistended       Musculoskeletal: Moves all extremities       Neurologic: No focal neurological deficits on my  exam             Pertinent  and/or Most Recent Results     LAB RESULTS:      Lab 10/19/24  0004 10/18/24  0540 10/17/24  0040 10/16/24  1610 10/16/24  0753 10/16/24  0428 10/15/24  2149   WBC 10.24 9.92 9.67 8.0  --  8.06 8.92   HEMOGLOBIN 8.3* 9.0* 9.3* 8.8*  --  8.2* 9.4*   HEMATOCRIT 29.6* 31.4* 33.1* 30.3*  --  29.4* 33.7*   PLATELETS 520* 541* 592* 603*  --  553* 637*   NEUTROS ABS 4.85 5.92 6.26 4.2  --  3.59 3.99   IMMATURE GRANS (ABS) 0.04 0.03 0.02  --   --  0.02 0.03   LYMPHS ABS 4.15* 2.50 2.28 2.9  --  3.22* 3.81*   MONOS ABS 0.93* 1.24* 0.85 0.7  --  0.94* 0.86   EOS ABS 0.23 0.18 0.22 0.2  --  0.26 0.20   MCV 73.3* 73.2* 72.9* 73*  --  73.5* 73.7*   PROTIME  --  12.4* 11.5  --  11.4  --   --          Lab 10/18/24  0540 10/17/24  0040 10/16/24  0428 10/15/24  2149   SODIUM 139 138 138 138   POTASSIUM 3.9 3.9 3.8 4.0   CHLORIDE 104 101 103 103   CO2 27.0 25.3 28.9 27.7   ANION GAP 8.0 11.7 6.1 7.3   BUN 18 11 12 13   CREATININE 1.05 0.97 0.95 0.95   EGFR 82.8 91.1 93.9 93.9   GLUCOSE 130* 80 98 107*   CALCIUM 8.3* 8.4* 8.5* 9.0         Lab 10/18/24  0540 10/17/24  0040 10/15/24  2149   TOTAL PROTEIN 8.6* 8.7* 9.3*   ALBUMIN 2.9* 3.0* 3.3*   GLOBULIN 5.7 5.7 6.0   ALT (SGPT) 6 7 5   AST (SGOT) 12 14 11   BILIRUBIN 0.3 0.3 0.2   ALK PHOS 66 64 70   LIPASE  --   --  20         Lab 10/18/24  0540 10/17/24  0040 10/16/24  0753   PROTIME 12.4* 11.5 11.4   INR 1.15* 1.06 1.05             Lab 10/18/24  1052 10/16/24  0428   IRON  --  21*   IRON SATURATION (TSAT)  --  6*   TIBC  --  341   TRANSFERRIN  --  229   FERRITIN  --  32.70   ABO TYPING A  --    RH TYPING Positive  --    ANTIBODY SCREEN Negative  --          Brief Urine Lab Results  (Last result in the past 365 days)        Color   Clarity   Blood   Leuk Est   Nitrite   Protein   CREAT   Urine HCG        10/15/24 2239 Yellow   Clear   Negative   Negative   Negative   30 mg/dL (1+)                 Microbiology Results (last 10 days)       ** No results  found for the last 240 hours. **            XR Abdomen KUB    Result Date: 10/18/2024  Impression: Impression: Interval dilution of contrast material from yesterday's small bowel follow-through, overall not well visualized. Gas and formed stool is noted in the colon, possibly increased compared to yesterday's exams, with otherwise similar bowel gas pattern overall with persistent dilated small bowel loops. Electronically Signed: Daren Heredia MD  10/18/2024 12:08 PM EDT  Workstation ID: [id number]    XR Abdomen KUB    Result Date: 10/17/2024  Impression: Impression: NG tube tip is in the lower cervical region. Electronically Signed: Mendoza Mitchell MD  10/17/2024 11:44 PM EDT  Workstation ID: [id number]    FL Small Bowel Follow Through Single-Contrast    Result Date: 10/17/2024  Impression: Impression: Abnormally dilated small bowel loops with lack of contrast opacification of the colon at 4 hours, suggesting high-grade small bowel obstruction. Electronically Signed: Nadeen Paris MD  10/17/2024 2:32 PM EDT  Workstation ID: [id number]    US Liver    Result Date: 10/16/2024  Impression: Impression: Normal appearance of the liver. Small amount of abdominal and pelvic ascites. Electronically Signed: Fernanda Garrido MD  10/16/2024 9:57 AM EDT  Workstation ID: [id number]    XR Abdomen KUB    Result Date: 10/16/2024  Impression: Impression: Esophagogastric tube is in the stomach. Electronically Signed: Mendoza Mitchell MD  10/16/2024 2:18 AM EDT  Workstation ID: [id number]    CT Abdomen Pelvis With Contrast    Result Date: 10/15/2024  Impression: Impression: Findings suspicious for a small bowel obstruction with likely high-grade transition point in the mid abdomen. Large volume ascites. Electronically Signed: Kobe Noble  10/15/2024 11:03 PM EDT  Workstation ID: [id number]                 Labs Pending at Discharge:      Procedures Performed  Procedure(s):  LAPAROTOMY EXPLORATORY - CANCELLED.         Consults:   Consults       Date and  Time Order Name Status Description    10/16/2024  5:35 AM Inpatient Gastroenterology Consult Completed     10/16/2024  5:35 AM Inpatient General Surgery Consult Completed     10/15/2024 11:50 PM Surgery (on-call MD unless specified)      10/15/2024 11:20 PM Hospitalist (on-call MD unless specified)                Discharge Details        Discharge Medications        Continue These Medications        Instructions Start Date   Biktarvy -25 MG per tablet  Generic drug: Bictegravir-Emtricitab-Tenofov   1 tablet, Daily      ergocalciferol 1.25 MG (49760 UT) capsule  Commonly known as: ERGOCALCIFEROL   1 capsule, Weekly               No Known Allergies      Discharge Disposition:   Home or Self Care    Diet:  Hospital:  Diet Order   Procedures    Diet: Gastrointestinal; Fiber-Restricted; Fluid Consistency: Thin (IDDSI 0)         Discharge Activity:         CODE STATUS:  Code Status and Medical Interventions: CPR (Attempt to Resuscitate); Full Support   Ordered at: 10/15/24 7133     Code Status (Patient has no pulse and is not breathing):    CPR (Attempt to Resuscitate)     Medical Interventions (Patient has pulse or is breathing):    Full Support         No future appointments.    Additional Instructions for the Follow-ups that You Need to Schedule    The patient can follow-up in our office in 4 to 6 weeks after discharge for further evaluation of ascites, elevated AMA, and elevated smooth muscle antibody.     [phone]           Time spent on Discharge including face to face service:  >30 minutes    Part of this note may be an electronic transcription/translation of spoken language to printed text using the Dragon Dictation System.    Signature: Electronically signed by Angel Banks MD, 10/19/24, 15:25 EDT.  [facility] Hospitalist Team

## 2024-10-19 NOTE — PROGRESS NOTES
Bariatric Surgery Progress Note    Name: Felipe Nieves ADMIT: 10/15/2024   : 1965  PCP: Provider, No Known    MRN: [id number] LOS: 4 days   AGE/SEX: 59 y.o. male  ROOM: 365/1   Subjective   Tolerating low residue diet and having bowel movements    Objective      Vital Signs  Vital Signs (range)  Temp:  [97.7 °F (36.5 °C)-98.5 °F (36.9 °C)] 97.9 °F (36.6 °C)  Heart Rate:  [78-95] 88  Resp:  [14-18] 17  BP: ()/(60-75) 108/70    Physical Exam:    Awake and alert  Normal mental status  Normal pulmonary effort  Abdomen soft and nontender extremities no tenderness or swelling      CBC    Results from last 7 days   Lab Units 10/19/24  0004 10/18/24  0540 10/17/24  0040 10/16/24  1610 10/16/24  0428 10/15/24  2149   WBC 10*3/mm3 10. 9.92 9.67 8.0 8.06 8.92   HEMOGLOBIN g/dL 8.3* 9.0* 9.3* 8.8* 8.2* 9.4*   PLATELETS 10*3/mm3 520* 541* 592* 603* 553* 637*     CMP   Results from last 7 days   Lab Units 10/18/24  0540 10/17/24  0040 10/16/24  1335 10/16/24  0428 10/15/24  2149   SODIUM mmol/L 139 138  --  138 138   POTASSIUM mmol/L 3.9 3.9  --  3.8 4.0   CHLORIDE mmol/L 104 101  --  103 103   CO2 mmol/L 27.0 25.3  --  28.9 27.7   BUN mg/dL 18 11  --  12 13   CREATININE mg/dL 1.05 0.97  --  0.95 0.95   GLUCOSE mg/dL 130* 80  --  98 107*   ALBUMIN g/dL 2.9* 3.0*  --   --  3.3*   BILIRUBIN mg/dL 0.3 0.3  --   --  0.2   ALK PHOS U/L 66 64  --   --  70   AST (SGOT) U/L 12 14  --   --  11   ALT (SGPT) U/L 6 7  --   --  5   LIPASE U/L  --   --   --   --  20   AMMONIA umol/L  --   --  22  --   --        Assessment & Plan     Small bowel obstruction    SBO (small bowel obstruction)    HIV (human immunodeficiency virus infection)    Fecal incontinence      59-year-old gentleman presents with small bowel obstruction.      -He has recovered bowel function and tolerating a low residue diet.  Okay to discharge home.  He does not need to follow-up with general surgery.  He does have fecal incontinence and should follow-up  with gastroenterology regarding that and his cirrhosis.        This note was created using Dragon Voice Recognition software.    Yudy Hudson MD  10/19/24  14:32 EDT

## 2024-10-20 NOTE — OUTREACH NOTE
Prep Survey      Flowsheet Row Responses   [facility] facility patient discharged from? [facility]   Is LACE score < 7 ? No   Eligibility Readm Mgmt   Discharge diagnosis Small bowel obstruction   Does the patient have one of the following disease processes/diagnoses(primary or secondary)? Other   Does the patient have Home health ordered? No   Is there a DME ordered? No   Prep survey completed? Yes            SAGAR COHN - Registered Nurse

## 2024-10-21 NOTE — PAYOR COMM NOTE
"This is discharge notification for Bradley Day.    DC on 10/19/24 to home.    Reference/Auth#: [id number]     EXTENDED AUTHORIZATION PENDING:     Please call or fax determination to contact below.   Thank you.    Mari Sifuentes RN, [facility]  Utilization Review Nurse  [address] [place] [address]  Ph [phone]  Fx [phone]       Jalil Day (59 y.o. Male)       Date of Birth   1965    Social Security Number       Address   [address] [place] [address]    Home Phone   [phone]    MRN   [id number]       Holiness   None    Marital Status                               Admission Date   10/15/24    Admission Type   Emergency    Admitting Provider   Llanes Alvarez, Carlos, MD    Attending Provider       Department, Room/Bed   [facility] MEDICAL INPATIENT, 365/1       Discharge Date   10/19/2024    Discharge Disposition   Home or Self Care    Discharge Destination                                 Attending Provider: (none)   Allergies: No Known Allergies    Isolation: None   Infection: None   Code Status: Prior    Ht: 170.2 cm (67\")   Wt: 59 kg (130 lb)    Admission Cmt: None   Principal Problem: Small bowel obstruction [K56.609]                   Active Insurance as of 10/15/2024       Primary Coverage       Payor Plan Insurance Group Employer/Plan Group    ANTHEM BLUE CROSS ANTHEM BLUE CROSS BLUE SHIELD PPO [id number]       Payor Plan Address Payor Plan Phone Number Payor Plan Fax Number Effective Dates    [address] [phone]  1/1/2021 - None Entered    [place] [address]         Subscriber Name Subscriber Birth Date Member ID       JALIL DAY 1965 [id number]               Secondary Coverage       Payor Plan Insurance Group Employer/Plan Group    [employer] [id number]       Payor Plan Address Payor Plan Phone Number Payor Plan Fax Number Effective Dates    MAIL STOP:   9/4/2018 " - None Entered    [address] [address]       [facility] [id number]         Subscriber Name Subscriber Birth Date Member ID       JALIL DAY 1965 [id number]                     Emergency Contacts        (Rel.) Home Phone Work Phone Mobile Phone    NAYELI DAVIS (Spouse) -- -- [phone]              Operative/Procedure Notes (last 72 hours)  Notes from 10/18/24 1554 through 10/21/24 1554   No notes of this type exist for this encounter.          Physician Progress Notes (last 72 hours)        Yudy Hudson MD at 10/19/24 1432          Bariatric Surgery Progress Note    Name: Jalil Day ADMIT: 10/15/2024   : 1965  PCP: Provider, No Known    MRN: [id number] LOS: 4 days   AGE/SEX: 59 y.o. male  ROOM: 365/1   Subjective   Tolerating low residue diet and having bowel movements    Objective      Vital Signs  Vital Signs (range)  Temp:  [97.7 °F (36.5 °C)-98.5 °F (36.9 °C)] 97.9 °F (36.6 °C)  Heart Rate:  [78-95] 88  Resp:  [14-18] 17  BP: ()/(60-75) 108/70    Physical Exam:    Awake and alert  Normal mental status  Normal pulmonary effort  Abdomen soft and nontender extremities no tenderness or swelling      CBC    Results from last 7 days   Lab Units 10/19/24  0004 10/18/24  0540 10/17/24  0040 10/16/24  1610 10/16/24  0428 10/15/24  2149   WBC 10*3/mm3 10. 9.92 9.67 8.0 8.06 8.92   HEMOGLOBIN g/dL 8.3* 9.0* 9.3* 8.8* 8.2* 9.4*   PLATELETS 10*3/mm3 520* 541* 592* 603* 553* 637*     CMP   Results from last 7 days   Lab Units 10/18/24  0540 10/17/24  0040 10/16/24  1335 10/16/24  0428 10/15/24  2149   SODIUM mmol/L 139 138  --  138 138   POTASSIUM mmol/L 3.9 3.9  --  3.8 4.0   CHLORIDE mmol/L 104 101  --  103 103   CO2 mmol/L 27.0 25.3  --  28.9 27.7   BUN mg/dL 18 11  --  12 13   CREATININE mg/dL 1.05 0.97  --  0.95 0.95   GLUCOSE mg/dL 130* 80  --  98 107*   ALBUMIN g/dL 2.9* 3.0*  --   --  3.3*   BILIRUBIN mg/dL 0.3 0.3  --   --  0.2   ALK PHOS U/L 66 64  --   --  70   AST  (SGOT) U/L 12 14  --   --  11   ALT (SGPT) U/L 6 7  --   --  5   LIPASE U/L  --   --   --   --  20   AMMONIA umol/L  --   --  22  --   --        Assessment & Plan     Small bowel obstruction    SBO (small bowel obstruction)    HIV (human immunodeficiency virus infection)    Fecal incontinence      59-year-old gentleman presents with small bowel obstruction.      -He has recovered bowel function and tolerating a low residue diet.  Okay to discharge home.  He does not need to follow-up with general surgery.  He does have fecal incontinence and should follow-up with gastroenterology regarding that and his cirrhosis.        This note was created using Dragon Voice Recognition software.    Yudy Hudson MD  10/19/24  14:32 EDT      Electronically signed by Yudy Hudson MD at 10/19/24 1433       Consult Notes (last 72 hours)  Notes from 10/18/24 1554 through 10/21/24 1554   No notes of this type exist for this encounter.          Discharge Summary        Angel Banks MD at 10/19/24 1525                       [facility] Medicine Services  Discharge Summary    Date of Service: 10/19/2024  Patient Name: Felipe Nieves  : 1965  MRN: [id number]    Date of Admission: 10/15/2024  Discharge Diagnosis:     SBO evaluated per surgery, tolerating diet, cleared per surgery for discharge, conservative management  HIV +  Ascites, follow up outpatient with gastroenterology      Date of Discharge:  10/19/2024  Primary Care Physician: Provider, No Known      Presenting Problem:   Small bowel obstruction [K56.609]  Generalized abdominal pain [R10.84]  Intestinal obstruction, unspecified cause, unspecified whether partial or complete [K56.609]  Nausea and vomiting, unspecified vomiting type [R11.2]    Active and Resolved Hospital Problems:  Active Hospital Problems    Diagnosis POA    **Small bowel obstruction [K56.609] Yes    Fecal incontinence [R15.9] Yes    SBO (small bowel obstruction) [K56.609] Yes    HIV (human  "immunodeficiency virus infection) [Z21] Yes      Resolved Hospital Problems   No resolved problems to display.         Hospital Course     HPI:  Per the H&P written by Odessa Soni APRN , dated 10/15/24 :  \"Abdominal pain, Vomiting.Nasuea\"    Hospital Course:  Felipe Nieves is a 58 y.o. male with a CMH of HIV previous history of abdominal surgery with 3 hernia repair [id number] who presented to [facility] on 10/15/2024 with complaints of abdominal pain nausea vomiting.  He denies any hematemesis melena or hematochezia.  CT abdomen and pelvis with contrast per radiology shows findings suspicious for small bowel obstruction with likely high-grade transition point in the mid abdomen large volume ascites.  LFTs and bilirubin are normal.  He was given 4 mg Zofran 4 mg morphine in the emergency department and general surgery was consulted from the emergency department.     Surgery was consulted, patient was managed conservatively per surgery, tolerated the diet, he was clear from surgery standpoint for discharge.  GI evaluated the patient for ascites, paracentesis was unable to be performed secondary due to insufficient pocket of fluid, patient will need to follow-up outpatient for outpatient colonoscopy and further workup  and evaluation of ascites.  Patient blood pressure was soft, so diuretics were held for now.  Patient need to follow-up outpatient with gastroenterology outpatient for initiation of diuretics for ascites when deemed safe or appropriate.    Patient is currently clinically stable and ambulating. Plan for discharge discussed with the patient prior to discharge, patient agreed with the plan. Patient advised to return to hospital or go near by hospital or call 911, should patient's symptoms worsen or recur. Patient also advised to follow up with PCP within 1-5 days for recent hospitalization, monitoring and further management of patient's health problems.  Patient acknowledged " understanding and agreed with the discharge plan.     DISCHARGE Follow Up Recommendations for labs and diagnostics:     -Follow-up with the gastroenterology outpatient for workup for ascites    -Follow-up with the surgery outpatient    -Continue to take Biktarvy for HIV, follow-up with your infectious disease physician    -Follow-up with your PCP for recent hospitalization      Reasons For Change In Medications and Indications for New Medications:      Day of Discharge     Vital Signs:  Temp:  [97.7 °F (36.5 °C)-98.5 °F (36.9 °C)] 97.9 °F (36.6 °C)  Heart Rate:  [78-95] 88  Resp:  [14-18] 17  BP: ()/(60-75) 108/70    Physical Exam:  General Appearance:  Awake alert   Head:  Atraumatic normocephalic   Eyes:        No scleral icterus   Neck: Normal range of motion   Pulm: Clear to auscultation   Cardio: Heart sounds   Extremities: No edema on bilateral lower extremities   Abdomen: Soft,  nondistended       Musculoskeletal: Moves all extremities       Neurologic: No focal neurological deficits on my exam             Pertinent  and/or Most Recent Results     LAB RESULTS:      Lab 10/19/24  0004 10/18/24  0540 10/17/24  0040 10/16/24  1610 10/16/24  0753 10/16/24  0428 10/15/24  2149   WBC 10.24 9.92 9.67 8.0  --  8.06 8.92   HEMOGLOBIN 8.3* 9.0* 9.3* 8.8*  --  8.2* 9.4*   HEMATOCRIT 29.6* 31.4* 33.1* 30.3*  --  29.4* 33.7*   PLATELETS 520* 541* 592* 603*  --  553* 637*   NEUTROS ABS 4.85 5.92 6.26 4.2  --  3.59 3.99   IMMATURE GRANS (ABS) 0.04 0.03 0.02  --   --  0.02 0.03   LYMPHS ABS 4.15* 2.50 2.28 2.9  --  3.22* 3.81*   MONOS ABS 0.93* 1.24* 0.85 0.7  --  0.94* 0.86   EOS ABS 0.23 0.18 0.22 0.2  --  0.26 0.20   MCV 73.3* 73.2* 72.9* 73*  --  73.5* 73.7*   PROTIME  --  12.4* 11.5  --  11.4  --   --          Lab 10/18/24  0540 10/17/24  0040 10/16/24  0428 10/15/24  2149   SODIUM 139 138 138 138   POTASSIUM 3.9 3.9 3.8 4.0   CHLORIDE 104 101 103 103   CO2 27.0 25.3 28.9 27.7   ANION GAP 8.0 11.7 6.1 7.3   BUN 18  11 12 13   CREATININE 1.05 0.97 0.95 0.95   EGFR 82.8 91.1 93.9 93.9   GLUCOSE 130* 80 98 107*   CALCIUM 8.3* 8.4* 8.5* 9.0         Lab 10/18/24  0540 10/17/24  0040 10/15/24  2149   TOTAL PROTEIN 8.6* 8.7* 9.3*   ALBUMIN 2.9* 3.0* 3.3*   GLOBULIN 5.7 5.7 6.0   ALT (SGPT) 6 7 5   AST (SGOT) 12 14 11   BILIRUBIN 0.3 0.3 0.2   ALK PHOS 66 64 70   LIPASE  --   --  20         Lab 10/18/24  0540 10/17/24  0040 10/16/24  0753   PROTIME 12.4* 11.5 11.4   INR 1.15* 1.06 1.05             Lab 10/18/24  1052 10/16/24  0428   IRON  --  21*   IRON SATURATION (TSAT)  --  6*   TIBC  --  341   TRANSFERRIN  --  229   FERRITIN  --  32.70   ABO TYPING A  --    RH TYPING Positive  --    ANTIBODY SCREEN Negative  --          Brief Urine Lab Results  (Last result in the past 365 days)        Color   Clarity   Blood   Leuk Est   Nitrite   Protein   CREAT   Urine HCG        10/15/24 2239 Yellow   Clear   Negative   Negative   Negative   30 mg/dL (1+)                 Microbiology Results (last 10 days)       ** No results found for the last 240 hours. **            XR Abdomen KUB    Result Date: 10/18/2024  Impression: Impression: Interval dilution of contrast material from yesterday's small bowel follow-through, overall not well visualized. Gas and formed stool is noted in the colon, possibly increased compared to yesterday's exams, with otherwise similar bowel gas pattern overall with persistent dilated small bowel loops. Electronically Signed: Daren Heredia MD  10/18/2024 12:08 PM EDT  Workstation ID: [id number]    XR Abdomen KUB    Result Date: 10/17/2024  Impression: Impression: NG tube tip is in the lower cervical region. Electronically Signed: Mendoza Mitchell MD  10/17/2024 11:44 PM EDT  Workstation ID: [id number]    FL Small Bowel Follow Through Single-Contrast    Result Date: 10/17/2024  Impression: Impression: Abnormally dilated small bowel loops with lack of contrast opacification of the colon at 4 hours, suggesting high-grade small  bowel obstruction. Electronically Signed: Nadeen Paris MD  10/17/2024 2:32 PM EDT  Workstation ID: [id number]    US Liver    Result Date: 10/16/2024  Impression: Impression: Normal appearance of the liver. Small amount of abdominal and pelvic ascites. Electronically Signed: Fernanda Garrido MD  10/16/2024 9:57 AM EDT  Workstation ID: [id number]    XR Abdomen KUB    Result Date: 10/16/2024  Impression: Impression: Esophagogastric tube is in the stomach. Electronically Signed: Mendoza Mitchell MD  10/16/2024 2:18 AM EDT  Workstation ID: [id number]    CT Abdomen Pelvis With Contrast    Result Date: 10/15/2024  Impression: Impression: Findings suspicious for a small bowel obstruction with likely high-grade transition point in the mid abdomen. Large volume ascites. Electronically Signed: Kobe Noble  10/15/2024 11:03 PM EDT  Workstation ID: [id number]                 Labs Pending at Discharge:      Procedures Performed  Procedure(s):  LAPAROTOMY EXPLORATORY - CANCELLED.         Consults:   Consults       Date and Time Order Name Status Description    10/16/2024  5:35 AM Inpatient Gastroenterology Consult Completed     10/16/2024  5:35 AM Inpatient General Surgery Consult Completed     10/15/2024 11:50 PM Surgery (on-call MD unless specified)      10/15/2024 11:20 PM Hospitalist (on-call MD unless specified)                Discharge Details        Discharge Medications        Continue These Medications        Instructions Start Date   Biktarvy -25 MG per tablet  Generic drug: Bictegravir-Emtricitab-Tenofov   1 tablet, Daily      ergocalciferol 1.25 MG (17028 UT) capsule  Commonly known as: ERGOCALCIFEROL   1 capsule, Weekly               No Known Allergies      Discharge Disposition:   Home or Self Care    Diet:  Hospital:  Diet Order   Procedures    Diet: Gastrointestinal; Fiber-Restricted; Fluid Consistency: Thin (IDDSI 0)         Discharge Activity:         CODE STATUS:  Code Status and Medical Interventions: CPR  (Attempt to Resuscitate); Full Support   Ordered at: 10/15/24 4438     Code Status (Patient has no pulse and is not breathing):    CPR (Attempt to Resuscitate)     Medical Interventions (Patient has pulse or is breathing):    Full Support         No future appointments.    Additional Instructions for the Follow-ups that You Need to Schedule    The patient can follow-up in our office in 4 to 6 weeks after discharge for further evaluation of ascites, elevated AMA, and elevated smooth muscle antibody.     [phone]           Time spent on Discharge including face to face service:  >30 minutes    Part of this note may be an electronic transcription/translation of spoken language to printed text using the Dragon Dictation System.    Signature: Electronically signed by Angel Banks MD, 10/19/24, 15:25 EDT.  [facility]     Electronically signed by Angel Banks MD at 10/19/24 1626

## 2024-10-23 ENCOUNTER — READMISSION MANAGEMENT (OUTPATIENT)
Dept: CALL CENTER | Facility: HOSPITAL | Age: 59
End: 2024-10-23
Payer: COMMERCIAL

## 2024-10-24 NOTE — PAYOR COMM NOTE
"This is discharge notification for Jalil Nieves  Reference/Auth # [id number]   Pt discharged ON 10/19/24    Chen Anthony, RN, BSN  Utilization Review Nurse  [facility]  Direct & confidential phone # [phone]  Fax # [phone]      Jalil Nieves (59 y.o. Male)       Date of Birth   1965    Social Security Number       Address   [id number] [place] [address]  [place] [address]    Home Phone   [phone]    MRN   [id number]       Tenriism   None    Marital Status                               Admission Date   10/15/24    Admission Type   Emergency    Admitting Provider   Llanes Alvarez, Carlos, MD    Attending Provider       Department, Room/Bed   [facility] MEDICAL INPATIENT, 365/1       Discharge Date   10/19/2024    Discharge Disposition   Home or Self Care    Discharge Destination                                 Attending Provider: (none)   Allergies: No Known Allergies    Isolation: None   Infection: None   Code Status: Prior    Ht: 170.2 cm (67\")   Wt: 59 kg (130 lb)    Admission Cmt: None   Principal Problem: Small bowel obstruction [K56.609]                   Active Insurance as of 10/15/2024       Primary Coverage       Payor Plan Insurance Group Employer/Plan Group    ANTHEM BLUE CROSS ANTHEM BLUE CROSS BLUE SHIELD PPO [id number]       Payor Plan Address Payor Plan Phone Number Payor Plan Fax Number Effective Dates    [address] [phone]  1/1/2021 - None Entered    [facility] [id number]         Subscriber Name Subscriber Birth Date Member ID       JALIL NIEVES 1965 [id number]               Secondary Coverage       Payor Plan Insurance Group Employer/Plan Group    ANTHEM MEDICAID [facility] -[id number]       Payor Plan Address Payor Plan Phone Number Payor Plan Fax Number Effective Dates    MAIL STOP:   9/4/2018 - None Entered    [address] [address]       [facility] [address]         Subscriber Name Subscriber Birth Date Member ID       " "JALIL DAY 1965 [id number]                     Emergency Contacts        (Rel.) Home Phone Work Phone Mobile Phone    NAYELI DAVIS (Spouse) -- -- [phone]                 Discharge Summary        Angel Banks MD at 10/19/24 [facility]                       [facility] Medicine Services  Discharge Summary    Date of Service: 10/19/2024  Patient Name: Jalil Day  : 1965  MRN: [id number]    Date of Admission: 10/15/2024  Discharge Diagnosis:     SBO evaluated per surgery, tolerating diet, cleared per surgery for discharge, conservative management  HIV +  Ascites, follow up outpatient with gastroenterology      Date of Discharge:  10/19/2024  Primary Care Physician: Provider, No Known      Presenting Problem:   Small bowel obstruction [K56.609]  Generalized abdominal pain [R10.84]  Intestinal obstruction, unspecified cause, unspecified whether partial or complete [K56.609]  Nausea and vomiting, unspecified vomiting type [R11.2]    Active and Resolved Hospital Problems:  Active Hospital Problems    Diagnosis POA    **Small bowel obstruction [K56.609] Yes    Fecal incontinence [R15.9] Yes    SBO (small bowel obstruction) [K56.609] Yes    HIV (human immunodeficiency virus infection) [Z21] Yes      Resolved Hospital Problems   No resolved problems to display.         Hospital Course     HPI:  Per the H&P written by Odessa Soni APRN , dated 10/15/24 :  \"Abdominal pain, Vomiting.Nasuea\"    Hospital Course:  Jalil Day is a 58 y.o. male with a CMH of HIV previous history of abdominal surgery with 3 hernia repair [id number] who presented to [facility] on 10/15/2024 with complaints of abdominal pain nausea vomiting.  He denies any hematemesis melena or hematochezia.  CT abdomen and pelvis with contrast per radiology shows findings suspicious for small bowel obstruction with likely high-grade transition point in the mid abdomen large volume " ascites.  LFTs and bilirubin are normal.  He was given 4 mg Zofran 4 mg morphine in the emergency department and general surgery was consulted from the emergency department.     Surgery was consulted, patient was managed conservatively per surgery, tolerated the diet, he was clear from surgery standpoint for discharge.  GI evaluated the patient for ascites, paracentesis was unable to be performed secondary due to insufficient pocket of fluid, patient will need to follow-up outpatient for outpatient colonoscopy and further workup  and evaluation of ascites.  Patient blood pressure was soft, so diuretics were held for now.  Patient need to follow-up outpatient with gastroenterology outpatient for initiation of diuretics for ascites when deemed safe or appropriate.    Patient is currently clinically stable and ambulating. Plan for discharge discussed with the patient prior to discharge, patient agreed with the plan. Patient advised to return to hospital or go near by hospital or call 911, should patient's symptoms worsen or recur. Patient also advised to follow up with PCP within 1-5 days for recent hospitalization, monitoring and further management of patient's health problems.  Patient acknowledged understanding and agreed with the discharge plan.     DISCHARGE Follow Up Recommendations for labs and diagnostics:     -Follow-up with the gastroenterology outpatient for workup for ascites    -Follow-up with the surgery outpatient    -Continue to take Biktarvy for HIV, follow-up with your infectious disease physician    -Follow-up with your PCP for recent hospitalization      Reasons For Change In Medications and Indications for New Medications:      Day of Discharge     Vital Signs:  Temp:  [97.7 °F (36.5 °C)-98.5 °F (36.9 °C)] 97.9 °F (36.6 °C)  Heart Rate:  [78-95] 88  Resp:  [14-18] 17  BP: ()/(60-75) 108/70    Physical Exam:  General Appearance:  Awake alert   Head:  Atraumatic normocephalic   Eyes:        No  scleral icterus   Neck: Normal range of motion   Pulm: Clear to auscultation   Cardio: Heart sounds   Extremities: No edema on bilateral lower extremities   Abdomen: Soft,  nondistended       Musculoskeletal: Moves all extremities       Neurologic: No focal neurological deficits on my exam             Pertinent  and/or Most Recent Results     LAB RESULTS:      Lab 10/19/24  0004 10/18/24  0540 10/17/24  0040 10/16/24  1610 10/16/24  0753 10/16/24  0428 10/15/24  2149   WBC 10.24 9.92 9.67 8.0  --  8.06 8.92   HEMOGLOBIN 8.3* 9.0* 9.3* 8.8*  --  8.2* 9.4*   HEMATOCRIT 29.6* 31.4* 33.1* 30.3*  --  29.4* 33.7*   PLATELETS 520* 541* 592* 603*  --  553* 637*   NEUTROS ABS 4.85 5.92 6.26 4.2  --  3.59 3.99   IMMATURE GRANS (ABS) 0.04 0.03 0.02  --   --  0.02 0.03   LYMPHS ABS 4.15* 2.50 2.28 2.9  --  3.22* 3.81*   MONOS ABS 0.93* 1.24* 0.85 0.7  --  0.94* 0.86   EOS ABS 0.23 0.18 0.22 0.2  --  0.26 0.20   MCV 73.3* 73.2* 72.9* 73*  --  73.5* 73.7*   PROTIME  --  12.4* 11.5  --  11.4  --   --          Lab 10/18/24  0540 10/17/24  0040 10/16/24  0428 10/15/24  2149   SODIUM 139 138 138 138   POTASSIUM 3.9 3.9 3.8 4.0   CHLORIDE 104 101 103 103   CO2 27.0 25.3 28.9 27.7   ANION GAP 8.0 11.7 6.1 7.3   BUN 18 11 12 13   CREATININE 1.05 0.97 0.95 0.95   EGFR 82.8 91.1 93.9 93.9   GLUCOSE 130* 80 98 107*   CALCIUM 8.3* 8.4* 8.5* 9.0         Lab 10/18/24  0540 10/17/24  0040 10/15/24  2149   TOTAL PROTEIN 8.6* 8.7* 9.3*   ALBUMIN 2.9* 3.0* 3.3*   GLOBULIN 5.7 5.7 6.0   ALT (SGPT) 6 7 5   AST (SGOT) 12 14 11   BILIRUBIN 0.3 0.3 0.2   ALK PHOS 66 64 70   LIPASE  --   --  20         Lab 10/18/24  0540 10/17/24  0040 10/16/24  0753   PROTIME 12.4* 11.5 11.4   INR 1.15* 1.06 1.05             Lab 10/18/24  1052 10/16/24  0428   IRON  --  21*   IRON SATURATION (TSAT)  --  6*   TIBC  --  341   TRANSFERRIN  --  229   FERRITIN  --  32.70   ABO TYPING A  --    RH TYPING Positive  --    ANTIBODY SCREEN Negative  --          Brief Urine Lab  Results  (Last result in the past 365 days)        Color   Clarity   Blood   Leuk Est   Nitrite   Protein   CREAT   Urine HCG        10/15/24 2239 Yellow   Clear   Negative   Negative   Negative   30 mg/dL (1+)                 Microbiology Results (last 10 days)       ** No results found for the last 240 hours. **            XR Abdomen KUB    Result Date: 10/18/2024  Impression: Impression: Interval dilution of contrast material from yesterday's small bowel follow-through, overall not well visualized. Gas and formed stool is noted in the colon, possibly increased compared to yesterday's exams, with otherwise similar bowel gas pattern overall with persistent dilated small bowel loops. Electronically Signed: Daren Heredia MD  10/18/2024 12:08 PM EDT  Workstation ID: [id number]    XR Abdomen KUB    Result Date: 10/17/2024  Impression: Impression: NG tube tip is in the lower cervical region. Electronically Signed: Mendoza Mitchell MD  10/17/2024 11:44 PM EDT  Workstation ID: [id number]    FL Small Bowel Follow Through Single-Contrast    Result Date: 10/17/2024  Impression: Impression: Abnormally dilated small bowel loops with lack of contrast opacification of the colon at 4 hours, suggesting high-grade small bowel obstruction. Electronically Signed: Nadeen Paris MD  10/17/2024 2:32 PM EDT  Workstation ID: [id number]    US Liver    Result Date: 10/16/2024  Impression: Impression: Normal appearance of the liver. Small amount of abdominal and pelvic ascites. Electronically Signed: Fernanda Garrido MD  10/16/2024 9:57 AM EDT  Workstation ID: [id number]    XR Abdomen KUB    Result Date: 10/16/2024  Impression: Impression: Esophagogastric tube is in the stomach. Electronically Signed: Mendoza Mitchell MD  10/16/2024 2:18 AM EDT  Workstation ID: [id number]    CT Abdomen Pelvis With Contrast    Result Date: 10/15/2024  Impression: Impression: Findings suspicious for a small bowel obstruction with likely high-grade transition point in the mid  abdomen. Large volume ascites. Electronically Signed: Okbe D Aide  10/15/2024 11:03 PM EDT  Workstation ID: [id number]                 Labs Pending at Discharge:      Procedures Performed  Procedure(s):  LAPAROTOMY EXPLORATORY - CANCELLED.         Consults:   Consults       Date and Time Order Name Status Description    10/16/2024  5:35 AM Inpatient Gastroenterology Consult Completed     10/16/2024  5:35 AM Inpatient General Surgery Consult Completed     10/15/2024 11:50 PM Surgery (on-call MD unless specified)      10/15/2024 11:20 PM Hospitalist (on-call MD unless specified)                Discharge Details        Discharge Medications        Continue These Medications        Instructions Start Date   Biktarvy -25 MG per tablet  Generic drug: Bictegravir-Emtricitab-Tenofov   1 tablet, Daily      ergocalciferol 1.25 MG (17848 UT) capsule  Commonly known as: ERGOCALCIFEROL   1 capsule, Weekly               No Known Allergies      Discharge Disposition:   Home or Self Care    Diet:  Hospital:  Diet Order   Procedures    Diet: Gastrointestinal; Fiber-Restricted; Fluid Consistency: Thin (IDDSI 0)         Discharge Activity:         CODE STATUS:  Code Status and Medical Interventions: CPR (Attempt to Resuscitate); Full Support   Ordered at: 10/15/24 2946     Code Status (Patient has no pulse and is not breathing):    CPR (Attempt to Resuscitate)     Medical Interventions (Patient has pulse or is breathing):    Full Support         No future appointments.    Additional Instructions for the Follow-ups that You Need to Schedule    The patient can follow-up in our office in 4 to 6 weeks after discharge for further evaluation of ascites, elevated AMA, and elevated smooth muscle antibody.     [phone]           Time spent on Discharge including face to face service:  >30 minutes    Part of this note may be an electronic transcription/translation of spoken language to printed text using the Dragon Dictation  System.    Signature: Electronically signed by Angel Banks MD, 10/19/24, 15:25 EDT.  [facility]     Electronically signed by Angel Banks MD at 10/19/24 7292

## 2024-10-29 ENCOUNTER — READMISSION MANAGEMENT (OUTPATIENT)
Dept: CALL CENTER | Facility: HOSPITAL | Age: 59
End: 2024-10-29
Payer: COMMERCIAL

## 2024-10-29 NOTE — OUTREACH NOTE
Medical Week 2 Survey      Flowsheet Row Responses   [facility] patient discharged from? [facility]   Does the patient have one of the following disease processes/diagnoses(primary or secondary)? Other   Week 2 attempt successful? Yes   Call start time 1247   Discharge diagnosis Small bowel obstruction   Call end time 1301   Person spoke with today (if not patient) and relationship Patient   Meds reviewed with patient/caregiver? Yes   Is the patient having any side effects they believe may be caused by any medication additions or changes? No   Comments regarding appointments Patient states he has attempted numerous times to obtain a GI appt. He called and the GI office will not accept him without a referral. He called  and was told to call GI of [facility], they will not accept him either. Patient is frustrated and is still having symptoms and needs to be seen asap. Will send Case Management a message to assist in GI referral and appt.   Does the patient have a primary care provider?  No   PCP Nursing Intervention Provided number to obtain PCP   Comments regarding PCP Patient is attempting to see Jose Rosa. He plans to call again today to get appt to be established.   Has home health visited the patient within 72 hours of discharge? N/A   Psychosocial issues? No   Did the patient receive a copy of their discharge instructions? Yes   Nursing interventions Reviewed instructions with patient   What is the patient's perception of their health status since discharge? Improving  [still having some abdominal pain, but intermittent. On a low residue diet.]   Is the patient/caregiver able to teach back signs and symptoms related to disease process for when to call 911? Yes   Is the patient/caregiver able to teach back the hierarchy of who to call/visit for symptoms/problems? PCP, Specialist, Home health nurse, Urgent Care, ED, 911 Yes   If the patient is a current smoker, are they able to teach back resources for  cessation? Not a smoker   Week 2 Call Completed? Yes   Graduated Yes   Is the patient interested in additional calls from an ambulatory ? Yes   What is the reason the patient needs support from an ACM? Care Coordination  [Patient is in need of GI referral. He has not been able to see a GI specialist. He has attempted 2 different GI groups, neither will see him.]   Would this patient benefit from a Referral to Amb Social Work? No   Graduated/Revoked comments CM note sent in regards to assistance with GI appt and referral.   Call end time 1301            Rowan BROWNE - Registered Nurse

## 2024-10-30 ENCOUNTER — HOSPITAL ENCOUNTER (INPATIENT)
Facility: HOSPITAL | Age: 59
LOS: 5 days | Discharge: HOME | DRG: 388 | End: 2024-11-04
Attending: INTERNAL MEDICINE
Payer: COMMERCIAL

## 2024-10-30 ENCOUNTER — APPOINTMENT (OUTPATIENT)
Dept: CT IMAGING | Facility: HOSPITAL | Age: 59
DRG: 388 | End: 2024-10-30
Payer: COMMERCIAL

## 2024-10-30 ENCOUNTER — APPOINTMENT (OUTPATIENT)
Dept: GENERAL RADIOLOGY | Facility: HOSPITAL | Age: 59
DRG: 388 | End: 2024-10-30
Payer: COMMERCIAL

## 2024-10-30 DIAGNOSIS — R10.9 ABDOMINAL PAIN, UNSPECIFIED ABDOMINAL LOCATION: ICD-10-CM

## 2024-10-30 DIAGNOSIS — R18.8 OTHER ASCITES: ICD-10-CM

## 2024-10-30 DIAGNOSIS — K56.609 SMALL BOWEL OBSTRUCTION: Primary | ICD-10-CM

## 2024-10-30 PROBLEM — K43.2 INCISIONAL HERNIA, WITHOUT OBSTRUCTION OR GANGRENE: Status: ACTIVE | Noted: 2024-10-30

## 2024-10-30 LAB
ALBUMIN SERPL-MCNC: 3.2 G/DL (ref 3.5–5.2)
ALBUMIN/GLOB SERPL: 0.5 G/DL
ALP SERPL-CCNC: 69 U/L (ref 39–117)
ALT SERPL W P-5'-P-CCNC: <5 U/L (ref 1–41)
ANION GAP SERPL CALCULATED.3IONS-SCNC: 8.3 MMOL/L (ref 5–15)
AST SERPL-CCNC: 14 U/L (ref 1–40)
BACTERIA UR QL AUTO: NORMAL /HPF
BASOPHILS # BLD AUTO: 0.05 10*3/MM3 (ref 0–0.2)
BASOPHILS NFR BLD AUTO: 0.5 % (ref 0–1.5)
BILIRUB SERPL-MCNC: 0.4 MG/DL (ref 0–1.2)
BILIRUB UR QL STRIP: NEGATIVE
BUN SERPL-MCNC: 11 MG/DL (ref 6–20)
BUN/CREAT SERPL: 10.7 (ref 7–25)
CALCIUM SPEC-SCNC: 8.9 MG/DL (ref 8.6–10.5)
CHLORIDE SERPL-SCNC: 100 MMOL/L (ref 98–107)
CLARITY UR: CLEAR
CO2 SERPL-SCNC: 28.7 MMOL/L (ref 22–29)
COLOR UR: ABNORMAL
CREAT SERPL-MCNC: 1.03 MG/DL (ref 0.76–1.27)
D-LACTATE SERPL-SCNC: 0.6 MMOL/L (ref 0.3–2)
DEPRECATED RDW RBC AUTO: 50.7 FL (ref 37–54)
EGFRCR SERPLBLD CKD-EPI 2021: 84.7 ML/MIN/1.73
EOSINOPHIL # BLD AUTO: 0.21 10*3/MM3 (ref 0–0.4)
EOSINOPHIL NFR BLD AUTO: 2.1 % (ref 0.3–6.2)
ERYTHROCYTE [DISTWIDTH] IN BLOOD BY AUTOMATED COUNT: 19.8 % (ref 12.3–15.4)
GLOBULIN UR ELPH-MCNC: 6.1 GM/DL
GLUCOSE SERPL-MCNC: 136 MG/DL (ref 65–99)
GLUCOSE UR STRIP-MCNC: NEGATIVE MG/DL
HCT VFR BLD AUTO: 35.2 % (ref 37.5–51)
HGB BLD-MCNC: 10.2 G/DL (ref 13–17.7)
HGB UR QL STRIP.AUTO: NEGATIVE
HOLD SPECIMEN: NORMAL
HOLD SPECIMEN: NORMAL
HYALINE CASTS UR QL AUTO: NORMAL /LPF
IMM GRANULOCYTES # BLD AUTO: 0.03 10*3/MM3 (ref 0–0.05)
IMM GRANULOCYTES NFR BLD AUTO: 0.3 % (ref 0–0.5)
KETONES UR QL STRIP: ABNORMAL
LEUKOCYTE ESTERASE UR QL STRIP.AUTO: ABNORMAL
LIPASE SERPL-CCNC: 8 U/L (ref 13–60)
LYMPHOCYTES # BLD AUTO: 3.08 10*3/MM3 (ref 0.7–3.1)
LYMPHOCYTES NFR BLD AUTO: 30.9 % (ref 19.6–45.3)
MCH RBC QN AUTO: 21.5 PG (ref 26.6–33)
MCHC RBC AUTO-ENTMCNC: 29 G/DL (ref 31.5–35.7)
MCV RBC AUTO: 74.3 FL (ref 79–97)
MONOCYTES # BLD AUTO: 0.82 10*3/MM3 (ref 0.1–0.9)
MONOCYTES NFR BLD AUTO: 8.2 % (ref 5–12)
NEUTROPHILS NFR BLD AUTO: 5.79 10*3/MM3 (ref 1.7–7)
NEUTROPHILS NFR BLD AUTO: 58 % (ref 42.7–76)
NITRITE UR QL STRIP: NEGATIVE
NRBC BLD AUTO-RTO: 0 /100 WBC (ref 0–0.2)
PH UR STRIP.AUTO: 6.5 [PH] (ref 5–8)
PLATELET # BLD AUTO: 663 10*3/MM3 (ref 140–450)
PMV BLD AUTO: 8.2 FL (ref 6–12)
POTASSIUM SERPL-SCNC: 4.3 MMOL/L (ref 3.5–5.2)
PROT SERPL-MCNC: 9.3 G/DL (ref 6–8.5)
PROT UR QL STRIP: ABNORMAL
RBC # BLD AUTO: 4.74 10*6/MM3 (ref 4.14–5.8)
RBC # UR STRIP: NORMAL /HPF
REF LAB TEST METHOD: NORMAL
SODIUM SERPL-SCNC: 137 MMOL/L (ref 136–145)
SP GR UR STRIP: 1.02 (ref 1–1.03)
SQUAMOUS #/AREA URNS HPF: NORMAL /HPF
TSH SERPL DL<=0.05 MIU/L-ACNC: 2.73 UIU/ML (ref 0.27–4.2)
UROBILINOGEN UR QL STRIP: ABNORMAL
WBC # UR STRIP: NORMAL /HPF
WBC NRBC COR # BLD AUTO: 9.98 10*3/MM3 (ref 3.4–10.8)
WHOLE BLOOD HOLD COAG: NORMAL
WHOLE BLOOD HOLD SPECIMEN: NORMAL

## 2024-10-30 PROCEDURE — 99222 1ST HOSP IP/OBS MODERATE 55: CPT | Performed by: SURGERY

## 2024-10-30 PROCEDURE — 81001 URINALYSIS AUTO W/SCOPE: CPT | Performed by: EMERGENCY MEDICINE

## 2024-10-30 PROCEDURE — 74177 CT ABD & PELVIS W/CONTRAST: CPT

## 2024-10-30 PROCEDURE — 83605 ASSAY OF LACTIC ACID: CPT

## 2024-10-30 PROCEDURE — 99285 EMERGENCY DEPT VISIT HI MDM: CPT

## 2024-10-30 PROCEDURE — 25510000001 IOPAMIDOL PER 1 ML

## 2024-10-30 PROCEDURE — 25010000002 MORPHINE PER 10 MG: Performed by: INTERNAL MEDICINE

## 2024-10-30 PROCEDURE — 25010000002 ONDANSETRON PER 1 MG: Performed by: INTERNAL MEDICINE

## 2024-10-30 PROCEDURE — 74018 RADEX ABDOMEN 1 VIEW: CPT

## 2024-10-30 PROCEDURE — 80050 GENERAL HEALTH PANEL: CPT | Performed by: EMERGENCY MEDICINE

## 2024-10-30 PROCEDURE — 25010000002 LORAZEPAM PER 2 MG: Performed by: INTERNAL MEDICINE

## 2024-10-30 PROCEDURE — 83690 ASSAY OF LIPASE: CPT | Performed by: EMERGENCY MEDICINE

## 2024-10-30 RX ORDER — BISACODYL 10 MG
10 SUPPOSITORY, RECTAL RECTAL DAILY PRN
Status: DISCONTINUED | OUTPATIENT
Start: 2024-10-30 | End: 2024-11-04 | Stop reason: HOSPADM

## 2024-10-30 RX ORDER — ONDANSETRON 4 MG/1
4 TABLET, ORALLY DISINTEGRATING ORAL EVERY 6 HOURS PRN
Status: DISCONTINUED | OUTPATIENT
Start: 2024-10-30 | End: 2024-11-04 | Stop reason: HOSPADM

## 2024-10-30 RX ORDER — LORAZEPAM 2 MG/ML
1 INJECTION INTRAMUSCULAR EVERY 4 HOURS PRN
Status: DISCONTINUED | OUTPATIENT
Start: 2024-10-30 | End: 2024-11-04

## 2024-10-30 RX ORDER — ONDANSETRON 2 MG/ML
4 INJECTION INTRAMUSCULAR; INTRAVENOUS EVERY 6 HOURS PRN
Status: DISCONTINUED | OUTPATIENT
Start: 2024-10-30 | End: 2024-11-04 | Stop reason: HOSPADM

## 2024-10-30 RX ORDER — DIPHENHYDRAMINE HCL 25 MG
25 CAPSULE ORAL EVERY 6 HOURS PRN
Status: DISCONTINUED | OUTPATIENT
Start: 2024-10-30 | End: 2024-11-04 | Stop reason: HOSPADM

## 2024-10-30 RX ORDER — DIPHENHYDRAMINE HCL 25 MG
25 CAPSULE ORAL EVERY 6 HOURS PRN
COMMUNITY

## 2024-10-30 RX ORDER — SODIUM CHLORIDE 0.9 % (FLUSH) 0.9 %
10 SYRINGE (ML) INJECTION EVERY 12 HOURS SCHEDULED
Status: DISCONTINUED | OUTPATIENT
Start: 2024-10-30 | End: 2024-11-04 | Stop reason: HOSPADM

## 2024-10-30 RX ORDER — AMOXICILLIN 250 MG
2 CAPSULE ORAL 2 TIMES DAILY PRN
Status: DISCONTINUED | OUTPATIENT
Start: 2024-10-30 | End: 2024-11-04 | Stop reason: HOSPADM

## 2024-10-30 RX ORDER — BISACODYL 5 MG/1
5 TABLET, DELAYED RELEASE ORAL DAILY PRN
Status: DISCONTINUED | OUTPATIENT
Start: 2024-10-30 | End: 2024-11-04 | Stop reason: HOSPADM

## 2024-10-30 RX ORDER — HYDROCODONE BITARTRATE AND ACETAMINOPHEN 5; 325 MG/1; MG/1
1 TABLET ORAL EVERY 6 HOURS PRN
Status: DISCONTINUED | OUTPATIENT
Start: 2024-10-30 | End: 2024-11-04 | Stop reason: HOSPADM

## 2024-10-30 RX ORDER — LORAZEPAM 1 MG/1
1 TABLET ORAL EVERY 6 HOURS PRN
Status: DISPENSED | OUTPATIENT
Start: 2024-10-30 | End: 2024-11-04

## 2024-10-30 RX ORDER — NITROGLYCERIN 0.4 MG/1
0.4 TABLET SUBLINGUAL
Status: DISCONTINUED | OUTPATIENT
Start: 2024-10-30 | End: 2024-11-04 | Stop reason: HOSPADM

## 2024-10-30 RX ORDER — POLYETHYLENE GLYCOL 3350 17 G/17G
17 POWDER, FOR SOLUTION ORAL DAILY PRN
Status: DISCONTINUED | OUTPATIENT
Start: 2024-10-30 | End: 2024-11-04 | Stop reason: HOSPADM

## 2024-10-30 RX ORDER — SODIUM CHLORIDE 9 MG/ML
40 INJECTION, SOLUTION INTRAVENOUS AS NEEDED
Status: DISPENSED | OUTPATIENT
Start: 2024-10-30 | End: 2024-11-02

## 2024-10-30 RX ORDER — IOPAMIDOL 755 MG/ML
75 INJECTION, SOLUTION INTRAVASCULAR
Status: COMPLETED | OUTPATIENT
Start: 2024-10-30 | End: 2024-10-30

## 2024-10-30 RX ORDER — MORPHINE SULFATE 2 MG/ML
2 INJECTION, SOLUTION INTRAMUSCULAR; INTRAVENOUS EVERY 4 HOURS PRN
Status: DISCONTINUED | OUTPATIENT
Start: 2024-10-30 | End: 2024-10-30

## 2024-10-30 RX ORDER — MORPHINE SULFATE 2 MG/ML
2 INJECTION, SOLUTION INTRAMUSCULAR; INTRAVENOUS EVERY 4 HOURS PRN
Status: DISCONTINUED | OUTPATIENT
Start: 2024-10-30 | End: 2024-11-04 | Stop reason: HOSPADM

## 2024-10-30 RX ORDER — SODIUM CHLORIDE 0.9 % (FLUSH) 0.9 %
10 SYRINGE (ML) INJECTION AS NEEDED
Status: DISCONTINUED | OUTPATIENT
Start: 2024-10-30 | End: 2024-11-04 | Stop reason: HOSPADM

## 2024-10-30 RX ADMIN — MORPHINE SULFATE 4 MG: 4 INJECTION, SOLUTION INTRAMUSCULAR; INTRAVENOUS at 20:03

## 2024-10-30 RX ADMIN — Medication 10 ML: at 20:03

## 2024-10-30 RX ADMIN — ONDANSETRON 4 MG: 2 INJECTION, SOLUTION INTRAMUSCULAR; INTRAVENOUS at 09:53

## 2024-10-30 RX ADMIN — LORAZEPAM 1 MG: 2 INJECTION INTRAMUSCULAR; INTRAVENOUS at 09:53

## 2024-10-30 RX ADMIN — IOPAMIDOL 75 ML: 755 INJECTION, SOLUTION INTRAVENOUS at 08:00

## 2024-10-30 RX ADMIN — MORPHINE SULFATE 2 MG: 2 INJECTION, SOLUTION INTRAMUSCULAR; INTRAVENOUS at 15:01

## 2024-10-30 NOTE — PAYOR COMM NOTE
"Jalil Day (59 y.o. Male)       Date of Birth   1965    Social Security Number       Address   [id number] [place] [address]  [place] [facility]    Home Phone   [phone]    MRN   [id number]       Yarsanism   None    Marital Status                               Admission Date   10/30/24    Admission Type   Emergency    Admitting Provider   Gino Bae MD    Attending Provider   Gino Bae MD    Department, Room/Bed   [facility] EMERGENCY DEPARTMENT, 28/28       Discharge Date       Discharge Disposition       Discharge Destination                                 Attending Provider: Gino Bae MD    Allergies: No Known Allergies    Isolation: None   Infection: None   Code Status: Prior    Ht: 170.2 cm (67\")   Wt: 59 kg (130 lb 1.1 oz)    Admission Cmt: None   Principal Problem: SBO (small bowel obstruction) [K56.609]                   Active Insurance as of 10/30/2024       Primary Coverage       Payor Plan Insurance Group Employer/Plan Group    ANTH BLUE CROSS ANTH BLUE CROSS BLUE SHIELD PPO [id number]       Payor Plan Address Payor Plan Phone Number Payor Plan Fax Number Effective Dates    [address] [phone]  1/1/2021 - None Entered    [facility] [id number]         Subscriber Name Subscriber Birth Date Member ID       JALIL DAY 1965 [id number]               Secondary Coverage       Payor Plan Insurance Group Employer/Plan Group    ANTH MEDICAID [facility] [id number]       Payor Plan Address Payor Plan Phone Number Payor Plan Fax Number Effective Dates    MAIL STOP:   9/4/2018 - None Entered    [address] [address]       [facility] [address]         Subscriber Name Subscriber Birth Date Member ID       JALIL DAY 1965 [id number]                     Emergency Contacts        (Rel.) Home Phone Work Phone Mobile Phone    NAYELI DAVIS (Spouse) -- -- [phone]                 History & Physical    "     Gino Bae MD at 10/30/24 1125              [facility] Medicine Services  History & Physical    Patient Name: Felipe Nieves  : 1965  MRN: [id number]  Primary Care Physician:  Provider, No Known  Date of admission: 10/30/2024  Date and Time of Service: 10/30/2024 at 10am    Subjective      Chief Complaint: abdominal pain    History of Present Illness: Felipe Nieves is a 59 y.o. male with a PMH of HIV, who presented to [facility] on 10/30/2024 with  complaints of continued abdominal pain. Patient reports he was admitted here several days ago and was recently discharged for a small bowel obstruction. He states they were supposed to do surgery but canceled it. Patient reports he has been having small bowel movements but had a large bowel movement yesterday. Patient reports some nausea, but denies any recent vomiting. Patient denies any chest pain, shortness of air, or fever.  Patient was given 50 mcs of fentanyl en route via EMS. .    CT abd- Findings consistent with high-grade small bowel obstruction.  2. Questionable inflammation versus incomplete distention of segment of the transverse colon and sigmoid colon.  3. Small quantity ascites, slightly increased since 10/15/2024. It is of insufficient quantity for paracentesis purposes.  4.. The lower thoracic esophagus appears thickened and is likely inflamed. This is new since the prior study      Review of Systems   Constitutional:  Positive for fatigue. Negative for appetite change, chills and fever.   HENT:  Negative for congestion.    Eyes:  Negative for pain and redness.   Respiratory: Negative.  Negative for cough, chest tightness and shortness of breath.    Gastrointestinal:  Positive for abdominal distention, abdominal pain, nausea and vomiting. Negative for diarrhea.   Endocrine: Negative for cold intolerance and heat intolerance.   Genitourinary:  Negative for dysuria, flank pain and frequency.   Musculoskeletal:   Negative for back pain and myalgias.   Neurological:  Negative for dizziness, weakness and headaches.   Psychiatric/Behavioral:  Negative for sleep disturbance. The patient is not nervous/anxious.      Personal History     Past Medical History:   Diagnosis Date    HIV (human immunodeficiency virus infection)     Skin cancer        Past Surgical History:   Procedure Laterality Date    CYSTOSCOPY, URETEROSCOPY, RETROGRADE PYELOGRAM, STENT INSERTION Left 5/30/2024    Procedure: CYSTOSCOPY URETEROSCOPY HOLMIUM LASER STENT INSERTION;  Surgeon: Wale Taylor MD;  Location: [facility] MAIN OR;  Service: Urology;  Laterality: Left;    ENDOSCOPY N/A 6/1/2024    Procedure: ESOPHAGOGASTRODUODENOSCOPY, biopsy x2 areas;  Surgeon: Nelly Obando MD;  Location: [facility] ENDOSCOPY;  Service: Gastroenterology;  Laterality: N/A;  post: gastritis    HERNIA REPAIR      SKIN CANCER EXCISION         Family History: family history includes Cancer in his mother; Diabetes in his brother; Heart disease in his brother, father, and mother. Otherwise pertinent FHx was reviewed and not pertinent to current issue.    Social History:  reports that he has never smoked. He has never used smokeless tobacco. He reports that he does not currently use alcohol. Drug use questions deferred to the physician.    Home Medications:  Prior to Admission Medications       Prescriptions Last Dose Informant Patient Reported? Taking?    Bictegravir-Emtricitab-Tenofov (Biktarvy) -25 MG per tablet 10/29/2024  Yes Yes    Take 1 tablet by mouth Daily.    diphenhydrAMINE (BENADRYL) 25 mg capsule 10/30/2024  Yes Yes    Take 1 capsule by mouth Every 6 (Six) Hours As Needed for Sleep.    ergocalciferol (ERGOCALCIFEROL) 1.25 MG (56199 UT) capsule Past Week  Yes Yes    Take 1 capsule by mouth 1 (One) Time Per Week. Tuesdays              Allergies:  No Known Allergies    Objective      Vitals:   Temp:  [98.1 °F (36.7 °C)] 98.1 °F (36.7 °C)  Heart Rate:  [75-84]  77  Resp:  [16] 16  BP: (100-128)/(76-89) 116/83  Body mass index is 20.37 kg/m².  Physical Exam  Vitals and nursing note reviewed.   Constitutional:       General: He is not in acute distress.     Appearance: He is well-developed. He is ill-appearing. He is not toxic-appearing or diaphoretic.   HENT:      Head: Normocephalic and atraumatic.      Nose: Nose normal. No congestion or rhinorrhea.      Mouth/Throat:      Mouth: Mucous membranes are moist.      Pharynx: No oropharyngeal exudate.   Eyes:      General: No scleral icterus.        Right eye: No discharge.         Left eye: No discharge.      Extraocular Movements: Extraocular movements intact.      Conjunctiva/sclera: Conjunctivae normal.      Pupils: Pupils are equal, round, and reactive to light.   Neck:      Thyroid: No thyromegaly.      Vascular: No carotid bruit or JVD.      Trachea: No tracheal deviation.   Cardiovascular:      Rate and Rhythm: Normal rate and regular rhythm.      Pulses: Normal pulses.      Heart sounds: Normal heart sounds. No murmur heard.     No friction rub. No gallop.   Pulmonary:      Effort: Pulmonary effort is normal. No respiratory distress.      Breath sounds: Normal breath sounds. No stridor. No wheezing, rhonchi or rales.   Chest:      Chest wall: No tenderness.   Abdominal:      General: There is distension.      Palpations: There is no mass.      Tenderness: There is abdominal tenderness. There is no guarding or rebound.      Hernia: No hernia is present.      Comments: No bowl sounds   Musculoskeletal:         General: No swelling, tenderness, deformity or signs of injury. Normal range of motion.      Cervical back: Normal range of motion and neck supple. No rigidity. No muscular tenderness.      Right lower leg: No edema.      Left lower leg: No edema.      Comments: Loss of muscle mass   Lymphadenopathy:      Cervical: No cervical adenopathy.   Skin:     General: Skin is warm and dry.      Coloration: Skin is pale.  Skin is not jaundiced.      Findings: No bruising, erythema or rash.   Neurological:      General: No focal deficit present.      Mental Status: He is alert and oriented to person, place, and time. Mental status is at baseline.      Cranial Nerves: No cranial nerve deficit.      Sensory: No sensory deficit.      Motor: Weakness present. No abnormal muscle tone.      Coordination: Coordination normal.   Psychiatric:         Mood and Affect: Mood normal.         Behavior: Behavior normal.         Thought Content: Thought content normal.         Judgment: Judgment normal.         Diagnostic Data:  Lab Results (last 24 hours)       Procedure Component Value Units Date/Time    POC Lactate [[id number]]  (Normal) Collected: 10/30/24 0728    Specimen: Blood Updated: 10/30/24 0837     Lactate 0.6 mmol/L      Comment: Serial Number: [id number]:  [id number]       Urinalysis With Culture If Indicated - Urine, Clean Catch [[id number]]  (Abnormal) Collected: 10/30/24 0724    Specimen: Urine, Clean Catch Updated: 10/30/24 0734     Color, UA Dark Yellow     Appearance, UA Clear     pH, UA 6.5     Specific Gravity, UA 1.024     Glucose, UA Negative     Ketones, UA Trace     Bilirubin, UA Negative     Blood, UA Negative     Protein, UA 30 mg/dL (1+)     Leuk Esterase, UA Trace     Nitrite, UA Negative     Urobilinogen, UA 1.0 E.U./dL    Narrative:      In absence of clinical symptoms, the presence of pyuria, bacteria, and/or nitrites on the urinalysis result does not correlate with infection.    Urinalysis, Microscopic Only - Urine, Clean Catch [[id number]] Collected: 10/30/24 0724    Specimen: Urine, Clean Catch Updated: 10/30/24 0734     RBC, UA 0-2 /HPF      WBC, UA 0-2 /HPF      Comment: Urine culture not indicated.        Bacteria, UA None Seen /HPF      Squamous Epithelial Cells, UA 0-2 /HPF      Hyaline Casts, UA 0-2 /LPF      Methodology Automated Microscopy    Comprehensive Metabolic Panel [[id number]]  (Abnormal)  Collected: 10/30/24 0548    Specimen: Blood Updated: 10/30/24 0618     Glucose 136 mg/dL      BUN 11 mg/dL      Creatinine 1.03 mg/dL      Sodium 137 mmol/L      Potassium 4.3 mmol/L      Chloride 100 mmol/L      CO2 28.7 mmol/L      Calcium 8.9 mg/dL      Total Protein 9.3 g/dL      Albumin 3.2 g/dL      ALT (SGPT) <5 U/L      AST (SGOT) 14 U/L      Alkaline Phosphatase 69 U/L      Total Bilirubin 0.4 mg/dL      Globulin 6.1 gm/dL      A/G Ratio 0.5 g/dL      BUN/Creatinine Ratio 10.7     Anion Gap 8.3 mmol/L      eGFR 84.7 mL/min/1.73     Narrative:      GFR Normal >60  Chronic Kidney Disease <60  Kidney Failure <15      Lipase [[id number]]  (Abnormal) Collected: 10/30/24 0548    Specimen: Blood Updated: 10/30/24 0618     Lipase 8 U/L     CBC & Differential [[id number]]  (Abnormal) Collected: 10/30/24 0548    Specimen: Blood Updated: 10/30/24 0606    Narrative:      The following orders were created for panel order CBC & Differential.  Procedure                               Abnormality         Status                     ---------                               -----------         ------                     CBC Auto Differential[[id number]]        Abnormal            Final result               Scan Slide[[id number]]                                                                    Please view results for these tests on the individual orders.    CBC Auto Differential [[id number]]  (Abnormal) Collected: 10/30/24 0548    Specimen: Blood Updated: 10/30/24 0606     WBC 9.98 10*3/mm3      RBC 4.74 10*6/mm3      Hemoglobin 10.2 g/dL      Hematocrit 35.2 %      MCV 74.3 fL      MCH 21.5 pg      MCHC 29.0 g/dL      RDW 19.8 %      RDW-SD 50.7 fl      MPV 8.2 fL      Platelets 663 10*3/mm3      Neutrophil % 58.0 %      Lymphocyte % 30.9 %      Monocyte % 8.2 %      Eosinophil % 2.1 %      Basophil % 0.5 %      Immature Grans % 0.3 %      Neutrophils, Absolute 5.79 10*3/mm3      Lymphocytes, Absolute 3.08 10*3/mm3      Monocytes,  Absolute 0.82 10*3/mm3      Eosinophils, Absolute 0.21 10*3/mm3      Basophils, Absolute 0.05 10*3/mm3      Immature Grans, Absolute 0.03 10*3/mm3      nRBC 0.0 /100 WBC     [place] Draw [[id number]] Collected: 10/30/24 0548    Specimen: Blood Updated: 10/30/24 0601    Narrative:      The following orders were created for panel order [place] Draw.  Procedure                               Abnormality         Status                     ---------                               -----------         ------                     Green Top (Gel)[[id number]]                                  Final result               Lavender Top[[id number]]                                     Final result               Gold Top - SST[[id number]]                                   Final result               Light Blue Top[[id number]]                                   Final result                 Please view results for these tests on the individual orders.    Green Top (Gel) [[id number]] Collected: 10/30/24 0548    Specimen: Blood Updated: 10/30/24 0601     Extra Tube Hold for add-ons.     Comment: Auto resulted.       Lavender Top [[id number]] Collected: 10/30/24 0548    Specimen: Blood Updated: 10/30/24 0601     Extra Tube hold for add-on     Comment: Auto resulted       Gold Top - SST [[id number]] Collected: 10/30/24 0548    Specimen: Blood Updated: 10/30/24 0601     Extra Tube Hold for add-ons.     Comment: Auto resulted.       Light Blue Top [[id number]] Collected: 10/30/24 0548    Specimen: Blood Updated: 10/30/24 0601     Extra Tube Hold for add-ons.     Comment: Auto resulted                Imaging Results (Last 24 Hours)       Procedure Component Value Units Date/Time    XR Abdomen KUB [[id number]] Collected: 10/30/24 1042     Updated: 10/30/24 1045    Narrative:      XR ABDOMEN KUB    Date of Exam: 10/30/2024 10:22 AM EDT    Indication: NG placement    Comparison: CT abdomen and pelvis 10/3/2024    Findings:  NG tube tip extends to the mid  gastric body level. Moderate gas distention of upper abdominal bowel loops. Lung bases appear clear. Normal heart size. Excreted contrast is seen within the renal collecting systems bilaterally.      Impression:      Impression:  NG tube tip projects to the level of the mid gastric body.      Electronically Signed: Nadeen Paris MD    10/30/2024 10:43 AM EDT    Workstation ID: [id number]    CT Abdomen Pelvis With Contrast [[id number]] Collected: 10/30/24 0804     Updated: 10/30/24 0817    Narrative:      CT ABDOMEN PELVIS W CONTRAST    Date of Exam: 10/30/2024 7:45 AM EDT    Indication: abdominal pain, recent hx of bowel obstruction.    Comparison: KUB 10/18/2024. Small bowel follow-through 10/17/2024. CT abdomen pelvis 10/15/2024, 5/29/2024, 7/19/2022.    Technique: Axial CT images were obtained of the abdomen and pelvis following the uneventful intravenous administration of iodinated contrast. Sagittal and coronal reconstructions were performed.  Automated exposure control and iterative reconstruction   methods were used.        Findings:  Markedly dilated small bowel loops with air-fluid levels, marked gastric distention with air-fluid level, are again seen. Transition zone of small bowel is thought to be slightly the left of midline posteriorly (series 3 image 90) , distal to which the   small bowel loops are decompressed. Findings are consistent with high-grade small bowel obstruction. Similar findings can be seen on the 10/15/2024 examination.    Cecum is oriented anteriorly in the mid abdomen (series 3 image 78) and is distended up to 7 cm transversely., Moderate stool burden is seen within the ascending colon.    The transverse colon and the sigmoid colon appear thickened and may be inflamed, or simply may be more prominent due to there relatively decompressed states.    Small quantity abdominal pelvic ascites is present, only slightly increased since the prior study.    Probable tiny cyst in the left hepatic  lobe. Gallbladder, spleen, pancreas, adrenals are normal. Small bilateral renal cyst. 3 mm nonobstructing right renal stone. No hydronephrosis.    Urinary bladder and prostate gland are within normal    Limits.  The lower thoracic esophagus appears thickened and may be inflamed, new since prior study.    Heart size is normal. Coronary artery calcifications are present. No acute basilar consolidation.    Mild lumbar levoscoliotic curvature. No suspicious osseous abnormality.          Impression:      1. Findings consistent with high-grade small bowel obstruction.  2. Questionable inflammation versus incomplete distention of segment of the transverse colon and sigmoid colon.  3. Small quantity ascites, slightly increased since 10/15/2024. It is of insufficient quantity for paracentesis purposes.  4.. The lower thoracic esophagus appears thickened and is likely inflamed. This is new since the prior study.          Electronically Signed: Nadeen Paris MD    10/30/2024 8:15 AM EDT    Workstation ID: [id number]              Assessment & Plan        This is a 59 y.o. male with:    Active and Resolved Problems  Active Hospital Problems    Diagnosis  POA    **SBO (small bowel obstruction) [K56.609]  Yes    HIV (human immunodeficiency virus infection) [Z21]  Yes     Priority: Medium    Iron deficiency anemia [D50.9]  Yes     Priority: Low      Resolved Hospital Problems   No resolved problems to display.     Small bowel obstruction   Plan  Admit to medical floor  Surgical consult  Keep NPO  -Zofran PRN IV  NGT to low wall suction  IV fluids  Monitor CBC/BMP  KUB:NG tube tip projects to the level of the mid gastric body.  CT:Findings consistent with high-grade small bowel obstruction.  2. Questionable inflammation versus incomplete distention of segment of the transverse colon and sigmoid colon.  3. Small quantity ascites, slightly increased since 10/15/2024. It is of insufficient quantity for paracentesis purposes.  4.. The  lower thoracic esophagus appears thickened and is likely inflamed. This is new since the prior study     HIV  Holding po meds-Bictegravir-Emtricitab-Tenofov (Biktarvy) -25 MG per tablet     CATA-monitor CBC. Check iron    VTE Prophylaxis:  No VTE prophylaxis order currently exists.        The patient desires to be as follows:    CODE STATUS:           NAYELI DAVIS, who can be contacted at [phone] , is the designated person to make medical decisions on the patient's behalf if He is incapable of doing so. This was clarified with patient and/or next of kin on 10/30/2024 during the course of this H&P.    Admission Status:  I believe this patient meets admit status.    Expected Length of Stay: 11/2/24    PDMP and Medication Dispenses via Sidebar reviewed and consistent with patient reported medications.    I discussed the patient's findings and my recommendations with patient.      Signature:     This document has been electronically signed by Gino Bae MD on October 30, 2024 11:35 EDT   [facility] Hospitalist Team    Electronically signed by Gino Bae MD at 10/30/24 1146          Emergency Department Notes        Jacey George, RN at 10/30/24 0917          This nurse sent message to Dr Bae, patient currently refusing NG tube placement until he has medication that will help calm him down prior to insertion. Awaiting response.     Electronically signed by Jacey George, RN at 10/30/24 0969       Shelbi Arellano APRN at 10/30/24 0680          Subjective   History of Present Illness  Is a 59-year-old male who presents via EMS with complaints of continued abdominal pain.  Patient reports he was admitted here several days ago and was recently discharged for a small bowel obstruction.  He states they were supposed to do surgery but canceled it.  Patient reports he has been having small bowel movements but had a large bowel movement yesterday.  Patient reports some nausea, but denies  any recent vomiting.  Patient denies any chest pain, shortness of air, or fever.  Patient was sleeping when provider entered the room, but was easily arousable.  Patient was given 50 mcs of fentanyl en route via EMS.        Review of Systems   Constitutional:  Negative for fever.       Past Medical History:   Diagnosis Date    HIV (human immunodeficiency virus infection)     Skin cancer        No Known Allergies    Past Surgical History:   Procedure Laterality Date    CYSTOSCOPY, URETEROSCOPY, RETROGRADE PYELOGRAM, STENT INSERTION Left 5/30/2024    Procedure: CYSTOSCOPY URETEROSCOPY HOLMIUM LASER STENT INSERTION;  Surgeon: Wale Taylor MD;  Location: [facility] MAIN OR;  Service: Urology;  Laterality: Left;    ENDOSCOPY N/A 6/1/2024    Procedure: ESOPHAGOGASTRODUODENOSCOPY, biopsy x2 areas;  Surgeon: Nelly Obando MD;  Location: [facility] ENDOSCOPY;  Service: Gastroenterology;  Laterality: N/A;  post: gastritis    HERNIA REPAIR      SKIN CANCER EXCISION         Family History   Problem Relation Age of Onset    Heart disease Mother     Cancer Mother     Heart disease Father     Diabetes Brother     Heart disease Brother        Social History     Socioeconomic History    Marital status:    Tobacco Use    Smoking status: Never    Smokeless tobacco: Never   Vaping Use    Vaping status: Never Used   Substance and Sexual Activity    Alcohol use: Not Currently    Drug use: Defer    Sexual activity: Defer           Objective   Physical Exam  Vitals and nursing note reviewed.   Constitutional:       Appearance: He is well-developed.   HENT:      Head: Normocephalic and atraumatic.      Mouth/Throat:      Mouth: Mucous membranes are moist.   Eyes:      Extraocular Movements: Extraocular movements intact.   Cardiovascular:      Rate and Rhythm: Normal rate and regular rhythm.   Pulmonary:      Effort: Pulmonary effort is normal.      Breath sounds: Normal breath sounds.   Abdominal:      General: Abdomen is  "protuberant. There is distension.      Palpations: There is no mass.      Tenderness: There is generalized abdominal tenderness.      Comments: Abdominal distention with firmness present on palpation.  No fluid wave, or masses noted.   Skin:     General: Skin is warm and dry.      Capillary Refill: Capillary refill takes less than 2 seconds.   Neurological:      General: No focal deficit present.      Mental Status: He is alert and oriented to person, place, and time.   Psychiatric:         Mood and Affect: Mood normal.         Behavior: Behavior normal.         Procedures          ED Course         /83   Pulse 77   Temp 98.1 °F (36.7 °C) (Oral)   Resp 16   Ht 170.2 cm (67\")   Wt 59 kg (130 lb 1.1 oz)   SpO2 98%   BMI 20.37 kg/m²   Labs Reviewed   COMPREHENSIVE METABOLIC PANEL - Abnormal; Notable for the following components:       Result Value    Glucose 136 (*)     Total Protein 9.3 (*)     Albumin 3.2 (*)     All other components within normal limits    Narrative:     GFR Normal >60  Chronic Kidney Disease <60  Kidney Failure <15     LIPASE - Abnormal; Notable for the following components:    Lipase 8 (*)     All other components within normal limits   URINALYSIS W/ CULTURE IF INDICATED - Abnormal; Notable for the following components:    Color, UA Dark Yellow (*)     Ketones, UA Trace (*)     Protein, UA 30 mg/dL (1+) (*)     Leuk Esterase, UA Trace (*)     All other components within normal limits    Narrative:     In absence of clinical symptoms, the presence of pyuria, bacteria, and/or nitrites on the urinalysis result does not correlate with infection.   CBC WITH AUTO DIFFERENTIAL - Abnormal; Notable for the following components:    Hemoglobin 10.2 (*)     Hematocrit 35.2 (*)     MCV 74.3 (*)     MCH 21.5 (*)     MCHC 29.0 (*)     RDW 19.8 (*)     Platelets 663 (*)     All other components within normal limits   POC LACTATE - Normal   RAINBOW DRAW    Narrative:     The following orders were " created for panel order [place] Draw.  Procedure                               Abnormality         Status                     ---------                               -----------         ------                     Green Top (Gel)[[id number]]                                  Final result               Lavender Top[[id number]]                                     Final result               Gold Top - SST[[id number]]                                   Final result               Light Blue Top[[id number]]                                   Final result                 Please view results for these tests on the individual orders.   URINALYSIS, MICROSCOPIC ONLY   POC LACTATE   GREEN TOP   LAVENDER TOP   GOLD TOP - SST   LIGHT BLUE TOP   CBC AND DIFFERENTIAL    Narrative:     The following orders were created for panel order CBC & Differential.  Procedure                               Abnormality         Status                     ---------                               -----------         ------                     CBC Auto Differential[[id number]]        Abnormal            Final result               Scan Slide[[id number]]                                                                    Please view results for these tests on the individual orders.     Medications   iopamidol (ISOVUE-370) 76 % injection 75 mL (75 mL Intravenous Given 10/30/24 0800)       CT Abdomen Pelvis With Contrast    Result Date: 10/30/2024  1. Findings consistent with high-grade small bowel obstruction. 2. Questionable inflammation versus incomplete distention of segment of the transverse colon and sigmoid colon. 3. Small quantity ascites, slightly increased since 10/15/2024. It is of insufficient quantity for paracentesis purposes. 4.. The lower thoracic esophagus appears thickened and is likely inflamed. This is new since the prior study. Electronically Signed: Nadeen Paris MD  10/30/2024 8:15 AM EDT  Workstation ID: [id number]                                          Medical Decision Making  Chart review: Date of Admission: 10/15/2024  Discharge Diagnosis:    SBO evaluated per surgery, tolerating diet, cleared per surgery for discharge, conservative management  HIV +  Ascites, follow up outpatient with gastroenterology     Radiology interpretation:  CT abdomen/pelvis reviewed and interpreted by Dinah: Positive for small bowel obstruction, ascites.   Further interpretation by radiologist as above  Lab interpretation:  Labs all viewed by me and significant for: Lipase 8, BUN 11, creatinine 1.03, ALT <5, AST 14, white count 9.98,  Hemoglobin 10.2, platelets 663. POC lactate 0.6    IV was established and labs were obtained to evaluate for bowel obstruction, infection, electrolyte imbalance.  Patient was admitted here on 10/15 for small bowel obstruction, with a scheduled laparoscopic procedure that was canceled.  Patient came in today complaining of pain, and abdominal distention.  Patient was given 50 mcg of fentanyl and route by EMS.  CT abdomen pelvis with contrast was ordered.  He was positive for small bowel obstruction and increased ascites.  Hospitalist and surgeon consulted.  NG tube with low intermittent suction was ordered.  On reexamination patient is resting comfortably in the bed nontoxic in appearance, with no signs and symptoms of distress.  Discussed findings, and decision to admit.  Patient was agreeable to admission and plan of care. Spoke to Dr. Koch.  Spoke with Dr. Singer and he agreed to admission and to assume care.    Appropriate PPE worn during exam.  Discussed care with: Dr. Koch. Dr. Bae     Based on the clinical findings at this time I anticipate the patient will require a 2 midnight stay  Discussed with Dr. Nix.    Problems Addressed:  Abdominal pain, unspecified abdominal location: complicated acute illness or injury  Other ascites: complicated acute illness or injury  Small bowel obstruction: complicated acute illness  or injury    Amount and/or Complexity of Data Reviewed  Labs: ordered.  Radiology: ordered.    Risk  Prescription drug management.        Final diagnoses:   Small bowel obstruction   Abdominal pain, unspecified abdominal location   Other ascites       ED Disposition  ED Disposition       ED Disposition   Decision to Admit    Condition   --    Comment   Level of Care: Telemetry [5]   Admitting Physician: SANDRA MONROY [[id number]]   Attending Physician: SANDRA MONROY [[id number]]                 No follow-up provider specified.       Medication List      No changes were made to your prescriptions during this visit.            Shelbi Arellano APRN  10/30/24 0909      Electronically signed by Shelbi Arellano APRN at 10/30/24 0909       Vital Signs (last day)       Date/Time Temp Temp src Pulse Resp BP Patient Position SpO2    10/30/24 0715 -- -- 77 -- 116/83 -- 98    10/30/24 0700 -- -- 84 -- 125/89 -- 98    10/30/24 0632 -- -- 77 -- 128/89 -- 98    10/30/24 0616 -- -- 75 -- 125/87 -- 98    10/30/24 0601 -- -- 77 -- 113/83 -- 97    10/30/24 0546 -- -- 81 -- 100/76 -- 98    10/30/24 0544 -- -- 76 -- 116/89 -- 99    10/30/24 0535 98.1 (36.7) Oral 78 16 127/78 -- 99          Lab Results (last 24 hours)       Procedure Component Value Units Date/Time    POC Lactate [[id number]]  (Normal) Collected: 10/30/24 0728    Specimen: Blood Updated: 10/30/24 0837     Lactate 0.6 mmol/L      Comment: Serial Number: [id number]:  [id number]       Urinalysis With Culture If Indicated - Urine, Clean Catch [[id number]]  (Abnormal) Collected: 10/30/24 0724    Specimen: Urine, Clean Catch Updated: 10/30/24 0734     Color, UA Dark Yellow     Appearance, UA Clear     pH, UA 6.5     Specific Gravity, UA 1.024     Glucose, UA Negative     Ketones, UA Trace     Bilirubin, UA Negative     Blood, UA Negative     Protein, UA 30 mg/dL (1+)     Leuk Esterase, UA Trace     Nitrite, UA Negative     Urobilinogen, UA 1.0 E.U./dL    Narrative:       In absence of clinical symptoms, the presence of pyuria, bacteria, and/or nitrites on the urinalysis result does not correlate with infection.    Urinalysis, Microscopic Only - Urine, Clean Catch [[id number]] Collected: 10/30/24 0724    Specimen: Urine, Clean Catch Updated: 10/30/24 0734     RBC, UA 0-2 /HPF      WBC, UA 0-2 /HPF      Comment: Urine culture not indicated.        Bacteria, UA None Seen /HPF      Squamous Epithelial Cells, UA 0-2 /HPF      Hyaline Casts, UA 0-2 /LPF      Methodology Automated Microscopy    Comprehensive Metabolic Panel [[id number]]  (Abnormal) Collected: 10/30/24 0548    Specimen: Blood Updated: 10/30/24 0618     Glucose 136 mg/dL      BUN 11 mg/dL      Creatinine 1.03 mg/dL      Sodium 137 mmol/L      Potassium 4.3 mmol/L      Chloride 100 mmol/L      CO2 28.7 mmol/L      Calcium 8.9 mg/dL      Total Protein 9.3 g/dL      Albumin 3.2 g/dL      ALT (SGPT) <5 U/L      AST (SGOT) 14 U/L      Alkaline Phosphatase 69 U/L      Total Bilirubin 0.4 mg/dL      Globulin 6.1 gm/dL      A/G Ratio 0.5 g/dL      BUN/Creatinine Ratio 10.7     Anion Gap 8.3 mmol/L      eGFR 84.7 mL/min/1.73     Narrative:      GFR Normal >60  Chronic Kidney Disease <60  Kidney Failure <15      Lipase [[id number]]  (Abnormal) Collected: 10/30/24 0548    Specimen: Blood Updated: 10/30/24 0618     Lipase 8 U/L     CBC & Differential [[id number]]  (Abnormal) Collected: 10/30/24 0548    Specimen: Blood Updated: 10/30/24 0606    Narrative:      The following orders were created for panel order CBC & Differential.  Procedure                               Abnormality         Status                     ---------                               -----------         ------                     CBC Auto Differential[[id number]]        Abnormal            Final result               Scan Slide[[id number]]                                                                    Please view results for these tests on the individual orders.     CBC Auto Differential [[id number]]  (Abnormal) Collected: 10/30/24 0548    Specimen: Blood Updated: 10/30/24 0606     WBC 9.98 10*3/mm3      RBC 4.74 10*6/mm3      Hemoglobin 10.2 g/dL      Hematocrit 35.2 %      MCV 74.3 fL      MCH 21.5 pg      MCHC 29.0 g/dL      RDW 19.8 %      RDW-SD 50.7 fl      MPV 8.2 fL      Platelets 663 10*3/mm3      Neutrophil % 58.0 %      Lymphocyte % 30.9 %      Monocyte % 8.2 %      Eosinophil % 2.1 %      Basophil % 0.5 %      Immature Grans % 0.3 %      Neutrophils, Absolute 5.79 10*3/mm3      Lymphocytes, Absolute 3.08 10*3/mm3      Monocytes, Absolute 0.82 10*3/mm3      Eosinophils, Absolute 0.21 10*3/mm3      Basophils, Absolute 0.05 10*3/mm3      Immature Grans, Absolute 0.03 10*3/mm3      nRBC 0.0 /100 WBC     [place] Draw [[id number]] Collected: 10/30/24 0548    Specimen: Blood Updated: 10/30/24 0601    Narrative:      The following orders were created for panel order [place] Draw.  Procedure                               Abnormality         Status                     ---------                               -----------         ------                     Green Top (Gel)[[id number]]                                  Final result               Lavender Top[[id number]]                                     Final result               Gold Top - SST[[id number]]                                   Final result               Light Blue Top[[id number]]                                   Final result                 Please view results for these tests on the individual orders.    Green Top (Gel) [[id number]] Collected: 10/30/24 0548    Specimen: Blood Updated: 10/30/24 0601     Extra Tube Hold for add-ons.     Comment: Auto resulted.       Lavender Top [[id number]] Collected: 10/30/24 0548    Specimen: Blood Updated: 10/30/24 0601     Extra Tube hold for add-on     Comment: Auto resulted       Gold Top - SST [[id number]] Collected: 10/30/24 0548    Specimen: Blood Updated: 10/30/24 0601     Extra  Tube Hold for add-ons.     Comment: Auto resulted.       Light Blue Top [[id number]] Collected: 10/30/24 0548    Specimen: Blood Updated: 10/30/24 0601     Extra Tube Hold for add-ons.     Comment: Auto resulted             Imaging Results (Last 24 Hours)       Procedure Component Value Units Date/Time    XR Abdomen KUB [[id number]] Collected: 10/30/24 1042     Updated: 10/30/24 1045    Narrative:      XR ABDOMEN KUB    Date of Exam: 10/30/2024 10:22 AM EDT    Indication: NG placement    Comparison: CT abdomen and pelvis 10/3/2024    Findings:  NG tube tip extends to the mid gastric body level. Moderate gas distention of upper abdominal bowel loops. Lung bases appear clear. Normal heart size. Excreted contrast is seen within the renal collecting systems bilaterally.      Impression:      Impression:  NG tube tip projects to the level of the mid gastric body.      Electronically Signed: Nadeen Paris MD    10/30/2024 10:43 AM EDT    Workstation ID: [id number]    CT Abdomen Pelvis With Contrast [[id number]] Collected: 10/30/24 0804     Updated: 10/30/24 0817    Narrative:      CT ABDOMEN PELVIS W CONTRAST    Date of Exam: 10/30/2024 7:45 AM EDT    Indication: abdominal pain, recent hx of bowel obstruction.    Comparison: KUB 10/18/2024. Small bowel follow-through 10/17/2024. CT abdomen pelvis 10/15/2024, 5/29/2024, 7/19/2022.    Technique: Axial CT images were obtained of the abdomen and pelvis following the uneventful intravenous administration of iodinated contrast. Sagittal and coronal reconstructions were performed.  Automated exposure control and iterative reconstruction   methods were used.        Findings:  Markedly dilated small bowel loops with air-fluid levels, marked gastric distention with air-fluid level, are again seen. Transition zone of small bowel is thought to be slightly the left of midline posteriorly (series 3 image 90) , distal to which the   small bowel loops are decompressed. Findings are  consistent with high-grade small bowel obstruction. Similar findings can be seen on the 10/15/2024 examination.    Cecum is oriented anteriorly in the mid abdomen (series 3 image 78) and is distended up to 7 cm transversely., Moderate stool burden is seen within the ascending colon.    The transverse colon and the sigmoid colon appear thickened and may be inflamed, or simply may be more prominent due to there relatively decompressed states.    Small quantity abdominal pelvic ascites is present, only slightly increased since the prior study.    Probable tiny cyst in the left hepatic lobe. Gallbladder, spleen, pancreas, adrenals are normal. Small bilateral renal cyst. 3 mm nonobstructing right renal stone. No hydronephrosis.    Urinary bladder and prostate gland are within normal    Limits.  The lower thoracic esophagus appears thickened and may be inflamed, new since prior study.    Heart size is normal. Coronary artery calcifications are present. No acute basilar consolidation.    Mild lumbar levoscoliotic curvature. No suspicious osseous abnormality.          Impression:      1. Findings consistent with high-grade small bowel obstruction.  2. Questionable inflammation versus incomplete distention of segment of the transverse colon and sigmoid colon.  3. Small quantity ascites, slightly increased since 10/15/2024. It is of insufficient quantity for paracentesis purposes.  4.. The lower thoracic esophagus appears thickened and is likely inflamed. This is new since the prior study.          Electronically Signed: Nadeen Paris MD    10/30/2024 8:15 AM EDT    Workstation ID: [id number]          Operative/Procedure Notes (last 24 hours)  Notes from 10/29/24 1256 through 10/30/24 1256   No notes of this type exist for this encounter.       Physician Progress Notes (last 24 hours)  Notes from 10/29/24 1256 through 10/30/24 1256   No notes of this type exist for this encounter.       Consult Notes (last 24 hours)  Notes  from 10/29/24 1256 through 10/30/24 1256   No notes of this type exist for this encounter.

## 2024-10-30 NOTE — ED NOTES
This nurse sent message to Dr Bae, patient currently refusing NG tube placement until he has medication that will help calm him down prior to insertion. Awaiting response.

## 2024-10-30 NOTE — H&P
[facility] Medicine Services  History & Physical    Patient Name: Felipe Nieves  : 1965  MRN: [id number]  Primary Care Physician:  Provider, No Known  Date of admission: 10/30/2024  Date and Time of Service: 10/30/2024 at 10am    Subjective      Chief Complaint: abdominal pain    History of Present Illness: Felipe Nieves is a 59 y.o. male with a PMH of HIV, who presented to [facility] on 10/30/2024 with  complaints of continued abdominal pain. Patient reports he was admitted here several days ago and was recently discharged for a small bowel obstruction. He states they were supposed to do surgery but canceled it. Patient reports he has been having small bowel movements but had a large bowel movement yesterday. Patient reports some nausea, but denies any recent vomiting. Patient denies any chest pain, shortness of air, or fever.  Patient was given 50 mcs of fentanyl en route via EMS. .    CT abd- Findings consistent with high-grade small bowel obstruction.  2. Questionable inflammation versus incomplete distention of segment of the transverse colon and sigmoid colon.  3. Small quantity ascites, slightly increased since 10/15/2024. It is of insufficient quantity for paracentesis purposes.  4.. The lower thoracic esophagus appears thickened and is likely inflamed. This is new since the prior study      Review of Systems   Constitutional:  Positive for fatigue. Negative for appetite change, chills and fever.   HENT:  Negative for congestion.    Eyes:  Negative for pain and redness.   Respiratory: Negative.  Negative for cough, chest tightness and shortness of breath.    Gastrointestinal:  Positive for abdominal distention, abdominal pain, nausea and vomiting. Negative for diarrhea.   Endocrine: Negative for cold intolerance and heat intolerance.   Genitourinary:  Negative for dysuria, flank pain and frequency.   Musculoskeletal:  Negative for back pain and myalgias.   Neurological:   Negative for dizziness, weakness and headaches.   Psychiatric/Behavioral:  Negative for sleep disturbance. The patient is not nervous/anxious.      Personal History     Past Medical History:   Diagnosis Date    HIV (human immunodeficiency virus infection)     Skin cancer        Past Surgical History:   Procedure Laterality Date    CYSTOSCOPY, URETEROSCOPY, RETROGRADE PYELOGRAM, STENT INSERTION Left 5/30/2024    Procedure: CYSTOSCOPY URETEROSCOPY HOLMIUM LASER STENT INSERTION;  Surgeon: Wale Taylor MD;  Location: [facility] MAIN OR;  Service: Urology;  Laterality: Left;    ENDOSCOPY N/A 6/1/2024    Procedure: ESOPHAGOGASTRODUODENOSCOPY, biopsy x2 areas;  Surgeon: Nelly Obando MD;  Location: [facility] ENDOSCOPY;  Service: Gastroenterology;  Laterality: N/A;  post: gastritis    HERNIA REPAIR      SKIN CANCER EXCISION         Family History: family history includes Cancer in his mother; Diabetes in his brother; Heart disease in his brother, father, and mother. Otherwise pertinent FHx was reviewed and not pertinent to current issue.    Social History:  reports that he has never smoked. He has never used smokeless tobacco. He reports that he does not currently use alcohol. Drug use questions deferred to the physician.    Home Medications:  Prior to Admission Medications       Prescriptions Last Dose Informant Patient Reported? Taking?    Bictegravir-Emtricitab-Tenofov (Biktarvy) -25 MG per tablet 10/29/2024  Yes Yes    Take 1 tablet by mouth Daily.    diphenhydrAMINE (BENADRYL) 25 mg capsule 10/30/2024  Yes Yes    Take 1 capsule by mouth Every 6 (Six) Hours As Needed for Sleep.    ergocalciferol (ERGOCALCIFEROL) 1.25 MG (41191 UT) capsule Past Week  Yes Yes    Take 1 capsule by mouth 1 (One) Time Per Week. Tuesdays              Allergies:  No Known Allergies    Objective      Vitals:   Temp:  [98.1 °F (36.7 °C)] 98.1 °F (36.7 °C)  Heart Rate:  [75-84] 77  Resp:  [16] 16  BP: (100-128)/(76-89) 116/83  Body mass  index is 20.37 kg/m².  Physical Exam  Vitals and nursing note reviewed.   Constitutional:       General: He is not in acute distress.     Appearance: He is well-developed. He is ill-appearing. He is not toxic-appearing or diaphoretic.   HENT:      Head: Normocephalic and atraumatic.      Nose: Nose normal. No congestion or rhinorrhea.      Mouth/Throat:      Mouth: Mucous membranes are moist.      Pharynx: No oropharyngeal exudate.   Eyes:      General: No scleral icterus.        Right eye: No discharge.         Left eye: No discharge.      Extraocular Movements: Extraocular movements intact.      Conjunctiva/sclera: Conjunctivae normal.      Pupils: Pupils are equal, round, and reactive to light.   Neck:      Thyroid: No thyromegaly.      Vascular: No carotid bruit or JVD.      Trachea: No tracheal deviation.   Cardiovascular:      Rate and Rhythm: Normal rate and regular rhythm.      Pulses: Normal pulses.      Heart sounds: Normal heart sounds. No murmur heard.     No friction rub. No gallop.   Pulmonary:      Effort: Pulmonary effort is normal. No respiratory distress.      Breath sounds: Normal breath sounds. No stridor. No wheezing, rhonchi or rales.   Chest:      Chest wall: No tenderness.   Abdominal:      General: There is distension.      Palpations: There is no mass.      Tenderness: There is abdominal tenderness. There is no guarding or rebound.      Hernia: No hernia is present.      Comments: No bowl sounds   Musculoskeletal:         General: No swelling, tenderness, deformity or signs of injury. Normal range of motion.      Cervical back: Normal range of motion and neck supple. No rigidity. No muscular tenderness.      Right lower leg: No edema.      Left lower leg: No edema.      Comments: Loss of muscle mass   Lymphadenopathy:      Cervical: No cervical adenopathy.   Skin:     General: Skin is warm and dry.      Coloration: Skin is pale. Skin is not jaundiced.      Findings: No bruising, erythema or  rash.   Neurological:      General: No focal deficit present.      Mental Status: He is alert and oriented to person, place, and time. Mental status is at baseline.      Cranial Nerves: No cranial nerve deficit.      Sensory: No sensory deficit.      Motor: Weakness present. No abnormal muscle tone.      Coordination: Coordination normal.   Psychiatric:         Mood and Affect: Mood normal.         Behavior: Behavior normal.         Thought Content: Thought content normal.         Judgment: Judgment normal.         Diagnostic Data:  Lab Results (last 24 hours)       Procedure Component Value Units Date/Time    POC Lactate [[id number]]  (Normal) Collected: 10/30/24 0728    Specimen: Blood Updated: 10/30/24 0837     Lactate 0.6 mmol/L      Comment: Serial Number: [id number]:  [id number]       Urinalysis With Culture If Indicated - Urine, Clean Catch [[id number]]  (Abnormal) Collected: 10/30/24 0724    Specimen: Urine, Clean Catch Updated: 10/30/24 0734     Color, UA Dark Yellow     Appearance, UA Clear     pH, UA 6.5     Specific Gravity, UA 1.024     Glucose, UA Negative     Ketones, UA Trace     Bilirubin, UA Negative     Blood, UA Negative     Protein, UA 30 mg/dL (1+)     Leuk Esterase, UA Trace     Nitrite, UA Negative     Urobilinogen, UA 1.0 E.U./dL    Narrative:      In absence of clinical symptoms, the presence of pyuria, bacteria, and/or nitrites on the urinalysis result does not correlate with infection.    Urinalysis, Microscopic Only - Urine, Clean Catch [[id number]] Collected: 10/30/24 0724    Specimen: Urine, Clean Catch Updated: 10/30/24 0734     RBC, UA 0-2 /HPF      WBC, UA 0-2 /HPF      Comment: Urine culture not indicated.        Bacteria, UA None Seen /HPF      Squamous Epithelial Cells, UA 0-2 /HPF      Hyaline Casts, UA 0-2 /LPF      Methodology Automated Microscopy    Comprehensive Metabolic Panel [[id number]]  (Abnormal) Collected: 10/30/24 0548    Specimen: Blood Updated: 10/30/24 0618      Glucose 136 mg/dL      BUN 11 mg/dL      Creatinine 1.03 mg/dL      Sodium 137 mmol/L      Potassium 4.3 mmol/L      Chloride 100 mmol/L      CO2 28.7 mmol/L      Calcium 8.9 mg/dL      Total Protein 9.3 g/dL      Albumin 3.2 g/dL      ALT (SGPT) <5 U/L      AST (SGOT) 14 U/L      Alkaline Phosphatase 69 U/L      Total Bilirubin 0.4 mg/dL      Globulin 6.1 gm/dL      A/G Ratio 0.5 g/dL      BUN/Creatinine Ratio 10.7     Anion Gap 8.3 mmol/L      eGFR 84.7 mL/min/1.73     Narrative:      GFR Normal >60  Chronic Kidney Disease <60  Kidney Failure <15      Lipase [[id number]]  (Abnormal) Collected: 10/30/24 0548    Specimen: Blood Updated: 10/30/24 0618     Lipase 8 U/L     CBC & Differential [[id number]]  (Abnormal) Collected: 10/30/24 0548    Specimen: Blood Updated: 10/30/24 0606    Narrative:      The following orders were created for panel order CBC & Differential.  Procedure                               Abnormality         Status                     ---------                               -----------         ------                     CBC Auto Differential[[id number]]        Abnormal            Final result               Scan Slide[[id number]]                                                                    Please view results for these tests on the individual orders.    CBC Auto Differential [[id number]]  (Abnormal) Collected: 10/30/24 0548    Specimen: Blood Updated: 10/30/24 0606     WBC 9.98 10*3/mm3      RBC 4.74 10*6/mm3      Hemoglobin 10.2 g/dL      Hematocrit 35.2 %      MCV 74.3 fL      MCH 21.5 pg      MCHC 29.0 g/dL      RDW 19.8 %      RDW-SD 50.7 fl      MPV 8.2 fL      Platelets 663 10*3/mm3      Neutrophil % 58.0 %      Lymphocyte % 30.9 %      Monocyte % 8.2 %      Eosinophil % 2.1 %      Basophil % 0.5 %      Immature Grans % 0.3 %      Neutrophils, Absolute 5.79 10*3/mm3      Lymphocytes, Absolute 3.08 10*3/mm3      Monocytes, Absolute 0.82 10*3/mm3      Eosinophils, Absolute 0.21 10*3/mm3       Basophils, Absolute 0.05 10*3/mm3      Immature Grans, Absolute 0.03 10*3/mm3      nRBC 0.0 /100 WBC     [place] Draw [[id number]] Collected: 10/30/24 0548    Specimen: Blood Updated: 10/30/24 0601    Narrative:      The following orders were created for panel order [place] Draw.  Procedure                               Abnormality         Status                     ---------                               -----------         ------                     Green Top (Gel)[[id number]]                                  Final result               Lavender Top[[id number]]                                     Final result               Gold Top - SST[[id number]]                                   Final result               Light Blue Top[[id number]]                                   Final result                 Please view results for these tests on the individual orders.    Green Top (Gel) [[id number]] Collected: 10/30/24 0548    Specimen: Blood Updated: 10/30/24 0601     Extra Tube Hold for add-ons.     Comment: Auto resulted.       Lavender Top [[id number]] Collected: 10/30/24 0548    Specimen: Blood Updated: 10/30/24 0601     Extra Tube hold for add-on     Comment: Auto resulted       Gold Top - SST [[id number]] Collected: 10/30/24 0548    Specimen: Blood Updated: 10/30/24 0601     Extra Tube Hold for add-ons.     Comment: Auto resulted.       Light Blue Top [[id number]] Collected: 10/30/24 0548    Specimen: Blood Updated: 10/30/24 0601     Extra Tube Hold for add-ons.     Comment: Auto resulted                Imaging Results (Last 24 Hours)       Procedure Component Value Units Date/Time    XR Abdomen KUB [[id number]] Collected: 10/30/24 1042     Updated: 10/30/24 1045    Narrative:      XR ABDOMEN KUB    Date of Exam: 10/30/2024 10:22 AM EDT    Indication: NG placement    Comparison: CT abdomen and pelvis 10/3/2024    Findings:  NG tube tip extends to the mid gastric body level. Moderate gas distention of upper abdominal bowel  loops. Lung bases appear clear. Normal heart size. Excreted contrast is seen within the renal collecting systems bilaterally.      Impression:      Impression:  NG tube tip projects to the level of the mid gastric body.      Electronically Signed: Nadeen Paris MD    10/30/2024 10:43 AM EDT    Workstation ID: [id number]    CT Abdomen Pelvis With Contrast [[id number]] Collected: 10/30/24 0804     Updated: 10/30/24 0817    Narrative:      CT ABDOMEN PELVIS W CONTRAST    Date of Exam: 10/30/2024 7:45 AM EDT    Indication: abdominal pain, recent hx of bowel obstruction.    Comparison: KUB 10/18/2024. Small bowel follow-through 10/17/2024. CT abdomen pelvis 10/15/2024, 5/29/2024, 7/19/2022.    Technique: Axial CT images were obtained of the abdomen and pelvis following the uneventful intravenous administration of iodinated contrast. Sagittal and coronal reconstructions were performed.  Automated exposure control and iterative reconstruction   methods were used.        Findings:  Markedly dilated small bowel loops with air-fluid levels, marked gastric distention with air-fluid level, are again seen. Transition zone of small bowel is thought to be slightly the left of midline posteriorly (series 3 image 90) , distal to which the   small bowel loops are decompressed. Findings are consistent with high-grade small bowel obstruction. Similar findings can be seen on the 10/15/2024 examination.    Cecum is oriented anteriorly in the mid abdomen (series 3 image 78) and is distended up to 7 cm transversely., Moderate stool burden is seen within the ascending colon.    The transverse colon and the sigmoid colon appear thickened and may be inflamed, or simply may be more prominent due to there relatively decompressed states.    Small quantity abdominal pelvic ascites is present, only slightly increased since the prior study.    Probable tiny cyst in the left hepatic lobe. Gallbladder, spleen, pancreas, adrenals are normal. Small  bilateral renal cyst. 3 mm nonobstructing right renal stone. No hydronephrosis.    Urinary bladder and prostate gland are within normal    Limits.  The lower thoracic esophagus appears thickened and may be inflamed, new since prior study.    Heart size is normal. Coronary artery calcifications are present. No acute basilar consolidation.    Mild lumbar levoscoliotic curvature. No suspicious osseous abnormality.          Impression:      1. Findings consistent with high-grade small bowel obstruction.  2. Questionable inflammation versus incomplete distention of segment of the transverse colon and sigmoid colon.  3. Small quantity ascites, slightly increased since 10/15/2024. It is of insufficient quantity for paracentesis purposes.  4.. The lower thoracic esophagus appears thickened and is likely inflamed. This is new since the prior study.          Electronically Signed: Nadeen Paris MD    10/30/2024 8:15 AM EDT    Workstation ID: [id number]              Assessment & Plan        This is a 59 y.o. male with:    Active and Resolved Problems  Active Hospital Problems    Diagnosis  POA    **SBO (small bowel obstruction) [K56.609]  Yes    HIV (human immunodeficiency virus infection) [Z21]  Yes     Priority: Medium    Iron deficiency anemia [D50.9]  Yes     Priority: Low      Resolved Hospital Problems   No resolved problems to display.     Small bowel obstruction   Plan  Admit to medical floor  Surgical consult  Keep NPO  -Zofran PRN IV  NGT to low wall suction  IV fluids  Monitor CBC/BMP  KUB:NG tube tip projects to the level of the mid gastric body.  CT:Findings consistent with high-grade small bowel obstruction.  2. Questionable inflammation versus incomplete distention of segment of the transverse colon and sigmoid colon.  3. Small quantity ascites, slightly increased since 10/15/2024. It is of insufficient quantity for paracentesis purposes.  4.. The lower thoracic esophagus appears thickened and is likely inflamed.  This is new since the prior study     HIV  Holding po meds-Bictegravir-Emtricitab-Tenofov (Biktarvy) -25 MG per tablet     CATA-monitor CBC. Check iron    VTE Prophylaxis:  No VTE prophylaxis order currently exists.        The patient desires to be as follows:    CODE STATUS:           NAYELI DAVIS, who can be contacted at [phone] , is the designated person to make medical decisions on the patient's behalf if He is incapable of doing so. This was clarified with patient and/or next of kin on 10/30/2024 during the course of this H&P.    Admission Status:  I believe this patient meets admit status.    Expected Length of Stay: 11/2/24    PDMP and Medication Dispenses via Sidebar reviewed and consistent with patient reported medications.    I discussed the patient's findings and my recommendations with patient.      Signature:     This document has been electronically signed by Gino Bae MD on October 30, 2024 11:35 EDT   [facility] Hospitalist Team

## 2024-10-30 NOTE — PLAN OF CARE
Goal Outcome Evaluation:              Outcome Evaluation: Pt. admitted to 376 with SBO, NG in place. Pt. A&Ox4, makes needs known. Pt. HIV positive.

## 2024-10-30 NOTE — CONSULTS
GENERAL SURGERY CONSULTATION NOTE    Consult requested by: [facility] emergency department    Patient Care Team:  Provider, No Known as PCP - Dianna Hood, RN as Ambulatory  ([facility])    Reason for consult: Abdominal pain and abdominal distention    Subjective     Patient is a 59 y.o. male presents with abdominal distention and abdominal pain which started yesterday.  He has a history of cirrhosis and HIV.  He has a history of umbilical and inguinal hernia repairs in the past.  He was previously seen on 10/16/2024 by Dr. Hudson for similar issues.  He returned to the ER today with similar complaints and a CT scan of the abdomen and pelvis again demonstrated large distention of his small intestine concerning for possible small bowel obstruction.  Previously when the patient was admitted to the hospital he was treated initially nonoperatively and had been posted to the OR for exploration but then began to have bowel function.  He reports that he is passing flatus and last had a large bowel movement yesterday.     Review of Systems   Gastrointestinal:  Positive for abdominal distention and abdominal pain.        History  Past Medical History:   Diagnosis Date    HIV (human immunodeficiency virus infection)     Skin cancer      Past Surgical History:   Procedure Laterality Date    CYSTOSCOPY, URETEROSCOPY, RETROGRADE PYELOGRAM, STENT INSERTION Left 5/30/2024    Procedure: CYSTOSCOPY URETEROSCOPY HOLMIUM LASER STENT INSERTION;  Surgeon: Wale Taylor MD;  Location: [facility] MAIN OR;  Service: Urology;  Laterality: Left;    ENDOSCOPY N/A 6/1/2024    Procedure: ESOPHAGOGASTRODUODENOSCOPY, biopsy x2 areas;  Surgeon: Nelly Obando MD;  Location: [facility] ENDOSCOPY;  Service: Gastroenterology;  Laterality: N/A;  post: gastritis    HERNIA REPAIR      SKIN CANCER EXCISION       Family History   Problem Relation Age of Onset    Heart disease Mother     Cancer Mother     Heart disease  "Father     Diabetes Brother     Heart disease Brother      Social History     Tobacco Use    Smoking status: Never    Smokeless tobacco: Never   Vaping Use    Vaping status: Never Used   Substance Use Topics    Alcohol use: Not Currently    Drug use: Defer     (Not in a hospital admission)    Allergies:  Patient has no known allergies.    Objective     Vital Signs  /75   Pulse 94   Temp 98.1 °F (36.7 °C) (Oral)   Resp 16   Ht 170.2 cm (67\")   Wt 59 kg (130 lb 1.1 oz)   SpO2 96%   BMI 20.37 kg/m²      Physical Exam  Vitals reviewed.   Constitutional:       Appearance: He is well-developed.   HENT:      Head: Normocephalic and atraumatic.      Comments: Nasogastric tube in place with mostly gastric contents  Eyes:      Pupils: Pupils are equal, round, and reactive to light.   Cardiovascular:      Rate and Rhythm: Normal rate and regular rhythm.   Pulmonary:      Effort: Pulmonary effort is normal.      Breath sounds: Normal breath sounds.   Abdominal:      General: There is distension.      Palpations: Abdomen is soft.      Tenderness: There is generalized abdominal tenderness. There is guarding. There is no rebound.      Hernia: No hernia is present.   Musculoskeletal:         General: Normal range of motion.      Cervical back: Normal range of motion.   Lymphadenopathy:      Cervical: No cervical adenopathy.   Skin:     General: Skin is warm and dry.      Findings: No rash.   Neurological:      Mental Status: He is alert and oriented to person, place, and time.         Results Review:   Lab Results (last 24 hours)       Procedure Component Value Units Date/Time    POC Lactate [[id number]]  (Normal) Collected: 10/30/24 0728    Specimen: Blood Updated: 10/30/24 0837     Lactate 0.6 mmol/L      Comment: Serial Number: [id number]:  [id number]       Urinalysis With Culture If Indicated - Urine, Clean Catch [[id number]]  (Abnormal) Collected: 10/30/24 0724    Specimen: Urine, Clean Catch Updated: 10/30/24 " 0734     Color, UA Dark Yellow     Appearance, UA Clear     pH, UA 6.5     Specific Gravity, UA 1.024     Glucose, UA Negative     Ketones, UA Trace     Bilirubin, UA Negative     Blood, UA Negative     Protein, UA 30 mg/dL (1+)     Leuk Esterase, UA Trace     Nitrite, UA Negative     Urobilinogen, UA 1.0 E.U./dL    Narrative:      In absence of clinical symptoms, the presence of pyuria, bacteria, and/or nitrites on the urinalysis result does not correlate with infection.    Urinalysis, Microscopic Only - Urine, Clean Catch [[id number]] Collected: 10/30/24 0724    Specimen: Urine, Clean Catch Updated: 10/30/24 0734     RBC, UA 0-2 /HPF      WBC, UA 0-2 /HPF      Comment: Urine culture not indicated.        Bacteria, UA None Seen /HPF      Squamous Epithelial Cells, UA 0-2 /HPF      Hyaline Casts, UA 0-2 /LPF      Methodology Automated Microscopy    Comprehensive Metabolic Panel [[id number]]  (Abnormal) Collected: 10/30/24 0548    Specimen: Blood Updated: 10/30/24 0618     Glucose 136 mg/dL      BUN 11 mg/dL      Creatinine 1.03 mg/dL      Sodium 137 mmol/L      Potassium 4.3 mmol/L      Chloride 100 mmol/L      CO2 28.7 mmol/L      Calcium 8.9 mg/dL      Total Protein 9.3 g/dL      Albumin 3.2 g/dL      ALT (SGPT) <5 U/L      AST (SGOT) 14 U/L      Alkaline Phosphatase 69 U/L      Total Bilirubin 0.4 mg/dL      Globulin 6.1 gm/dL      A/G Ratio 0.5 g/dL      BUN/Creatinine Ratio 10.7     Anion Gap 8.3 mmol/L      eGFR 84.7 mL/min/1.73     Narrative:      GFR Normal >60  Chronic Kidney Disease <60  Kidney Failure <15      Lipase [[id number]]  (Abnormal) Collected: 10/30/24 0548    Specimen: Blood Updated: 10/30/24 0618     Lipase 8 U/L     CBC & Differential [[id number]]  (Abnormal) Collected: 10/30/24 0548    Specimen: Blood Updated: 10/30/24 0606    Narrative:      The following orders were created for panel order CBC & Differential.  Procedure                               Abnormality         Status                      ---------                               -----------         ------                     CBC Auto Differential[[id number]]        Abnormal            Final result               Scan Slide[[id number]]                                                                    Please view results for these tests on the individual orders.    CBC Auto Differential [[id number]]  (Abnormal) Collected: 10/30/24 0548    Specimen: Blood Updated: 10/30/24 0606     WBC 9.98 10*3/mm3      RBC 4.74 10*6/mm3      Hemoglobin 10.2 g/dL      Hematocrit 35.2 %      MCV 74.3 fL      MCH 21.5 pg      MCHC 29.0 g/dL      RDW 19.8 %      RDW-SD 50.7 fl      MPV 8.2 fL      Platelets 663 10*3/mm3      Neutrophil % 58.0 %      Lymphocyte % 30.9 %      Monocyte % 8.2 %      Eosinophil % 2.1 %      Basophil % 0.5 %      Immature Grans % 0.3 %      Neutrophils, Absolute 5.79 10*3/mm3      Lymphocytes, Absolute 3.08 10*3/mm3      Monocytes, Absolute 0.82 10*3/mm3      Eosinophils, Absolute 0.21 10*3/mm3      Basophils, Absolute 0.05 10*3/mm3      Immature Grans, Absolute 0.03 10*3/mm3      nRBC 0.0 /100 WBC     [place] Draw [[id number]] Collected: 10/30/24 0548    Specimen: Blood Updated: 10/30/24 0601    Narrative:      The following orders were created for panel order [place] Draw.  Procedure                               Abnormality         Status                     ---------                               -----------         ------                     Green Top (Gel)[[id number]]                                  Final result               Lavender Top[[id number]]                                     Final result               Gold Top - SST[[id number]]                                   Final result               Light Blue Top[[id number]]                                   Final result                 Please view results for these tests on the individual orders.    Green Top (Gel) [[id number]] Collected: 10/30/24 0548    Specimen: Blood Updated: 10/30/24 0601      Extra Tube Hold for add-ons.     Comment: Auto resulted.       Lavender Top [[id number]] Collected: 10/30/24 0548    Specimen: Blood Updated: 10/30/24 0601     Extra Tube hold for add-on     Comment: Auto resulted       Gold Top - SST [[id number]] Collected: 10/30/24 0548    Specimen: Blood Updated: 10/30/24 0601     Extra Tube Hold for add-ons.     Comment: Auto resulted.       Light Blue Top [[id number]] Collected: 10/30/24 0548    Specimen: Blood Updated: 10/30/24 0601     Extra Tube Hold for add-ons.     Comment: Auto resulted             XR Abdomen KUB    Result Date: 10/30/2024  Impression: NG tube tip projects to the level of the mid gastric body. Electronically Signed: Nadeen Paris MD  10/30/2024 10:43 AM EDT  Workstation ID: [id number]    CT Abdomen Pelvis With Contrast    Result Date: 10/30/2024  1. Findings consistent with high-grade small bowel obstruction. 2. Questionable inflammation versus incomplete distention of segment of the transverse colon and sigmoid colon. 3. Small quantity ascites, slightly increased since 10/15/2024. It is of insufficient quantity for paracentesis purposes. 4.. The lower thoracic esophagus appears thickened and is likely inflamed. This is new since the prior study. Electronically Signed: Nadeen Paris MD  10/30/2024 8:15 AM EDT  Workstation ID: [id number]       I reviewed the patient's new imaging results and agree with the interpretation.  I reviewed the patient's other test results and agree with the interpretation    Assessment & Plan     Principal Problem:    SBO (small bowel obstruction)  Active Problems:    Iron deficiency anemia    HIV (human immunodeficiency virus infection)    Patient with abdominal distention and abdominal pain.  CT scan concerning for possible small bowel obstruction.  In my opinion however, the patient's nasogastric tube contents is not that of enteric contents but rather gastric contents.  He reportedly is passing flatus and having large bowel  movements.  Question as to whether or not he has some degree of dysmotility secondary to his cirrhosis of the liver and/or HIV  Recommend trial of nonoperative therapy with nasogastric tube decompression.  Once his abdominal distention has improved, can consider small bowel follow-through to rule out obstruction.  May need promotility agents and lactulose once obstruction is excluded    I discussed the patients findings and my recommendations with the patient.     Rayo Koch MD  10/30/24  15:34 EDT

## 2024-10-30 NOTE — ED PROVIDER NOTES
Subjective   History of Present Illness  Is a 59-year-old male who presents via EMS with complaints of continued abdominal pain.  Patient reports he was admitted here several days ago and was recently discharged for a small bowel obstruction.  He states they were supposed to do surgery but canceled it.  Patient reports he has been having small bowel movements but had a large bowel movement yesterday.  Patient reports some nausea, but denies any recent vomiting.  Patient denies any chest pain, shortness of air, or fever.  Patient was sleeping when provider entered the room, but was easily arousable.  Patient was given 50 mcs of fentanyl en route via EMS.        Review of Systems   Constitutional:  Negative for fever.       Past Medical History:   Diagnosis Date    HIV (human immunodeficiency virus infection)     Skin cancer        No Known Allergies    Past Surgical History:   Procedure Laterality Date    CYSTOSCOPY, URETEROSCOPY, RETROGRADE PYELOGRAM, STENT INSERTION Left 5/30/2024    Procedure: CYSTOSCOPY URETEROSCOPY HOLMIUM LASER STENT INSERTION;  Surgeon: Wale Taylor MD;  Location: [facility] MAIN OR;  Service: Urology;  Laterality: Left;    ENDOSCOPY N/A 6/1/2024    Procedure: ESOPHAGOGASTRODUODENOSCOPY, biopsy x2 areas;  Surgeon: Nelly Obando MD;  Location: [facility] ENDOSCOPY;  Service: Gastroenterology;  Laterality: N/A;  post: gastritis    HERNIA REPAIR      SKIN CANCER EXCISION         Family History   Problem Relation Age of Onset    Heart disease Mother     Cancer Mother     Heart disease Father     Diabetes Brother     Heart disease Brother        Social History     Socioeconomic History    Marital status:    Tobacco Use    Smoking status: Never    Smokeless tobacco: Never   Vaping Use    Vaping status: Never Used   Substance and Sexual Activity    Alcohol use: Not Currently    Drug use: Defer    Sexual activity: Defer           Objective   Physical Exam  Vitals and nursing note reviewed.  "  Constitutional:       Appearance: He is well-developed.   HENT:      Head: Normocephalic and atraumatic.      Mouth/Throat:      Mouth: Mucous membranes are moist.   Eyes:      Extraocular Movements: Extraocular movements intact.   Cardiovascular:      Rate and Rhythm: Normal rate and regular rhythm.   Pulmonary:      Effort: Pulmonary effort is normal.      Breath sounds: Normal breath sounds.   Abdominal:      General: Abdomen is protuberant. There is distension.      Palpations: There is no mass.      Tenderness: There is generalized abdominal tenderness.      Comments: Abdominal distention with firmness present on palpation.  No fluid wave, or masses noted.   Skin:     General: Skin is warm and dry.      Capillary Refill: Capillary refill takes less than 2 seconds.   Neurological:      General: No focal deficit present.      Mental Status: He is alert and oriented to person, place, and time.   Psychiatric:         Mood and Affect: Mood normal.         Behavior: Behavior normal.         Procedures           ED Course         /83   Pulse 77   Temp 98.1 °F (36.7 °C) (Oral)   Resp 16   Ht 170.2 cm (67\")   Wt 59 kg (130 lb 1.1 oz)   SpO2 98%   BMI 20.37 kg/m²   Labs Reviewed   COMPREHENSIVE METABOLIC PANEL - Abnormal; Notable for the following components:       Result Value    Glucose 136 (*)     Total Protein 9.3 (*)     Albumin 3.2 (*)     All other components within normal limits    Narrative:     GFR Normal >60  Chronic Kidney Disease <60  Kidney Failure <15     LIPASE - Abnormal; Notable for the following components:    Lipase 8 (*)     All other components within normal limits   URINALYSIS W/ CULTURE IF INDICATED - Abnormal; Notable for the following components:    Color, UA Dark Yellow (*)     Ketones, UA Trace (*)     Protein, UA 30 mg/dL (1+) (*)     Leuk Esterase, UA Trace (*)     All other components within normal limits    Narrative:     In absence of clinical symptoms, the presence of " pyuria, bacteria, and/or nitrites on the urinalysis result does not correlate with infection.   CBC WITH AUTO DIFFERENTIAL - Abnormal; Notable for the following components:    Hemoglobin 10.2 (*)     Hematocrit 35.2 (*)     MCV 74.3 (*)     MCH 21.5 (*)     MCHC 29.0 (*)     RDW 19.8 (*)     Platelets 663 (*)     All other components within normal limits   POC LACTATE - Normal   RAINBOW DRAW    Narrative:     The following orders were created for panel order [place] Draw.  Procedure                               Abnormality         Status                     ---------                               -----------         ------                     Green Top (Gel)[[id number]]                                  Final result               Lavender Top[[id number]]                                     Final result               Gold Top - SST[[id number]]                                   Final result               Light Blue Top[[id number]]                                   Final result                 Please view results for these tests on the individual orders.   URINALYSIS, MICROSCOPIC ONLY   POC LACTATE   GREEN TOP   LAVENDER TOP   GOLD TOP - SST   LIGHT BLUE TOP   CBC AND DIFFERENTIAL    Narrative:     The following orders were created for panel order CBC & Differential.  Procedure                               Abnormality         Status                     ---------                               -----------         ------                     CBC Auto Differential[[id number]]        Abnormal            Final result               Scan Slide[[id number]]                                                                    Please view results for these tests on the individual orders.     Medications   iopamidol (ISOVUE-370) 76 % injection 75 mL (75 mL Intravenous Given 10/30/24 0800)       CT Abdomen Pelvis With Contrast    Result Date: 10/30/2024  1. Findings consistent with high-grade small bowel obstruction. 2. Questionable  inflammation versus incomplete distention of segment of the transverse colon and sigmoid colon. 3. Small quantity ascites, slightly increased since 10/15/2024. It is of insufficient quantity for paracentesis purposes. 4.. The lower thoracic esophagus appears thickened and is likely inflamed. This is new since the prior study. Electronically Signed: Nadeen Paris MD  10/30/2024 8:15 AM EDT  Workstation ID: [id number]                                         Medical Decision Making  Chart review: Date of Admission: 10/15/2024  Discharge Diagnosis:    SBO evaluated per surgery, tolerating diet, cleared per surgery for discharge, conservative management  HIV +  Ascites, follow up outpatient with gastroenterology     Radiology interpretation:  CT abdomen/pelvis reviewed and interpreted by Dinah: Positive for small bowel obstruction, ascites.   Further interpretation by radiologist as above  Lab interpretation:  Labs all viewed by me and significant for: Lipase 8, BUN 11, creatinine 1.03, ALT <5, AST 14, white count 9.98,  Hemoglobin 10.2, platelets 663. POC lactate 0.6    IV was established and labs were obtained to evaluate for bowel obstruction, infection, electrolyte imbalance.  Patient was admitted here on 10/15 for small bowel obstruction, with a scheduled laparoscopic procedure that was canceled.  Patient came in today complaining of pain, and abdominal distention.  Patient was given 50 mcg of fentanyl and route by EMS.  CT abdomen pelvis with contrast was ordered.  He was positive for small bowel obstruction and increased ascites.  Hospitalist and surgeon consulted.  NG tube with low intermittent suction was ordered.  On reexamination patient is resting comfortably in the bed nontoxic in appearance, with no signs and symptoms of distress.  Discussed findings, and decision to admit.  Patient was agreeable to admission and plan of care. Spoke to Dr. Koch.  Spoke with Dr. Singer and he agreed to admission and  to assume care.    Appropriate PPE worn during exam.  Discussed care with: Dr. Koch. Dr. Monroy     Based on the clinical findings at this time I anticipate the patient will require a 2 midnight stay  Discussed with Dr. Nix.    Problems Addressed:  Abdominal pain, unspecified abdominal location: complicated acute illness or injury  Other ascites: complicated acute illness or injury  Small bowel obstruction: complicated acute illness or injury    Amount and/or Complexity of Data Reviewed  Labs: ordered.  Radiology: ordered.    Risk  Prescription drug management.        Final diagnoses:   Small bowel obstruction   Abdominal pain, unspecified abdominal location   Other ascites       ED Disposition  ED Disposition       ED Disposition   Decision to Admit    Condition   --    Comment   Level of Care: Telemetry [5]   Admitting Physician: SANDRA MONROY [[id number]]   Attending Physician: SANDRA MONROY [[id number]]                 No follow-up provider specified.       Medication List      No changes were made to your prescriptions during this visit.            Shelbi Arellano, APRN  10/30/24 0909

## 2024-10-30 NOTE — Clinical Note
Level of Care: Telemetry [5]   Admitting Physician: SANDRA MONROY [[id number]]   Attending Physician: SANDRA MONROY [[id number]]

## 2024-10-30 NOTE — CASE MANAGEMENT/SOCIAL WORK
Discharge Planning Assessment   Ro[place]     Patient Name: Felipe Nieves  MRN: [id number]  Today's Date: 10/30/2024    Admit Date: 10/30/2024    Plan: Home with spouse   Discharge Needs Assessment       Row Name 10/30/24 1128       Living Environment    People in Home spouse    Name(s) of People in Home [place],     Current Living Arrangements home    Potentially Unsafe Housing Conditions none    In the past 12 months has the electric, gas, oil, or water company threatened to shut off services in your home? No    Primary Care Provided by self    Provides Primary Care For no one    Family Caregiver if Needed spouse    Family Caregiver Names [place]    Quality of Family Relationships helpful;involved;supportive    Able to Return to Prior Arrangements yes       Resource/Environmental Concerns    Resource/Environmental Concerns none    Transportation Concerns none       Transportation Needs    In the past 12 months, has lack of transportation kept you from medical appointments or from getting medications? no    In the past 12 months, has lack of transportation kept you from meetings, work, or from getting things needed for daily living? No       Food Insecurity    Within the past 12 months, you worried that your food would run out before you got the money to buy more. Never true    Within the past 12 months, the food you bought just didn't last and you didn't have money to get more. Never true       Transition Planning    Patient/Family Anticipates Transition to home with family    Patient/Family Anticipated Services at Transition none    Transportation Anticipated car, drives self       Discharge Needs Assessment    Readmission Within the Last 30 Days other (see comments)  Admitted 10/15 for small bowel obstruction    Equipment Currently Used at Home none    Concerns to be Addressed denies needs/concerns at this time    Anticipated Changes Related to Illness none    Equipment Needed After Discharge none                    Discharge Plan       Row Name 10/30/24 1129       Plan    Plan Home with spouse    Patient/Family in Agreement with Plan yes    Plan Comments CM met with patient at bedside. Confirmed patient does not have PCP and declined interest in setting one up. Confirmed insurance and pharmacy. Enrolled in meds to beds. Patient denies any difficulty affording medications. Patient is not current with any home health, outpatient physical or occupational therapy services. Confirmed transportation at discharge will be , [place].   DC Barriers: NG tube, surgery consult.                  Continued Care and Services - Admitted Since 10/30/2024    No active coordination exists for this encounter.          Demographic Summary       Row Name 10/30/24 1125       General Information    Admission Type other (see comments)  pending admit status order    Arrived From emergency department;home    Referral Source emergency department    Reason for Consult discharge planning    Preferred Language English       Contact Information    Permission Granted to Share Info With     Contact Information Obtained for                    Functional Status       Row Name 10/30/24 1127       Functional Status    Usual Activity Tolerance moderate    Current Activity Tolerance moderate       Functional Status, IADL    Medications independent    Meal Preparation independent    Housekeeping independent    Laundry independent    Shopping independent               Ayse Barry RN     Office: [phone]

## 2024-10-31 ENCOUNTER — APPOINTMENT (OUTPATIENT)
Dept: GENERAL RADIOLOGY | Facility: HOSPITAL | Age: 59
DRG: 388 | End: 2024-10-31
Payer: COMMERCIAL

## 2024-10-31 ENCOUNTER — INPATIENT HOSPITAL (AMBULATORY)
Age: 59
End: 2024-10-31
Payer: COMMERCIAL

## 2024-10-31 ENCOUNTER — INPATIENT HOSPITAL (AMBULATORY)
Dept: URBAN - METROPOLITAN AREA HOSPITAL 84 | Facility: HOSPITAL | Age: 59
End: 2024-10-31
Payer: COMMERCIAL

## 2024-10-31 ENCOUNTER — APPOINTMENT (OUTPATIENT)
Dept: CARDIOLOGY | Facility: HOSPITAL | Age: 59
DRG: 388 | End: 2024-10-31
Payer: COMMERCIAL

## 2024-10-31 DIAGNOSIS — R10.9 UNSPECIFIED ABDOMINAL PAIN: ICD-10-CM

## 2024-10-31 LAB
ALBUMIN SERPL-MCNC: 3.1 G/DL (ref 3.5–5.2)
ALBUMIN/GLOB SERPL: 0.6 G/DL
ALP SERPL-CCNC: 64 U/L (ref 39–117)
ALT SERPL W P-5'-P-CCNC: 7 U/L (ref 1–41)
AMYLASE SERPL-CCNC: 74 U/L (ref 28–100)
ANION GAP SERPL CALCULATED.3IONS-SCNC: 9.5 MMOL/L (ref 5–15)
AORTIC DIMENSIONLESS INDEX: 1.09 (DI)
AST SERPL-CCNC: 13 U/L (ref 1–40)
BASOPHILS # BLD AUTO: 0.04 10*3/MM3 (ref 0–0.2)
BASOPHILS # BLD AUTO: 0.04 10*3/MM3 (ref 0–0.2)
BASOPHILS NFR BLD AUTO: 0.5 % (ref 0–1.5)
BASOPHILS NFR BLD AUTO: 0.5 % (ref 0–1.5)
BH CV ECHO LEFT VENTRICLE GLOBAL LONGITUDINAL STRAIN: -13.4 %
BH CV ECHO MEAS - AO MAX PG: 2.11 MMHG
BH CV ECHO MEAS - AO MEAN PG: 1 MMHG
BH CV ECHO MEAS - AO V2 MAX: 72.7 CM/SEC
BH CV ECHO MEAS - AO V2 VTI: 13.2 CM
BH CV ECHO MEAS - AVA(I,D): 2.9 CM2
BH CV ECHO MEAS - EDV(CUBED): 91.1 ML
BH CV ECHO MEAS - EDV(MOD-SP4): 56.7 ML
BH CV ECHO MEAS - EF(MOD-BP): 58 %
BH CV ECHO MEAS - EF(MOD-SP4): 58 %
BH CV ECHO MEAS - ESV(CUBED): 27 ML
BH CV ECHO MEAS - ESV(MOD-SP4): 23.8 ML
BH CV ECHO MEAS - FS: 33.3 %
BH CV ECHO MEAS - IVS/LVPW: 1.29 CM
BH CV ECHO MEAS - IVSD: 0.9 CM
BH CV ECHO MEAS - LA DIMENSION: 2.8 CM
BH CV ECHO MEAS - LAT PEAK E' VEL: 9.6 CM/SEC
BH CV ECHO MEAS - LV DIASTOLIC VOL/BSA (35-75): 34.8 CM2
BH CV ECHO MEAS - LV MASS(C)D: 113.6 GRAMS
BH CV ECHO MEAS - LV MAX PG: 2.5 MMHG
BH CV ECHO MEAS - LV MEAN PG: 1 MMHG
BH CV ECHO MEAS - LV SYSTOLIC VOL/BSA (12-30): 14.6 CM2
BH CV ECHO MEAS - LV V1 MAX: 79.1 CM/SEC
BH CV ECHO MEAS - LV V1 VTI: 13.7 CM
BH CV ECHO MEAS - LVIDD: 4.5 CM
BH CV ECHO MEAS - LVIDS: 3 CM
BH CV ECHO MEAS - LVOT AREA: 2.8 CM2
BH CV ECHO MEAS - LVOT DIAM: 1.9 CM
BH CV ECHO MEAS - LVPWD: 0.7 CM
BH CV ECHO MEAS - MED PEAK E' VEL: 7.1 CM/SEC
BH CV ECHO MEAS - MV A MAX VEL: 61.8 CM/SEC
BH CV ECHO MEAS - MV DEC SLOPE: 313 CM/SEC2
BH CV ECHO MEAS - MV DEC TIME: 0.21 SEC
BH CV ECHO MEAS - MV E MAX VEL: 48.2 CM/SEC
BH CV ECHO MEAS - MV E/A: 0.78
BH CV ECHO MEAS - MV MAX PG: 1.62 MMHG
BH CV ECHO MEAS - MV MEAN PG: 1 MMHG
BH CV ECHO MEAS - MV P1/2T: 51.7 MSEC
BH CV ECHO MEAS - MV V2 VTI: 14.2 CM
BH CV ECHO MEAS - MVA(P1/2T): 4.3 CM2
BH CV ECHO MEAS - MVA(VTI): 2.7 CM2
BH CV ECHO MEAS - PULM A REVS DUR: 0.09 SEC
BH CV ECHO MEAS - PULM A REVS VEL: 28.3 CM/SEC
BH CV ECHO MEAS - PULM DIAS VEL: 30.4 CM/SEC
BH CV ECHO MEAS - PULM S/D: 2.07
BH CV ECHO MEAS - PULM SYS VEL: 62.9 CM/SEC
BH CV ECHO MEAS - RVDD: 2.5 CM
BH CV ECHO MEAS - SV(LVOT): 38.8 ML
BH CV ECHO MEAS - SV(MOD-SP4): 32.9 ML
BH CV ECHO MEAS - SVI(LVOT): 23.9 ML/M2
BH CV ECHO MEAS - SVI(MOD-SP4): 20.2 ML/M2
BH CV ECHO MEAS - TAPSE (>1.6): 1.95 CM
BH CV ECHO MEASUREMENTS AVERAGE E/E' RATIO: 5.77
BH CV XLRA - TDI S': 13.8 CM/SEC
BILIRUB SERPL-MCNC: 0.4 MG/DL (ref 0–1.2)
BUN SERPL-MCNC: 15 MG/DL (ref 6–20)
BUN/CREAT SERPL: 15.3 (ref 7–25)
CALCIUM SPEC-SCNC: 8.6 MG/DL (ref 8.6–10.5)
CHLORIDE SERPL-SCNC: 101 MMOL/L (ref 98–107)
CHOLEST SERPL-MCNC: 133 MG/DL (ref 0–200)
CK SERPL-CCNC: 37 U/L (ref 20–200)
CO2 SERPL-SCNC: 27.5 MMOL/L (ref 22–29)
CREAT SERPL-MCNC: 0.98 MG/DL (ref 0.76–1.27)
D-LACTATE SERPL-SCNC: 0.9 MMOL/L (ref 0.5–2)
DEPRECATED RDW RBC AUTO: 48.8 FL (ref 37–54)
DEPRECATED RDW RBC AUTO: 51.8 FL (ref 37–54)
EGFRCR SERPLBLD CKD-EPI 2021: 89.9 ML/MIN/1.73
EOSINOPHIL # BLD AUTO: 0.11 10*3/MM3 (ref 0–0.4)
EOSINOPHIL # BLD AUTO: 0.12 10*3/MM3 (ref 0–0.4)
EOSINOPHIL NFR BLD AUTO: 1.3 % (ref 0.3–6.2)
EOSINOPHIL NFR BLD AUTO: 1.4 % (ref 0.3–6.2)
ERYTHROCYTE [DISTWIDTH] IN BLOOD BY AUTOMATED COUNT: 19.3 % (ref 12.3–15.4)
ERYTHROCYTE [DISTWIDTH] IN BLOOD BY AUTOMATED COUNT: 19.7 % (ref 12.3–15.4)
GLOBULIN UR ELPH-MCNC: 5.5 GM/DL
GLUCOSE BLDC GLUCOMTR-MCNC: 106 MG/DL (ref 70–105)
GLUCOSE BLDC GLUCOMTR-MCNC: 81 MG/DL (ref 70–105)
GLUCOSE SERPL-MCNC: 81 MG/DL (ref 65–99)
HBA1C MFR BLD: 6.13 % (ref 4.8–5.6)
HCT VFR BLD AUTO: 32.7 % (ref 37.5–51)
HCT VFR BLD AUTO: 33.6 % (ref 37.5–51)
HDLC SERPL-MCNC: 32 MG/DL (ref 40–60)
HGB BLD-MCNC: 9.2 G/DL (ref 13–17.7)
HGB BLD-MCNC: 9.7 G/DL (ref 13–17.7)
IMM GRANULOCYTES # BLD AUTO: 0.02 10*3/MM3 (ref 0–0.05)
IMM GRANULOCYTES # BLD AUTO: 0.02 10*3/MM3 (ref 0–0.05)
IMM GRANULOCYTES NFR BLD AUTO: 0.2 % (ref 0–0.5)
IMM GRANULOCYTES NFR BLD AUTO: 0.2 % (ref 0–0.5)
IRON 24H UR-MRATE: 14 MCG/DL (ref 59–158)
IRON SATN MFR SERPL: 5 % (ref 20–50)
LDLC SERPL CALC-MCNC: 76 MG/DL (ref 0–100)
LDLC/HDLC SERPL: 2.28 {RATIO}
LYMPHOCYTES # BLD AUTO: 2.63 10*3/MM3 (ref 0.7–3.1)
LYMPHOCYTES # BLD AUTO: 3.06 10*3/MM3 (ref 0.7–3.1)
LYMPHOCYTES NFR BLD AUTO: 30.8 % (ref 19.6–45.3)
LYMPHOCYTES NFR BLD AUTO: 35.1 % (ref 19.6–45.3)
MAGNESIUM SERPL-MCNC: 2.3 MG/DL (ref 1.6–2.6)
MAGNESIUM SERPL-MCNC: 2.4 MG/DL (ref 1.6–2.6)
MCH RBC QN AUTO: 20.7 PG (ref 26.6–33)
MCH RBC QN AUTO: 21.6 PG (ref 26.6–33)
MCHC RBC AUTO-ENTMCNC: 27.4 G/DL (ref 31.5–35.7)
MCHC RBC AUTO-ENTMCNC: 29.7 G/DL (ref 31.5–35.7)
MCV RBC AUTO: 72.8 FL (ref 79–97)
MCV RBC AUTO: 75.7 FL (ref 79–97)
MONOCYTES # BLD AUTO: 0.7 10*3/MM3 (ref 0.1–0.9)
MONOCYTES # BLD AUTO: 0.81 10*3/MM3 (ref 0.1–0.9)
MONOCYTES NFR BLD AUTO: 8 % (ref 5–12)
MONOCYTES NFR BLD AUTO: 9.5 % (ref 5–12)
NEUTROPHILS NFR BLD AUTO: 4.8 10*3/MM3 (ref 1.7–7)
NEUTROPHILS NFR BLD AUTO: 4.93 10*3/MM3 (ref 1.7–7)
NEUTROPHILS NFR BLD AUTO: 54.9 % (ref 42.7–76)
NEUTROPHILS NFR BLD AUTO: 57.6 % (ref 42.7–76)
NRBC BLD AUTO-RTO: 0 /100 WBC (ref 0–0.2)
NRBC BLD AUTO-RTO: 0 /100 WBC (ref 0–0.2)
NT-PROBNP SERPL-MCNC: 38.5 PG/ML (ref 0–900)
PLATELET # BLD AUTO: 625 10*3/MM3 (ref 140–450)
PLATELET # BLD AUTO: 649 10*3/MM3 (ref 140–450)
PMV BLD AUTO: 8.3 FL (ref 6–12)
PMV BLD AUTO: 8.4 FL (ref 6–12)
POTASSIUM SERPL-SCNC: 4.4 MMOL/L (ref 3.5–5.2)
PROCALCITONIN SERPL-MCNC: 0.13 NG/ML (ref 0–0.25)
PROT SERPL-MCNC: 8.6 G/DL (ref 6–8.5)
RBC # BLD AUTO: 4.44 10*6/MM3 (ref 4.14–5.8)
RBC # BLD AUTO: 4.49 10*6/MM3 (ref 4.14–5.8)
SINUS: 2.4 CM
SODIUM SERPL-SCNC: 138 MMOL/L (ref 136–145)
STJ: 1.6 CM
TIBC SERPL-MCNC: 307 MCG/DL (ref 298–536)
TRANSFERRIN SERPL-MCNC: 206 MG/DL (ref 200–360)
TRIGL SERPL-MCNC: 140 MG/DL (ref 0–150)
TSH SERPL DL<=0.05 MIU/L-ACNC: 2.07 UIU/ML (ref 0.27–4.2)
VLDLC SERPL-MCNC: 25 MG/DL (ref 5–40)
WBC NRBC COR # BLD AUTO: 8.55 10*3/MM3 (ref 3.4–10.8)
WBC NRBC COR # BLD AUTO: 8.73 10*3/MM3 (ref 3.4–10.8)

## 2024-10-31 PROCEDURE — 83540 ASSAY OF IRON: CPT | Performed by: INTERNAL MEDICINE

## 2024-10-31 PROCEDURE — 63710000001 DIPHENHYDRAMINE PER 50 MG: Performed by: INTERNAL MEDICINE

## 2024-10-31 PROCEDURE — 93356 MYOCRD STRAIN IMG SPCKL TRCK: CPT | Performed by: INTERNAL MEDICINE

## 2024-10-31 PROCEDURE — 93356 MYOCRD STRAIN IMG SPCKL TRCK: CPT

## 2024-10-31 PROCEDURE — 93306 TTE W/DOPPLER COMPLETE: CPT

## 2024-10-31 PROCEDURE — 97162 PT EVAL MOD COMPLEX 30 MIN: CPT

## 2024-10-31 PROCEDURE — 82948 REAGENT STRIP/BLOOD GLUCOSE: CPT

## 2024-10-31 PROCEDURE — 74018 RADEX ABDOMEN 1 VIEW: CPT

## 2024-10-31 PROCEDURE — 83036 HEMOGLOBIN GLYCOSYLATED A1C: CPT | Performed by: INTERNAL MEDICINE

## 2024-10-31 PROCEDURE — 84145 PROCALCITONIN (PCT): CPT | Performed by: INTERNAL MEDICINE

## 2024-10-31 PROCEDURE — 80061 LIPID PANEL: CPT | Performed by: INTERNAL MEDICINE

## 2024-10-31 PROCEDURE — 99232 SBSQ HOSP IP/OBS MODERATE 35: CPT | Performed by: NURSE PRACTITIONER

## 2024-10-31 PROCEDURE — 83880 ASSAY OF NATRIURETIC PEPTIDE: CPT | Performed by: INTERNAL MEDICINE

## 2024-10-31 PROCEDURE — 85025 COMPLETE CBC W/AUTO DIFF WBC: CPT | Performed by: INTERNAL MEDICINE

## 2024-10-31 PROCEDURE — 99232 SBSQ HOSP IP/OBS MODERATE 35: CPT

## 2024-10-31 PROCEDURE — 83605 ASSAY OF LACTIC ACID: CPT | Performed by: INTERNAL MEDICINE

## 2024-10-31 PROCEDURE — 82550 ASSAY OF CK (CPK): CPT | Performed by: INTERNAL MEDICINE

## 2024-10-31 PROCEDURE — 93306 TTE W/DOPPLER COMPLETE: CPT | Performed by: INTERNAL MEDICINE

## 2024-10-31 PROCEDURE — 82150 ASSAY OF AMYLASE: CPT | Performed by: INTERNAL MEDICINE

## 2024-10-31 PROCEDURE — 83735 ASSAY OF MAGNESIUM: CPT | Performed by: INTERNAL MEDICINE

## 2024-10-31 PROCEDURE — 80050 GENERAL HEALTH PANEL: CPT | Performed by: INTERNAL MEDICINE

## 2024-10-31 PROCEDURE — 84466 ASSAY OF TRANSFERRIN: CPT | Performed by: INTERNAL MEDICINE

## 2024-10-31 PROCEDURE — 25010000002 MORPHINE PER 10 MG: Performed by: INTERNAL MEDICINE

## 2024-10-31 PROCEDURE — 25010000002 LORAZEPAM PER 2 MG: Performed by: INTERNAL MEDICINE

## 2024-10-31 RX ADMIN — MORPHINE SULFATE 4 MG: 4 INJECTION, SOLUTION INTRAMUSCULAR; INTRAVENOUS at 09:57

## 2024-10-31 RX ADMIN — LORAZEPAM 1 MG: 2 INJECTION INTRAMUSCULAR; INTRAVENOUS at 21:01

## 2024-10-31 RX ADMIN — Medication 10 ML: at 09:25

## 2024-10-31 RX ADMIN — DIPHENHYDRAMINE HYDROCHLORIDE 25 MG: 25 CAPSULE ORAL at 21:01

## 2024-10-31 RX ADMIN — Medication 10 ML: at 20:59

## 2024-10-31 RX ADMIN — BICTEGRAVIR SODIUM, EMTRICITABINE, AND TENOFOVIR ALAFENAMIDE FUMARATE 1 TABLET: 50; 200; 25 TABLET ORAL at 21:00

## 2024-10-31 RX ADMIN — MORPHINE SULFATE 4 MG: 4 INJECTION, SOLUTION INTRAMUSCULAR; INTRAVENOUS at 19:33

## 2024-10-31 NOTE — CONSULTS
GI CONSULT  NOTE:    Referring Provider:  Dr. Ferreira    Chief complaint: Abdominal pain    Subjective .     History of present illness: Patient is a 59-year-old male with history of HIV, recent small bowel obstruction, hernia repair x 3, iron deficiency anemia, and recent diagnosis of ascites who presents with complaints of abdominal pain and abdominal distention.  Patient was recently admitted to [facility] for similar symptoms. At that time CT abdomen/pelvis with contrast showed a small bowel obstruction with a high-grade transition point in the mid abdomen and large volume of ascites.  A small bowel follow-through confirmed high-grade small bowel obstruction.  The plan was for general surgery to perform a bowel resection.  Prior to surgery, the patient's bowel function had recovered as evidenced by regular bowel movements and tolerating a low residue diet.  And he was discharged home.  During this time, his LFTs remained normal.  The plan was for GI follow-up outpatient for further evaluation of ascites, elevated AMA, and elevated smooth muscle antibody.    Patient reports abdominal pain and distention began yesterday on 10/3/2024.  Patient reports the abdominal pain has decreased significantly since NG tube was placed to low wall suction.  He denies having passing gas or having a bowel movements since admission.  Patient reports constipation has slowly worsened since he was discharged 2 weeks ago.  He reports his last bowel movement was on 10/29/2024.  He denies any bloody or black/tarry stools.  He denies any nausea or vomiting.  He denies any alcohol use.      Endo History:  6/2024 EGD (Dr. Obando) -gastritis  No previous colonoscopy.    Past Medical History:  Past Medical History:   Diagnosis Date    HIV (human immunodeficiency virus infection)     Skin cancer        Past Surgical History:  Past Surgical History:   Procedure Laterality Date    CYSTOSCOPY, URETEROSCOPY, RETROGRADE PYELOGRAM, STENT  INSERTION Left 5/30/2024    Procedure: CYSTOSCOPY URETEROSCOPY HOLMIUM LASER STENT INSERTION;  Surgeon: Wale Taylor MD;  Location: [facility] MAIN OR;  Service: Urology;  Laterality: Left;    ENDOSCOPY N/A 6/1/2024    Procedure: ESOPHAGOGASTRODUODENOSCOPY, biopsy x2 areas;  Surgeon: Nelly Obando MD;  Location: [facility] ENDOSCOPY;  Service: Gastroenterology;  Laterality: N/A;  post: gastritis    HERNIA REPAIR      SKIN CANCER EXCISION         Social History:  Social History     Tobacco Use    Smoking status: Never    Smokeless tobacco: Never   Vaping Use    Vaping status: Never Used   Substance Use Topics    Alcohol use: Not Currently    Drug use: Defer       Family History:  Family History   Problem Relation Age of Onset    Heart disease Mother     Cancer Mother     Heart disease Father     Diabetes Brother     Heart disease Brother        Medications:  Medications Prior to Admission   Medication Sig Dispense Refill Last Dose/Taking    Bictegravir-Emtricitab-Tenofov (Biktarvy) -25 MG per tablet Take 1 tablet by mouth Daily.   10/29/2024    diphenhydrAMINE (BENADRYL) 25 mg capsule Take 1 capsule by mouth Every 6 (Six) Hours As Needed for Sleep.   10/30/2024 Morning    ergocalciferol (ERGOCALCIFEROL) 1.25 MG (23097 UT) capsule Take 1 capsule by mouth 1 (One) Time Per Week. Tuesdays   Past Week       Scheduled Meds:Bictegravir-Emtricitab-Tenofov, 1 tablet, Oral, Daily  sodium chloride, 10 mL, Intravenous, Q12H      Continuous Infusions:   PRN Meds:.  senna-docusate sodium **AND** polyethylene glycol **AND** bisacodyl **AND** bisacodyl    Calcium Replacement - Follow Nurse / BPA Driven Protocol    diphenhydrAMINE    HYDROcodone-acetaminophen    LORazepam    LORazepam    Magnesium Standard Dose Replacement - Follow Nurse / BPA Driven Protocol    Morphine    Morphine    nitroglycerin    ondansetron ODT **OR** ondansetron    ondansetron ODT **OR** ondansetron    Phosphorus Replacement - Follow Nurse / BPA  "Driven Protocol    Potassium Replacement - Follow Nurse / BPA Driven Protocol    sodium chloride    sodium chloride    ALLERGIES:  Patient has no known allergies.    ROS:  Review of Systems   Constitutional:  Negative for chills and fever.   Respiratory:  Negative for shortness of breath.    Cardiovascular:  Negative for chest pain.   Gastrointestinal:  Positive for abdominal distention and constipation. Negative for blood in stool, diarrhea, nausea and vomiting.   Psychiatric/Behavioral:  Negative for agitation and confusion.        Objective     Vital Signs:   Visit Vitals  /85   Pulse 93   Temp 98.1 °F (36.7 °C) (Axillary)   Resp 11   Ht 170.2 cm (67\")   Wt 54.4 kg (120 lb)   SpO2 95%   BMI 18.79 kg/m²       Physical Exam:      General Appearance:  Laying in bed awake and alert, in no acute distress   Head:    Normocephalic, without obvious abnormality, atraumatic, NG tube to low wall suction   Eyes:            Conjunctivae normal, anicteric sclera   Ears:    Ears appear intact with no abnormalities noted   Throat:   No oral lesions, no thrush, oral mucosa moist   Neck:   no JVD   Lungs:    respirations regular, even and unlabored       Chest Wall:    No abnormalities observed   Abdomen:     soft, mild abdominal tenderness with palpation, mild distention, no rebound or guarding   Rectal:     Deferred   Extremities:   Moves all extremities well, no cyanosis, no redness       Skin:   No bleeding, bruising or rash, no jaundice       Neurologic:   sensation intact       Results Review:   I reviewed the patient's labs and imaging.  CBC  Results from last 7 days   Lab Units 10/31/24  0054 10/30/24  0548   RBC 10*6/mm3 4.49 4.74   WBC 10*3/mm3 8.55 9.98   HEMOGLOBIN g/dL 9.7* 10.2*   PLATELETS 10*3/mm3 649* 663*       CMP  Results from last 7 days   Lab Units 10/31/24  0054 10/30/24  0548   SODIUM mmol/L 138 137   POTASSIUM mmol/L 4.4 4.3   CHLORIDE mmol/L 101 100   CO2 mmol/L 27.5 28.7   BUN mg/dL 15 11 "   CREATININE mg/dL 0.98 1.03   GLUCOSE mg/dL 81 136*   ALBUMIN g/dL 3.1* 3.2*   BILIRUBIN mg/dL 0.4 0.4   ALK PHOS U/L 64 69   AST (SGOT) U/L 13 14   ALT (SGPT) U/L 7 <5       Amylase and Lipase  Results from last 7 days   Lab Units 10/31/24  0054 10/30/24  0548   AMYLASE U/L 74  --    LIPASE U/L  --  8*       CRP         Imaging Results (Last 24 Hours)       Procedure Component Value Units Date/Time    XR Abdomen KUB [[id number]] Collected: 10/31/24 1246     Updated: 10/31/24 1250    Narrative:      XR ABDOMEN KUB    Date of Exam: 10/31/2024 8:30 AM EDT    Indication: sbo    Comparison: None available.    Findings:  There is an NG tube with the tip in the stomach.    Again noted is moderate gaseous distention of the bowel loops. There is residual contrast in the bladder. Surgical clips in the pelvis. The regional bones are unremarkable. There is scattered stool content      Impression:      Impression:  NG tube tip in the stomach    No interval change in diffuse gaseous distention      Electronically Signed: Santhosh Hodges MD    10/31/2024 12:48 PM EDT    Workstation ID: [id number]              ASSESSMENT:  -Small bowel obstruction  -Abdominal pain  -Ascites  -Iron deficiency anemia  -HIV  -History of hernia repair x 3     PLAN:  Patient is a 58-year-old male with history of HIV and iron deficiency anemia.  Who was recently hospitalized at [facility] [place] on 10/15/2024 with a small bowel obstruction, which resolved prior to surgery.  Patient presented on 10/30/2024 with abdominal pain and distention.  Patient reports worsening constipation since discharge.  CT on admission showed small bowel obstruction and NG tube was placed.    CT abdomen and pelvis with contrast-high-grade small bowel obstruction, questionable inflammation versus incomplete distention of the transverse and sigmoid colon, small quantity ascites slightly increased since 10/15/2024 but is insufficient quantity for paracentesis, lower thoracic  esophagus thickened and likely inflamed  Repeat KUB 10/31/2024-shows NG tube in tip of stomach and no interval change in diffuse gaseous distention    Hemoglobin 9.2,.  MCV 75.7, iron 14, TSAT 5.  Platelets 625    LFTs remain normal.  RUQ US in 5/2024 showed mild steatosis and trace ascites.  Platelets are elevated.  Will order echo to rule out potential cardiac cause of ascites.  Continue to monitor H&H and transfuse as needed.  Patient had IV iron replacement during previous admission.  Patient would likely benefit from outpatient colonoscopy once status improves.  General surgery has been consulted.  Supportive care      I discussed the patients findings and my recommendations with the patient.  Nevin Hernanedz, APRN  10/31/24  13:23 EDT

## 2024-10-31 NOTE — PROGRESS NOTES
General Surgery Progress Note    Name: Felipe Nieves ADMIT: 10/30/2024   : 1965  PCP: Provider, No Known    MRN: [id number] LOS: 1 days   AGE/SEX: 59 y.o. male  ROOM: [address] [place] [address]    Chief Complaint   Patient presents with    Abdominal Pain     Subjective     Patient seen and examined.  Vital signs stable, afebrile.  Reports nausea, no vomiting.  Having diffused abdominal pain.  No flatus or BM.  100 mL of NG output recorded overnight.    Objective     Scheduled Medications:   Bictegravir-Emtricitab-Tenofov, 1 tablet, Oral, Daily  sodium chloride, 10 mL, Intravenous, Q12H        Active Infusions:       As Needed Medications:    senna-docusate sodium **AND** polyethylene glycol **AND** bisacodyl **AND** bisacodyl    Calcium Replacement - Follow Nurse / BPA Driven Protocol    diphenhydrAMINE    HYDROcodone-acetaminophen    LORazepam    LORazepam    Magnesium Standard Dose Replacement - Follow Nurse / BPA Driven Protocol    Morphine    Morphine    nitroglycerin    ondansetron ODT **OR** ondansetron    ondansetron ODT **OR** ondansetron    Phosphorus Replacement - Follow Nurse / BPA Driven Protocol    Potassium Replacement - Follow Nurse / BPA Driven Protocol    sodium chloride    sodium chloride    Vital Signs  Vital Signs Patient Vitals for the past 24 hrs:   BP Temp Temp src Pulse Resp SpO2 Weight   10/31/24 0808 130/90 -- -- 81 13 94 % --   10/31/24 0500 130/91 98.3 °F (36.8 °C) Axillary 81 14 94 % --   10/31/24 0310 -- -- -- -- -- -- 54.7 kg (120 lb 9.5 oz)   10/31/24 0300 122/87 98.5 °F (36.9 °C) Oral 86 14 94 % --   10/30/24 2300 131/89 -- -- 84 -- 93 % --   10/30/24 2100 129/87 -- -- 84 16 98 % --   10/30/24 1946 130/83 98.3 °F (36.8 °C) Oral 90 16 97 % --   10/30/24 1500 114/75 -- -- -- -- -- --   10/30/24 1400 106/72 -- -- -- -- -- --   10/30/24 1300 119/76 -- -- 94 -- 96 % --   10/30/24 1200 126/91 -- -- 79 -- 97 % --     I/O:  I/O last 3 completed shifts:  In: -   Out: 100 [Emesis/NG  output:100]    Physical Exam:  Physical Exam  Constitutional:       General: He is not in acute distress.  Cardiovascular:      Rate and Rhythm: Normal rate.   Pulmonary:      Effort: Pulmonary effort is normal. No respiratory distress.   Abdominal:      General: There is distension.      Palpations: Abdomen is soft.      Tenderness: There is abdominal tenderness. There is no guarding.      Comments: Soft, distended, diffuse tenderness to palpation.   Neurological:      Mental Status: He is alert.   Psychiatric:         Mood and Affect: Mood normal.         Behavior: Behavior normal.         Results Review:     CBC    Results from last 7 days   Lab Units 10/31/24  0054 10/30/24  0548   WBC 10*3/mm3 8.55 9.98   HEMOGLOBIN g/dL 9.7* 10.2*   PLATELETS 10*3/mm3 649* 663*     BMP   Results from last 7 days   Lab Units 10/31/24  0054 10/30/24  0548   SODIUM mmol/L 138 137   POTASSIUM mmol/L 4.4 4.3   CHLORIDE mmol/L 101 100   CO2 mmol/L 27.5 28.7   BUN mg/dL 15 11   CREATININE mg/dL 0.98 1.03   GLUCOSE mg/dL 81 136*   MAGNESIUM mg/dL 2.4  --      Radiology(recent) XR Abdomen KUB    Result Date: 10/30/2024  Impression: NG tube tip projects to the level of the mid gastric body. Electronically Signed: Nadeen Paris MD  10/30/2024 10:43 AM EDT  Workstation ID: [id number]    CT Abdomen Pelvis With Contrast    Result Date: 10/30/2024  1. Findings consistent with high-grade small bowel obstruction. 2. Questionable inflammation versus incomplete distention of segment of the transverse colon and sigmoid colon. 3. Small quantity ascites, slightly increased since 10/15/2024. It is of insufficient quantity for paracentesis purposes. 4.. The lower thoracic esophagus appears thickened and is likely inflamed. This is new since the prior study. Electronically Signed: Nadeen Paris MD  10/30/2024 8:15 AM EDT  Workstation ID: [id number]     I reviewed the patient's new clinical results.    Assessment & Plan       SBO (small bowel  obstruction)    Iron deficiency anemia    HIV (human immunodeficiency virus infection)      59 y.o. male admitted 10/30/2024 with small bowel obstruction    -Keep NPO, continue IV fluids  -NG tube to low intermittent wall suction  -Increase mobility as tolerated, out of bed to chair  -Monitor and replace electrolytes as needed  -KUB obtained this morning, will await results  -Will follow-up on morning labs  -Awaiting return of bowel function.  If no BM, will consider small bowel follow-through for tomorrow        This note was created using Dragon Voice Recognition software.    COMFORT Raphael  10/31/24  11:37 EDT

## 2024-10-31 NOTE — PLAN OF CARE
Goal Outcome Evaluation:              Outcome Evaluation: Patient is alert and oriented on room air. Had complaints of pain, see MAR. If no BM today, plan to have small bowel follow through tomorrow. remains NPO, Q6 accuchecks. NG tube remains in place to low wall intermittent suction. plan to go home when d/c

## 2024-10-31 NOTE — THERAPY EVALUATION
Patient Name: Felipe Nieves  : 1965    MRN: [id number]                              Today's Date: 10/31/2024       Admit Date: 10/30/2024    Visit Dx:     ICD-10-CM ICD-9-CM   1. Small bowel obstruction  K56.609 560.9   2. Abdominal pain, unspecified abdominal location  R10.9 789.00   3. Other ascites  R18.8 789.59     Patient Active Problem List   Diagnosis    Hydronephrosis with obstructing calculus    Hydronephrosis with urinary obstruction due to ureteral calculus    Iron deficiency anemia    SBO (small bowel obstruction)    HIV (human immunodeficiency virus infection)    Fecal incontinence     Past Medical History:   Diagnosis Date    HIV (human immunodeficiency virus infection)     Skin cancer      Past Surgical History:   Procedure Laterality Date    CYSTOSCOPY, URETEROSCOPY, RETROGRADE PYELOGRAM, STENT INSERTION Left 2024    Procedure: CYSTOSCOPY URETEROSCOPY HOLMIUM LASER STENT INSERTION;  Surgeon: Wale Taylor MD;  Location: [facility] MAIN OR;  Service: Urology;  Laterality: Left;    ENDOSCOPY N/A 2024    Procedure: ESOPHAGOGASTRODUODENOSCOPY, biopsy x2 areas;  Surgeon: Nelly Obando MD;  Location: [facility] ENDOSCOPY;  Service: Gastroenterology;  Laterality: N/A;  post: gastritis    HERNIA REPAIR      SKIN CANCER EXCISION        General Information       Row Name 10/31/24 1342          Physical Therapy Time and Intention    Document Type evaluation  -AM     Mode of Treatment physical therapy  -AM       Row Name 10/31/24 1342          General Information    Patient Profile Reviewed yes  -AM     Prior Level of Function independent:;all household mobility;community mobility;gait;transfer;bed mobility;using stairs  -AM     Existing Precautions/Restrictions other (see comments)  NG to wall suction  -AM     Barriers to Rehab medically complex  -AM       Row Name 10/31/24 1342          Living Environment    People in Home spouse  -AM       Row Name 10/31/24 1342          Home Main  Entrance    Number of Stairs, Main Entrance one  -AM     Stair Railings, Main Entrance none  -AM       Row Name 10/31/24 1342          Stairs Within Home, Primary    Number of Stairs, Within Home, Primary none  -AM       Row Name 10/31/24 1342          Cognition    Orientation Status (Cognition) oriented x 4  -AM       Row Name 10/31/24 1342          Safety Issues/Impairments Affecting Functional Mobility    Impairments Affecting Function (Mobility) balance;endurance/activity tolerance;pain  -AM     Comment, Safety Issues/Impairments (Mobility) gait belt utilized  -AM               User Key  (r) = Recorded By, (t) = Taken By, (c) = Cosigned By      Initials Name Provider Type    AM Dwayne Bullock, PT Physical Therapist                   Mobility       Row Name 10/31/24 1343          Bed Mobility    Bed Mobility bed mobility (all) activities  -AM     All Activities, [place] (Bed Mobility) minimum assist (75% patient effort);1 person assist  -AM     Assistive Device (Bed Mobility) bed rails  -AM     Comment, (Bed Mobility) with increased time and effort secondary to abdominal pain  -AM       Row Name 10/31/24 1343          Sit-Stand Transfer    Sit-Stand [place] (Transfers) contact guard;1 person assist  -AM     Comment, (Sit-Stand Transfer) with increased time and effort.  Trunk flexion with standing secondary to abdominal pain.  Mod verbal/tactile cues to for trunk extension  -AM       Row Name 10/31/24 1343          Gait/Stairs (Locomotion)    [place] Level (Gait) not tested  -AM     Comment, (Gait/Stairs) pt unable to attempt ambulation this date secondary to pain  -AM               User Key  (r) = Recorded By, (t) = Taken By, (c) = Cosigned By      Initials Name Provider Type    AM Dwayne Bullock, PT Physical Therapist                   Obj/Interventions       Row Name 10/31/24 1344          Range of Motion Comprehensive    General Range of Motion no range of motion deficits identified  -AM        Row Name 10/31/24 1344          Strength Comprehensive (MMT)    Comment, General Manual Muscle Testing (MMT) Assessment 5/5 BLEs  -AM       Row Name 10/31/24 1344          Motor Skills    Motor Skills functional endurance  -AM     Functional Endurance poor  -AM       Row Name 10/31/24 1344          Balance    Balance Assessment sitting static balance;sitting dynamic balance;standing static balance  -AM     Static Sitting Balance independent  -AM     Dynamic Sitting Balance independent  -AM     Position, Sitting Balance unsupported;sitting edge of bed  -AM     Static Standing Balance contact guard  -AM     Position/Device Used, Standing Balance supported  -AM       Row Name 10/31/24 1344          Sensory Assessment (Somatosensory)    Sensory Assessment (Somatosensory) sensation intact  -AM               User Key  (r) = Recorded By, (t) = Taken By, (c) = Cosigned By      Initials Name Provider Type    AM Dwayne Bullock, PT Physical Therapist                   Goals/Plan       Row Name 10/31/24 1351          Bed Mobility Goal 1 (PT)    Activity/Assistive Device (Bed Mobility Goal 1, PT) bed mobility activities, all  -AM     [place] Level/Cues Needed (Bed Mobility Goal 1, PT) modified independence  -AM     Time Frame (Bed Mobility Goal 1, PT) long term goal (LTG)  -AM       Row Name 10/31/24 1351          Transfer Goal 1 (PT)    Activity/Assistive Device (Transfer Goal 1, PT) transfers, all  -AM     [place] Level/Cues Needed (Transfer Goal 1, PT) modified independence  -AM     Time Frame (Transfer Goal 1, PT) long term goal (LTG)  -AM       Row Name 10/31/24 1351          Gait Training Goal 1 (PT)    Activity/Assistive Device (Gait Training Goal 1, PT) gait (walking locomotion)  -AM     [place] Level (Gait Training Goal 1, PT) modified independence  -AM     Distance (Gait Training Goal 1, PT) 150'  -AM     Time Frame (Gait Training Goal 1, PT) long term goal (LTG)  -AM       Row Name 10/31/24 1351           Stairs Goal 1 (PT)    Activity/Assistive Device (Stairs Goal 1, PT) stairs, all skills  -AM     [place] Level/Cues Needed (Stairs Goal 1, PT) modified independence  -AM     Number of Stairs (Stairs Goal 1, PT) 1  -AM     Time Frame (Stairs Goal 1, PT) long term goal (LTG)  -AM       Row Name 10/31/24 1351          Therapy Assessment/Plan (PT)    Planned Therapy Interventions (PT) balance training;bed mobility training;gait training;strengthening;stair training;patient/family education;transfer training  -AM               User Key  (r) = Recorded By, (t) = Taken By, (c) = Cosigned By      Initials Name Provider Type    AM Dwayne Bullock, PT Physical Therapist                   Clinical Impression       Row Name 10/31/24 1347          Pain    Pretreatment Pain Rating 7/10  -AM     Posttreatment Pain Rating 9/10  -AM     Pain Location abdomen  -AM     Pain Management Interventions exercise or physical activity utilized;nursing notified  -AM     Response to Pain Interventions activity participation with increased pain  -AM       Row Name 10/31/24 8297          Plan of Care Review    Plan of Care Reviewed With patient  -AM     Outcome Evaluation Pt is a 60 y/o male with continued c/o abdominal pain.  Pt admitted several days ago with recent dx secondary to SBO.  Non-surgical tx during prior hospitalization.  (+) HIV.  CT abdomen/pelvis: high grade SBO, questionable inflammation vs incomplete distension of transverse colon and sigmoid colon.  Pt reports he lives with his spouse in a 1 story home with 1 step to enter into home.  Pt was independent with all functional mobility tasks and did not use an assistive device prior to hospitalization.  Pt with NG tube to wall suctions, telemetry and room air this date.  Min A for bed mobiity with increased time and effort required secondary to abdominal pain.  CGA for transfers with flexed trunk in standing.  Mod verbal/tactile cues to extend trunk.  Pt declined ambulation  secondary to increasing pain in abdomen.  RN notified.  Anticipate home with assist after hospital d/c  -AM       Row Name 10/31/24 1345          Therapy Assessment/Plan (PT)    Patient/Family Therapy Goals Statement (PT) To go home  -AM     Rehab Potential (PT) good  -AM     Criteria for Skilled Interventions Met (PT) yes  -AM     Therapy Frequency (PT) 3 times/wk  -AM     Predicted Duration of Therapy Intervention (PT) until d/c  -AM       Row Name 10/31/24 1345          Vital Signs    O2 Delivery Pre Treatment room air  -AM     O2 Delivery Intra Treatment room air  -AM     O2 Delivery Post Treatment room air  -AM     Pre Patient Position Supine  -AM     Intra Patient Position Standing  -AM     Post Patient Position Supine  -AM       Row Name 10/31/24 1345          Positioning and Restraints    Pre-Treatment Position in bed  -AM     Post Treatment Position bed  -AM     In Bed notified nsg;supine;call light within reach;encouraged to call for assist;exit alarm on  -AM               User Key  (r) = Recorded By, (t) = Taken By, (c) = Cosigned By      Initials Name Provider Type    Dwayne Dietz, JACQUELYN Physical Therapist                   Outcome Measures       Row Name 10/31/24 1352 10/31/24 0808       How much help from another person do you currently need...    Turning from your back to your side while in flat bed without using bedrails? 4  -AM 4  -SS    Moving from lying on back to sitting on the side of a flat bed without bedrails? 3  -AM 4  -SS    Moving to and from a bed to a chair (including a wheelchair)? 3  -AM 4  -SS    Standing up from a chair using your arms (e.g., wheelchair, bedside chair)? 3  -AM 4  -SS    Climbing 3-5 steps with a railing? 2  -AM 3  -SS    To walk in hospital room? 2  -AM 4  -SS    AM-PAC 6 Clicks Score (PT) 17  -AM 23  -SS              User Key  (r) = Recorded By, (t) = Taken By, (c) = Cosigned By      Initials Name Provider Type    Dwayne Dietz PT Physical Therapist    SS  Nara Shaver LPN Licensed Nurse                                 Physical Therapy Education       Title: PT OT SLP Therapies (Done)       Topic: Physical Therapy (Done)       Point: Mobility training (Done)       Learning Progress Summary            Patient Acceptance, E,TB, VU by AM at 10/31/2024 1352    Acceptance, E,TB, VU by  at 10/30/2024 1800                      Point: Body mechanics (Done)       Learning Progress Summary            Patient Acceptance, E,TB, VU by AM at 10/31/2024 1352                      Point: Precautions (Done)       Learning Progress Summary            Patient Acceptance, E,TB, VU by AM at 10/31/2024 1352    Acceptance, E,TB, VU by  at 10/30/2024 1800                                      User Key       Initials Effective Dates Name Provider Type Discipline     05/16/23 -  Russ Camp, JEWELL Registered Nurse Nurse     05/10/21 -  Dwayne Bullock PT Physical Therapist PT                  PT Recommendation and Plan  Planned Therapy Interventions (PT): balance training, bed mobility training, gait training, strengthening, stair training, patient/family education, transfer training  Outcome Evaluation: Pt is a 58 y/o male with continued c/o abdominal pain.  Pt admitted several days ago with recent dx secondary to SBO.  Non-surgical tx during prior hospitalization.  (+) HIV.  CT abdomen/pelvis: high grade SBO, questionable inflammation vs incomplete distension of transverse colon and sigmoid colon.  Pt reports he lives with his spouse in a 1 story home with 1 step to enter into home.  Pt was independent with all functional mobility tasks and did not use an assistive device prior to hospitalization.  Pt with NG tube to wall suctions, telemetry and room air this date.  Min A for bed mobiity with increased time and effort required secondary to abdominal pain.  CGA for transfers with flexed trunk in standing.  Mod verbal/tactile cues to extend trunk.  Pt declined ambulation secondary to  increasing pain in abdomen.  RN notified.  Anticipate home with assist after hospital d/c     Time Calculation:         PT Charges       Row Name 10/31/24 1353             Time Calculation    Start Time 0950  -AM      Stop Time 1006  -AM      Time Calculation (min) 16 min  -AM      PT Received On 10/31/24  -AM      PT - Next Appointment 11/01/24  -AM      PT Goal Re-Cert Due Date 11/14/24  -AM                User Key  (r) = Recorded By, (t) = Taken By, (c) = Cosigned By      Initials Name Provider Type    AM Dwayne Bullock, PT Physical Therapist                  Therapy Charges for Today       Code Description Service Date Service Provider Modifiers Qty    [id number] HC PT EVAL MOD COMPLEXITY 3 10/31/2024 Dwayne Bullock, PT GP 1            PT G-Codes  AM-PAC 6 Clicks Score (PT): 17  PT Discharge Summary  Anticipated Discharge Disposition (PT): home with assist    Dwayne Bullock, PT  10/31/2024

## 2024-10-31 NOTE — PROGRESS NOTES
[facility] MEDICINE SERVICE  DAILY PROGRESS NOTE    NAME: Felipe Nieves  : 1965  MRN: [id number]      LOS: 1 day     PROVIDER OF SERVICE: Anali Ferreira MD    Chief Complaint: SBO (small bowel obstruction)    Subjective:     Interval History:  History taken from: patient    Patient seen and examined at bedside this morning.  Still has NG tube in with bilious drainage seen.  States that his last bowel movement was about 2 days ago, does not remember if he has passed any gas.  He does state that his pressure in his abdomen has improved since he had the NG tube placed and        Review of Systems: Negative except as described above  Review of Systems    Objective:     Vital Signs  Temp:  [98.3 °F (36.8 °C)-98.5 °F (36.9 °C)] 98.3 °F (36.8 °C)  Heart Rate:  [79-98] 81  Resp:  [13-16] 13  BP: (106-131)/() 130/90   Body mass index is 18.89 kg/m².    Physical Exam   Physical Exam  Constitutional:       Comments: NAD    Cardiovascular:      Comments:  RRR, S1 & S2   Pulmonary:      Comments:  Lungs CTA   Abdominal:      Comments:  ABD soft, NGT            Diagnostic Data    Results from last 7 days   Lab Units 10/31/24  0054   WBC 10*3/mm3 8.55   HEMOGLOBIN g/dL 9.7*   HEMATOCRIT % 32.7*   PLATELETS 10*3/mm3 649*   GLUCOSE mg/dL 81   CREATININE mg/dL 0.98   BUN mg/dL 15   SODIUM mmol/L 138   POTASSIUM mmol/L 4.4   AST (SGOT) U/L 13   ALT (SGPT) U/L 7   ALK PHOS U/L 64   BILIRUBIN mg/dL 0.4   ANION GAP mmol/L 9.5       XR Abdomen KUB    Result Date: 10/30/2024  Impression: NG tube tip projects to the level of the mid gastric body. Electronically Signed: Nadeen Paris MD  10/30/2024 10:43 AM EDT  Workstation ID: [id number]    CT Abdomen Pelvis With Contrast    Result Date: 10/30/2024  1. Findings consistent with high-grade small bowel obstruction. 2. Questionable inflammation versus incomplete distention of segment of the transverse colon and sigmoid colon. 3. Small quantity ascites, slightly  increased since 10/15/2024. It is of insufficient quantity for paracentesis purposes. 4.. The lower thoracic esophagus appears thickened and is likely inflamed. This is new since the prior study. Electronically Signed: Nadeen Paris MD  10/30/2024 8:15 AM EDT  Workstation ID: [id number]       I reviewed the patient's new clinical results.    Assessment/Plan:     Active and Resolved Problems  Active Hospital Problems    Diagnosis  POA    **SBO (small bowel obstruction) [K56.609]  Yes    HIV (human immunodeficiency virus infection) [Z21]  Yes    Iron deficiency anemia [D50.9]  Yes      Resolved Hospital Problems   No resolved problems to display.       Small bowel obstruction   Plan  Surgery consulted, recommend consider doing small bowel follow-through once distention has improved  Keep NPO  -Zofran PRN IV  NGT to low wall suction  IV fluids  Monitor CBC/BMP  KUB:NG tube tip projects to the level of the mid gastric body.  CT:Findings consistent with high-grade small bowel obstruction.  2. Questionable inflammation versus incomplete distention of segment of the transverse colon and sigmoid colon.  3. Small quantity ascites, slightly increased since 10/15/2024. It is of insufficient quantity for paracentesis purposes.  4.. The lower thoracic esophagus appears thickened and is likely inflamed. This is new since the prior study      HIV  Holding po meds-Bictegravir-Emtricitab-Tenofov (Biktarvy) -25 MG per tablet      CATA-monitor CBC. Check iron    VTE Prophylaxis:  Mechanical VTE prophylaxis orders are present.                Time: 35 minutes     There are no questions and answers to display.       Signature: Electronically signed by Anali Ferreira MD, 10/31/24, 10:48 EDT.  [facility] Hospitalist Team

## 2024-10-31 NOTE — PLAN OF CARE
Problem: Adult Inpatient Plan of Care  Goal: Optimal Comfort and Wellbeing  Intervention: Monitor Pain and Promote Comfort  Description: Assess pain level, treatment efficacy and patient response at regular intervals using a consistent pain scale.  Consider the presence and impact of preexisting chronic pain.  Encourage patient and caregiver involvement in pain assessment, interventions and safety measures.  Promote activity; balance with sleep and rest to enhance healing.  Recent Flowsheet Documentation  Taken 10/30/2024 2003 by Amelia Scruggs RN  Pain Management Interventions:   care clustered   diversional activity provided   pain medication given   quiet environment facilitated     Problem: Adult Inpatient Plan of Care  Goal: Optimal Comfort and Wellbeing  Intervention: Provide Person-Centered Care  Description: Use a family-focused approach to care; encourage support system presence and participation.  Develop trust and rapport by proactively providing information, encouraging questions, addressing concerns and offering reassurance.  Acknowledge emotional response to hospitalization.  Recognize and utilize personal coping strategies and strengths; develop goals via shared decision-making.  Honor spiritual and cultural preferences.  Recent Flowsheet Documentation  Taken 10/30/2024 2003 by Amelia Scruggs RN  Trust Relationship/Rapport:   care explained   choices provided   emotional support provided   questions encouraged   reassurance provided   thoughts/feelings acknowledged     Problem: Pain Acute  Goal: Optimal Pain Control and Function  Intervention: Prevent or Manage Pain  Description: Evaluate pain level, effect of treatment and patient response at regular intervals.  Minimize painful stimuli; coordinate care and adjust environment (e.g., light, noise, unnecessary movement); promote sleep/rest.  Match pharmacologic analgesia to severity and type of pain mechanism (e.g., neuropathic, muscle,  inflammatory); consider multimodal approach.  Provide medication at regular intervals; titrate to patient response; premedicate for painful procedures.  Manage breakthrough pain with additional doses; consider rotation or switching medication.  Monitor for signs of substance tolerance (increased dose to reach desired effect, decreased effect with same dose).  Manage medication-induced adverse events and side effects.  Provide multimodal interventions, such as physical activity, therapeutic exercise, yoga, TENS (transcutaneous electrical nerve stimulation) and manual therapy.  Train in functional activity modifications, such as body mechanics, posture, ergonomics, energy conservation and activity pacing.  Consider addition of complementary or alternative therapy, such as acupuncture, hypnosis or therapeutic touch.  Recent Flowsheet Documentation  Taken 10/30/2024 2003 by Amelia Scruggs RN  Sleep/Rest Enhancement: regular sleep/rest pattern promoted  Medication Review/Management: medications reviewed   Goal Outcome Evaluation:  Plan of Care Reviewed With: patient        Progress: improving

## 2024-10-31 NOTE — PLAN OF CARE
Goal Outcome Evaluation:  Plan of Care Reviewed With: patient           Outcome Evaluation: Pt is a 58 y/o male with continued c/o abdominal pain.  Pt admitted several days ago with recent dx secondary to SBO.  Non-surgical tx during prior hospitalization.  (+) HIV.  CT abdomen/pelvis: high grade SBO, questionable inflammation vs incomplete distension of transverse colon and sigmoid colon.  Pt reports he lives with his spouse in a 1 story home with 1 step to enter into home.  Pt was independent with all functional mobility tasks and did not use an assistive device prior to hospitalization.  Pt with NG tube to wall suctions, telemetry and room air this date.  Min A for bed mobiity with increased time and effort required secondary to abdominal pain.  CGA for transfers with flexed trunk in standing.  Mod verbal/tactile cues to extend trunk.  Pt declined ambulation secondary to increasing pain in abdomen.  RN notified.  Anticipate home with assist after hospital d/c    Anticipated Discharge Disposition (PT): home with assist

## 2024-10-31 NOTE — SIGNIFICANT NOTE
Case Management Readmission Assessment Note    Case Management Readmission Assessment (all recorded)       Readmission Interview       Row Name 10/31/24 1230             Readmission Indications    Is the patient and/or family able to complete the readmission assessment questions? Yes      Is this hospitalization related to the prior hospital diagnosis? Yes        Row Name 10/31/24 1230             Recommendation for rehospitalization    Did you speak with your physician prior to coming to the hospital No        [facility] Name 10/31/24 1230             Follow-up Appointments    Do you have a PCP? No      Did you have an appointment with PCP after your hospitalization? No      Did you have an appointment with a Specialist? No  Pt was unable to get appt with  1/6      Are you current with the Pulmonary Clinic? No      Are you current with the CHF Clinic? No        Row Name 10/31/24 1230             Medications    Did you have newly prescribed medications at discharge? No      Did you understand the reasons for your medications at discharge and how to take them? --  NA no new meds      Did you understand the side effects of your medications? --  NA no new meds      Are you taking all of you prescribed medications? Yes      Were medications picked up? --  NA no new meds        [facility] Name 10/31/24 1230             Discharge Instructions    Did you understand your discharge instructions? Yes      Did your family/caregiver hear your instructions? Yes      Were you told to eat a special diet? Yes      Did you adhere to the diet? Yes      Were you given a number of someone to call if you had questions or concerns? Yes        Row Name 10/31/24 1230             Index discharge location/services    Where did you go upon discharge? Home      Do you have supportive family or friends in the home? Yes        [facility] Name 10/31/24 1230             Discharge Readiness    On a scale of 1-5 (5 being well prepared), how ready were you for discharge  4      Recommendation based on interview Education on diagnosis/self management;Other (comment)  Pt unable to obtain GI appt till 1/6. Recommend f/u appt be made prior to d/c        Row Name 10/31/24 1230             Palliative Care/Hospice    Are you current with Palliative Care? No      Are you current with Hospice Care? No        Row Name 10/31/24 1230 10/30/24 175          Advance Directives (For Healthcare)    Pre-existing AND/MOST/POLST Order No No     Advance Directive Status Patient does not have advance directive Patient does not have advance directive     Have you reviewed your Advance Directive and is it valid for this stay? Not applicable Not applicable     Literature Provided on Advance Directives No --     Patient Requests Assistance on Advance Directives Patient Declined Patient Declined       Row Name 10/31/24 1230             Readmission Assessment Final Comments    Final Comments Hx: Cirrhosis of liver, HIV, and anemia.  PREVIOUS: 10/15-10/19 Sm Bowel Obstruction (SBO) CT - poss SBO. Gen Surg consult treat nonopertaively (NG tube decompression) Exploratory Lab cx pt  had bowel function, clear liquids started and tolerated. GI unable to do paracenteis d/t insufficient pocket of fluid. Recommend OP Colonoscopy and F/U with Gastroenterology to monitor ascites and when can safely resume diuretics. Pt scheduled OP appt w/Gastro but was unable to get appt until 1/6/2025. CURRENT: SBO CT lg distention poss SBO. Gen Sx NG tube placed, once abd distention improves poss sm bowel follow-through. RECOMMENDATION: Pts follow up appts be placed prior to d/c to ensure appt is set in accordance with d/c plan. (P)

## 2024-11-01 ENCOUNTER — APPOINTMENT (OUTPATIENT)
Dept: GENERAL RADIOLOGY | Facility: HOSPITAL | Age: 59
DRG: 388 | End: 2024-11-01
Payer: COMMERCIAL

## 2024-11-01 ENCOUNTER — INPATIENT HOSPITAL (AMBULATORY)
Dept: URBAN - METROPOLITAN AREA HOSPITAL 84 | Facility: HOSPITAL | Age: 59
End: 2024-11-01
Payer: COMMERCIAL

## 2024-11-01 ENCOUNTER — INPATIENT HOSPITAL (AMBULATORY)
Age: 59
End: 2024-11-01
Payer: COMMERCIAL

## 2024-11-01 DIAGNOSIS — D50.9 IRON DEFICIENCY ANEMIA, UNSPECIFIED: ICD-10-CM

## 2024-11-01 LAB
ANION GAP SERPL CALCULATED.3IONS-SCNC: 14.7 MMOL/L (ref 5–15)
BASOPHILS # BLD AUTO: 0.06 10*3/MM3 (ref 0–0.2)
BASOPHILS NFR BLD AUTO: 0.6 % (ref 0–1.5)
BUN SERPL-MCNC: 17 MG/DL (ref 6–20)
BUN/CREAT SERPL: 15.5 (ref 7–25)
CALCIUM SPEC-SCNC: 8.5 MG/DL (ref 8.6–10.5)
CHLORIDE SERPL-SCNC: 98 MMOL/L (ref 98–107)
CO2 SERPL-SCNC: 23.3 MMOL/L (ref 22–29)
CREAT SERPL-MCNC: 1.1 MG/DL (ref 0.76–1.27)
CRP SERPL-MCNC: 13.9 MG/DL (ref 0–0.5)
DEPRECATED RDW RBC AUTO: 50.8 FL (ref 37–54)
EGFRCR SERPLBLD CKD-EPI 2021: 78.3 ML/MIN/1.73
EOSINOPHIL # BLD AUTO: 0.17 10*3/MM3 (ref 0–0.4)
EOSINOPHIL NFR BLD AUTO: 1.8 % (ref 0.3–6.2)
ERYTHROCYTE [DISTWIDTH] IN BLOOD BY AUTOMATED COUNT: 19.3 % (ref 12.3–15.4)
ERYTHROCYTE [SEDIMENTATION RATE] IN BLOOD: >130 MM/HR (ref 0–20)
GLUCOSE BLDC GLUCOMTR-MCNC: 170 MG/DL (ref 70–105)
GLUCOSE BLDC GLUCOMTR-MCNC: 61 MG/DL (ref 70–105)
GLUCOSE BLDC GLUCOMTR-MCNC: 73 MG/DL (ref 70–105)
GLUCOSE SERPL-MCNC: 59 MG/DL (ref 65–99)
HCT VFR BLD AUTO: 32.4 % (ref 37.5–51)
HGB BLD-MCNC: 9.2 G/DL (ref 13–17.7)
IMM GRANULOCYTES # BLD AUTO: 0.03 10*3/MM3 (ref 0–0.05)
IMM GRANULOCYTES NFR BLD AUTO: 0.3 % (ref 0–0.5)
LYMPHOCYTES # BLD AUTO: 3.18 10*3/MM3 (ref 0.7–3.1)
LYMPHOCYTES NFR BLD AUTO: 33.2 % (ref 19.6–45.3)
MAGNESIUM SERPL-MCNC: 2.3 MG/DL (ref 1.6–2.6)
MCH RBC QN AUTO: 21.4 PG (ref 26.6–33)
MCHC RBC AUTO-ENTMCNC: 28.4 G/DL (ref 31.5–35.7)
MCV RBC AUTO: 75.3 FL (ref 79–97)
MONOCYTES # BLD AUTO: 0.91 10*3/MM3 (ref 0.1–0.9)
MONOCYTES NFR BLD AUTO: 9.5 % (ref 5–12)
NEUTROPHILS NFR BLD AUTO: 5.22 10*3/MM3 (ref 1.7–7)
NEUTROPHILS NFR BLD AUTO: 54.6 % (ref 42.7–76)
NRBC BLD AUTO-RTO: 0 /100 WBC (ref 0–0.2)
PLATELET # BLD AUTO: 599 10*3/MM3 (ref 140–450)
PMV BLD AUTO: 8.5 FL (ref 6–12)
POTASSIUM SERPL-SCNC: 4.1 MMOL/L (ref 3.5–5.2)
RBC # BLD AUTO: 4.3 10*6/MM3 (ref 4.14–5.8)
SODIUM SERPL-SCNC: 136 MMOL/L (ref 136–145)
WBC NRBC COR # BLD AUTO: 9.57 10*3/MM3 (ref 3.4–10.8)

## 2024-11-01 PROCEDURE — 25510000001 IOPAMIDOL PER 1 ML

## 2024-11-01 PROCEDURE — 86140 C-REACTIVE PROTEIN: CPT | Performed by: NURSE PRACTITIONER

## 2024-11-01 PROCEDURE — 99233 SBSQ HOSP IP/OBS HIGH 50: CPT | Performed by: NURSE PRACTITIONER

## 2024-11-01 PROCEDURE — 74250 X-RAY XM SM INT 1CNTRST STD: CPT

## 2024-11-01 PROCEDURE — 85025 COMPLETE CBC W/AUTO DIFF WBC: CPT | Performed by: INTERNAL MEDICINE

## 2024-11-01 PROCEDURE — 25010000002 MORPHINE PER 10 MG: Performed by: INTERNAL MEDICINE

## 2024-11-01 PROCEDURE — 82948 REAGENT STRIP/BLOOD GLUCOSE: CPT

## 2024-11-01 PROCEDURE — 80048 BASIC METABOLIC PNL TOTAL CA: CPT | Performed by: INTERNAL MEDICINE

## 2024-11-01 PROCEDURE — 83735 ASSAY OF MAGNESIUM: CPT | Performed by: INTERNAL MEDICINE

## 2024-11-01 PROCEDURE — 25810000003 LACTATED RINGERS PER 1000 ML

## 2024-11-01 PROCEDURE — 85652 RBC SED RATE AUTOMATED: CPT | Performed by: NURSE PRACTITIONER

## 2024-11-01 PROCEDURE — 25010000002 LORAZEPAM PER 2 MG: Performed by: INTERNAL MEDICINE

## 2024-11-01 RX ORDER — IOPAMIDOL 755 MG/ML
200 INJECTION, SOLUTION INTRAVASCULAR
Status: COMPLETED | OUTPATIENT
Start: 2024-11-01 | End: 2024-11-01

## 2024-11-01 RX ORDER — SODIUM CHLORIDE, SODIUM LACTATE, POTASSIUM CHLORIDE, CALCIUM CHLORIDE 600; 310; 30; 20 MG/100ML; MG/100ML; MG/100ML; MG/100ML
100 INJECTION, SOLUTION INTRAVENOUS CONTINUOUS
Status: DISCONTINUED | OUTPATIENT
Start: 2024-11-01 | End: 2024-11-02

## 2024-11-01 RX ORDER — DEXTROSE MONOHYDRATE 25 G/50ML
INJECTION, SOLUTION INTRAVENOUS
Status: COMPLETED
Start: 2024-11-01 | End: 2024-11-01

## 2024-11-01 RX ADMIN — IOPAMIDOL 200 ML: 755 INJECTION, SOLUTION INTRAVENOUS at 12:02

## 2024-11-01 RX ADMIN — Medication 10 ML: at 20:46

## 2024-11-01 RX ADMIN — MORPHINE SULFATE 4 MG: 4 INJECTION, SOLUTION INTRAMUSCULAR; INTRAVENOUS at 05:21

## 2024-11-01 RX ADMIN — Medication 10 ML: at 10:32

## 2024-11-01 RX ADMIN — LORAZEPAM 1 MG: 2 INJECTION INTRAMUSCULAR; INTRAVENOUS at 19:06

## 2024-11-01 RX ADMIN — BICTEGRAVIR SODIUM, EMTRICITABINE, AND TENOFOVIR ALAFENAMIDE FUMARATE 1 TABLET: 50; 200; 25 TABLET ORAL at 14:55

## 2024-11-01 RX ADMIN — SODIUM CHLORIDE, POTASSIUM CHLORIDE, SODIUM LACTATE AND CALCIUM CHLORIDE 100 ML/HR: 600; 310; 30; 20 INJECTION, SOLUTION INTRAVENOUS at 20:46

## 2024-11-01 RX ADMIN — MORPHINE SULFATE 2 MG: 2 INJECTION, SOLUTION INTRAMUSCULAR; INTRAVENOUS at 10:32

## 2024-11-01 RX ADMIN — MORPHINE SULFATE 2 MG: 2 INJECTION, SOLUTION INTRAMUSCULAR; INTRAVENOUS at 19:06

## 2024-11-01 RX ADMIN — DEXTROSE MONOHYDRATE 50 ML: 25 INJECTION, SOLUTION INTRAVENOUS at 06:46

## 2024-11-01 NOTE — PLAN OF CARE
Goal Outcome Evaluation:              Outcome Evaluation: Pt. has been sleeping in between care. Pt. is A&Ox4, makes needs known. Pt. has NG tube. Pt had small bowel follow through today.

## 2024-11-01 NOTE — PROGRESS NOTES
[facility] MEDICINE SERVICE  DAILY PROGRESS NOTE    NAME: Felipe Nieves  : 1965  MRN: [id number]      LOS: 2 days     PROVIDER OF SERVICE: Anali Ferreira MD    Chief Complaint: SBO (small bowel obstruction)    Subjective:     Interval History:  History taken from: patient    Patient was seen and examined at bedside this morning.  States that his abdominal pressure has improved as compared to when he came in, had small bowel movement overnight and he is passing gas.  Still has NG tube in with bilious output seen        Review of Systems: Negative except as described above  Review of Systems    Objective:     Vital Signs  Temp:  [98.1 °F (36.7 °C)-98.3 °F (36.8 °C)] 98.1 °F (36.7 °C)  Heart Rate:  [82-95] 82  Resp:  [11-17] 12  BP: (114-133)/(82-92) 123/83   Body mass index is 18.64 kg/m².    Physical Exam  Physical Exam  Constitutional:       Comments: NAD    Cardiovascular:      Comments:  RRR, S1 & S2   Pulmonary:      Comments:  Lungs CTA   Abdominal:      Comments:  ABD soft, NT            Diagnostic Data    Results from last 7 days   Lab Units 24  0520 10/31/24  1319 10/31/24  0054   WBC 10*3/mm3 9.57   < > 8.55   HEMOGLOBIN g/dL 9.2*   < > 9.7*   HEMATOCRIT % 32.4*   < > 32.7*   PLATELETS 10*3/mm3 599*   < > 649*   GLUCOSE mg/dL 59*  --  81   CREATININE mg/dL 1.10  --  0.98   BUN mg/dL 17  --  15   SODIUM mmol/L 136  --  138   POTASSIUM mmol/L 4.1  --  4.4   AST (SGOT) U/L  --   --  13   ALT (SGPT) U/L  --   --  7   ALK PHOS U/L  --   --  64   BILIRUBIN mg/dL  --   --  0.4   ANION GAP mmol/L 14.7  --  9.5    < > = values in this interval not displayed.       XR Abdomen KUB    Result Date: 10/31/2024  Impression: NG tube tip in the stomach No interval change in diffuse gaseous distention Electronically Signed: Santhosh Hodges MD  10/31/2024 12:48 PM EDT  Workstation ID: [id number]       I reviewed the patient's new clinical results.    Assessment/Plan:     Active and Resolved  Problems  Active Hospital Problems    Diagnosis  POA    **SBO (small bowel obstruction) [K56.609]  Yes    HIV (human immunodeficiency virus infection) [Z21]  Yes    Iron deficiency anemia [D50.9]  Yes      Resolved Hospital Problems   No resolved problems to display.       Small bowel obstruction   Plan  Surgery consulted, patient going for small bowel follow-through today  Keep NPO  -Zofran PRN IV  NGT to low wall suction, will defer further management to surgery  IV fluids  Monitor CBC/BMP  KUB:NG tube tip projects to the level of the mid gastric body.  CT:Findings consistent with high-grade small bowel obstruction.  2. Questionable inflammation versus incomplete distention of segment of the transverse colon and sigmoid colon.  3. Small quantity ascites, slightly increased since 10/15/2024. It is of insufficient quantity for paracentesis purposes.  4.. The lower thoracic esophagus appears thickened and is likely inflamed. This is new since the prior study      HIV  Holding po meds-Bictegravir-Emtricitab-Tenofov (Biktarvy) -25 MG per tablet      CATA-monitor CBC. Check iron    VTE Prophylaxis:  Mechanical VTE prophylaxis orders are present.                  Time: 35 minutes     There are no questions and answers to display.       Signature: Electronically signed by Anali Ferreira MD, 11/01/24, 12:48 EDT.  [facility] Hospitalist Team

## 2024-11-01 NOTE — PROGRESS NOTES
Patient was off the floor getting a small bowel follow-through during rounds.  Will follow-up with patient tomorrow    Rayo Koch MD  11/1/2024  13:55 EDT

## 2024-11-01 NOTE — PLAN OF CARE
Goal Outcome Evaluation:              Outcome Evaluation: PATIENT HAS BEEN SLEEPING BETWEEN CARE. PATIENT IS AGGITATED DUE TO THIS PROCESS SEEMS TOO SLOW FOR HIM. PATIENT IS NPO AND MISSED DOSES OF BIKTARVEY AND I RECEIVED PERMISSION TO GIVE TONIGHTS DOSE CRUSHED.

## 2024-11-01 NOTE — CASE MANAGEMENT/SOCIAL WORK
Continued Stay Note  [facility]     Patient Name: Felipe Nieves  MRN: [id number]  Today's Date: 11/1/2024    Admit Date: 10/30/2024    Plan: Home with spouse pending PT eval. Watch for possible HHC.   Discharge Plan       Row Name 11/01/24 1316       Plan    Plan Home with spouse pending PT eval. Watch for possible HHC.    Patient/Family in Agreement with Plan yes    Plan Comments DC Barrier: 11/1 Sx small bowel follow-through, clear liquids, NG tube             Expected Discharge Date and Time       Expected Discharge Date Expected Discharge Time    Nov 2, 2024         Chuyita Rodriguez RN    phone [phone]  fax [phone]

## 2024-11-01 NOTE — PROGRESS NOTES
LOS: 2 days   Patient Care Team:  Provider, No Known as PCP - General      Subjective     Interval History:   Is passing gas and mucus stools.  Continues left NG tube to intermittent wall suction.  Still feeling poorly.  LABS: Sodium potassium creatinine all normal.  WBCs 9.57, hemoglobin 9.2 with an MCV of 75.3, platelets 599.      ROS:   No chest pain, shortness of breath, or cough.         Medication Review:     Current Facility-Administered Medications:     Bictegravir-Emtricitab-Tenofov (BIKTARVY) -25 MG per tablet 1 tablet, 1 tablet, Oral, Daily, Gino Bae MD, 1 tablet at 10/31/24 2100    sennosides-docusate (PERICOLACE) 8.6-50 MG per tablet 2 tablet, 2 tablet, Oral, BID PRN **AND** polyethylene glycol (MIRALAX) packet 17 g, 17 g, Oral, Daily PRN **AND** bisacodyl (DULCOLAX) EC tablet 5 mg, 5 mg, Oral, Daily PRN **AND** bisacodyl (DULCOLAX) suppository 10 mg, 10 mg, Rectal, Daily PRN, Gino Bae MD    Calcium Replacement - Follow Nurse / BPA Driven Protocol, , Does not apply, Kathia DE Federico N, MD    diphenhydrAMINE (BENADRYL) capsule 25 mg, 25 mg, Oral, Q6H PRN, Gino Bae MD, 25 mg at 10/31/24 2101    HYDROcodone-acetaminophen (NORCO) 5-325 MG per tablet 1 tablet, 1 tablet, Oral, Q6H PRN, Gino Bae MD    LORazepam (ATIVAN) injection 1 mg, 1 mg, Intravenous, Q4H PRN, Gino Bae MD, 1 mg at 10/31/24 2101    LORazepam (ATIVAN) tablet 1 mg, 1 mg, Oral, Q6H PRN, Gino Bae MD    Magnesium Standard Dose Replacement - Follow Nurse / BPA Driven Protocol, , Does not apply, PRNKathia Federico N, MD    morphine injection 2 mg, 2 mg, Intravenous, Q4H PRN, Gino Bae MD, 2 mg at 11/01/24 1032    morphine injection 4 mg, 4 mg, Intravenous, Q4H PRN, Gino Bae MD, 4 mg at 11/01/24 0521    nitroglycerin (NITROSTAT) SL tablet 0.4 mg, 0.4 mg, Sublingual, Q5 Min PRN, Gino Bae MD    ondansetron ODT (ZOFRAN-ODT)  disintegrating tablet 4 mg, 4 mg, Oral, Q6H PRN **OR** ondansetron (ZOFRAN) injection 4 mg, 4 mg, Intravenous, Q6H PRN, Gino Bae MD, 4 mg at 10/30/24 0953    ondansetron ODT (ZOFRAN-ODT) disintegrating tablet 4 mg, 4 mg, Oral, Q6H PRN **OR** ondansetron (ZOFRAN) injection 4 mg, 4 mg, Intravenous, Q6H PRN, Gino Bae MD    Phosphorus Replacement - Follow Nurse / BPA Driven Protocol, , Does not apply, Kathia DE Federico N, MD    Potassium Replacement - Follow Nurse / BPA Driven Protocol, , Does not apply, Kathia DE Federico N, MD    sodium chloride 0.9 % flush 10 mL, 10 mL, Intravenous, Q12H, Gino Bae MD, 10 mL at 11/01/24 1032    sodium chloride 0.9 % flush 10 mL, 10 mL, Intravenous, Kathia DE Federico N, MD    sodium chloride 0.9 % infusion 40 mL, 40 mL, Intravenous, Kathia DE Federico N, MD      Objective resting in the hospital bed.  Family at bedside.  NAD.    Vital Signs  Temp:  [98.1 °F (36.7 °C)-98.3 °F (36.8 °C)] 98.1 °F (36.7 °C)  Heart Rate:  [82-95] 82  Resp:  [11-17] 12  BP: (114-133)/(82-92) 123/83  Physical Exam: NG tube in left naris.    General Appearance:    Awake and alert, in no acute distress   Head:    Normocephalic, without obvious abnormality   Eyes:          Conjunctivae normal, anicteric sclerae   Ears:    Hearing intact   Throat:   No oral lesions, no thrush, oral mucosa moist   Neck:   No adenopathy, supple, no JVD   Lungs:      respirations regular, even and unlabored        Abdomen:       soft, non-tender, no rebound or guarding, non-distended, no hepatosplenomegaly   Rectal:     Deferred   Extremities:   No edema, no cyanosis, no redness   Skin:   No bleeding, bruising or rash, no jaundice   Neurologic:   Cranial nerves 2 - 12 grossly intact, no asterixis, sensation   intact        Results Review:    CBC    Results from last 7 days   Lab Units 11/01/24  0520 10/31/24  1319 10/31/24  0054 10/30/24  0548   WBC 10*3/mm3 9.57 8.73 8.55 9.98    HEMOGLOBIN g/dL 9.2* 9.2* 9.7* 10.2*   PLATELETS 10*3/mm3 599* 625* 649* 663*     CMP   Results from last 7 days   Lab Units 11/01/24  0520 10/31/24  1319 10/31/24  0054 10/30/24  0548   SODIUM mmol/L 136  --  138 137   POTASSIUM mmol/L 4.1  --  4.4 4.3   CHLORIDE mmol/L 98  --  101 100   CO2 mmol/L 23.3  --  27.5 28.7   BUN mg/dL 17  --  15 11   CREATININE mg/dL 1.10  --  0.98 1.03   GLUCOSE mg/dL 59*  --  81 136*   ALBUMIN g/dL  --   --  3.1* 3.2*   BILIRUBIN mg/dL  --   --  0.4 0.4   ALK PHOS U/L  --   --  64 69   AST (SGOT) U/L  --   --  13 14   ALT (SGPT) U/L  --   --  7 <5   MAGNESIUM mg/dL 2.3 2.3 2.4  --    AMYLASE U/L  --   --  74  --    LIPASE U/L  --   --   --  8*     Cr Clearance Estimated Creatinine Clearance: 56.6 mL/min (by C-G formula based on SCr of 1.1 mg/dL).  Coag     HbA1C   Lab Results   Component Value Date    HGBA1C 6.13 (H) 10/31/2024     Blood Glucose   Glucose   Date/Time Value Ref Range Status   11/01/2024 0718 170 (H) 70 - 105 mg/dL Final     Comment:     Serial Number: [id number]:  [id number]   11/01/2024 0636 61 (L) 70 - 105 mg/dL Final     Comment:     Serial Number: [id number]:  [id number]   10/31/2024 2022 106 (H) 70 - 105 mg/dL Final     Comment:     Serial Number: [id number]:  [id number]   10/31/2024 0814 81 70 - 105 mg/dL Final     Comment:     Serial Number: [id number]:  [id number]     Infection   Results from last 7 days   Lab Units 10/31/24  0054   PROCALCITONIN ng/mL 0.13     UA    Results from last 7 days   Lab Units 10/30/24  0724   NITRITE UA  Negative   WBC UA /HPF 0-2   BACTERIA UA /HPF None Seen   SQUAM EPITHEL UA /HPF 0-2     Radiology(recent) XR Abdomen KUB    Result Date: 10/31/2024  Impression: NG tube tip in the stomach No interval change in diffuse gaseous distention Electronically Signed: Santhosh Hodges MD  10/31/2024 12:48 PM EDT  Workstation ID: [id number]           Assessment & Plan   -Small bowel obstruction  -Abdominal  pain  -Ascites  -Iron deficiency anemia  -HIV  -History of hernia repair x 3     PLAN:  Patient is a 58-year-old male with history of HIV and iron deficiency anemia.  Who was recently hospitalized at [facility] on 10/15/2024 with a small bowel obstruction, which resolved prior to surgery.  Patient presented on 10/30/2024 with abdominal pain and distention.  Patient reports worsening constipation since discharge.  CT on admission showed small bowel obstruction and NG tube was placed. CT abdomen and pelvis with contrast-high-grade small bowel obstruction, questionable inflammation versus incomplete distention of the transverse and sigmoid colon, small quantity ascites slightly increased since 10/15/2024 but is insufficient quantity for paracentesis, lower thoracic esophagus thickened and likely inflamed  Repeat KUB 10/31/2024-shows NG tube in tip of stomach and no interval change in diffuse gaseous distention.    Plan for small bowel follow-through today.  Continue supportive care with NG tube to intermittent low wall suction.  Hemoglobin is stable at 9.2.  Continue to monitor and transfuse for hemoglobin less than 7.  General surgery is following.  Will add CRP and sed rate to morning labs.  Recommend outpatient colonoscopy once he improves.  Echo done yesterday shows ejection fraction 56 to 60%.    Laura Singh, APRN  11/01/24  13:05 EDT

## 2024-11-02 ENCOUNTER — INPATIENT HOSPITAL (AMBULATORY)
Age: 59
End: 2024-11-02
Payer: COMMERCIAL

## 2024-11-02 ENCOUNTER — INPATIENT HOSPITAL (AMBULATORY)
Dept: URBAN - METROPOLITAN AREA HOSPITAL 84 | Facility: HOSPITAL | Age: 59
End: 2024-11-02
Payer: COMMERCIAL

## 2024-11-02 ENCOUNTER — APPOINTMENT (OUTPATIENT)
Dept: GENERAL RADIOLOGY | Facility: HOSPITAL | Age: 59
DRG: 388 | End: 2024-11-02
Payer: COMMERCIAL

## 2024-11-02 DIAGNOSIS — Z21 ASYMPTOMATIC HUMAN IMMUNODEFICIENCY VIRUS [HIV] INFECTION ST: ICD-10-CM

## 2024-11-02 DIAGNOSIS — R18.8 OTHER ASCITES: ICD-10-CM

## 2024-11-02 DIAGNOSIS — D50.9 IRON DEFICIENCY ANEMIA, UNSPECIFIED: ICD-10-CM

## 2024-11-02 DIAGNOSIS — K56.609 UNSPECIFIED INTESTINAL OBSTRUCTION, UNSPECIFIED AS TO PARTIA: ICD-10-CM

## 2024-11-02 LAB
ANION GAP SERPL CALCULATED.3IONS-SCNC: 9.6 MMOL/L (ref 5–15)
BUN SERPL-MCNC: 18 MG/DL (ref 6–20)
BUN/CREAT SERPL: 16.8 (ref 7–25)
CALCIUM SPEC-SCNC: 8.6 MG/DL (ref 8.6–10.5)
CHLORIDE SERPL-SCNC: 99 MMOL/L (ref 98–107)
CO2 SERPL-SCNC: 28.4 MMOL/L (ref 22–29)
CREAT SERPL-MCNC: 1.07 MG/DL (ref 0.76–1.27)
CRP SERPL-MCNC: 15 MG/DL (ref 0–0.5)
DEPRECATED RDW RBC AUTO: 50.3 FL (ref 37–54)
EGFRCR SERPLBLD CKD-EPI 2021: 80.9 ML/MIN/1.73
ERYTHROCYTE [DISTWIDTH] IN BLOOD BY AUTOMATED COUNT: 19.1 % (ref 12.3–15.4)
GLUCOSE BLDC GLUCOMTR-MCNC: 101 MG/DL (ref 70–105)
GLUCOSE BLDC GLUCOMTR-MCNC: 163 MG/DL (ref 70–105)
GLUCOSE BLDC GLUCOMTR-MCNC: 303 MG/DL (ref 70–105)
GLUCOSE BLDC GLUCOMTR-MCNC: 74 MG/DL (ref 70–105)
GLUCOSE BLDC GLUCOMTR-MCNC: 80 MG/DL (ref 70–105)
GLUCOSE SERPL-MCNC: 72 MG/DL (ref 65–99)
HCT VFR BLD AUTO: 31.1 % (ref 37.5–51)
HGB BLD-MCNC: 8.9 G/DL (ref 13–17.7)
MCH RBC QN AUTO: 21.3 PG (ref 26.6–33)
MCHC RBC AUTO-ENTMCNC: 28.6 G/DL (ref 31.5–35.7)
MCV RBC AUTO: 74.6 FL (ref 79–97)
PLATELET # BLD AUTO: 549 10*3/MM3 (ref 140–450)
PMV BLD AUTO: 8.1 FL (ref 6–12)
POTASSIUM SERPL-SCNC: 3.9 MMOL/L (ref 3.5–5.2)
RBC # BLD AUTO: 4.17 10*6/MM3 (ref 4.14–5.8)
SODIUM SERPL-SCNC: 137 MMOL/L (ref 136–145)
WBC NRBC COR # BLD AUTO: 6.77 10*3/MM3 (ref 3.4–10.8)

## 2024-11-02 PROCEDURE — 85027 COMPLETE CBC AUTOMATED: CPT | Performed by: NURSE PRACTITIONER

## 2024-11-02 PROCEDURE — 25810000003 LACTATED RINGERS PER 1000 ML

## 2024-11-02 PROCEDURE — 82948 REAGENT STRIP/BLOOD GLUCOSE: CPT

## 2024-11-02 PROCEDURE — 86140 C-REACTIVE PROTEIN: CPT | Performed by: NURSE PRACTITIONER

## 2024-11-02 PROCEDURE — 99232 SBSQ HOSP IP/OBS MODERATE 35: CPT | Performed by: NURSE PRACTITIONER

## 2024-11-02 PROCEDURE — 74018 RADEX ABDOMEN 1 VIEW: CPT

## 2024-11-02 PROCEDURE — 80048 BASIC METABOLIC PNL TOTAL CA: CPT | Performed by: NURSE PRACTITIONER

## 2024-11-02 PROCEDURE — 99232 SBSQ HOSP IP/OBS MODERATE 35: CPT | Performed by: SURGERY

## 2024-11-02 PROCEDURE — 25010000002 MORPHINE PER 10 MG: Performed by: INTERNAL MEDICINE

## 2024-11-02 RX ORDER — INSULIN LISPRO 100 [IU]/ML
2-7 INJECTION, SOLUTION INTRAVENOUS; SUBCUTANEOUS
Status: DISCONTINUED | OUTPATIENT
Start: 2024-11-02 | End: 2024-11-04 | Stop reason: HOSPADM

## 2024-11-02 RX ORDER — DEXTROSE MONOHYDRATE 25 G/50ML
25 INJECTION, SOLUTION INTRAVENOUS
Status: DISCONTINUED | OUTPATIENT
Start: 2024-11-02 | End: 2024-11-04 | Stop reason: HOSPADM

## 2024-11-02 RX ORDER — IBUPROFEN 600 MG/1
1 TABLET ORAL
Status: DISCONTINUED | OUTPATIENT
Start: 2024-11-02 | End: 2024-11-04 | Stop reason: HOSPADM

## 2024-11-02 RX ORDER — NICOTINE POLACRILEX 4 MG
15 LOZENGE BUCCAL
Status: DISCONTINUED | OUTPATIENT
Start: 2024-11-02 | End: 2024-11-04 | Stop reason: HOSPADM

## 2024-11-02 RX ORDER — MAGNESIUM CARB/ALUMINUM HYDROX 105-160MG
30 TABLET,CHEWABLE ORAL DAILY PRN
Status: DISCONTINUED | OUTPATIENT
Start: 2024-11-02 | End: 2024-11-04 | Stop reason: HOSPADM

## 2024-11-02 RX ADMIN — BICTEGRAVIR SODIUM, EMTRICITABINE, AND TENOFOVIR ALAFENAMIDE FUMARATE 1 TABLET: 50; 200; 25 TABLET ORAL at 14:43

## 2024-11-02 RX ADMIN — SODIUM CHLORIDE, POTASSIUM CHLORIDE, SODIUM LACTATE AND CALCIUM CHLORIDE 100 ML/HR: 600; 310; 30; 20 INJECTION, SOLUTION INTRAVENOUS at 05:56

## 2024-11-02 RX ADMIN — Medication 10 ML: at 23:06

## 2024-11-02 RX ADMIN — MORPHINE SULFATE 2 MG: 2 INJECTION, SOLUTION INTRAMUSCULAR; INTRAVENOUS at 10:13

## 2024-11-02 RX ADMIN — MORPHINE SULFATE 2 MG: 2 INJECTION, SOLUTION INTRAMUSCULAR; INTRAVENOUS at 14:43

## 2024-11-02 RX ADMIN — Medication 10 ML: at 08:50

## 2024-11-02 NOTE — PROGRESS NOTES
"General Surgery Progress Note    Name: Felipe Nieves ADMIT: 10/30/2024   : 1965  PCP: Provider, No Known    MRN: [id number] LOS: 3 days   AGE/SEX: 59 y.o. male  ROOM: [address] [place] [address]    Chief Complaint   Patient presents with    Abdominal Pain     Subjective     Patient seen and examined.  Vital signs stable, afebrile.  Results of small bowel follow-through are noted.  Follow-up KUB demonstrates passage of contrast into the descending colon thereby excluding complete bowel obstruction.    Objective     Scheduled Medications:   Bictegravir-Emtricitab-Tenofov, 1 tablet, Oral, Daily  sodium chloride, 10 mL, Intravenous, Q12H        Active Infusions:  lactated ringers, 100 mL/hr, Last Rate: 100 mL/hr (24 0556)        As Needed Medications:    senna-docusate sodium **AND** polyethylene glycol **AND** bisacodyl **AND** bisacodyl    Calcium Replacement - Follow Nurse / BPA Driven Protocol    diphenhydrAMINE    HYDROcodone-acetaminophen    LORazepam    LORazepam    Magnesium Standard Dose Replacement - Follow Nurse / BPA Driven Protocol    mineral oil    Morphine    Morphine    nitroglycerin    ondansetron ODT **OR** ondansetron    ondansetron ODT **OR** ondansetron    Phosphorus Replacement - Follow Nurse / BPA Driven Protocol    Potassium Replacement - Follow Nurse / BPA Driven Protocol    sodium chloride    sodium chloride    Vital Signs  Vital Signs Patient Vitals for the past 24 hrs:   BP Temp Temp src Pulse Resp SpO2 Height Weight   24 1446 -- -- -- 78 -- -- -- --   24 1105 122/75 97.6 °F (36.4 °C) Oral 85 12 96 % -- --   24 0700 118/81 98.4 °F (36.9 °C) Axillary 85 14 -- -- --   24 0512 -- -- -- -- -- -- -- 54 kg (119 lb 0.8 oz)   24 0427 125/85 98.7 °F (37.1 °C) Oral 95 12 95 % -- --   24 1928 122/90 98.4 °F (36.9 °C) Oral 104 13 95 % -- --   24 127/87 -- -- -- 13 94 % 170.2 cm (67.01\") --     I/O:  I/O last 3 completed shifts:  In: -   Out: 450 " [Emesis/NG output:450]    Physical Exam:  Physical Exam  Constitutional:       General: He is not in acute distress.  Cardiovascular:      Rate and Rhythm: Normal rate.   Pulmonary:      Effort: Pulmonary effort is normal. No respiratory distress.   Abdominal:      General: There is distension.      Palpations: Abdomen is soft.      Tenderness: There is abdominal tenderness. There is no guarding.      Comments: Soft, distended, mildly tender generally over the abdomen.   Neurological:      Mental Status: He is alert.   Psychiatric:         Mood and Affect: Mood normal.         Behavior: Behavior normal.         Results Review:     CBC    Results from last 7 days   Lab Units 11/02/24  0142 11/01/24  0520 10/31/24  1319 10/31/24  0054 10/30/24  0548   WBC 10*3/mm3 6.77 9.57 8.73 8.55 9.98   HEMOGLOBIN g/dL 8.9* 9.2* 9.2* 9.7* 10.2*   PLATELETS 10*3/mm3 549* 599* 625* 649* 663*     BMP   Results from last 7 days   Lab Units 11/02/24  0142 11/01/24  0520 10/31/24  1319 10/31/24  0054 10/30/24  0548   SODIUM mmol/L 137 136  --  138 137   POTASSIUM mmol/L 3.9 4.1  --  4.4 4.3   CHLORIDE mmol/L 99 98  --  101 100   CO2 mmol/L 28.4 23.3  --  27.5 28.7   BUN mg/dL 18 17  --  15 11   CREATININE mg/dL 1.07 1.10  --  0.98 1.03   GLUCOSE mg/dL 72 59*  --  81 136*   MAGNESIUM mg/dL  --  2.3 2.3 2.4  --      Radiology(recent) XR Abdomen KUB    Result Date: 11/2/2024  Impression: There is now oral contrast seen all the way to the level of the descending colon. This would argue against a complete obstruction. The small bowel loops now appear to be only mildly dilated. I would recommend clinical correlation. Electronically Signed: Felipe Palmer MD  11/2/2024 1:57 PM EDT  Workstation ID: [id number]    FL Small Bowel Follow Through Single-Contrast    Result Date: 11/2/2024  Impression: 1. Findings consistent with a high-grade small bowel obstruction. There is no contrast within the cecum on the 9-hour image. 2. I would consider repeating a  KUB today to see if there is any filling of the colon with contrast. If there is no contrast in the colon, I would recommend surgical consultation if not already performed. Electronically Signed: Felipe Palmer MD  11/2/2024 11:06 AM EDT  Workstation ID: [id number]     I reviewed the patient's new clinical results.    Assessment & Plan       SBO (small bowel obstruction)    Iron deficiency anemia    HIV (human immunodeficiency virus infection)      59 y.o. male admitted 10/30/2024 with small bowel obstruction    Okay to discontinue nasogastric tube  Begin clear liquid diet  Will add mineral oil to help with passage of intestinal contents.  Appreciate GI help with management of cirrhosis and dysmotility  Will slowly advance diet as patient is able to tolerate.    This note was created using Dragon Voice Recognition software.    Rayo Koch MD  11/02/24  15:19 EDT

## 2024-11-02 NOTE — PROGRESS NOTES
" LOS: 3 days   Patient Care Team:  Provider, No Known as PCP - General      Subjective   \"OK\"    Interval History:   LABS: Sodium potassium creatinine all normal.  CRP 15(13.9).  WBC 6.77, hemoglobin 8.9 (9.2),Platelets 549.  Small bowel follow-through done yesterday.  Pending read.  Spoke to radiology tech about contacting radiologist to read ASAP.  NG tube to intermittent low wall suction.  550 cc of bilious drainage noted.  Passing gas but no bowel movement still.      ROS:   No chest pain, shortness of breath, or cough.         Medication Review:     Current Facility-Administered Medications:     Bictegravir-Emtricitab-Tenofov (BIKTARVY) -25 MG per tablet 1 tablet, 1 tablet, Oral, Daily, Gino Bae MD, 1 tablet at 11/01/24 1455    sennosides-docusate (PERICOLACE) 8.6-50 MG per tablet 2 tablet, 2 tablet, Oral, BID PRN **AND** polyethylene glycol (MIRALAX) packet 17 g, 17 g, Oral, Daily PRN **AND** bisacodyl (DULCOLAX) EC tablet 5 mg, 5 mg, Oral, Daily PRN **AND** bisacodyl (DULCOLAX) suppository 10 mg, 10 mg, Rectal, Daily PRN, Gino Bae MD    Calcium Replacement - Follow Nurse / BPA Driven Protocol, , Does not apply, PRN, Gino Bae MD    diphenhydrAMINE (BENADRYL) capsule 25 mg, 25 mg, Oral, Q6H PRN, Gino Bae MD, 25 mg at 10/31/24 2101    HYDROcodone-acetaminophen (NORCO) 5-325 MG per tablet 1 tablet, 1 tablet, Oral, Q6H PRN, Gino Bae MD    lactated ringers infusion, 100 mL/hr, Intravenous, Continuous, Anali Ferreira MD, Last Rate: 100 mL/hr at 11/02/24 0556, 100 mL/hr at 11/02/24 0556    LORazepam (ATIVAN) injection 1 mg, 1 mg, Intravenous, Q4H PRN, Gino Bae MD, 1 mg at 11/01/24 1906    LORazepam (ATIVAN) tablet 1 mg, 1 mg, Oral, Q6H PRNKathia Federico N, MD    Magnesium Standard Dose Replacement - Follow Nurse / BPA Driven Protocol, , Does not apply, PRNKathia Federico N, MD    morphine injection 2 mg, 2 mg, " Intravenous, Q4H PRN, Gino Bae MD, 2 mg at 11/02/24 1013    morphine injection 4 mg, 4 mg, Intravenous, Q4H PRN, Gino Bae MD, 4 mg at 11/01/24 0521    nitroglycerin (NITROSTAT) SL tablet 0.4 mg, 0.4 mg, Sublingual, Q5 Min PRN, Gino Bae MD    ondansetron ODT (ZOFRAN-ODT) disintegrating tablet 4 mg, 4 mg, Oral, Q6H PRN **OR** ondansetron (ZOFRAN) injection 4 mg, 4 mg, Intravenous, Q6H PRN, Gino Bae MD, 4 mg at 10/30/24 0953    ondansetron ODT (ZOFRAN-ODT) disintegrating tablet 4 mg, 4 mg, Oral, Q6H PRN **OR** ondansetron (ZOFRAN) injection 4 mg, 4 mg, Intravenous, Q6H PRN, Gino Bae MD    Phosphorus Replacement - Follow Nurse / BPA Driven Protocol, , Does not apply, Kathia DE Federico N, MD    Potassium Replacement - Follow Nurse / BPA Driven Protocol, , Does not apply, Kathia DE Federico N, MD    sodium chloride 0.9 % flush 10 mL, 10 mL, Intravenous, Q12H, Gino Bae MD, 10 mL at 11/02/24 0850    sodium chloride 0.9 % flush 10 mL, 10 mL, Intravenous, Kathia DE Federico N, MD    sodium chloride 0.9 % infusion 40 mL, 40 mL, Intravenous, PRKathia IRVING Federico N, MD      Objective resting in the hospital bed NAD.    Vital Signs  Temp:  [98.4 °F (36.9 °C)-98.7 °F (37.1 °C)] 98.4 °F (36.9 °C)  Heart Rate:  [] 85  Resp:  [12-14] 14  BP: (118-127)/(81-90) 118/81  Physical Exam: NG tube in left naris.  General Appearance:    Awake and alert, in no acute distress   Head:    Normocephalic, without obvious abnormality   Eyes:          Conjunctivae normal, anicteric sclerae   Ears:    Hearing intact   Throat:   No oral lesions, no thrush, oral mucosa moist   Neck:   No adenopathy, supple, no JVD   Lungs:      respirations regular, even and unlabored        Abdomen:       soft, non-tender, no rebound or guarding, non-distended, no hepatosplenomegaly   Rectal:     Deferred   Extremities:   No edema, no cyanosis, no redness   Skin:   No bleeding,  bruising or rash, no jaundice   Neurologic:   Cranial nerves 2 - 12 grossly intact, no asterixis, sensation   intact        Results Review:    CBC    Results from last 7 days   Lab Units 11/02/24  0142 11/01/24  0520 10/31/24  1319 10/31/24  0054 10/30/24  0548   WBC 10*3/mm3 6.77 9.57 8.73 8.55 9.98   HEMOGLOBIN g/dL 8.9* 9.2* 9.2* 9.7* 10.2*   PLATELETS 10*3/mm3 549* 599* 625* 649* 663*     CMP   Results from last 7 days   Lab Units 11/02/24  0142 11/01/24  0520 10/31/24  1319 10/31/24  0054 10/30/24  0548   SODIUM mmol/L 137 136  --  138 137   POTASSIUM mmol/L 3.9 4.1  --  4.4 4.3   CHLORIDE mmol/L 99 98  --  101 100   CO2 mmol/L 28.4 23.3  --  27.5 28.7   BUN mg/dL 18 17  --  15 11   CREATININE mg/dL 1.07 1.10  --  0.98 1.03   GLUCOSE mg/dL 72 59*  --  81 136*   ALBUMIN g/dL  --   --   --  3.1* 3.2*   BILIRUBIN mg/dL  --   --   --  0.4 0.4   ALK PHOS U/L  --   --   --  64 69   AST (SGOT) U/L  --   --   --  13 14   ALT (SGPT) U/L  --   --   --  7 <5   MAGNESIUM mg/dL  --  2.3 2.3 2.4  --    AMYLASE U/L  --   --   --  74  --    LIPASE U/L  --   --   --   --  8*     Cr Clearance Estimated Creatinine Clearance: 58.2 mL/min (by C-G formula based on SCr of 1.07 mg/dL).  Coag     HbA1C   Lab Results   Component Value Date    HGBA1C 6.13 (H) 10/31/2024     Blood Glucose   Glucose   Date/Time Value Ref Range Status   11/02/2024 0607 101 70 - 105 mg/dL Final     Comment:     Serial Number: [id number]:  [id number]   11/02/2024 0052 80 70 - 105 mg/dL Final     Comment:     Serial Number: [id number]:  [id number]   11/01/2024 1750 73 70 - 105 mg/dL Final     Comment:     Serial Number: [id number]:  [id number]   11/01/2024 0718 170 (H) 70 - 105 mg/dL Final     Comment:     Serial Number: [id number]:  [id number]   11/01/2024 0636 61 (L) 70 - 105 mg/dL Final     Comment:     Serial Number: [id number]:  [id number]   10/31/2024 2022 106 (H) 70 - 105 mg/dL Final     Comment:     Serial Number:  [id number]:  [id number]   10/31/2024 0814 81 70 - 105 mg/dL Final     Comment:     Serial Number: [id number]:  [id number]     Infection   Results from last 7 days   Lab Units 10/31/24  0054   PROCALCITONIN ng/mL 0.13     UA    Results from last 7 days   Lab Units 10/30/24  0724   NITRITE UA  Negative   WBC UA /HPF 0-2   BACTERIA UA /HPF None Seen   SQUAM EPITHEL UA /HPF 0-2     Radiology(recent) No radiology results for the last day          Assessment & Plan   -Small bowel obstruction  -Abdominal pain  -Ascites  -Iron deficiency anemia  -HIV  -History of hernia repair x 3     PLAN:  Patient is a 58-year-old male with history of HIV and iron deficiency anemia.  Who was recently hospitalized at [facility] on 10/15/2024 with a small bowel obstruction, which resolved prior to surgery.  Patient presented on 10/30/2024 with abdominal pain and distention.  Patient reports worsening constipation since discharge.  CT on admission showed small bowel obstruction and NG tube was placed. CT abdomen and pelvis with contrast-high-grade small bowel obstruction, questionable inflammation versus incomplete distention of the transverse and sigmoid colon, small quantity ascites slightly increased since 10/15/2024 but is insufficient quantity for paracentesis, lower thoracic esophagus thickened and likely inflamed  Repeat KUB 10/31/2024-shows NG tube in tip of stomach and no interval change in diffuse gaseous distention.    Pending small bowel follow-through to be read.  Contacted radiology to have radiologist read as soon as possible.  Patient about the same.  Passing gas but little bowel movements.  Pending CRP.  Labs are about the same.  Hemoglobin is stable.  NG tube to intermittent low wall suction with 500 cc of bilious drainage noted.  General surgery is following.  Recommend outpatient colonoscopy once he improves.        Laura Singh, APRN  11/02/24  10:33 EDT             CARDIAC MARKERS ( 27 Mar 2020 04:58 )  9.730 ng/mL / x     / x     / x     / x      CARDIAC MARKERS ( 26 Mar 2020 21:54 )  3.920 ng/mL / x     / x     / x     / x      CARDIAC MARKERS ( 26 Mar 2020 19:24 )  1.420 ng/mL / x     / x     / x     / x                                11.8   30.04 )-----------( 285      ( 27 Mar 2020 04:58 )             35.4     26 Mar 2020 19:24    133    |  105    |  30     ----------------------------<  184    3.6     |  17     |  1.69     Ca    8.8        26 Mar 2020 19:24    TPro  7.8    /  Alb  2.7    /  TBili  0.3    /  DBili  x      /  AST  35     /  ALT  29     /  AlkPhos  234    26 Mar 2020 19:24    PT/INR - ( 26 Mar 2020 21:54 )   PT: 16.0 sec;   INR: 1.43 ratio         PTT - ( 27 Mar 2020 09:10 )  PTT:68.1 sec  CAPILLARY BLOOD GLUCOSE        LIVER FUNCTIONS - ( 26 Mar 2020 19:24 )  Alb: 2.7 g/dL / Pro: 7.8 gm/dL / ALK PHOS: 234 U/L / ALT: 29 U/L / AST: 35 U/L / GGT: x

## 2024-11-02 NOTE — CONSULTS
Nutrition Services  Patient Name: Felipe Nieves  YOB: 1965  MRN: [id number]  Admission date: 10/30/2024    *See MSA at bottom of this note     Severe acute illness related malnutrition R/t repeated SBO in the last month and associated significantly diminished intake; as evidenced by weight loss greater than 5% in one month, intake meeting less than 50% for greater than five days, with predominantly moderate muscle and fat wasting.     -Continue NPO until medically appropriate for diet advancement. RD will continue to follow in case alternative route for nutrition support is needed.     CLINICAL NUTRITION ASSESSMENT      Reason for Assessment 11/1: Low BMI     H&P      Past Medical History:   Diagnosis Date    HIV (human immunodeficiency virus infection)     Skin cancer        Past Surgical History:   Procedure Laterality Date    CYSTOSCOPY, URETEROSCOPY, RETROGRADE PYELOGRAM, STENT INSERTION Left 5/30/2024    Procedure: CYSTOSCOPY URETEROSCOPY HOLMIUM LASER STENT INSERTION;  Surgeon: Wale Taylor MD;  Location: [facility] MAIN OR;  Service: Urology;  Laterality: Left;    ENDOSCOPY N/A 6/1/2024    Procedure: ESOPHAGOGASTRODUODENOSCOPY, biopsy x2 areas;  Surgeon: Nelly Obando MD;  Location: [facility] ENDOSCOPY;  Service: Gastroenterology;  Laterality: N/A;  post: gastritis    HERNIA REPAIR      SKIN CANCER EXCISION          Current Problems   SBO   - NG in place to LWS   - surgery following   - to have small bowel follow through    HIV   - on medications long-term     Iron deficiency anemia       Encounter Information        Trending Narrative     11/1: Pt assessed due to concern for low BMI. Pt with recent ongoing GI concerns -- was hospitalized earlier this month for bowel obstruction. Was D/C, but then constipation worsened and he developed increased abd pain and distension, leading up to this admission. Again has SBO, now with NG to LWS. Not currently appropriate for PO. Pt meets criteria for  "malnutrition - arguably could be chronic Dz related, but pt actually has been fairly thin long-term and difficult to establish chronicity of current trends in weight loss and wasting.  Given very recent SBO and associated decrease in intake just in the last month, pt most closely meets criteria for acute malnutrition at this time, though this could convert to chronic if unable to resume intakes soon and/or demonstrates impaired assimilation of nutrients at follow up assessments. See MSA.      Anthropometrics        Current Height, Weight Height: 170.2 cm (67.01\")  Weight: 54 kg (119 lb 0.8 oz) (11/01/24 0253)       Usual Body Weight (UBW) UTD       Trending Weight Hx     This admission: 11/1: Scale weight 54 kg              PTA: 11/1: 8.5% loss in 1 month -- significant     Wt Readings from Last 30 Encounters:   11/01/24 0253 54 kg (119 lb 0.8 oz)   10/31/24 1935 54 kg (119 lb 0.8 oz)   10/31/24 1142 54.4 kg (120 lb)   10/31/24 0310 54.7 kg (120 lb 9.5 oz)   10/30/24 0535 59 kg (130 lb 1.1 oz)   10/15/24 2108 59 kg (130 lb)   06/01/24 0300 53.5 kg (117 lb 15.1 oz)   05/31/24 0326 53.6 kg (118 lb 2.7 oz)   05/30/24 0100 57 kg (125 lb 10.6 oz)   05/29/24 1842 59 kg (130 lb)   10/06/22 2120 56.7 kg (125 lb)   07/19/22 1520 60.2 kg (132 lb 11.5 oz)   02/23/22 1109 56.7 kg (125 lb)   02/01/22 0910 56.7 kg (125 lb)   01/17/22 0813 56.7 kg (125 lb)   01/07/22 0945 56.7 kg (125 lb)   12/06/21 1436 52.2 kg (115 lb)   04/30/21 1444 56.7 kg (125 lb)   12/18/20 1729 57.6 kg (127 lb)   12/15/20 1148 57.6 kg (127 lb)      BMI kg/m2 Body mass index is 18.64 kg/m².       Labs        Pertinent Labs    Results from last 7 days   Lab Units 11/01/24  0520 10/31/24  0054 10/30/24  0548   SODIUM mmol/L 136 138 137   POTASSIUM mmol/L 4.1 4.4 4.3   CHLORIDE mmol/L 98 101 100   CO2 mmol/L 23.3 27.5 28.7   BUN mg/dL 17 15 11   CREATININE mg/dL 1.10 0.98 1.03   CALCIUM mg/dL 8.5* 8.6 8.9   BILIRUBIN mg/dL  --  0.4 0.4   ALK PHOS U/L  --  64 " 69   ALT (SGPT) U/L  --  7 <5   AST (SGOT) U/L  --  13 14   GLUCOSE mg/dL 59* 81 136*     Results from last 7 days   Lab Units 11/01/24  0520 10/31/24  1319 10/31/24  0054   MAGNESIUM mg/dL 2.3 2.3 2.4   HEMOGLOBIN g/dL 9.2* 9.2* 9.7*   HEMATOCRIT % 32.4* 33.6* 32.7*   TRIGLYCERIDES mg/dL  --   --  140     Lab Results   Component Value Date    HGBA1C 6.13 (H) 10/31/2024        Medications    Scheduled Medications Bictegravir-Emtricitab-Tenofov, 1 tablet, Oral, Daily  sodium chloride, 10 mL, Intravenous, Q12H        Infusions lactated ringers, 100 mL/hr, Last Rate: 100 mL/hr (11/01/24 2046)        PRN Medications   senna-docusate sodium **AND** polyethylene glycol **AND** bisacodyl **AND** bisacodyl    Calcium Replacement - Follow Nurse / BPA Driven Protocol    diphenhydrAMINE    HYDROcodone-acetaminophen    LORazepam    LORazepam    Magnesium Standard Dose Replacement - Follow Nurse / BPA Driven Protocol    Morphine    Morphine    nitroglycerin    ondansetron ODT **OR** ondansetron    ondansetron ODT **OR** ondansetron    Phosphorus Replacement - Follow Nurse / BPA Driven Protocol    Potassium Replacement - Follow Nurse / BPA Driven Protocol    sodium chloride    sodium chloride     Physical Findings        Trending Physical   Appearance, NFPE 11/1: NFPE completed, consistent with nutrition diagnosis of malnutrition using AND/ASPEN criteria. See MSA below.      --  Edema  No breakdown documented      Bowel Function SBO ongoing; surgery following     Tubes No feeding tube in place; has NGT in for suction      Chewing/Swallowing Denies difficulty      Skin No breakdown documented      --  Current Nutrition Orders & Evaluation of Intake       Oral Nutrition     Food Allergies NKFA   Current PO Diet NPO Diet NPO Type: Strict NPO   Supplement None ordered    PO Evaluation     Trending % PO Intake 11/1: NPO   --  Nutritional Risk Screening        NRS-2002 Score          Nutrition Diagnosis         Nutrition Dx Problem 1  Severe acute illness related malnutrition R/t repeated SBO in the last month and associated significantly diminished intake; as evidenced by weight loss greater than 5% in one month, intake meeting less than 50% for greater than five days, with predominantly moderate muscle and fat wasting.       Nutrition Dx Problem 2        Intervention Goal         Intervention Goal(s) Pt able to resume PO within 1-3 days unless contraindicated      Nutrition Intervention        RD Action Will monitor for possible need for nutrition support      Nutrition Prescription          Diet Prescription NPO   Supplement Prescription None    --  Monitor/Evaluation        Monitor I&O, Pertinent labs, Weight, Skin status, GI status, POC/GOC     Malnutrition Severity Assessment      Patient meets criteria for : Severe Malnutrition  Malnutrition Type (Last 8 Hours)       Malnutrition Severity Assessment       Row Name 11/03/24 0356       Malnutrition Severity Assessment    Malnutrition Type Acute Disease or Injury - Related Malnutrition      Row Name 11/03/24 0356       Insufficient Energy Intake     Insufficient Energy Intake Findings Severe    Insufficient Energy Intake  < or equal to 50% of est. energy requirement for > or equal to 5d)      Row Name 11/03/24 0356       Unintentional Weight Loss     Unintentional Weight Loss Findings Severe    Unintentional Weight Loss  Weight loss greater than 5% in one month      Row Name 11/03/24 0356       Muscle Loss    Loss of Muscle Mass Findings Moderate    [place] Region Moderate - slight depression    Clavicle Bone Region Moderate - some protrusion in females, visible in males    Patellar Region Moderate - patella more prominent, less muscle definition around patella    Posterior Calf Region Moderate - some roundness, slight firmness      Row Name 11/03/24 0356       Fat Loss    Subcutaneous Fat Loss Findings Moderate    Orbital Region  Severe - pronounced hollowness/depression, dark circles, loose  saggy skin    Upper Arm Region Moderate - some fat tissue, not ample      Row Name 11/03/24 0356       Criteria Met (Must meet criteria for severity in at least 2 of these categories: M Wasting, Fat Loss, Fluid, Secondary Signs, Wt. Status, Intake)    Patient meets criteria for  Severe Malnutrition                       Electronically signed by:  Pauline Lee RD  11/01/24 22:15 EDT

## 2024-11-02 NOTE — PLAN OF CARE
Goal Outcome Evaluation:   Pt NG tube removed per physician's order. Pt tolerated tube removal. Pt on clear liquid diet and is tolerating diet well. Physician placed pt on sliding scale for high BG

## 2024-11-02 NOTE — PLAN OF CARE
Goal Outcome Evaluation:              Outcome Evaluation: PATIENT EXTREMELY FRUSTRATED. SHOWING ANGER.  PATIENT GIVEN ATIVAN AND MORPHINE FOR PAIN OF 8. PATIENT UP FOR BOWEL FOLLOW THROUGH.  PATIENT NOW SLEEPING WELL.

## 2024-11-02 NOTE — PROGRESS NOTES
[facility] MEDICINE SERVICE  DAILY PROGRESS NOTE    NAME: Felipe Nieves  : 1965  MRN: [id number]      LOS: 3 days     PROVIDER OF SERVICE: Anali Ferreira MD    Chief Complaint: SBO (small bowel obstruction)    Subjective:     Interval History:  History taken from: patient    Patient seen and examined at bedside this morning.  States that he is feeling a little better however still has some nausea.  He did pass gas but no bowel movements.  Seen prior to going down for his KUB        Review of Systems: Negative except as described above  Review of Systems    Objective:     Vital Signs  Temp:  [97.6 °F (36.4 °C)-98.7 °F (37.1 °C)] 97.6 °F (36.4 °C)  Heart Rate:  [] 78  Resp:  [12-14] 12  BP: (118-127)/(75-90) 122/75   Body mass index is 18.64 kg/m².    Physical Exam  Physical Exam  Constitutional:       Comments: NAD    Cardiovascular:      Comments:  RRR, S1 & S2   Pulmonary:      Comments:  Lungs CTA   Abdominal:      Comments:  ABD soft, NT            Diagnostic Data    Results from last 7 days   Lab Units 24  0142 10/31/24  1319 10/31/24  0054   WBC 10*3/mm3 6.77   < > 8.55   HEMOGLOBIN g/dL 8.9*   < > 9.7*   HEMATOCRIT % 31.1*   < > 32.7*   PLATELETS 10*3/mm3 549*   < > 649*   GLUCOSE mg/dL 72   < > 81   CREATININE mg/dL 1.07   < > 0.98   BUN mg/dL 18   < > 15   SODIUM mmol/L 137   < > 138   POTASSIUM mmol/L 3.9   < > 4.4   AST (SGOT) U/L  --   --  13   ALT (SGPT) U/L  --   --  7   ALK PHOS U/L  --   --  64   BILIRUBIN mg/dL  --   --  0.4   ANION GAP mmol/L 9.6   < > 9.5    < > = values in this interval not displayed.       XR Abdomen KUB    Result Date: 2024  Impression: There is now oral contrast seen all the way to the level of the descending colon. This would argue against a complete obstruction. The small bowel loops now appear to be only mildly dilated. I would recommend clinical correlation. Electronically Signed: Felipe Palmer MD  2024 1:57 PM EDT   Workstation ID: [id number]    FL Small Bowel Follow Through Single-Contrast    Result Date: 11/2/2024  Impression: 1. Findings consistent with a high-grade small bowel obstruction. There is no contrast within the cecum on the 9-hour image. 2. I would consider repeating a KUB today to see if there is any filling of the colon with contrast. If there is no contrast in the colon, I would recommend surgical consultation if not already performed. Electronically Signed: Felipe Palmer MD  11/2/2024 11:06 AM EDT  Workstation ID: [id number]       I reviewed the patient's new clinical results.    Assessment/Plan:     Active and Resolved Problems  Active Hospital Problems    Diagnosis  POA    **SBO (small bowel obstruction) [K56.609]  Yes    HIV (human immunodeficiency virus infection) [Z21]  Yes    Iron deficiency anemia [D50.9]  Yes      Resolved Hospital Problems   No resolved problems to display.       Small bowel obstruction   Plan  Surgery consulted, patient had follow-up KUB done this morning which did not show passage of contrast likely excluding complete bowel obstruction with possible resolution.  Plan is to do a trial of clear liquids today  -Zofran PRN IV  Monitor CBC/BMP       HIV  Resume -Bictegravir-Emtricitab-Tenofov (Biktarvy) -25 MG per tablet      CATA-monitor CBC.     VTE Prophylaxis:  Mechanical VTE prophylaxis orders are present.                  Time: 35 minutes     There are no questions and answers to display.       Signature: Electronically signed by Anali Ferreira MD, 11/02/24, 15:23 EDT.  [facility] Hospitalist Team

## 2024-11-03 ENCOUNTER — INPATIENT HOSPITAL (AMBULATORY)
Age: 59
End: 2024-11-03
Payer: COMMERCIAL

## 2024-11-03 ENCOUNTER — INPATIENT HOSPITAL (AMBULATORY)
Dept: URBAN - METROPOLITAN AREA HOSPITAL 84 | Facility: HOSPITAL | Age: 59
End: 2024-11-03
Payer: COMMERCIAL

## 2024-11-03 DIAGNOSIS — K56.600 PARTIAL INTESTINAL OBSTRUCTION, UNSPECIFIED AS TO CAUSE: ICD-10-CM

## 2024-11-03 DIAGNOSIS — D50.9 IRON DEFICIENCY ANEMIA, UNSPECIFIED: ICD-10-CM

## 2024-11-03 DIAGNOSIS — R18.8 OTHER ASCITES: ICD-10-CM

## 2024-11-03 DIAGNOSIS — R10.9 UNSPECIFIED ABDOMINAL PAIN: ICD-10-CM

## 2024-11-03 DIAGNOSIS — B20 HUMAN IMMUNODEFICIENCY VIRUS [HIV] DISEASE: ICD-10-CM

## 2024-11-03 LAB
ANION GAP SERPL CALCULATED.3IONS-SCNC: 6.8 MMOL/L (ref 5–15)
BUN SERPL-MCNC: 13 MG/DL (ref 6–20)
BUN/CREAT SERPL: 14.1 (ref 7–25)
CALCIUM SPEC-SCNC: 8 MG/DL (ref 8.6–10.5)
CHLORIDE SERPL-SCNC: 97 MMOL/L (ref 98–107)
CO2 SERPL-SCNC: 28.2 MMOL/L (ref 22–29)
CREAT SERPL-MCNC: 0.92 MG/DL (ref 0.76–1.27)
CRP SERPL-MCNC: 10.8 MG/DL (ref 0–0.5)
DEPRECATED RDW RBC AUTO: 48.8 FL (ref 37–54)
EGFRCR SERPLBLD CKD-EPI 2021: 97 ML/MIN/1.73
ERYTHROCYTE [DISTWIDTH] IN BLOOD BY AUTOMATED COUNT: 18.6 % (ref 12.3–15.4)
GLUCOSE BLDC GLUCOMTR-MCNC: 115 MG/DL (ref 70–105)
GLUCOSE BLDC GLUCOMTR-MCNC: 155 MG/DL (ref 70–105)
GLUCOSE BLDC GLUCOMTR-MCNC: 170 MG/DL (ref 70–105)
GLUCOSE BLDC GLUCOMTR-MCNC: 82 MG/DL (ref 70–105)
GLUCOSE SERPL-MCNC: 88 MG/DL (ref 65–99)
HCT VFR BLD AUTO: 28.5 % (ref 37.5–51)
HGB BLD-MCNC: 8.4 G/DL (ref 13–17.7)
MCH RBC QN AUTO: 21.6 PG (ref 26.6–33)
MCHC RBC AUTO-ENTMCNC: 29.5 G/DL (ref 31.5–35.7)
MCV RBC AUTO: 73.5 FL (ref 79–97)
PLATELET # BLD AUTO: 475 10*3/MM3 (ref 140–450)
PMV BLD AUTO: 8.2 FL (ref 6–12)
POTASSIUM SERPL-SCNC: 4 MMOL/L (ref 3.5–5.2)
RBC # BLD AUTO: 3.88 10*6/MM3 (ref 4.14–5.8)
SODIUM SERPL-SCNC: 132 MMOL/L (ref 136–145)
WBC NRBC COR # BLD AUTO: 6.17 10*3/MM3 (ref 3.4–10.8)

## 2024-11-03 PROCEDURE — 99232 SBSQ HOSP IP/OBS MODERATE 35: CPT | Performed by: NURSE PRACTITIONER

## 2024-11-03 PROCEDURE — 99232 SBSQ HOSP IP/OBS MODERATE 35: CPT

## 2024-11-03 PROCEDURE — 86140 C-REACTIVE PROTEIN: CPT | Performed by: NURSE PRACTITIONER

## 2024-11-03 PROCEDURE — 82948 REAGENT STRIP/BLOOD GLUCOSE: CPT

## 2024-11-03 PROCEDURE — 85027 COMPLETE CBC AUTOMATED: CPT | Performed by: NURSE PRACTITIONER

## 2024-11-03 PROCEDURE — 63710000001 DIPHENHYDRAMINE PER 50 MG: Performed by: INTERNAL MEDICINE

## 2024-11-03 PROCEDURE — 82948 REAGENT STRIP/BLOOD GLUCOSE: CPT | Performed by: NURSE PRACTITIONER

## 2024-11-03 PROCEDURE — 80048 BASIC METABOLIC PNL TOTAL CA: CPT | Performed by: NURSE PRACTITIONER

## 2024-11-03 RX ORDER — POLYETHYLENE GLYCOL 3350 17 G/17G
17 POWDER, FOR SOLUTION ORAL DAILY
Status: DISCONTINUED | OUTPATIENT
Start: 2024-11-03 | End: 2024-11-04 | Stop reason: HOSPADM

## 2024-11-03 RX ADMIN — Medication 10 ML: at 21:55

## 2024-11-03 RX ADMIN — DIPHENHYDRAMINE HYDROCHLORIDE 25 MG: 25 CAPSULE ORAL at 21:55

## 2024-11-03 RX ADMIN — LORAZEPAM 1 MG: 1 TABLET ORAL at 21:55

## 2024-11-03 RX ADMIN — BICTEGRAVIR SODIUM, EMTRICITABINE, AND TENOFOVIR ALAFENAMIDE FUMARATE 1 TABLET: 50; 200; 25 TABLET ORAL at 16:21

## 2024-11-03 RX ADMIN — Medication 10 ML: at 09:00

## 2024-11-03 RX ADMIN — Medication 5 MG: at 21:55

## 2024-11-03 NOTE — PROGRESS NOTES
General Surgery Progress Note    Name: Felipe Nieves ADMIT: 10/30/2024   : 1965  PCP: Provider, No Known    MRN: [id number] LOS: 4 days   AGE/SEX: 59 y.o. male  ROOM: [address] [place] [address]    Chief Complaint   Patient presents with    Abdominal Pain     Subjective     Patient seen examined.  Vital signs stable, afebrile.  Reports feeling better this morning.  Reports pain is improving.  Tolerating clear liquids without nausea and vomiting.  Had multiple bowel movements overnight.    Objective     Scheduled Medications:   Bictegravir-Emtricitab-Tenofov, 1 tablet, Oral, Daily  insulin lispro, 2-7 Units, Subcutaneous, 4x Daily AC & at Bedtime  sodium chloride, 10 mL, Intravenous, Q12H        Active Infusions:       As Needed Medications:    senna-docusate sodium **AND** polyethylene glycol **AND** bisacodyl **AND** bisacodyl    Calcium Replacement - Follow Nurse / BPA Driven Protocol    dextrose    dextrose    diphenhydrAMINE    glucagon (human recombinant)    HYDROcodone-acetaminophen    LORazepam    LORazepam    Magnesium Standard Dose Replacement - Follow Nurse / BPA Driven Protocol    melatonin    mineral oil    Morphine    Morphine    nitroglycerin    ondansetron ODT **OR** ondansetron    ondansetron ODT **OR** ondansetron    Phosphorus Replacement - Follow Nurse / BPA Driven Protocol    Potassium Replacement - Follow Nurse / BPA Driven Protocol    sodium chloride    Vital Signs  Vital Signs Patient Vitals for the past 24 hrs:   BP Temp Temp src Pulse Resp SpO2 Weight   24 1202 99/67 98.1 °F (36.7 °C) Oral 81 14 95 % --   24 0815 111/76 97.9 °F (36.6 °C) Oral 83 12 96 % --   24 0548 99/70 98.2 °F (36.8 °C) Oral 86 13 94 % --   24 0543 -- -- -- -- -- -- 43.5 kg (95 lb 14.4 oz)   24 0030 117/75 99.4 °F (37.4 °C) Oral 93 14 97 % --   24 1949 124/79 98.3 °F (36.8 °C) Oral 90 16 98 % --   24 1629 120/83 97.6 °F (36.4 °C) Axillary 79 14 95 % --   24 1446 -- -- -- 78  -- -- --     I/O:  I/O last 3 completed shifts:  In: 0   Out: 1250 [Urine:400; Emesis/NG output:850]    Physical Exam:  Physical Exam  Constitutional:       General: He is not in acute distress.  Cardiovascular:      Rate and Rhythm: Normal rate.   Pulmonary:      Effort: Pulmonary effort is normal. No respiratory distress.   Abdominal:      General: There is distension.      Palpations: Abdomen is soft.      Tenderness: There is abdominal tenderness. There is no guarding.   Neurological:      Mental Status: He is alert. Mental status is at baseline.   Psychiatric:         Mood and Affect: Mood normal.         Behavior: Behavior normal.         Results Review:     CBC    Results from last 7 days   Lab Units 11/03/24  0540 11/02/24  0142 11/01/24  0520 10/31/24  1319 10/31/24  0054 10/30/24  0548   WBC 10*3/mm3 6.17 6.77 9.57 8.73 8.55 9.98   HEMOGLOBIN g/dL 8.4* 8.9* 9.2* 9.2* 9.7* 10.2*   PLATELETS 10*3/mm3 475* 549* 599* 625* 649* 663*     BMP   Results from last 7 days   Lab Units 11/03/24  0540 11/02/24  0142 11/01/24  0520 10/31/24  1319 10/31/24  0054 10/30/24  0548   SODIUM mmol/L 132* 137 136  --  138 137   POTASSIUM mmol/L 4.0 3.9 4.1  --  4.4 4.3   CHLORIDE mmol/L 97* 99 98  --  101 100   CO2 mmol/L 28.2 28.4 23.3  --  27.5 28.7   BUN mg/dL 13 18 17  --  15 11   CREATININE mg/dL 0.92 1.07 1.10  --  0.98 1.03   GLUCOSE mg/dL 88 72 59*  --  81 136*   MAGNESIUM mg/dL  --   --  2.3 2.3 2.4  --      Radiology(recent) XR Abdomen KUB    Result Date: 11/2/2024  Impression: There is now oral contrast seen all the way to the level of the descending colon. This would argue against a complete obstruction. The small bowel loops now appear to be only mildly dilated. I would recommend clinical correlation. Electronically Signed: Felipe Palmer MD  11/2/2024 1:57 PM EDT  Workstation ID: [id number]    FL Small Bowel Follow Through Single-Contrast    Result Date: 11/2/2024  Impression: 1. Findings consistent with a high-grade  small bowel obstruction. There is no contrast within the cecum on the 9-hour image. 2. I would consider repeating a KUB today to see if there is any filling of the colon with contrast. If there is no contrast in the colon, I would recommend surgical consultation if not already performed. Electronically Signed: Felipe Palmer MD  11/2/2024 11:06 AM EDT  Workstation ID: [id number]     I reviewed the patient's new clinical results.    Assessment & Plan       SBO (small bowel obstruction)    Iron deficiency anemia    HIV (human immunodeficiency virus infection)      59 y.o. male admitted 10/30/2024 with small bowel obstruction    Diet advanced to full liquids, monitor for tolerance  Okay to advance diet as tolerated  Pain medication as needed  Encourage ambulation, out of bed to chair  Continue daily mineral oil to aid with passage of intestinal contents.  Would recommend continuing taking mineral oil daily after discharging  Okay to discharge from general surgery once tolerating regular diet        This note was created using Dragon Voice Recognition software.    COMFORT Raphael  11/03/24  12:33 EST

## 2024-11-03 NOTE — PLAN OF CARE
Goal Outcome Evaluation:  Plan of Care Reviewed With: patient        Progress: improving     Two bowel movements overnight. Tolerating clear liquids.

## 2024-11-03 NOTE — PROGRESS NOTES
[facility] MEDICINE SERVICE  DAILY PROGRESS NOTE    NAME: Felipe Nieves  : 1965  MRN: [id number]      LOS: 4 days     PROVIDER OF SERVICE: Anali Ferreira MD    Chief Complaint: SBO (small bowel obstruction)    Subjective:     Interval History:  History taken from: patient    Patient was seen and examined at bedside this morning.  States that he has been passing gas and did a trial of clear liquid yesterday which did not increase his abdominal pain and was able to tolerate it.  States that he wants to try something solid         Review of Systems: negative except as described above  Review of Systems    Objective:     Vital Signs  Temp:  [97.6 °F (36.4 °C)-99.4 °F (37.4 °C)] 98.1 °F (36.7 °C)  Heart Rate:  [78-93] 81  Resp:  [12-16] 14  BP: ()/(67-83) 99/67   Body mass index is 15.02 kg/m².    Physical Exam   Physical Exam  Constitutional:       Comments: NAD    Cardiovascular:      Comments:  RRR, S1 & S2   Pulmonary:      Comments:  Lungs CTA   Abdominal:      Comments:  ABD soft, NT            Diagnostic Data    Results from last 7 days   Lab Units 24  0540 10/31/24  1319 10/31/24  0054   WBC 10*3/mm3 6.17   < > 8.55   HEMOGLOBIN g/dL 8.4*   < > 9.7*   HEMATOCRIT % 28.5*   < > 32.7*   PLATELETS 10*3/mm3 475*   < > 649*   GLUCOSE mg/dL 88   < > 81   CREATININE mg/dL 0.92   < > 0.98   BUN mg/dL 13   < > 15   SODIUM mmol/L 132*   < > 138   POTASSIUM mmol/L 4.0   < > 4.4   AST (SGOT) U/L  --   --  13   ALT (SGPT) U/L  --   --  7   ALK PHOS U/L  --   --  64   BILIRUBIN mg/dL  --   --  0.4   ANION GAP mmol/L 6.8   < > 9.5    < > = values in this interval not displayed.       XR Abdomen KUB    Result Date: 2024  Impression: There is now oral contrast seen all the way to the level of the descending colon. This would argue against a complete obstruction. The small bowel loops now appear to be only mildly dilated. I would recommend clinical correlation. Electronically Signed:  Felipe Palmer MD  11/2/2024 1:57 PM EDT  Workstation ID: [id number]    FL Small Bowel Follow Through Single-Contrast    Result Date: 11/2/2024  Impression: 1. Findings consistent with a high-grade small bowel obstruction. There is no contrast within the cecum on the 9-hour image. 2. I would consider repeating a KUB today to see if there is any filling of the colon with contrast. If there is no contrast in the colon, I would recommend surgical consultation if not already performed. Electronically Signed: Felipe Palmer MD  11/2/2024 11:06 AM EDT  Workstation ID: [id number]       I reviewed the patient's new clinical results.    Assessment/Plan:     Active and Resolved Problems  Active Hospital Problems    Diagnosis  POA    **SBO (small bowel obstruction) [K56.609]  Yes    HIV (human immunodeficiency virus infection) [Z21]  Yes    Iron deficiency anemia [D50.9]  Yes      Resolved Hospital Problems   No resolved problems to display.       Small bowel obstruction   Plan  Surgery consulted, patient had follow-up KUB done this morning which did not show passage of contrast likely excluding complete bowel obstruction with possible resolution.  Plan is to do a trial of clear liquids today.  Will defer further diet escalation to surgery  -Zofran PRN IV  Monitor CBC/BMP        HIV  Resume -Bictegravir-Emtricitab-Tenofov (Biktarvy) -25 MG per tablet      CATA-monitor CBC.     VTE Prophylaxis:  Mechanical VTE prophylaxis orders are present.                Time: 35 minutes      There are no questions and answers to display.       Signature: Electronically signed by Anali Ferreira MD, 11/03/24, 12:15 EST.  [facility] Hospitalist Team

## 2024-11-03 NOTE — PLAN OF CARE
Goal Outcome Evaluation:  Patient is pleasant. No complaints of pain this shift. Patient's diet was advanced today-current diet is a low residue, so far patient has been able to tolerate.Once patient is able to tolerate a regular diet, Sx will sign off. Patient has ambulated around the unit today as recommended by Sx. Will continue to monitor.

## 2024-11-03 NOTE — PROGRESS NOTES
" LOS: 4 days   Patient Care Team:  Provider, No Known as PCP - General      Subjective   \"Better\"    Interval History:   LABS: Sodium 132, potassium 4, creatinine 0.92.  CRP 10.8 (15), WBC 6.17,Hemoglobin 8.4 (8.9), platelets 475.  KUB yesterday showed contrast in the descending colon.  No evidence of complete bowel obstruction.  NG tube was removed yesterday and patient was started on clear liquid diet.    ROS:   No chest pain, shortness of breath, or cough.         Medication Review:     Current Facility-Administered Medications:     Bictegravir-Emtricitab-Tenofov (BIKTARVY) -25 MG per tablet 1 tablet, 1 tablet, Oral, Daily, Gino Bae MD, 1 tablet at 11/02/24 1443    sennosides-docusate (PERICOLACE) 8.6-50 MG per tablet 2 tablet, 2 tablet, Oral, BID PRN **AND** polyethylene glycol (MIRALAX) packet 17 g, 17 g, Oral, Daily PRN **AND** bisacodyl (DULCOLAX) EC tablet 5 mg, 5 mg, Oral, Daily PRN **AND** bisacodyl (DULCOLAX) suppository 10 mg, 10 mg, Rectal, Daily PRN, Gino Bae MD    Calcium Replacement - Follow Nurse / BPA Driven Protocol, , Does not apply, PRN, Gino Bae MD    dextrose (D50W) (25 g/50 mL) IV injection 25 g, 25 g, Intravenous, Q15 Min PRN, Anali Ferreira MD    dextrose (GLUTOSE) oral gel 15 g, 15 g, Oral, Q15 Min PRN, Anali Ferreira MD    diphenhydrAMINE (BENADRYL) capsule 25 mg, 25 mg, Oral, Q6H PRN, Gino Bae MD, 25 mg at 10/31/24 2101    glucagon (GLUCAGEN) injection 1 mg, 1 mg, Intramuscular, Q15 Min PRN, Anali Ferreira MD    HYDROcodone-acetaminophen (NORCO) 5-325 MG per tablet 1 tablet, 1 tablet, Oral, Q6H PRN, Gino Bae MD    insulin lispro (HUMALOG/ADMELOG) injection 2-7 Units, 2-7 Units, Subcutaneous, 4x Daily AC & at Bedtime, Anali Ferreira MD    LORazepam (ATIVAN) injection 1 mg, 1 mg, Intravenous, Q4H PRN, Gino Bae MD, 1 mg at 11/01/24 1906    LORazepam (ATIVAN) tablet 1 mg, 1 " mg, Oral, Q6H PRN, Gino Bae MD    Magnesium Standard Dose Replacement - Follow Nurse / BPA Driven Protocol, , Does not apply, Kathia DE Federico N, MD    melatonin tablet 5 mg, 5 mg, Oral, Nightly PRN, Kiara Fields APRN    mineral oil liquid 30 mL, 30 mL, Oral, Daily PRN, Rayo Koch MD    morphine injection 2 mg, 2 mg, Intravenous, Q4H PRN, Gino Bae MD, 2 mg at 11/02/24 1443    morphine injection 4 mg, 4 mg, Intravenous, Q4H PRN, Gino Bae MD, 4 mg at 11/01/24 0521    nitroglycerin (NITROSTAT) SL tablet 0.4 mg, 0.4 mg, Sublingual, Q5 Min PRN, Gino Bae MD    ondansetron ODT (ZOFRAN-ODT) disintegrating tablet 4 mg, 4 mg, Oral, Q6H PRN **OR** ondansetron (ZOFRAN) injection 4 mg, 4 mg, Intravenous, Q6H PRN, Gino Bae MD, 4 mg at 10/30/24 0953    ondansetron ODT (ZOFRAN-ODT) disintegrating tablet 4 mg, 4 mg, Oral, Q6H PRN **OR** ondansetron (ZOFRAN) injection 4 mg, 4 mg, Intravenous, Q6H PRN, Gino Bae MD    Phosphorus Replacement - Follow Nurse / BPA Driven Protocol, , Does not apply, PRNKathia Federico N, MD    Potassium Replacement - Follow Nurse / BPA Driven Protocol, , Does not apply, PRNKathia Federico N, MD    sodium chloride 0.9 % flush 10 mL, 10 mL, Intravenous, Q12H, Gino Bae MD, 10 mL at 11/03/24 0900    sodium chloride 0.9 % flush 10 mL, 10 mL, Intravenous, PRNKathia Federico N, MD      Objective resting in the hospital bed.  NAD.    Vital Signs  Temp:  [97.6 °F (36.4 °C)-99.4 °F (37.4 °C)] 98.1 °F (36.7 °C)  Heart Rate:  [78-93] 81  Resp:  [12-16] 14  BP: ()/(67-83) 99/67  Physical Exam: NG tube in left naris.  General Appearance:    Awake and alert, in no acute distress   Head:    Normocephalic, without obvious abnormality   Eyes:          Conjunctivae normal, anicteric sclerae   Ears:    Hearing intact   Throat:   No oral lesions, no thrush, oral mucosa moist   Neck:   No adenopathy,  supple, no JVD   Lungs:      respirations regular, even and unlabored        Abdomen:       soft, non-tender, no rebound or guarding, non-distended, no hepatosplenomegaly   Rectal:     Deferred   Extremities:   No edema, no cyanosis, no redness   Skin:   No bleeding, bruising or rash, no jaundice   Neurologic:   Cranial nerves 2 - 12 grossly intact, no asterixis, sensation   intact        Results Review:    CBC    Results from last 7 days   Lab Units 11/03/24 0540 11/02/24  0142 11/01/24 0520 10/31/24  1319 10/31/24  0054 10/30/24  0548   WBC 10*3/mm3 6.17 6.77 9.57 8.73 8.55 9.98   HEMOGLOBIN g/dL 8.4* 8.9* 9.2* 9.2* 9.7* 10.2*   PLATELETS 10*3/mm3 475* 549* 599* 625* 649* 663*     CMP   Results from last 7 days   Lab Units 11/03/24 0540 11/02/24 0142 11/01/24 0520 10/31/24  1319 10/31/24  0054 10/30/24  0548   SODIUM mmol/L 132* 137 136  --  138 137   POTASSIUM mmol/L 4.0 3.9 4.1  --  4.4 4.3   CHLORIDE mmol/L 97* 99 98  --  101 100   CO2 mmol/L 28.2 28.4 23.3  --  27.5 28.7   BUN mg/dL 13 18 17  --  15 11   CREATININE mg/dL 0.92 1.07 1.10  --  0.98 1.03   GLUCOSE mg/dL 88 72 59*  --  81 136*   ALBUMIN g/dL  --   --   --   --  3.1* 3.2*   BILIRUBIN mg/dL  --   --   --   --  0.4 0.4   ALK PHOS U/L  --   --   --   --  64 69   AST (SGOT) U/L  --   --   --   --  13 14   ALT (SGPT) U/L  --   --   --   --  7 <5   MAGNESIUM mg/dL  --   --  2.3 2.3 2.4  --    AMYLASE U/L  --   --   --   --  74  --    LIPASE U/L  --   --   --   --   --  8*     Cr Clearance Estimated Creatinine Clearance: 54.5 mL/min (by C-G formula based on SCr of 0.92 mg/dL).  Coag     HbA1C   Lab Results   Component Value Date    HGBA1C 6.13 (H) 10/31/2024     Blood Glucose   Glucose   Date/Time Value Ref Range Status   11/03/2024 1208 115 (H) 70 - 105 mg/dL Final     Comment:     Serial Number: [id number]:  [id number]   11/03/2024 0820 82 70 - 105 mg/dL Final     Comment:     Serial Number: [id number]:  [id number]   11/02/2024 2303 163  (H) 70 - 105 mg/dL Final     Comment:     Serial Number: [id number]:  [id number]   11/02/2024 1755 303 (H) 70 - 105 mg/dL Final     Comment:     Serial Number: [id number]:  [id number]   11/02/2024 1109 74 70 - 105 mg/dL Final     Comment:     Serial Number: [id number]:  [id number]   11/02/2024 0607 101 70 - 105 mg/dL Final     Comment:     Serial Number: [id number]:  [id number]   11/02/2024 0052 80 70 - 105 mg/dL Final     Comment:     Serial Number: [id number]:  [id number]   11/01/2024 1750 73 70 - 105 mg/dL Final     Comment:     Serial Number: [id number]:  [id number]     Infection   Results from last 7 days   Lab Units 10/31/24  0054   PROCALCITONIN ng/mL 0.13     UA    Results from last 7 days   Lab Units 10/30/24  0724   NITRITE UA  Negative   WBC UA /HPF 0-2   BACTERIA UA /HPF None Seen   SQUAM EPITHEL UA /HPF 0-2     Radiology(recent) XR Abdomen KUB    Result Date: 11/2/2024  Impression: There is now oral contrast seen all the way to the level of the descending colon. This would argue against a complete obstruction. The small bowel loops now appear to be only mildly dilated. I would recommend clinical correlation. Electronically Signed: Felipe Palmer MD  11/2/2024 1:57 PM EDT  Workstation ID: [id number]    FL Small Bowel Follow Through Single-Contrast    Result Date: 11/2/2024  Impression: 1. Findings consistent with a high-grade small bowel obstruction. There is no contrast within the cecum on the 9-hour image. 2. I would consider repeating a KUB today to see if there is any filling of the colon with contrast. If there is no contrast in the colon, I would recommend surgical consultation if not already performed. Electronically Signed: Felipe Palmer MD  11/2/2024 11:06 AM EDT  Workstation ID: [id number]           Assessment & Plan   -Small bowel obstruction  -Abdominal pain  -Ascites unlikely cardiac in nature  -Iron deficiency anemia  -HIV  -History of hernia repair x 3      PLAN:  Patient is a 58-year-old male with history of HIV and iron deficiency anemia.  Who was recently hospitalized at [facility] on 10/15/2024 with a small bowel obstruction, which resolved prior to surgery.  Patient presented on 10/30/2024 with abdominal pain and distention.  Patient reports worsening constipation since discharge.  CT on admission showed small bowel obstruction and NG tube was placed. CT abdomen and pelvis with contrast-high-grade small bowel obstruction, questionable inflammation versus incomplete distention of the transverse and sigmoid colon, small quantity ascites slightly increased since 10/15/2024 but is insufficient quantity for paracentesis, lower thoracic esophagus thickened and likely inflamed  Repeat KUB 10/31/2024-shows NG tube in tip of stomach and no interval change in diffuse gaseous distention.    KUB showed no signs of complete obstruction so NG tube was removed yesterday and patient was started on clear liquid diet.  Patient did well with this so he has been advanced to full liquid diet.  Patient is passing mucus and small amount of stool as well as lots of gas.  Seems as is his small bowel obstruction is resolving.  Increase diet per general surgery.  CRP is improving.  Recommend outpatient colonoscopy after small bowel resolved.      Laura Singh, APRN  11/03/24  13:06 EST

## 2024-11-04 ENCOUNTER — READMISSION MANAGEMENT (OUTPATIENT)
Dept: CALL CENTER | Facility: HOSPITAL | Age: 59
End: 2024-11-04
Payer: COMMERCIAL

## 2024-11-04 VITALS
TEMPERATURE: 98 F | OXYGEN SATURATION: 95 % | HEART RATE: 101 BPM | RESPIRATION RATE: 15 BRPM | HEIGHT: 67 IN | WEIGHT: 95.9 LBS | BODY MASS INDEX: 15.05 KG/M2 | DIASTOLIC BLOOD PRESSURE: 77 MMHG | SYSTOLIC BLOOD PRESSURE: 110 MMHG

## 2024-11-04 PROBLEM — E43 SEVERE MALNUTRITION: Status: ACTIVE | Noted: 2024-11-04

## 2024-11-04 LAB
CRP SERPL-MCNC: 8.88 MG/DL (ref 0–0.5)
GLUCOSE BLDC GLUCOMTR-MCNC: 104 MG/DL (ref 70–105)
GLUCOSE BLDC GLUCOMTR-MCNC: 218 MG/DL (ref 70–105)

## 2024-11-04 PROCEDURE — 99232 SBSQ HOSP IP/OBS MODERATE 35: CPT

## 2024-11-04 PROCEDURE — 86140 C-REACTIVE PROTEIN: CPT | Performed by: NURSE PRACTITIONER

## 2024-11-04 PROCEDURE — 82948 REAGENT STRIP/BLOOD GLUCOSE: CPT | Performed by: NURSE PRACTITIONER

## 2024-11-04 RX ORDER — POLYETHYLENE GLYCOL 3350 17 G/17G
17 POWDER, FOR SOLUTION ORAL DAILY
Qty: 510 G | Refills: 0 | Status: SHIPPED | OUTPATIENT
Start: 2024-11-05 | End: 2024-12-05

## 2024-11-04 RX ADMIN — POLYETHYLENE GLYCOL 3350 17 G: 17 POWDER, FOR SOLUTION ORAL at 09:30

## 2024-11-04 RX ADMIN — BICTEGRAVIR SODIUM, EMTRICITABINE, AND TENOFOVIR ALAFENAMIDE FUMARATE 1 TABLET: 50; 200; 25 TABLET ORAL at 13:38

## 2024-11-04 RX ADMIN — Medication 10 ML: at 09:31

## 2024-11-04 NOTE — CASE MANAGEMENT/SOCIAL WORK
Continued Stay Note   Roel     Patient Name: Felipe Nieves  MRN: [id number]  Today's Date: 11/4/2024    Admit Date: 10/30/2024    Plan: From home with spouse   Discharge Plan       Row Name 11/04/24 1323       Plan    Plan From home with spouse    Patient/Family in Agreement with Plan yes    Provided Post Acute Provider List? N/A    Provided Post Acute Provider Quality & Resource List? N/A    Plan Comments CM notified per Nursing via secure chat of pt questions regarding short term disability. CM notified nursing that CM's unable to provide info on disability. Pt also is not current with PCP so CM provided pt the Patient Connection Hub to locate PCP onto AVS. D/C orders noted. No further needs at this time.               Expected Discharge Date and Time       Expected Discharge Date Expected Discharge Time    Nov 4, 2024        Lorleei Mcdonnell RN     [phone]  [email or web address]

## 2024-11-04 NOTE — CASE MANAGEMENT/SOCIAL WORK
Case Management Discharge Note      Final Note: Home    Provided Post Acute Provider List?: N/A  Provided Post Acute Provider Quality & Resource List?: N/A    Selected Continued Care - Discharged on 11/4/2024 Admission date: 10/30/2024 - Discharge disposition: Home or Self Care       Transportation Services  Private: Car    Final Discharge Disposition Code: 01 - home or self-care

## 2024-11-04 NOTE — PROGRESS NOTES
General Surgery Progress Note    Name: Felipe Nieves ADMIT: 10/30/2024   : 1965  PCP: Provider, No Known    MRN: [id number] LOS: 5 days   AGE/SEX: 59 y.o. male  ROOM: [address] [place] [address]    Chief Complaint   Patient presents with    Abdominal Pain     Subjective     Patient seen examined.  Vital signs stable, afebrile.  Feeling well this morning.  Reports pain has improved.  Tolerating regular diet, no nausea or vomiting.  Continues to have loose bowel movements.    Objective     Scheduled Medications:   Bictegravir-Emtricitab-Tenofov, 1 tablet, Oral, Daily  insulin lispro, 2-7 Units, Subcutaneous, 4x Daily AC & at Bedtime  polyethylene glycol, 17 g, Oral, Daily  sodium chloride, 10 mL, Intravenous, Q12H        Active Infusions:       As Needed Medications:    senna-docusate sodium **AND** polyethylene glycol **AND** bisacodyl **AND** bisacodyl    Calcium Replacement - Follow Nurse / BPA Driven Protocol    dextrose    dextrose    diphenhydrAMINE    glucagon (human recombinant)    HYDROcodone-acetaminophen    LORazepam    LORazepam    Magnesium Standard Dose Replacement - Follow Nurse / BPA Driven Protocol    melatonin    mineral oil    Morphine    Morphine    nitroglycerin    ondansetron ODT **OR** ondansetron    ondansetron ODT **OR** ondansetron    Phosphorus Replacement - Follow Nurse / BPA Driven Protocol    Potassium Replacement - Follow Nurse / BPA Driven Protocol    sodium chloride    Vital Signs  Vital Signs Patient Vitals for the past 24 hrs:   BP Temp Temp src Pulse Resp SpO2 Weight   24 0734 103/69 -- -- -- 12 -- --   24 0437 92/61 98.4 °F (36.9 °C) Oral -- 12 -- 43.5 kg (95 lb 14.4 oz)   24 1942 97/61 98.5 °F (36.9 °C) Oral 85 12 -- --   24 1740 104/68 -- -- 84 12 95 % --   24 1202 99/67 98.1 °F (36.7 °C) Oral 81 14 95 % --     I/O:  No intake/output data recorded.    Physical Exam:  Physical Exam  Constitutional:       General: He is not in acute  distress.  Cardiovascular:      Rate and Rhythm: Normal rate.   Pulmonary:      Effort: Pulmonary effort is normal. No respiratory distress.   Abdominal:      Palpations: Abdomen is soft.      Tenderness: There is no guarding.      Comments: Soft, distended, minimal tenderness to palpation.   Neurological:      Mental Status: He is alert. Mental status is at baseline.   Psychiatric:         Mood and Affect: Mood normal.         Behavior: Behavior normal.         Results Review:     CBC    Results from last 7 days   Lab Units 11/03/24 0540 11/02/24  0142 11/01/24 0520 10/31/24  1319 10/31/24  0054 10/30/24  0548   WBC 10*3/mm3 6.17 6.77 9.57 8.73 8.55 9.98   HEMOGLOBIN g/dL 8.4* 8.9* 9.2* 9.2* 9.7* 10.2*   PLATELETS 10*3/mm3 475* 549* 599* 625* 649* 663*     BMP   Results from last 7 days   Lab Units 11/03/24 0540 11/02/24 0142 11/01/24 0520 10/31/24  1319 10/31/24  0054 10/30/24  0548   SODIUM mmol/L 132* 137 136  --  138 137   POTASSIUM mmol/L 4.0 3.9 4.1  --  4.4 4.3   CHLORIDE mmol/L 97* 99 98  --  101 100   CO2 mmol/L 28.2 28.4 23.3  --  27.5 28.7   BUN mg/dL 13 18 17  --  15 11   CREATININE mg/dL 0.92 1.07 1.10  --  0.98 1.03   GLUCOSE mg/dL 88 72 59*  --  81 136*   MAGNESIUM mg/dL  --   --  2.3 2.3 2.4  --      Radiology(recent) XR Abdomen KUB    Result Date: 11/2/2024  Impression: There is now oral contrast seen all the way to the level of the descending colon. This would argue against a complete obstruction. The small bowel loops now appear to be only mildly dilated. I would recommend clinical correlation. Electronically Signed: Felipe Palmer MD  11/2/2024 1:57 PM EDT  Workstation ID: [id number]     I reviewed the patient's new clinical results.    Assessment & Plan       SBO (small bowel obstruction)    Iron deficiency anemia    HIV (human immunodeficiency virus infection)      59 y.o. male admitted 10/30/2024 with small bowel obstruction    Diet as tolerated  Okay to advance diet as tolerated  Pain  medication as needed  Encourage ambulation, out of bed to chair  Continue daily mineral oil to aid with passage of intestinal contents.  Recommend mineral oil daily  Okay to discharge from general surgery perspective        This note was created using Dragon Voice Recognition software.    COMFORT Raphael  11/04/24  09:11 EST

## 2024-11-04 NOTE — PAYOR COMM NOTE
"CLINICAL UPDATE 11/1 - 11/4/2024:        AUTH # [id number]   Jalil Day (59 y.o. Male) 1965      PATIENT IS STILL IN-HOUSE.        AUTHORIZATION PENDING:   PLEASE CALL OR FAX DETERMINATION TO CONTACT BELOW. THANK YOU.        Rosalee Ogden RN MSN  /UR  [facility]  [phone] office  [phone] fax  [email or web address]    [facility]  NPI: [id number]  Tax: [phone]          Jalil Day (59 y.o. Male)       Date of Birth   1965    Social Security Number       Address   [address] [place] [address] [address] [place] [facility]    Home Phone   [phone]    MRN   [id number]       Religious   None    Marital Status                               Admission Date   10/30/24    Admission Type   Emergency    Admitting Provider       Attending Provider   Simón Robbins MD    Department, Room/Bed   [facility] MEDICAL INPATIENT, 376/1       Discharge Date       Discharge Disposition   Home or Self Care    Discharge Destination                                 Attending Provider: Simón Robbins MD    Allergies: No Known Allergies    Isolation: None   Infection: None   Code Status: Prior    Ht: 170.2 cm (67.01\")   Wt: 43.5 kg (95 lb 14.4 oz)    Admission Cmt: None   Principal Problem: SBO (small bowel obstruction) [K56.609]                   Active Insurance as of 10/30/2024       Primary Coverage       Payor Plan Insurance Group Employer/Plan Group    [employer] [facility] [id number]       Payor Plan Address Payor Plan Phone Number Payor Plan Fax Number Effective Dates    [address] [phone]  1/1/2021 - None Entered    [facility] [id number]         Subscriber Name Subscriber Birth Date Member ID       JALIL DAY 1965 [id number]               Secondary Coverage       Payor Plan Insurance Group Employer/Plan Group    ANTH MEDICAID [facility] [id number]       Payor Plan Address Payor Plan " Phone Number Payor Plan Fax Number Effective Dates    MAIL STOP:   2018 - None Entered    [address] [address]       [facility] [id number]         Subscriber Name Subscriber Birth Date Member ID       JALIL DAY 1965 [id number]                     Emergency Contacts        (Rel.) Home Phone Work Phone Mobile Phone    NAYELI DAVIS (Spouse) -- -- [phone]                 Physician Progress Notes (last 72 hours)        Naila Moon PA at 24 0911       Attestation signed by Rayo Koch MD at 24 1025    I have reviewed this documentation and agree.    Rayo Koch MD  2024  10:25 EST                    General Surgery Progress Note    Name: Jalil Day ADMIT: 10/30/2024   : 1965  PCP: Provider, No Known    MRN: [id number] LOS: 5 days   AGE/SEX: 59 y.o. male  ROOM: [address] [place] [address]    Chief Complaint   Patient presents with    Abdominal Pain     Subjective     Patient seen examined.  Vital signs stable, afebrile.  Feeling well this morning.  Reports pain has improved.  Tolerating regular diet, no nausea or vomiting.  Continues to have loose bowel movements.    Objective     Scheduled Medications:   Bictegravir-Emtricitab-Tenofov, 1 tablet, Oral, Daily  insulin lispro, 2-7 Units, Subcutaneous, 4x Daily AC & at Bedtime  polyethylene glycol, 17 g, Oral, Daily  sodium chloride, 10 mL, Intravenous, Q12H        Active Infusions:       As Needed Medications:    senna-docusate sodium **AND** polyethylene glycol **AND** bisacodyl **AND** bisacodyl    Calcium Replacement - Follow Nurse / BPA Driven Protocol    dextrose    dextrose    diphenhydrAMINE    glucagon (human recombinant)    HYDROcodone-acetaminophen    LORazepam    LORazepam    Magnesium Standard Dose Replacement - Follow Nurse / BPA Driven Protocol    melatonin    mineral oil    Morphine    Morphine    nitroglycerin    ondansetron ODT **OR** ondansetron    ondansetron ODT  **OR** ondansetron    Phosphorus Replacement - Follow Nurse / BPA Driven Protocol    Potassium Replacement - Follow Nurse / BPA Driven Protocol    sodium chloride    Vital Signs  Vital Signs Patient Vitals for the past 24 hrs:   BP Temp Temp src Pulse Resp SpO2 Weight   11/04/24 0734 103/69 -- -- -- 12 -- --   11/04/24 0437 92/61 98.4 °F (36.9 °C) Oral -- 12 -- 43.5 kg (95 lb 14.4 oz)   11/03/24 1942 97/61 98.5 °F (36.9 °C) Oral 85 12 -- --   11/03/24 1740 104/68 -- -- 84 12 95 % --   11/03/24 1202 99/67 98.1 °F (36.7 °C) Oral 81 14 95 % --     I/O:  No intake/output data recorded.    Physical Exam:  Physical Exam  Constitutional:       General: He is not in acute distress.  Cardiovascular:      Rate and Rhythm: Normal rate.   Pulmonary:      Effort: Pulmonary effort is normal. No respiratory distress.   Abdominal:      Palpations: Abdomen is soft.      Tenderness: There is no guarding.      Comments: Soft, distended, minimal tenderness to palpation.   Neurological:      Mental Status: He is alert. Mental status is at baseline.   Psychiatric:         Mood and Affect: Mood normal.         Behavior: Behavior normal.         Results Review:     CBC    Results from last 7 days   Lab Units 11/03/24 0540 11/02/24 0142 11/01/24  0520 10/31/24  1319 10/31/24  0054 10/30/24  0548   WBC 10*3/mm3 6.17 6.77 9.57 8.73 8.55 9.98   HEMOGLOBIN g/dL 8.4* 8.9* 9.2* 9.2* 9.7* 10.2*   PLATELETS 10*3/mm3 475* 549* 599* 625* 649* 663*     BMP   Results from last 7 days   Lab Units 11/03/24 0540 11/02/24 0142 11/01/24  0520 10/31/24  1319 10/31/24  0054 10/30/24  0548   SODIUM mmol/L 132* 137 136  --  138 137   POTASSIUM mmol/L 4.0 3.9 4.1  --  4.4 4.3   CHLORIDE mmol/L 97* 99 98  --  101 100   CO2 mmol/L 28.2 28.4 23.3  --  27.5 28.7   BUN mg/dL 13 18 17  --  15 11   CREATININE mg/dL 0.92 1.07 1.10  --  0.98 1.03   GLUCOSE mg/dL 88 72 59*  --  81 136*   MAGNESIUM mg/dL  --   --  2.3 2.3 2.4  --      Radiology(recent) XR Abdomen  KUB    Result Date: 11/2/2024  Impression: There is now oral contrast seen all the way to the level of the descending colon. This would argue against a complete obstruction. The small bowel loops now appear to be only mildly dilated. I would recommend clinical correlation. Electronically Signed: Felipe Palmer MD  11/2/2024 1:57 PM EDT  Workstation ID: [id number]     I reviewed the patient's new clinical results.    Assessment & Plan       SBO (small bowel obstruction)    Iron deficiency anemia    HIV (human immunodeficiency virus infection)      59 y.o. male admitted 10/30/2024 with small bowel obstruction    Diet as tolerated  Okay to advance diet as tolerated  Pain medication as needed  Encourage ambulation, out of bed to chair  Continue daily mineral oil to aid with passage of intestinal contents.  Recommend mineral oil daily  Okay to discharge from general surgery perspective        This note was created using Dragon Voice Recognition software.    COMFORT Raphael  11/04/24  09:11 EST     Electronically signed by Rayo Koch MD at 11/04/24 1025       Laura Singh APRN at 11/03/24 1306       Attestation signed by PAVAN Krishna MD at 11/03/24 1350    Electronically signed on 11/03/24 13:47 EST by NATA Krishna MD  The patient was seen and examined with an APRN. I personally performed the entire medical decision making, and physical exam. Labs and imaging and outpatient records reviewed, ordered.    Subjectively doing better.  NG tube was removed yesterday.  Tolerating clear liquids.  Had several bowel movements.  Denies nausea or vomiting.  Abdominal pain improved.  On exam he is chronically ill-appearing but abdomen is soft and nontender.  Labs and imaging reviewed, hemoglobin 8.4.  Small bowel obstruction seems to be improving.  Okay with advancing diet as tolerated, per surgery recommendations.  Recommend outpatient colonoscopy after bowel obstruction is resolved.  If worsening nausea  "could consider prokinetic such as Reglan which she has taken previously.  Would continue laxative such as MiraLAX daily to prevent constipation.    GI will see inpatient as needed                   LOS: 4 days   Patient Care Team:  Provider, No Known as PCP - General      Subjective   \"Better\"    Interval History:   LABS: Sodium 132, potassium 4, creatinine 0.92.  CRP 10.8 (15), WBC 6.17,Hemoglobin 8.4 (8.9), platelets 475.  KUB yesterday showed contrast in the descending colon.  No evidence of complete bowel obstruction.  NG tube was removed yesterday and patient was started on clear liquid diet.    ROS:   No chest pain, shortness of breath, or cough.         Medication Review:     Current Facility-Administered Medications:     Bictegravir-Emtricitab-Tenofov (BIKTARVY) -25 MG per tablet 1 tablet, 1 tablet, Oral, Daily, Gino Bae MD, 1 tablet at 11/02/24 1443    sennosides-docusate (PERICOLACE) 8.6-50 MG per tablet 2 tablet, 2 tablet, Oral, BID PRN **AND** polyethylene glycol (MIRALAX) packet 17 g, 17 g, Oral, Daily PRN **AND** bisacodyl (DULCOLAX) EC tablet 5 mg, 5 mg, Oral, Daily PRN **AND** bisacodyl (DULCOLAX) suppository 10 mg, 10 mg, Rectal, Daily PRN, Gino Bae MD    Calcium Replacement - Follow Nurse / BPA Driven Protocol, , Does not apply, PRN, Gino Bae MD    dextrose (D50W) (25 g/50 mL) IV injection 25 g, 25 g, Intravenous, Q15 Min PRN, Anali Ferreira MD    dextrose (GLUTOSE) oral gel 15 g, 15 g, Oral, Q15 Min PRN, Anali Ferreira MD    diphenhydrAMINE (BENADRYL) capsule 25 mg, 25 mg, Oral, Q6H PRN, Gino Bae MD, 25 mg at 10/31/24 2101    glucagon (GLUCAGEN) injection 1 mg, 1 mg, Intramuscular, Q15 Min PRN, Anali Ferreira MD    HYDROcodone-acetaminophen (NORCO) 5-325 MG per tablet 1 tablet, 1 tablet, Oral, Q6H PRN, Gino Bae MD    insulin lispro (HUMALOG/ADMELOG) injection 2-7 Units, 2-7 Units, Subcutaneous, 4x Daily " AC & at Bedtime, Anali Ferreira MD    LORazepam (ATIVAN) injection 1 mg, 1 mg, Intravenous, Q4H PRN, Gino Bae MD, 1 mg at 11/01/24 1906    LORazepam (ATIVAN) tablet 1 mg, 1 mg, Oral, Q6H PRN, Gino Bae MD    Magnesium Standard Dose Replacement - Follow Nurse / BPA Driven Protocol, , Does not apply, PRN, Gino Bae MD    melatonin tablet 5 mg, 5 mg, Oral, Nightly PRN, Kiara Fields, APRN    mineral oil liquid 30 mL, 30 mL, Oral, Daily PRN, Rayo Koch MD    morphine injection 2 mg, 2 mg, Intravenous, Q4H PRN, Gino Bae MD, 2 mg at 11/02/24 1443    morphine injection 4 mg, 4 mg, Intravenous, Q4H PRN, Gino Bae MD, 4 mg at 11/01/24 0521    nitroglycerin (NITROSTAT) SL tablet 0.4 mg, 0.4 mg, Sublingual, Q5 Min PRN, Gino Bae MD    ondansetron ODT (ZOFRAN-ODT) disintegrating tablet 4 mg, 4 mg, Oral, Q6H PRN **OR** ondansetron (ZOFRAN) injection 4 mg, 4 mg, Intravenous, Q6H PRN, Gino Bae MD, 4 mg at 10/30/24 0953    ondansetron ODT (ZOFRAN-ODT) disintegrating tablet 4 mg, 4 mg, Oral, Q6H PRN **OR** ondansetron (ZOFRAN) injection 4 mg, 4 mg, Intravenous, Q6H PRN, Gino Bae MD    Phosphorus Replacement - Follow Nurse / BPA Driven Protocol, , Does not apply, Kathia DE Federico N, MD    Potassium Replacement - Follow Nurse / BPA Driven Protocol, , Does not apply, Kathia DE Federico N, MD    sodium chloride 0.9 % flush 10 mL, 10 mL, Intravenous, Q12H, Gino Bae MD, 10 mL at 11/03/24 0900    sodium chloride 0.9 % flush 10 mL, 10 mL, Intravenous, PRN, Gino Bae MD      Objective resting in the hospital bed.  NAD.    Vital Signs  Temp:  [97.6 °F (36.4 °C)-99.4 °F (37.4 °C)] 98.1 °F (36.7 °C)  Heart Rate:  [78-93] 81  Resp:  [12-16] 14  BP: ()/(67-83) 99/67  Physical Exam: NG tube in left naris.  General Appearance:    Awake and alert, in no acute distress   Head:     Normocephalic, without obvious abnormality   Eyes:          Conjunctivae normal, anicteric sclerae   Ears:    Hearing intact   Throat:   No oral lesions, no thrush, oral mucosa moist   Neck:   No adenopathy, supple, no JVD   Lungs:      respirations regular, even and unlabored        Abdomen:       soft, non-tender, no rebound or guarding, non-distended, no hepatosplenomegaly   Rectal:     Deferred   Extremities:   No edema, no cyanosis, no redness   Skin:   No bleeding, bruising or rash, no jaundice   Neurologic:   Cranial nerves 2 - 12 grossly intact, no asterixis, sensation   intact        Results Review:    CBC    Results from last 7 days   Lab Units 11/03/24  0540 11/02/24  0142 11/01/24  0520 10/31/24  1319 10/31/24  0054 10/30/24  0548   WBC 10*3/mm3 6.17 6.77 9.57 8.73 8.55 9.98   HEMOGLOBIN g/dL 8.4* 8.9* 9.2* 9.2* 9.7* 10.2*   PLATELETS 10*3/mm3 475* 549* 599* 625* 649* 663*     CMP   Results from last 7 days   Lab Units 11/03/24  0540 11/02/24  0142 11/01/24  0520 10/31/24  1319 10/31/24  0054 10/30/24  0548   SODIUM mmol/L 132* 137 136  --  138 137   POTASSIUM mmol/L 4.0 3.9 4.1  --  4.4 4.3   CHLORIDE mmol/L 97* 99 98  --  101 100   CO2 mmol/L 28.2 28.4 23.3  --  27.5 28.7   BUN mg/dL 13 18 17  --  15 11   CREATININE mg/dL 0.92 1.07 1.10  --  0.98 1.03   GLUCOSE mg/dL 88 72 59*  --  81 136*   ALBUMIN g/dL  --   --   --   --  3.1* 3.2*   BILIRUBIN mg/dL  --   --   --   --  0.4 0.4   ALK PHOS U/L  --   --   --   --  64 69   AST (SGOT) U/L  --   --   --   --  13 14   ALT (SGPT) U/L  --   --   --   --  7 <5   MAGNESIUM mg/dL  --   --  2.3 2.3 2.4  --    AMYLASE U/L  --   --   --   --  74  --    LIPASE U/L  --   --   --   --   --  8*     Cr Clearance Estimated Creatinine Clearance: 54.5 mL/min (by C-G formula based on SCr of 0.92 mg/dL).  Coag     HbA1C   Lab Results   Component Value Date    HGBA1C 6.13 (H) 10/31/2024     Blood Glucose   Glucose   Date/Time Value Ref Range Status   11/03/2024 1208 115 (H)  70 - 105 mg/dL Final     Comment:     Serial Number: [id number]:  [id number]   11/03/2024 0820 82 70 - 105 mg/dL Final     Comment:     Serial Number: [id number]:  [id number]   11/02/2024 2303 163 (H) 70 - 105 mg/dL Final     Comment:     Serial Number: [id number]:  [id number]   11/02/2024 1755 303 (H) 70 - 105 mg/dL Final     Comment:     Serial Number: [id number]:  [id number]   11/02/2024 1109 74 70 - 105 mg/dL Final     Comment:     Serial Number: [id number]:  [id number]   11/02/2024 0607 101 70 - 105 mg/dL Final     Comment:     Serial Number: [id number]:  [id number]   11/02/2024 0052 80 70 - 105 mg/dL Final     Comment:     Serial Number: [id number]:  [id number]   11/01/2024 1750 73 70 - 105 mg/dL Final     Comment:     Serial Number: [id number]:  [id number]     Infection   Results from last 7 days   Lab Units 10/31/24  0054   PROCALCITONIN ng/mL 0.13     UA    Results from last 7 days   Lab Units 10/30/24  0724   NITRITE UA  Negative   WBC UA /HPF 0-2   BACTERIA UA /HPF None Seen   SQUAM EPITHEL UA /HPF 0-2     Radiology(recent) XR Abdomen KUB    Result Date: 11/2/2024  Impression: There is now oral contrast seen all the way to the level of the descending colon. This would argue against a complete obstruction. The small bowel loops now appear to be only mildly dilated. I would recommend clinical correlation. Electronically Signed: Felipe Palmer MD  11/2/2024 1:57 PM EDT  Workstation ID: [id number]    FL Small Bowel Follow Through Single-Contrast    Result Date: 11/2/2024  Impression: 1. Findings consistent with a high-grade small bowel obstruction. There is no contrast within the cecum on the 9-hour image. 2. I would consider repeating a KUB today to see if there is any filling of the colon with contrast. If there is no contrast in the colon, I would recommend surgical consultation if not already performed. Electronically Signed: Felipe Palmer MD  11/2/2024 11:06 AM  EDT  Workstation ID: [id number]           Assessment & Plan   -Small bowel obstruction  -Abdominal pain  -Ascites unlikely cardiac in nature  -Iron deficiency anemia  -HIV  -History of hernia repair x 3     PLAN:  Patient is a 58-year-old male with history of HIV and iron deficiency anemia.  Who was recently hospitalized at [facility] on 10/15/2024 with a small bowel obstruction, which resolved prior to surgery.  Patient presented on 10/30/2024 with abdominal pain and distention.  Patient reports worsening constipation since discharge.  CT on admission showed small bowel obstruction and NG tube was placed. CT abdomen and pelvis with contrast-high-grade small bowel obstruction, questionable inflammation versus incomplete distention of the transverse and sigmoid colon, small quantity ascites slightly increased since 10/15/2024 but is insufficient quantity for paracentesis, lower thoracic esophagus thickened and likely inflamed  Repeat KUB 10/31/2024-shows NG tube in tip of stomach and no interval change in diffuse gaseous distention.    KUB showed no signs of complete obstruction so NG tube was removed yesterday and patient was started on clear liquid diet.  Patient did well with this so he has been advanced to full liquid diet.  Patient is passing mucus and small amount of stool as well as lots of gas.  Seems as is his small bowel obstruction is resolving.  Increase diet per general surgery.  CRP is improving.  Recommend outpatient colonoscopy after small bowel resolved.      Laura Singh, DRU  11/03/24  13:06 EST              Electronically signed by PAVAN Krishna MD at 11/03/24 1350       Naila Moon PA at 11/03/24 1233       Attestation signed by Rayo Koch MD at 11/03/24 1245    I have reviewed this documentation and agree. ADAT. Recommend mineral oil daily to help with BM.    Rayo Koch MD  11/3/2024  12:45 EST                    General Surgery Progress  Note    Name: Felipe Nieves ADMIT: 10/30/2024   : 1965  PCP: Provider, No Known    MRN: [id number] LOS: 4 days   AGE/SEX: 59 y.o. male  ROOM: [address] [place] [address]    Chief Complaint   Patient presents with    Abdominal Pain     Subjective     Patient seen examined.  Vital signs stable, afebrile.  Reports feeling better this morning.  Reports pain is improving.  Tolerating clear liquids without nausea and vomiting.  Had multiple bowel movements overnight.    Objective     Scheduled Medications:   Bictegravir-Emtricitab-Tenofov, 1 tablet, Oral, Daily  insulin lispro, 2-7 Units, Subcutaneous, 4x Daily AC & at Bedtime  sodium chloride, 10 mL, Intravenous, Q12H        Active Infusions:       As Needed Medications:    senna-docusate sodium **AND** polyethylene glycol **AND** bisacodyl **AND** bisacodyl    Calcium Replacement - Follow Nurse / BPA Driven Protocol    dextrose    dextrose    diphenhydrAMINE    glucagon (human recombinant)    HYDROcodone-acetaminophen    LORazepam    LORazepam    Magnesium Standard Dose Replacement - Follow Nurse / BPA Driven Protocol    melatonin    mineral oil    Morphine    Morphine    nitroglycerin    ondansetron ODT **OR** ondansetron    ondansetron ODT **OR** ondansetron    Phosphorus Replacement - Follow Nurse / BPA Driven Protocol    Potassium Replacement - Follow Nurse / BPA Driven Protocol    sodium chloride    Vital Signs  Vital Signs Patient Vitals for the past 24 hrs:   BP Temp Temp src Pulse Resp SpO2 Weight   24 1202 99/67 98.1 °F (36.7 °C) Oral 81 14 95 % --   24 0815 111/76 97.9 °F (36.6 °C) Oral 83 12 96 % --   24 0548 99/70 98.2 °F (36.8 °C) Oral 86 13 94 % --   24 0543 -- -- -- -- -- -- 43.5 kg (95 lb 14.4 oz)   24 0030 117/75 99.4 °F (37.4 °C) Oral 93 14 97 % --   24 1949 124/79 98.3 °F (36.8 °C) Oral 90 16 98 % --   24 1629 120/83 97.6 °F (36.4 °C) Axillary 79 14 95 % --   24 1446 -- -- -- 78 -- -- --     I/O:  I/O  last 3 completed shifts:  In: 0   Out: 1250 [Urine:400; Emesis/NG output:850]    Physical Exam:  Physical Exam  Constitutional:       General: He is not in acute distress.  Cardiovascular:      Rate and Rhythm: Normal rate.   Pulmonary:      Effort: Pulmonary effort is normal. No respiratory distress.   Abdominal:      General: There is distension.      Palpations: Abdomen is soft.      Tenderness: There is abdominal tenderness. There is no guarding.   Neurological:      Mental Status: He is alert. Mental status is at baseline.   Psychiatric:         Mood and Affect: Mood normal.         Behavior: Behavior normal.         Results Review:     CBC    Results from last 7 days   Lab Units 11/03/24  0540 11/02/24  0142 11/01/24  0520 10/31/24  1319 10/31/24  0054 10/30/24  0548   WBC 10*3/mm3 6.17 6.77 9.57 8.73 8.55 9.98   HEMOGLOBIN g/dL 8.4* 8.9* 9.2* 9.2* 9.7* 10.2*   PLATELETS 10*3/mm3 475* 549* 599* 625* 649* 663*     BMP   Results from last 7 days   Lab Units 11/03/24  0540 11/02/24  0142 11/01/24  0520 10/31/24  1319 10/31/24  0054 10/30/24  0548   SODIUM mmol/L 132* 137 136  --  138 137   POTASSIUM mmol/L 4.0 3.9 4.1  --  4.4 4.3   CHLORIDE mmol/L 97* 99 98  --  101 100   CO2 mmol/L 28.2 28.4 23.3  --  27.5 28.7   BUN mg/dL 13 18 17  --  15 11   CREATININE mg/dL 0.92 1.07 1.10  --  0.98 1.03   GLUCOSE mg/dL 88 72 59*  --  81 136*   MAGNESIUM mg/dL  --   --  2.3 2.3 2.4  --      Radiology(recent) XR Abdomen KUB    Result Date: 11/2/2024  Impression: There is now oral contrast seen all the way to the level of the descending colon. This would argue against a complete obstruction. The small bowel loops now appear to be only mildly dilated. I would recommend clinical correlation. Electronically Signed: Felipe Palmer MD  11/2/2024 1:57 PM EDT  Workstation ID: [id number]    FL Small Bowel Follow Through Single-Contrast    Result Date: 11/2/2024  Impression: 1. Findings consistent with a high-grade small bowel obstruction.  There is no contrast within the cecum on the 9-hour image. 2. I would consider repeating a KUB today to see if there is any filling of the colon with contrast. If there is no contrast in the colon, I would recommend surgical consultation if not already performed. Electronically Signed: Felipe Palmer MD  2024 11:06 AM EDT  Workstation ID: [id number]     I reviewed the patient's new clinical results.    Assessment & Plan       SBO (small bowel obstruction)    Iron deficiency anemia    HIV (human immunodeficiency virus infection)      59 y.o. male admitted 10/30/2024 with small bowel obstruction    Diet advanced to full liquids, monitor for tolerance  Okay to advance diet as tolerated  Pain medication as needed  Encourage ambulation, out of bed to chair  Continue daily mineral oil to aid with passage of intestinal contents.  Would recommend continuing taking mineral oil daily after discharging  Okay to discharge from general surgery once tolerating regular diet        This note was created using Dragon Voice Recognition software.    COMFORT Raphael  24  12:33 EST     Electronically signed by Rayo Koch MD at 24 1245       Anali Ferreira MD at 24 1215              [facility] MEDICINE SERVICE  DAILY PROGRESS NOTE    NAME: Felipe K Ezequiel  : 1965  MRN: [id number]      LOS: 4 days     PROVIDER OF SERVICE: Anali Ferreira MD    Chief Complaint: SBO (small bowel obstruction)    Subjective:     Interval History:  History taken from: patient    Patient was seen and examined at bedside this morning.  States that he has been passing gas and did a trial of clear liquid yesterday which did not increase his abdominal pain and was able to tolerate it.  States that he wants to try something solid         Review of Systems: negative except as described above  Review of Systems    Objective:     Vital Signs  Temp:  [97.6 °F (36.4 °C)-99.4 °F (37.4 °C)] 98.1 °F (36.7  °C)  Heart Rate:  [78-93] 81  Resp:  [12-16] 14  BP: ()/(67-83) 99/67   Body mass index is 15.02 kg/m².    Physical Exam   Physical Exam  Constitutional:       Comments: NAD    Cardiovascular:      Comments:  RRR, S1 & S2   Pulmonary:      Comments:  Lungs CTA   Abdominal:      Comments:  ABD soft, NT            Diagnostic Data    Results from last 7 days   Lab Units 11/03/24  0540 10/31/24  1319 10/31/24  0054   WBC 10*3/mm3 6.17   < > 8.55   HEMOGLOBIN g/dL 8.4*   < > 9.7*   HEMATOCRIT % 28.5*   < > 32.7*   PLATELETS 10*3/mm3 475*   < > 649*   GLUCOSE mg/dL 88   < > 81   CREATININE mg/dL 0.92   < > 0.98   BUN mg/dL 13   < > 15   SODIUM mmol/L 132*   < > 138   POTASSIUM mmol/L 4.0   < > 4.4   AST (SGOT) U/L  --   --  13   ALT (SGPT) U/L  --   --  7   ALK PHOS U/L  --   --  64   BILIRUBIN mg/dL  --   --  0.4   ANION GAP mmol/L 6.8   < > 9.5    < > = values in this interval not displayed.       XR Abdomen KUB    Result Date: 11/2/2024  Impression: There is now oral contrast seen all the way to the level of the descending colon. This would argue against a complete obstruction. The small bowel loops now appear to be only mildly dilated. I would recommend clinical correlation. Electronically Signed: Felipe Palmer MD  11/2/2024 1:57 PM EDT  Workstation ID: [id number]    FL Small Bowel Follow Through Single-Contrast    Result Date: 11/2/2024  Impression: 1. Findings consistent with a high-grade small bowel obstruction. There is no contrast within the cecum on the 9-hour image. 2. I would consider repeating a KUB today to see if there is any filling of the colon with contrast. If there is no contrast in the colon, I would recommend surgical consultation if not already performed. Electronically Signed: Felipe Palmer MD  11/2/2024 11:06 AM EDT  Workstation ID: [id number]       I reviewed the patient's new clinical results.    Assessment/Plan:     Active and Resolved Problems  Active Hospital Problems    Diagnosis  POA    **SBO  (small bowel obstruction) [K56.609]  Yes    HIV (human immunodeficiency virus infection) [Z21]  Yes    Iron deficiency anemia [D50.9]  Yes      Resolved Hospital Problems   No resolved problems to display.       Small bowel obstruction   Plan  Surgery consulted, patient had follow-up KUB done this morning which did not show passage of contrast likely excluding complete bowel obstruction with possible resolution.  Plan is to do a trial of clear liquids today.  Will defer further diet escalation to surgery  -Zofran PRN IV  Monitor CBC/BMP        HIV  Resume -Bictegravir-Emtricitab-Tenofov (Biktarvy) -25 MG per tablet      CATA-monitor CBC.     VTE Prophylaxis:  Mechanical VTE prophylaxis orders are present.                Time: 35 minutes      There are no questions and answers to display.       Signature: Electronically signed by Anali Ferreira MD, 24, 12:15 EST.  [facility]      Electronically signed by Anali Ferreira MD at 24 1216       Anali Ferreira MD at 24 1523              [facility] MEDICINE SERVICE  DAILY PROGRESS NOTE    NAME: Felipe Nieves  : 1965  MRN: [id number]      LOS: 3 days     PROVIDER OF SERVICE: Anali Ferreira MD    Chief Complaint: SBO (small bowel obstruction)    Subjective:     Interval History:  History taken from: patient    Patient seen and examined at bedside this morning.  States that he is feeling a little better however still has some nausea.  He did pass gas but no bowel movements.  Seen prior to going down for his KUB        Review of Systems: Negative except as described above  Review of Systems    Objective:     Vital Signs  Temp:  [97.6 °F (36.4 °C)-98.7 °F (37.1 °C)] 97.6 °F (36.4 °C)  Heart Rate:  [] 78  Resp:  [12-14] 12  BP: (118-127)/(75-90) 122/75   Body mass index is 18.64 kg/m².    Physical Exam  Physical Exam  Constitutional:       Comments: NAD    Cardiovascular:       Comments:  RRR, S1 & S2   Pulmonary:      Comments:  Lungs CTA   Abdominal:      Comments:  ABD soft, NT            Diagnostic Data    Results from last 7 days   Lab Units 11/02/24  0142 10/31/24  1319 10/31/24  0054   WBC 10*3/mm3 6.77   < > 8.55   HEMOGLOBIN g/dL 8.9*   < > 9.7*   HEMATOCRIT % 31.1*   < > 32.7*   PLATELETS 10*3/mm3 549*   < > 649*   GLUCOSE mg/dL 72   < > 81   CREATININE mg/dL 1.07   < > 0.98   BUN mg/dL 18   < > 15   SODIUM mmol/L 137   < > 138   POTASSIUM mmol/L 3.9   < > 4.4   AST (SGOT) U/L  --   --  13   ALT (SGPT) U/L  --   --  7   ALK PHOS U/L  --   --  64   BILIRUBIN mg/dL  --   --  0.4   ANION GAP mmol/L 9.6   < > 9.5    < > = values in this interval not displayed.       XR Abdomen KUB    Result Date: 11/2/2024  Impression: There is now oral contrast seen all the way to the level of the descending colon. This would argue against a complete obstruction. The small bowel loops now appear to be only mildly dilated. I would recommend clinical correlation. Electronically Signed: Felipe Palmer MD  11/2/2024 1:57 PM EDT  Workstation ID: [id number]    FL Small Bowel Follow Through Single-Contrast    Result Date: 11/2/2024  Impression: 1. Findings consistent with a high-grade small bowel obstruction. There is no contrast within the cecum on the 9-hour image. 2. I would consider repeating a KUB today to see if there is any filling of the colon with contrast. If there is no contrast in the colon, I would recommend surgical consultation if not already performed. Electronically Signed: Felipe Palmer MD  11/2/2024 11:06 AM EDT  Workstation ID: [id number]       I reviewed the patient's new clinical results.    Assessment/Plan:     Active and Resolved Problems  Active Hospital Problems    Diagnosis  POA    **SBO (small bowel obstruction) [K56.609]  Yes    HIV (human immunodeficiency virus infection) [Z21]  Yes    Iron deficiency anemia [D50.9]  Yes      Resolved Hospital Problems   No resolved problems to  display.       Small bowel obstruction   Plan  Surgery consulted, patient had follow-up KUB done this morning which did not show passage of contrast likely excluding complete bowel obstruction with possible resolution.  Plan is to do a trial of clear liquids today  -Zofran PRN IV  Monitor CBC/BMP       HIV  Resume -Bictegravir-Emtricitab-Tenofov (Biktarvy) -25 MG per tablet      CATA-monitor CBC.     VTE Prophylaxis:  Mechanical VTE prophylaxis orders are present.                  Time: 35 minutes     There are no questions and answers to display.       Signature: Electronically signed by Anali Ferreira MD, 24, 15:23 EDT.  [facility] Hospitalist Team      Electronically signed by Anali Ferreira MD at 24 1525       Rayo Koch MD at 24 1518          General Surgery Progress Note    Name: Felipe Nieves ADMIT: 10/30/2024   : 1965  PCP: Provider, No Known    MRN: [id number] LOS: 3 days   AGE/SEX: 59 y.o. male  ROOM: [address] [place] [address]    Chief Complaint   Patient presents with    Abdominal Pain     Subjective     Patient seen and examined.  Vital signs stable, afebrile.  Results of small bowel follow-through are noted.  Follow-up KUB demonstrates passage of contrast into the descending colon thereby excluding complete bowel obstruction.    Objective     Scheduled Medications:   Bictegravir-Emtricitab-Tenofov, 1 tablet, Oral, Daily  sodium chloride, 10 mL, Intravenous, Q12H        Active Infusions:  lactated ringers, 100 mL/hr, Last Rate: 100 mL/hr (24 0556)        As Needed Medications:    senna-docusate sodium **AND** polyethylene glycol **AND** bisacodyl **AND** bisacodyl    Calcium Replacement - Follow Nurse / BPA Driven Protocol    diphenhydrAMINE    HYDROcodone-acetaminophen    LORazepam    LORazepam    Magnesium Standard Dose Replacement - Follow Nurse / BPA Driven Protocol    mineral oil    Morphine    Morphine    nitroglycerin     "ondansetron ODT **OR** ondansetron    ondansetron ODT **OR** ondansetron    Phosphorus Replacement - Follow Nurse / BPA Driven Protocol    Potassium Replacement - Follow Nurse / BPA Driven Protocol    sodium chloride    sodium chloride    Vital Signs  Vital Signs Patient Vitals for the past 24 hrs:   BP Temp Temp src Pulse Resp SpO2 Height Weight   11/02/24 1446 -- -- -- 78 -- -- -- --   11/02/24 1105 122/75 97.6 °F (36.4 °C) Oral 85 12 96 % -- --   11/02/24 0700 118/81 98.4 °F (36.9 °C) Axillary 85 14 -- -- --   11/02/24 0512 -- -- -- -- -- -- -- 54 kg (119 lb 0.8 oz)   11/02/24 0427 125/85 98.7 °F (37.1 °C) Oral 95 12 95 % -- --   11/01/24 1928 122/90 98.4 °F (36.9 °C) Oral 104 13 95 % -- --   11/01/24 1910 127/87 -- -- -- 13 94 % 170.2 cm (67.01\") --     I/O:  I/O last 3 completed shifts:  In: -   Out: 450 [Emesis/NG output:450]    Physical Exam:  Physical Exam  Constitutional:       General: He is not in acute distress.  Cardiovascular:      Rate and Rhythm: Normal rate.   Pulmonary:      Effort: Pulmonary effort is normal. No respiratory distress.   Abdominal:      General: There is distension.      Palpations: Abdomen is soft.      Tenderness: There is abdominal tenderness. There is no guarding.      Comments: Soft, distended, mildly tender generally over the abdomen.   Neurological:      Mental Status: He is alert.   Psychiatric:         Mood and Affect: Mood normal.         Behavior: Behavior normal.         Results Review:     CBC    Results from last 7 days   Lab Units 11/02/24  0142 11/01/24  0520 10/31/24  1319 10/31/24  0054 10/30/24  0548   WBC 10*3/mm3 6.77 9.57 8.73 8.55 9.98   HEMOGLOBIN g/dL 8.9* 9.2* 9.2* 9.7* 10.2*   PLATELETS 10*3/mm3 549* 599* 625* 649* 663*     BMP   Results from last 7 days   Lab Units 11/02/24  0142 11/01/24  0520 10/31/24  1319 10/31/24  0054 10/30/24  0548   SODIUM mmol/L 137 136  --  138 137   POTASSIUM mmol/L 3.9 4.1  --  4.4 4.3   CHLORIDE mmol/L 99 98  --  101 100 "   CO2 mmol/L 28.4 23.3  --  27.5 28.7   BUN mg/dL 18 17  --  15 11   CREATININE mg/dL 1.07 1.10  --  0.98 1.03   GLUCOSE mg/dL 72 59*  --  81 136*   MAGNESIUM mg/dL  --  2.3 2.3 2.4  --      Radiology(recent) XR Abdomen KUB    Result Date: 11/2/2024  Impression: There is now oral contrast seen all the way to the level of the descending colon. This would argue against a complete obstruction. The small bowel loops now appear to be only mildly dilated. I would recommend clinical correlation. Electronically Signed: Feilpe Palmer MD  11/2/2024 1:57 PM EDT  Workstation ID: [id number]    FL Small Bowel Follow Through Single-Contrast    Result Date: 11/2/2024  Impression: 1. Findings consistent with a high-grade small bowel obstruction. There is no contrast within the cecum on the 9-hour image. 2. I would consider repeating a KUB today to see if there is any filling of the colon with contrast. If there is no contrast in the colon, I would recommend surgical consultation if not already performed. Electronically Signed: Felipe Palmer MD  11/2/2024 11:06 AM EDT  Workstation ID: [id number]     I reviewed the patient's new clinical results.    Assessment & Plan       SBO (small bowel obstruction)    Iron deficiency anemia    HIV (human immunodeficiency virus infection)      59 y.o. male admitted 10/30/2024 with small bowel obstruction    Okay to discontinue nasogastric tube  Begin clear liquid diet  Will add mineral oil to help with passage of intestinal contents.  Appreciate GI help with management of cirrhosis and dysmotility  Will slowly advance diet as patient is able to tolerate.    This note was created using Dragon Voice Recognition software.    Rayo Koch MD  11/02/24  15:19 EDT     Electronically signed by Rayo Koch MD at 11/02/24 1520       Laura Singh APRN at 11/02/24 1033       Attestation signed by PAVAN Krishna MD at 11/02/24 1502    Electronically signed on 11/02/24 15:00 EDT by NATA  "Eber Krishna MD  The patient was seen and examined with an APRN. I personally performed the entire medical decision making, and physical exam. Labs and imaging and outpatient records reviewed, ordered.     subjectively doing about the same.  Complaining of nausea.  Still with NG tube with significant output.  Passing gas but no bowel movement.  On exam abdomen is thin but mildly tender to palpation.  NG with dark brown output.  Labs and imaging reviewed, creatinine remains stable 1.07.  KUB shows some contrast reaching the descending colon.  Concern for partial small bowel obstruction, lower suspicion for complete obstruction.  Continue NG decompression.  Surgery following with recurrent small bowel obstruction, hopeful to avoid surgical management.  Ascites unlikely to be cardiac related.  Unclear if he has cirrhosis or portal hypertension, liver appears normal on most recent ultrasound a few weeks ago, platelets are normal.  Continue to monitor.                 LOS: 3 days   Patient Care Team:  Provider, No Known as PCP - General      Subjective   \"OK\"    Interval History:   LABS: Sodium potassium creatinine all normal.  CRP 15(13.9).  WBC 6.77, hemoglobin 8.9 (9.2),Platelets 549.  Small bowel follow-through done yesterday.  Pending read.  Spoke to radiology tech about contacting radiologist to read ASAP.  NG tube to intermittent low wall suction.  550 cc of bilious drainage noted.  Passing gas but no bowel movement still.      ROS:   No chest pain, shortness of breath, or cough.         Medication Review:     Current Facility-Administered Medications:     Bictegravir-Emtricitab-Tenofov (BIKTARVY) -25 MG per tablet 1 tablet, 1 tablet, Oral, Daily, Gino Bae MD, 1 tablet at 11/01/24 1455    sennosides-docusate (PERICOLACE) 8.6-50 MG per tablet 2 tablet, 2 tablet, Oral, BID PRN **AND** polyethylene glycol (MIRALAX) packet 17 g, 17 g, Oral, Daily PRN **AND** bisacodyl (DULCOLAX) EC tablet 5 mg, 5 " mg, Oral, Daily PRN **AND** bisacodyl (DULCOLAX) suppository 10 mg, 10 mg, Rectal, Daily PRN, Gino Bae MD    Calcium Replacement - Follow Nurse / BPA Driven Protocol, , Does not apply, PRNKathia Federico N, MD    diphenhydrAMINE (BENADRYL) capsule 25 mg, 25 mg, Oral, Q6H PRN, Gino Bae MD, 25 mg at 10/31/24 2101    HYDROcodone-acetaminophen (NORCO) 5-325 MG per tablet 1 tablet, 1 tablet, Oral, Q6H PRN, Gino Bae MD    lactated ringers infusion, 100 mL/hr, Intravenous, Continuous, Anali Ferreira MD, Last Rate: 100 mL/hr at 11/02/24 0556, 100 mL/hr at 11/02/24 0556    LORazepam (ATIVAN) injection 1 mg, 1 mg, Intravenous, Q4H PRN, Gino Bae MD, 1 mg at 11/01/24 1906    LORazepam (ATIVAN) tablet 1 mg, 1 mg, Oral, Q6H PRN, Gino Bae MD    Magnesium Standard Dose Replacement - Follow Nurse / BPA Driven Protocol, , Does not apply, PRKathia IRVING Federico N, MD    morphine injection 2 mg, 2 mg, Intravenous, Q4H PRN, Gino Bae MD, 2 mg at 11/02/24 1013    morphine injection 4 mg, 4 mg, Intravenous, Q4H PRN, Gino Bae MD, 4 mg at 11/01/24 0521    nitroglycerin (NITROSTAT) SL tablet 0.4 mg, 0.4 mg, Sublingual, Q5 Min PRN, Gino Bae MD    ondansetron ODT (ZOFRAN-ODT) disintegrating tablet 4 mg, 4 mg, Oral, Q6H PRN **OR** ondansetron (ZOFRAN) injection 4 mg, 4 mg, Intravenous, Q6H PRN, Gino Bae MD, 4 mg at 10/30/24 0953    ondansetron ODT (ZOFRAN-ODT) disintegrating tablet 4 mg, 4 mg, Oral, Q6H PRN **OR** ondansetron (ZOFRAN) injection 4 mg, 4 mg, Intravenous, Q6H PRN, Gino Bae MD    Phosphorus Replacement - Follow Nurse / BPA Driven Protocol, , Does not apply, PRKathia IRVING Federico N, MD    Potassium Replacement - Follow Nurse / BPA Driven Protocol, , Does not apply, Kathia DE Federico N, MD    sodium chloride 0.9 % flush 10 mL, 10 mL, Intravenous, Q12H, Gino Bae MD, 10 mL at 11/02/24  0850    sodium chloride 0.9 % flush 10 mL, 10 mL, Intravenous, Kathia DE Federico N, MD    sodium chloride 0.9 % infusion 40 mL, 40 mL, Intravenous, Kathia DE Federico N, MD      Objective resting in the hospital bed NAD.    Vital Signs  Temp:  [98.4 °F (36.9 °C)-98.7 °F (37.1 °C)] 98.4 °F (36.9 °C)  Heart Rate:  [] 85  Resp:  [12-14] 14  BP: (118-127)/(81-90) 118/81  Physical Exam: NG tube in left naris.  General Appearance:    Awake and alert, in no acute distress   Head:    Normocephalic, without obvious abnormality   Eyes:          Conjunctivae normal, anicteric sclerae   Ears:    Hearing intact   Throat:   No oral lesions, no thrush, oral mucosa moist   Neck:   No adenopathy, supple, no JVD   Lungs:      respirations regular, even and unlabored        Abdomen:       soft, non-tender, no rebound or guarding, non-distended, no hepatosplenomegaly   Rectal:     Deferred   Extremities:   No edema, no cyanosis, no redness   Skin:   No bleeding, bruising or rash, no jaundice   Neurologic:   Cranial nerves 2 - 12 grossly intact, no asterixis, sensation   intact        Results Review:    CBC    Results from last 7 days   Lab Units 11/02/24  0142 11/01/24  0520 10/31/24  1319 10/31/24  0054 10/30/24  0548   WBC 10*3/mm3 6.77 9.57 8.73 8.55 9.98   HEMOGLOBIN g/dL 8.9* 9.2* 9.2* 9.7* 10.2*   PLATELETS 10*3/mm3 549* 599* 625* 649* 663*     CMP   Results from last 7 days   Lab Units 11/02/24  0142 11/01/24  0520 10/31/24  1319 10/31/24  0054 10/30/24  0548   SODIUM mmol/L 137 136  --  138 137   POTASSIUM mmol/L 3.9 4.1  --  4.4 4.3   CHLORIDE mmol/L 99 98  --  101 100   CO2 mmol/L 28.4 23.3  --  27.5 28.7   BUN mg/dL 18 17  --  15 11   CREATININE mg/dL 1.07 1.10  --  0.98 1.03   GLUCOSE mg/dL 72 59*  --  81 136*   ALBUMIN g/dL  --   --   --  3.1* 3.2*   BILIRUBIN mg/dL  --   --   --  0.4 0.4   ALK PHOS U/L  --   --   --  64 69   AST (SGOT) U/L  --   --   --  13 14   ALT (SGPT) U/L  --   --   --  7 <5    MAGNESIUM mg/dL  --  2.3 2.3 2.4  --    AMYLASE U/L  --   --   --  74  --    LIPASE U/L  --   --   --   --  8*     Cr Clearance Estimated Creatinine Clearance: 58.2 mL/min (by C-G formula based on SCr of 1.07 mg/dL).  Coag     HbA1C   Lab Results   Component Value Date    HGBA1C 6.13 (H) 10/31/2024     Blood Glucose   Glucose   Date/Time Value Ref Range Status   11/02/2024 0607 101 70 - 105 mg/dL Final     Comment:     Serial Number: [id number]:  [id number]   11/02/2024 0052 80 70 - 105 mg/dL Final     Comment:     Serial Number: [id number]:  [id number]   11/01/2024 1750 73 70 - 105 mg/dL Final     Comment:     Serial Number: [id number]:  [id number]   11/01/2024 0718 170 (H) 70 - 105 mg/dL Final     Comment:     Serial Number: [id number]:  [id number]   11/01/2024 0636 61 (L) 70 - 105 mg/dL Final     Comment:     Serial Number: [id number]:  [id number]   10/31/2024 2022 106 (H) 70 - 105 mg/dL Final     Comment:     Serial Number: [id number]:  [id number]   10/31/2024 0814 81 70 - 105 mg/dL Final     Comment:     Serial Number: [id number]:  [id number]     Infection   Results from last 7 days   Lab Units 10/31/24  0054   PROCALCITONIN ng/mL 0.13     UA    Results from last 7 days   Lab Units 10/30/24  0724   NITRITE UA  Negative   WBC UA /HPF 0-2   BACTERIA UA /HPF None Seen   SQUAM EPITHEL UA /HPF 0-2     Radiology(recent) No radiology results for the last day          Assessment & Plan   -Small bowel obstruction  -Abdominal pain  -Ascites  -Iron deficiency anemia  -HIV  -History of hernia repair x 3     PLAN:  Patient is a 58-year-old male with history of HIV and iron deficiency anemia.  Who was recently hospitalized at [facility] on 10/15/2024 with a small bowel obstruction, which resolved prior to surgery.  Patient presented on 10/30/2024 with abdominal pain and distention.  Patient reports worsening constipation since discharge.  CT on admission showed small  bowel obstruction and NG tube was placed. CT abdomen and pelvis with contrast-high-grade small bowel obstruction, questionable inflammation versus incomplete distention of the transverse and sigmoid colon, small quantity ascites slightly increased since 10/15/2024 but is insufficient quantity for paracentesis, lower thoracic esophagus thickened and likely inflamed  Repeat KUB 10/31/2024-shows NG tube in tip of stomach and no interval change in diffuse gaseous distention.    Pending small bowel follow-through to be read.  Contacted radiology to have radiologist read as soon as possible.  Patient about the same.  Passing gas but little bowel movements.  Pending CRP.  Labs are about the same.  Hemoglobin is stable.  NG tube to intermittent low wall suction with 500 cc of bilious drainage noted.  General surgery is following.  Recommend outpatient colonoscopy once he improves.        DRU Porter  11/02/24  10:33 EDT              Electronically signed by PAVAN Krishna MD at 11/02/24 1502       Rayo Koch MD at 11/01/24 1355          Patient was off the floor getting a small bowel follow-through during rounds.  Will follow-up with patient tomorrow    Rayo Koch MD  11/1/2024  13:55 EDT      Electronically signed by Rayo Koch MD at 11/01/24 1355       Laura Singh APRN at 11/01/24 1305       Attestation signed by Alonso Coyle MD at 11/01/24 1433    I have reviewed this documentation and agree.  I have performed the physical and decision-making component of this encounter.  Patient passing gas and mucus.  Continues to have NG tube to intermittent low wall suction and still feeling poorly  Physical exam  Abdomen is soft, tender to palpation diffusely with no rebound or guarding  Labs-platelets 599, hemoglobin 9.2  Assessment plan  Small bowel obstruction-recurrent.  Will get small bowel follow-through ASAP today to determine severity of obstruction.  Appreciate  general surgery following.  Check daily CRPs  Abdominal pain-secondary to above  Ascites-transthoracic echo done yesterday shows ejection fraction of 56 to 60% so unlikely heart related  HIV-on meds                   LOS: 2 days   Patient Care Team:  Provider, No Known as PCP - General      Subjective     Interval History:   Is passing gas and mucus stools.  Continues left NG tube to intermittent wall suction.  Still feeling poorly.  LABS: Sodium potassium creatinine all normal.  WBCs 9.57, hemoglobin 9.2 with an MCV of 75.3, platelets 599.      ROS:   No chest pain, shortness of breath, or cough.         Medication Review:     Current Facility-Administered Medications:     Bictegravir-Emtricitab-Tenofov (BIKTARVY) -25 MG per tablet 1 tablet, 1 tablet, Oral, Daily, Gino Bae MD, 1 tablet at 10/31/24 2100    sennosides-docusate (PERICOLACE) 8.6-50 MG per tablet 2 tablet, 2 tablet, Oral, BID PRN **AND** polyethylene glycol (MIRALAX) packet 17 g, 17 g, Oral, Daily PRN **AND** bisacodyl (DULCOLAX) EC tablet 5 mg, 5 mg, Oral, Daily PRN **AND** bisacodyl (DULCOLAX) suppository 10 mg, 10 mg, Rectal, Daily PRN, Gino Bae MD    Calcium Replacement - Follow Nurse / BPA Driven Protocol, , Does not apply, PRN, Gino Bae MD    diphenhydrAMINE (BENADRYL) capsule 25 mg, 25 mg, Oral, Q6H PRN, Gino Bae MD, 25 mg at 10/31/24 2101    HYDROcodone-acetaminophen (NORCO) 5-325 MG per tablet 1 tablet, 1 tablet, Oral, Q6H PRN, Gino Bae MD    LORazepam (ATIVAN) injection 1 mg, 1 mg, Intravenous, Q4H PRN, Gino Bae MD, 1 mg at 10/31/24 2101    LORazepam (ATIVAN) tablet 1 mg, 1 mg, Oral, Q6H PRN, Gino Bae MD    Magnesium Standard Dose Replacement - Follow Nurse / BPA Driven Protocol, , Does not apply, PRN, Gino Bae MD    morphine injection 2 mg, 2 mg, Intravenous, Q4H PRN, Gino Bae MD, 2 mg at 11/01/24 1032    morphine injection 4 mg,  4 mg, Intravenous, Q4H PRN, Gino Bae MD, 4 mg at 11/01/24 0521    nitroglycerin (NITROSTAT) SL tablet 0.4 mg, 0.4 mg, Sublingual, Q5 Min PRN, Gino Bae MD    ondansetron ODT (ZOFRAN-ODT) disintegrating tablet 4 mg, 4 mg, Oral, Q6H PRN **OR** ondansetron (ZOFRAN) injection 4 mg, 4 mg, Intravenous, Q6H PRN, Gino Bae MD, 4 mg at 10/30/24 0953    ondansetron ODT (ZOFRAN-ODT) disintegrating tablet 4 mg, 4 mg, Oral, Q6H PRN **OR** ondansetron (ZOFRAN) injection 4 mg, 4 mg, Intravenous, Q6H PRN, Gino Bae MD    Phosphorus Replacement - Follow Nurse / BPA Driven Protocol, , Does not apply, Kathia DE Federico N, MD    Potassium Replacement - Follow Nurse / BPA Driven Protocol, , Does not apply, Kathia DE Federico N, MD    sodium chloride 0.9 % flush 10 mL, 10 mL, Intravenous, Q12H, Gino Bae MD, 10 mL at 11/01/24 1032    sodium chloride 0.9 % flush 10 mL, 10 mL, Intravenous, Kathia DE Federico N, MD    sodium chloride 0.9 % infusion 40 mL, 40 mL, Intravenous, Kathia DE Federico N, MD      Objective resting in the hospital bed.  Family at bedside.  NAD.    Vital Signs  Temp:  [98.1 °F (36.7 °C)-98.3 °F (36.8 °C)] 98.1 °F (36.7 °C)  Heart Rate:  [82-95] 82  Resp:  [11-17] 12  BP: (114-133)/(82-92) 123/83  Physical Exam: NG tube in left naris.    General Appearance:    Awake and alert, in no acute distress   Head:    Normocephalic, without obvious abnormality   Eyes:          Conjunctivae normal, anicteric sclerae   Ears:    Hearing intact   Throat:   No oral lesions, no thrush, oral mucosa moist   Neck:   No adenopathy, supple, no JVD   Lungs:      respirations regular, even and unlabored        Abdomen:       soft, non-tender, no rebound or guarding, non-distended, no hepatosplenomegaly   Rectal:     Deferred   Extremities:   No edema, no cyanosis, no redness   Skin:   No bleeding, bruising or rash, no jaundice   Neurologic:   Cranial nerves 2 - 12  grossly intact, no asterixis, sensation   intact        Results Review:    CBC    Results from last 7 days   Lab Units 11/01/24  0520 10/31/24  1319 10/31/24  0054 10/30/24  0548   WBC 10*3/mm3 9.57 8.73 8.55 9.98   HEMOGLOBIN g/dL 9.2* 9.2* 9.7* 10.2*   PLATELETS 10*3/mm3 599* 625* 649* 663*     CMP   Results from last 7 days   Lab Units 11/01/24  0520 10/31/24  1319 10/31/24  0054 10/30/24  0548   SODIUM mmol/L 136  --  138 137   POTASSIUM mmol/L 4.1  --  4.4 4.3   CHLORIDE mmol/L 98  --  101 100   CO2 mmol/L 23.3  --  27.5 28.7   BUN mg/dL 17  --  15 11   CREATININE mg/dL 1.10  --  0.98 1.03   GLUCOSE mg/dL 59*  --  81 136*   ALBUMIN g/dL  --   --  3.1* 3.2*   BILIRUBIN mg/dL  --   --  0.4 0.4   ALK PHOS U/L  --   --  64 69   AST (SGOT) U/L  --   --  13 14   ALT (SGPT) U/L  --   --  7 <5   MAGNESIUM mg/dL 2.3 2.3 2.4  --    AMYLASE U/L  --   --  74  --    LIPASE U/L  --   --   --  8*     Cr Clearance Estimated Creatinine Clearance: 56.6 mL/min (by C-G formula based on SCr of 1.1 mg/dL).  Coag     HbA1C   Lab Results   Component Value Date    HGBA1C 6.13 (H) 10/31/2024     Blood Glucose   Glucose   Date/Time Value Ref Range Status   11/01/2024 0718 170 (H) 70 - 105 mg/dL Final     Comment:     Serial Number: [id number]:  [id number]   11/01/2024 0636 61 (L) 70 - 105 mg/dL Final     Comment:     Serial Number: [id number]:  [id number]   10/31/2024 2022 106 (H) 70 - 105 mg/dL Final     Comment:     Serial Number: [id number]:  [id number]   10/31/2024 0814 81 70 - 105 mg/dL Final     Comment:     Serial Number: [id number]:  [id number]     Infection   Results from last 7 days   Lab Units 10/31/24  0054   PROCALCITONIN ng/mL 0.13     UA    Results from last 7 days   Lab Units 10/30/24  0724   NITRITE UA  Negative   WBC UA /HPF 0-2   BACTERIA UA /HPF None Seen   SQUAM EPITHEL UA /HPF 0-2     Radiology(recent) XR Abdomen KUB    Result Date: 10/31/2024  Impression: NG tube tip in the stomach No  interval change in diffuse gaseous distention Electronically Signed: Santhosh Hodges MD  10/31/2024 12:48 PM EDT  Workstation ID: [id number]           Assessment & Plan   -Small bowel obstruction  -Abdominal pain  -Ascites  -Iron deficiency anemia  -HIV  -History of hernia repair x 3     PLAN:  Patient is a 58-year-old male with history of HIV and iron deficiency anemia.  Who was recently hospitalized at [facility] on 10/15/2024 with a small bowel obstruction, which resolved prior to surgery.  Patient presented on 10/30/2024 with abdominal pain and distention.  Patient reports worsening constipation since discharge.  CT on admission showed small bowel obstruction and NG tube was placed. CT abdomen and pelvis with contrast-high-grade small bowel obstruction, questionable inflammation versus incomplete distention of the transverse and sigmoid colon, small quantity ascites slightly increased since 10/15/2024 but is insufficient quantity for paracentesis, lower thoracic esophagus thickened and likely inflamed  Repeat KUB 10/31/2024-shows NG tube in tip of stomach and no interval change in diffuse gaseous distention.    Plan for small bowel follow-through today.  Continue supportive care with NG tube to intermittent low wall suction.  Hemoglobin is stable at 9.2.  Continue to monitor and transfuse for hemoglobin less than 7.  General surgery is following.  Will add CRP and sed rate to morning labs.  Recommend outpatient colonoscopy once he improves.  Echo done yesterday shows ejection fraction 56 to 60%.    Laura Singh, APRN  24  13:05 EDT              Electronically signed by Alonso Coyle MD at 24 1433       Anali Ferreira MD at 24 1248              [facility] MEDICINE SERVICE  DAILY PROGRESS NOTE    NAME: Felipe Nieves  : 1965  MRN: [id number]      LOS: 2 days     PROVIDER OF SERVICE: Anali Ferreira MD    Chief Complaint: SBO (small bowel  obstruction)    Subjective:     Interval History:  History taken from: patient    Patient was seen and examined at bedside this morning.  States that his abdominal pressure has improved as compared to when he came in, had small bowel movement overnight and he is passing gas.  Still has NG tube in with bilious output seen        Review of Systems: Negative except as described above  Review of Systems    Objective:     Vital Signs  Temp:  [98.1 °F (36.7 °C)-98.3 °F (36.8 °C)] 98.1 °F (36.7 °C)  Heart Rate:  [82-95] 82  Resp:  [11-17] 12  BP: (114-133)/(82-92) 123/83   Body mass index is 18.64 kg/m².    Physical Exam  Physical Exam  Constitutional:       Comments: NAD    Cardiovascular:      Comments:  RRR, S1 & S2   Pulmonary:      Comments:  Lungs CTA   Abdominal:      Comments:  ABD soft, NT            Diagnostic Data    Results from last 7 days   Lab Units 11/01/24  0520 10/31/24  1319 10/31/24  0054   WBC 10*3/mm3 9.57   < > 8.55   HEMOGLOBIN g/dL 9.2*   < > 9.7*   HEMATOCRIT % 32.4*   < > 32.7*   PLATELETS 10*3/mm3 599*   < > 649*   GLUCOSE mg/dL 59*  --  81   CREATININE mg/dL 1.10  --  0.98   BUN mg/dL 17  --  15   SODIUM mmol/L 136  --  138   POTASSIUM mmol/L 4.1  --  4.4   AST (SGOT) U/L  --   --  13   ALT (SGPT) U/L  --   --  7   ALK PHOS U/L  --   --  64   BILIRUBIN mg/dL  --   --  0.4   ANION GAP mmol/L 14.7  --  9.5    < > = values in this interval not displayed.       XR Abdomen KUB    Result Date: 10/31/2024  Impression: NG tube tip in the stomach No interval change in diffuse gaseous distention Electronically Signed: Santhosh Hodges MD  10/31/2024 12:48 PM EDT  Workstation ID: [id number]       I reviewed the patient's new clinical results.    Assessment/Plan:     Active and Resolved Problems  Active Hospital Problems    Diagnosis  POA    **SBO (small bowel obstruction) [K56.609]  Yes    HIV (human immunodeficiency virus infection) [Z21]  Yes    Iron deficiency anemia [D50.9]  Yes      Resolved Hospital  Problems   No resolved problems to display.       Small bowel obstruction   Plan  Surgery consulted, patient going for small bowel follow-through today  Keep NPO  -Zofran PRN IV  NGT to low wall suction, will defer further management to surgery  IV fluids  Monitor CBC/BMP  KUB:NG tube tip projects to the level of the mid gastric body.  CT:Findings consistent with high-grade small bowel obstruction.  2. Questionable inflammation versus incomplete distention of segment of the transverse colon and sigmoid colon.  3. Small quantity ascites, slightly increased since 10/15/2024. It is of insufficient quantity for paracentesis purposes.  4.. The lower thoracic esophagus appears thickened and is likely inflamed. This is new since the prior study      HIV  Holding po meds-Bictegravir-Emtricitab-Tenofov (Biktarvy) -25 MG per tablet      CATA-monitor CBC. Check iron    VTE Prophylaxis:  Mechanical VTE prophylaxis orders are present.                  Time: 35 minutes     There are no questions and answers to display.       Signature: Electronically signed by Anali Ferreira MD, 11/01/24, 12:48 EDT.  [facility] Hospitalist Team      Electronically signed by Anali Ferreira MD at 11/01/24 0592

## 2024-11-04 NOTE — DISCHARGE INSTR - APPOINTMENTS
To locate a primary care provider within the [facility] please call: [facility] [phone]

## 2024-11-04 NOTE — PLAN OF CARE
Goal Outcome Evaluation:   Pt reports no abdominal pain, GI agreeable to pt adjusting to regular diet as tolerated. To follow-up with GI in a month.        Progress: improving

## 2024-11-04 NOTE — DISCHARGE INSTR - OTHER ORDERS
To locate a primary care provider within the [facility] please call: [facility] [phone]

## 2024-11-04 NOTE — DISCHARGE SUMMARY
[facility] Medicine Services  Discharge Summary    Date of Service: 2024  Patient Name: Felipe Nieves  : 1965  MRN: [id number]    Date of Admission: 10/30/2024  Discharge Diagnosis: SBO (small bowel obstruction)  Date of Discharge: 2024  Primary Care Physician: Provider, No Known      Presenting Problem:   Small bowel obstruction [K56.609]  SBO (small bowel obstruction) [K56.609]  Other ascites [R18.8]  Abdominal pain, unspecified abdominal location [R10.9]    Active and Resolved Hospital Problems:  Active Hospital Problems    Diagnosis POA    **SBO (small bowel obstruction) [K56.609] Yes    Severe malnutrition [E43] Yes    HIV (human immunodeficiency virus infection) [Z21] Yes    Iron deficiency anemia [D50.9] Yes      Resolved Hospital Problems   No resolved problems to display.         Hospital Course     HPI:  Admitting team HPI   59 y.o. male with a PMH of HIV, who presented to [facility] on 10/30/2024 with  complaints of continued abdominal pain. Patient reports he was admitted here several days ago and was recently discharged for a small bowel obstruction. He states they were supposed to do surgery but canceled it. Patient reports he has been having small bowel movements but had a large bowel movement yesterday. Patient reports some nausea, but denies any recent vomiting. Patient denies any chest pain, shortness of air, or fever.  Patient was given 50 mcs of fentanyl en route via EMS.     Hospital Course:  #Small bowel obstruction  CT abdomen pelvis on admission 10/30 consistent with high-grade small bowel obstruction.  General surgery and GI consulted patient medically managed by NG tube placement bowel rest.  Diet gradually advanced.  Pain is controlled tolerating diet well cleared for discharge from general surgery's perspective.  Patient to follow-up with GI as outpatient for colonoscopy          #History of HIV  Continue home dose of  Bictegravir-Emtricitab-Tenofov (Biktarvy) -25 MG per tablet      #Chronic anemia  Stable        DISCHARGE Follow Up Recommendations for labs and diagnostics: Outpatient follow-up with GI for colonoscopy        Day of Discharge     Vital Signs:  Temp:  [98 °F (36.7 °C)-98.5 °F (36.9 °C)] 98 °F (36.7 °C)  Heart Rate:  [] 101  Resp:  [12-15] 15  BP: ()/(61-77) 110/77    Physical Exam:  Physical Exam   AOx3 NAD  RRR S1 and S2 audible  Lungs with fair air entry  Abdomen soft nontender nondistended  Bowel sounds positive      Pertinent  and/or Most Recent Results     LAB RESULTS:      Lab 11/04/24  0540 11/03/24  0540 11/02/24  0142 11/01/24  0520 10/31/24  1319 10/31/24  0054 10/30/24  0728 10/30/24  0548   WBC  --  6.17 6.77 9.57 8.73 8.55  --  9.98   HEMOGLOBIN  --  8.4* 8.9* 9.2* 9.2* 9.7*  --  10.2*   HEMATOCRIT  --  28.5* 31.1* 32.4* 33.6* 32.7*  --  35.2*   PLATELETS  --  475* 549* 599* 625* 649*  --  663*   NEUTROS ABS  --   --   --  5.22 4.80 4.93  --  5.79   IMMATURE GRANS (ABS)  --   --   --  0.03 0.02 0.02  --  0.03   LYMPHS ABS  --   --   --  3.18* 3.06 2.63  --  3.08   MONOS ABS  --   --   --  0.91* 0.70 0.81  --  0.82   EOS ABS  --   --   --  0.17 0.11 0.12  --  0.21   MCV  --  73.5* 74.6* 75.3* 75.7* 72.8*  --  74.3*   SED RATE  --   --   --  >130*  --   --   --   --    CRP 8.88* 10.80* 15.00* 13.90*  --   --   --   --    PROCALCITONIN  --   --   --   --   --  0.13  --   --    LACTATE  --   --   --   --   --  0.9 0.6  --          Lab 11/03/24  0540 11/02/24  0142 11/01/24  0520 10/31/24  1319 10/31/24  0054 10/30/24  0548   SODIUM 132* 137 136  --  138 137   POTASSIUM 4.0 3.9 4.1  --  4.4 4.3   CHLORIDE 97* 99 98  --  101 100   CO2 28.2 28.4 23.3  --  27.5 28.7   ANION GAP 6.8 9.6 14.7  --  9.5 8.3   BUN 13 18 17  --  15 11   CREATININE 0.92 1.07 1.10  --  0.98 1.03   EGFR 97.0 80.9 78.3  --  89.9 84.7   GLUCOSE 88 72 59*  --  81 136*   CALCIUM 8.0* 8.6 8.5*  --  8.6 8.9   MAGNESIUM  --    --  2.3 2.3 2.4  --    HEMOGLOBIN A1C  --   --   --   --  6.13*  --    TSH  --   --   --   --  2.070 2.730         Lab 10/31/24  0054 10/30/24  0548   TOTAL PROTEIN 8.6* 9.3*   ALBUMIN 3.1* 3.2*   GLOBULIN 5.5 6.1   ALT (SGPT) 7 <5   AST (SGOT) 13 14   BILIRUBIN 0.4 0.4   ALK PHOS 64 69   AMYLASE 74  --    LIPASE  --  8*         Lab 10/31/24  0054   PROBNP 38.5         Lab 10/31/24  0054   CHOLESTEROL 133   LDL CHOL 76   HDL CHOL 32*   TRIGLYCERIDES 140         Lab 10/31/24  0054   IRON 14*   IRON SATURATION (TSAT) 5*   TIBC 307   TRANSFERRIN 206         Brief Urine Lab Results  (Last result in the past 365 days)        Color   Clarity   Blood   Leuk Est   Nitrite   Protein   CREAT   Urine HCG        10/30/24 0724 Dark Yellow   Clear   Negative   Trace   Negative   30 mg/dL (1+)                 Microbiology Results (last 10 days)       ** No results found for the last 240 hours. **            XR Abdomen KUB    Result Date: 11/2/2024  Impression: Impression: There is now oral contrast seen all the way to the level of the descending colon. This would argue against a complete obstruction. The small bowel loops now appear to be only mildly dilated. I would recommend clinical correlation. Electronically Signed: Felipe Palmer MD  11/2/2024 1:57 PM EDT  Workstation ID: [id number]    FL Small Bowel Follow Through Single-Contrast    Result Date: 11/2/2024  Impression: Impression: 1. Findings consistent with a high-grade small bowel obstruction. There is no contrast within the cecum on the 9-hour image. 2. I would consider repeating a KUB today to see if there is any filling of the colon with contrast. If there is no contrast in the colon, I would recommend surgical consultation if not already performed. Electronically Signed: Felipe Palmer MD  11/2/2024 11:06 AM EDT  Workstation ID: [id number]    XR Abdomen KUB    Result Date: 10/31/2024  Impression: Impression: NG tube tip in the stomach No interval change in diffuse gaseous  distention Electronically Signed: Santhosh Hodges MD  10/31/2024 12:48 PM EDT  Workstation ID: [id number]    XR Abdomen KUB    Result Date: 10/30/2024  Impression: Impression: NG tube tip projects to the level of the mid gastric body. Electronically Signed: Nadeen Paris MD  10/30/2024 10:43 AM EDT  Workstation ID: [id number]    CT Abdomen Pelvis With Contrast    Result Date: 10/30/2024  Impression: 1. Findings consistent with high-grade small bowel obstruction. 2. Questionable inflammation versus incomplete distention of segment of the transverse colon and sigmoid colon. 3. Small quantity ascites, slightly increased since 10/15/2024. It is of insufficient quantity for paracentesis purposes. 4.. The lower thoracic esophagus appears thickened and is likely inflamed. This is new since the prior study. Electronically Signed: Nadeen Paris MD  10/30/2024 8:15 AM EDT  Workstation ID: [id number]    XR Abdomen KUB    Result Date: 10/18/2024  Impression: Impression: Interval dilution of contrast material from yesterday's small bowel follow-through, overall not well visualized. Gas and formed stool is noted in the colon, possibly increased compared to yesterday's exams, with otherwise similar bowel gas pattern overall with persistent dilated small bowel loops. Electronically Signed: Daren Heredia MD  10/18/2024 12:08 PM EDT  Workstation ID: [id number]    XR Abdomen KUB    Result Date: 10/17/2024  Impression: Impression: NG tube tip is in the lower cervical region. Electronically Signed: Mendoza Mitchell MD  10/17/2024 11:44 PM EDT  Workstation ID: [id number]    FL Small Bowel Follow Through Single-Contrast    Result Date: 10/17/2024  Impression: Impression: Abnormally dilated small bowel loops with lack of contrast opacification of the colon at 4 hours, suggesting high-grade small bowel obstruction. Electronically Signed: Nadeen Paris MD  10/17/2024 2:32 PM EDT  Workstation ID: [id number]    US Liver    Result Date:  10/16/2024  Impression: Impression: Normal appearance of the liver. Small amount of abdominal and pelvic ascites. Electronically Signed: Fernanda Garrido MD  10/16/2024 9:57 AM EDT  Workstation ID: [id number]    XR Abdomen KUB    Result Date: 10/16/2024  Impression: Impression: Esophagogastric tube is in the stomach. Electronically Signed: Mendoza Mitchell MD  10/16/2024 2:18 AM EDT  Workstation ID: [id number]    CT Abdomen Pelvis With Contrast    Result Date: 10/15/2024  Impression: Impression: Findings suspicious for a small bowel obstruction with likely high-grade transition point in the mid abdomen. Large volume ascites. Electronically Signed: Kobe Noble  10/15/2024 11:03 PM EDT  Workstation ID: [id number]             Results for orders placed during the hospital encounter of 10/30/24    Adult Transthoracic Echo Complete W/ Cont if Necessary Per Protocol    Interpretation Summary    Left ventricular systolic function is normal. Left ventricular ejection fraction appears to be 56 - 60%.    Left ventricular diastolic function was normal.      Labs Pending at Discharge:      Procedures Performed           Consults:   Consults       Date and Time Order Name Status Description    10/30/2024  5:33 PM Inpatient Gastroenterology Consult Completed     10/30/2024  5:33 PM Inpatient General Surgery Consult      10/30/2024  8:59 AM Hospitalist (on-call MD unless specified)      10/30/2024  8:59 AM Surgery (on-call MD unless specified) Completed     10/16/2024  5:35 AM Inpatient Gastroenterology Consult Completed     10/16/2024  5:35 AM Inpatient General Surgery Consult Completed               Discharge Details        Discharge Medications        New Medications        Instructions Start Date   ClearLax 17 GM/SCOOP powder  Generic drug: polyethylene glycol   17 g, Oral, Daily   Start Date: November 5, 2024            Continue These Medications        Instructions Start Date   Biktarvy -25 MG per tablet  Generic drug:  Bictegravir-Emtricitab-Tenofov   1 tablet, Daily      diphenhydrAMINE 25 mg capsule  Commonly known as: BENADRYL   25 mg, Every 6 Hours PRN      ergocalciferol 1.25 MG (42786 UT) capsule  Commonly known as: ERGOCALCIFEROL   1 capsule, Weekly               No Known Allergies      Discharge Disposition:   Home or Self Care    Diet:  Hospital:  Diet Order   Procedures    Diet: Gastrointestinal; Fiber-Restricted; Fluid Consistency: Thin (IDDSI 0)         Discharge Activity:         CODE STATUS:  There are no questions and answers to display.         No future appointments.        Time spent on Discharge including face to face service:  40 minutes    Signature: Electronically signed by Simón Robbins MD, 11/04/24, 14:21 EST.  [facility] Hospitalist Team

## 2024-11-04 NOTE — PLAN OF CARE
Goal Outcome Evaluation:   Pt resting thru out the shift , refuses insulin , want to be discharged in order to go  vote

## 2024-11-05 NOTE — PAYOR COMM NOTE
"NOTIFICATION OF DISCHARGE:        AUTH # [id number]   Jalil Day (59 y.o. Male) 1965      DISCHARGED TO HOME ON 11/04/2024.        AUTHORIZATION PENDING:   PLEASE CALL OR FAX DETERMINATION TO CONTACT BELOW. THANK YOU.        Rosalee Ogden RN MSN  /UR  [facility]  [phone] office  [phone] fax  [email or web address]    [facility]  NPI: [id number]  Tax: [phone]          Jalil Day (59 y.o. Male)       Date of Birth   1965    Social Security Number       Address   [address] [place] [address]  [place] [facility]    Home Phone   [phone]    MRN   [id number]       Holiness   None    Marital Status                               Admission Date   10/30/24    Admission Type   Emergency    Admitting Provider       Attending Provider       Department, Room/Bed   [facility] MEDICAL INPATIENT, 376/1       Discharge Date   11/4/2024    Discharge Disposition   Home or Self Care    Discharge Destination                                 Attending Provider: (none)   Allergies: No Known Allergies    Isolation: None   Infection: None   Code Status: Prior    Ht: 170.2 cm (67.01\")   Wt: 43.5 kg (95 lb 14.4 oz)    Admission Cmt: None   Principal Problem: SBO (small bowel obstruction) [K56.609]                   Active Insurance as of 10/30/2024       Primary Coverage       Payor Plan Insurance Group Employer/Plan Group    ANTHEM BLUE CROSS ANTHEM BLUE CROSS BLUE SHIELD PPO [id number]       Payor Plan Address Payor Plan Phone Number Payor Plan Fax Number Effective Dates    [address] [phone]  1/1/2021 - None Entered    [facility] [id number]         Subscriber Name Subscriber Birth Date Member ID       JALIL DAY 1965 [id number]               Secondary Coverage       Payor Plan Insurance Group Employer/Plan Group    ANTHEM MEDICAID [facility] -[id number]       Payor Plan Address Payor Plan Phone Number Payor Plan Fax " Number Effective Dates    MAIL STOP:   2018 - None Entered    [address] [address]       [facility] [address]         Subscriber Name Subscriber Birth Date Member ID       JALIL DAY 1965 [id number]                     Emergency Contacts        (Rel.) Home Phone Work Phone Mobile Phone    NAYELI DAVIS (Spouse) -- -- [phone]                 Discharge Summary        Simón Robbins MD at [address] [facility]                       [facility] Medicine Services  Discharge Summary    Date of Service: 2024  Patient Name: Jalil Day  : 1965  MRN: [id number]    Date of Admission: 10/30/2024  Discharge Diagnosis: SBO (small bowel obstruction)  Date of Discharge: 2024  Primary Care Physician: Provider, No Known      Presenting Problem:   Small bowel obstruction [K56.609]  SBO (small bowel obstruction) [K56.609]  Other ascites [R18.8]  Abdominal pain, unspecified abdominal location [R10.9]    Active and Resolved Hospital Problems:  Active Hospital Problems    Diagnosis POA    **SBO (small bowel obstruction) [K56.609] Yes    Severe malnutrition [E43] Yes    HIV (human immunodeficiency virus infection) [Z21] Yes    Iron deficiency anemia [D50.9] Yes      Resolved Hospital Problems   No resolved problems to display.         Hospital Course     HPI:  Admitting team HPI   59 y.o. male with a PMH of HIV, who presented to [facility] on 10/30/2024 with  complaints of continued abdominal pain. Patient reports he was admitted here several days ago and was recently discharged for a small bowel obstruction. He states they were supposed to do surgery but canceled it. Patient reports he has been having small bowel movements but had a large bowel movement yesterday. Patient reports some nausea, but denies any recent vomiting. Patient denies any chest pain, shortness of air, or fever.  Patient was given 50 mcs of fentanyl en route via EMS.     Hospital Course:  #Small  bowel obstruction  CT abdomen pelvis on admission 10/30 consistent with high-grade small bowel obstruction.  General surgery and GI consulted patient medically managed by NG tube placement bowel rest.  Diet gradually advanced.  Pain is controlled tolerating diet well cleared for discharge from general surgery's perspective.  Patient to follow-up with GI as outpatient for colonoscopy          #History of HIV  Continue home dose of Bictegravir-Emtricitab-Tenofov (Biktarvy) -25 MG per tablet      #Chronic anemia  Stable        DISCHARGE Follow Up Recommendations for labs and diagnostics: Outpatient follow-up with GI for colonoscopy        Day of Discharge     Vital Signs:  Temp:  [98 °F (36.7 °C)-98.5 °F (36.9 °C)] 98 °F (36.7 °C)  Heart Rate:  [] 101  Resp:  [12-15] 15  BP: ()/(61-77) 110/77    Physical Exam:  Physical Exam   AOx3 NAD  RRR S1 and S2 audible  Lungs with fair air entry  Abdomen soft nontender nondistended  Bowel sounds positive      Pertinent  and/or Most Recent Results     LAB RESULTS:      Lab 11/04/24  0540 11/03/24  0540 11/02/24  0142 11/01/24  0520 10/31/24  1319 10/31/24  0054 10/30/24  0728 10/30/24  0548   WBC  --  6.17 6.77 9.57 8.73 8.55  --  9.98   HEMOGLOBIN  --  8.4* 8.9* 9.2* 9.2* 9.7*  --  10.2*   HEMATOCRIT  --  28.5* 31.1* 32.4* 33.6* 32.7*  --  35.2*   PLATELETS  --  475* 549* 599* 625* 649*  --  663*   NEUTROS ABS  --   --   --  5.22 4.80 4.93  --  5.79   IMMATURE GRANS (ABS)  --   --   --  0.03 0.02 0.02  --  0.03   LYMPHS ABS  --   --   --  3.18* 3.06 2.63  --  3.08   MONOS ABS  --   --   --  0.91* 0.70 0.81  --  0.82   EOS ABS  --   --   --  0.17 0.11 0.12  --  0.21   MCV  --  73.5* 74.6* 75.3* 75.7* 72.8*  --  74.3*   SED RATE  --   --   --  >130*  --   --   --   --    CRP 8.88* 10.80* 15.00* 13.90*  --   --   --   --    PROCALCITONIN  --   --   --   --   --  0.13  --   --    LACTATE  --   --   --   --   --  0.9 0.6  --          Lab 11/03/24  0540 11/02/24  0142  11/01/24  0520 10/31/24  1319 10/31/24  0054 10/30/24  0548   SODIUM 132* 137 136  --  138 137   POTASSIUM 4.0 3.9 4.1  --  4.4 4.3   CHLORIDE 97* 99 98  --  101 100   CO2 28.2 28.4 23.3  --  27.5 28.7   ANION GAP 6.8 9.6 14.7  --  9.5 8.3   BUN 13 18 17  --  15 11   CREATININE 0.92 1.07 1.10  --  0.98 1.03   EGFR 97.0 80.9 78.3  --  89.9 84.7   GLUCOSE 88 72 59*  --  81 136*   CALCIUM 8.0* 8.6 8.5*  --  8.6 8.9   MAGNESIUM  --   --  2.3 2.3 2.4  --    HEMOGLOBIN A1C  --   --   --   --  6.13*  --    TSH  --   --   --   --  2.070 2.730         Lab 10/31/24  0054 10/30/24  0548   TOTAL PROTEIN 8.6* 9.3*   ALBUMIN 3.1* 3.2*   GLOBULIN 5.5 6.1   ALT (SGPT) 7 <5   AST (SGOT) 13 14   BILIRUBIN 0.4 0.4   ALK PHOS 64 69   AMYLASE 74  --    LIPASE  --  8*         Lab 10/31/24  0054   PROBNP 38.5         Lab 10/31/24  0054   CHOLESTEROL 133   LDL CHOL 76   HDL CHOL 32*   TRIGLYCERIDES 140         Lab 10/31/24  0054   IRON 14*   IRON SATURATION (TSAT) 5*   TIBC 307   TRANSFERRIN 206         Brief Urine Lab Results  (Last result in the past 365 days)        Color   Clarity   Blood   Leuk Est   Nitrite   Protein   CREAT   Urine HCG        10/30/24 0724 Dark Yellow   Clear   Negative   Trace   Negative   30 mg/dL (1+)                 Microbiology Results (last 10 days)       ** No results found for the last 240 hours. **            XR Abdomen KUB    Result Date: 11/2/2024  Impression: Impression: There is now oral contrast seen all the way to the level of the descending colon. This would argue against a complete obstruction. The small bowel loops now appear to be only mildly dilated. I would recommend clinical correlation. Electronically Signed: Felipe Palmer MD  11/2/2024 1:57 PM EDT  Workstation ID: [id number]    FL Small Bowel Follow Through Single-Contrast    Result Date: 11/2/2024  Impression: Impression: 1. Findings consistent with a high-grade small bowel obstruction. There is no contrast within the cecum on the 9-hour image.  2. I would consider repeating a KUB today to see if there is any filling of the colon with contrast. If there is no contrast in the colon, I would recommend surgical consultation if not already performed. Electronically Signed: Felipe Palmer MD  11/2/2024 11:06 AM EDT  Workstation ID: [id number]    XR Abdomen KUB    Result Date: 10/31/2024  Impression: Impression: NG tube tip in the stomach No interval change in diffuse gaseous distention Electronically Signed: Santhosh Hodges MD  10/31/2024 12:48 PM EDT  Workstation ID: [id number]    XR Abdomen KUB    Result Date: 10/30/2024  Impression: Impression: NG tube tip projects to the level of the mid gastric body. Electronically Signed: Nadeen Paris MD  10/30/2024 10:43 AM EDT  Workstation ID: [id number]    CT Abdomen Pelvis With Contrast    Result Date: 10/30/2024  Impression: 1. Findings consistent with high-grade small bowel obstruction. 2. Questionable inflammation versus incomplete distention of segment of the transverse colon and sigmoid colon. 3. Small quantity ascites, slightly increased since 10/15/2024. It is of insufficient quantity for paracentesis purposes. 4.. The lower thoracic esophagus appears thickened and is likely inflamed. This is new since the prior study. Electronically Signed: Nadeen Paris MD  10/30/2024 8:15 AM EDT  Workstation ID: [id number]    XR Abdomen KUB    Result Date: 10/18/2024  Impression: Impression: Interval dilution of contrast material from yesterday's small bowel follow-through, overall not well visualized. Gas and formed stool is noted in the colon, possibly increased compared to yesterday's exams, with otherwise similar bowel gas pattern overall with persistent dilated small bowel loops. Electronically Signed: Daren Heredia MD  10/18/2024 12:08 PM EDT  Workstation ID: [id number]    XR Abdomen KUB    Result Date: 10/17/2024  Impression: Impression: NG tube tip is in the lower cervical region. Electronically Signed: Mendoza Mitchell MD   10/17/2024 11:44 PM EDT  Workstation ID: [id number]    FL Small Bowel Follow Through Single-Contrast    Result Date: 10/17/2024  Impression: Impression: Abnormally dilated small bowel loops with lack of contrast opacification of the colon at 4 hours, suggesting high-grade small bowel obstruction. Electronically Signed: Nadeen Paris MD  10/17/2024 2:32 PM EDT  Workstation ID: [id number]    US Liver    Result Date: 10/16/2024  Impression: Impression: Normal appearance of the liver. Small amount of abdominal and pelvic ascites. Electronically Signed: Fernanda Garrido MD  10/16/2024 9:57 AM EDT  Workstation ID: [id number]    XR Abdomen KUB    Result Date: 10/16/2024  Impression: Impression: Esophagogastric tube is in the stomach. Electronically Signed: Mendoza Mitchell MD  10/16/2024 2:18 AM EDT  Workstation ID: [id number]    CT Abdomen Pelvis With Contrast    Result Date: 10/15/2024  Impression: Impression: Findings suspicious for a small bowel obstruction with likely high-grade transition point in the mid abdomen. Large volume ascites. Electronically Signed: Kobe Noble  10/15/2024 11:03 PM EDT  Workstation ID: [id number]             Results for orders placed during the hospital encounter of 10/30/24    Adult Transthoracic Echo Complete W/ Cont if Necessary Per Protocol    Interpretation Summary    Left ventricular systolic function is normal. Left ventricular ejection fraction appears to be 56 - 60%.    Left ventricular diastolic function was normal.      Labs Pending at Discharge:      Procedures Performed           Consults:   Consults       Date and Time Order Name Status Description    10/30/2024  5:33 PM Inpatient Gastroenterology Consult Completed     10/30/2024  5:33 PM Inpatient General Surgery Consult      10/30/2024  8:59 AM Hospitalist (on-call MD unless specified)      10/30/2024  8:59 AM Surgery (on-call MD unless specified) Completed     10/16/2024  5:35 AM Inpatient Gastroenterology Consult Completed      10/16/2024  5:35 AM Inpatient General Surgery Consult Completed               Discharge Details        Discharge Medications        New Medications        Instructions Start Date   ClearLax 17 GM/SCOOP powder  Generic drug: polyethylene glycol   17 g, Oral, Daily   Start Date: November 5, 2024            Continue These Medications        Instructions Start Date   Biktarvy -25 MG per tablet  Generic drug: Bictegravir-Emtricitab-Tenofov   1 tablet, Daily      diphenhydrAMINE 25 mg capsule  Commonly known as: BENADRYL   25 mg, Every 6 Hours PRN      ergocalciferol 1.25 MG (66091 UT) capsule  Commonly known as: ERGOCALCIFEROL   1 capsule, Weekly               No Known Allergies      Discharge Disposition:   Home or Self Care    Diet:  Hospital:  Diet Order   Procedures    Diet: Gastrointestinal; Fiber-Restricted; Fluid Consistency: Thin (IDDSI 0)         Discharge Activity:         CODE STATUS:  There are no questions and answers to display.         No future appointments.        Time spent on Discharge including face to face service:  40 minutes    Signature: Electronically signed by Ian Beavers MD, 11/04/24, 14:21 [facility].  [facility] Hospitalist Team      Electronically signed by Ian Beavers MD at 11/04/24 1421       Discharge Order (From admission, onward)       Start     Ordered    11/04/24 1107  Discharge patient  Once        Expected Discharge Date: 11/04/24   Discharge Disposition: Home or Self Care   Physician of Record for Attribution - Please select from Treatment Team: IAN BEAVERS [[id number]]   Review needed by CMO to determine Physician of Record: No      Question Answer Comment   Physician of Record for Attribution - Please select from Treatment Team IAN BEAVERS    Review needed by CMO to determine Physician of Record No        11/04/24 1107

## 2024-11-05 NOTE — OUTREACH NOTE
Prep Survey      Flowsheet Row Responses   [facility] facility patient discharged from? [facility]   Is LACE score < 7 ? No   Eligibility Readm Mgmt   Discharge diagnosis SBO (small bowel obstruction)   Does the patient have one of the following disease processes/diagnoses(primary or secondary)? Other   Does the patient have Home health ordered? No   Is there a DME ordered? No   Prep survey completed? Yes            Emily PALM - Registered Nurse

## 2024-11-05 NOTE — PAYOR COMM NOTE
"NOTIFICATION OF DISCHARGE:          AUTH # [id number]   Jalil Day (59 y.o. Male) 1965        DISCHARGED TO HOME ON 11/04/2024.              Rosalee Ogden RN MSN  /UR  [facility]  [phone] office  [phone] fax  [email or web address]    [facility]  NPI: [id number]  Tax: [id number]            Jalil Day (59 y.o. Male)       Date of Birth   1965    Social Security Number       Address   [id number] [place] [address]  [place] [address]    Home Phone   [phone]    MRN   [id number]       Cheondoism   None    Marital Status                               Admission Date   10/30/24    Admission Type   Emergency    Admitting Provider       Attending Provider       Department, Room/Bed   [facility] MEDICAL INPATIENT, 376/1       Discharge Date   11/4/2024    Discharge Disposition   Home or Self Care    Discharge Destination                                 Attending Provider: (none)   Allergies: No Known Allergies    Isolation: None   Infection: None   Code Status: Prior    Ht: 170.2 cm (67.01\")   Wt: 43.5 kg (95 lb 14.4 oz)    Admission Cmt: None   Principal Problem: SBO (small bowel obstruction) [K56.609]                   Active Insurance as of 10/30/2024       Primary Coverage       Payor Plan Insurance Group Employer/Plan Group    ANTHEM BLUE CROSS ANTHEM BLUE CROSS BLUE SHIELD PPO [id number]       Payor Plan Address Payor Plan Phone Number Payor Plan Fax Number Effective Dates    [address] [phone]  1/1/2021 - None Entered    [facility] [id number]         Subscriber Name Subscriber Birth Date Member ID       JALIL DAY 1965 [id number]               Secondary Coverage       Payor Plan Insurance Group Employer/Plan Group    ANTHEM MEDICAID [facility] [id number]       Payor Plan Address Payor Plan Phone Number Payor Plan Fax Number Effective Dates    MAIL STOP:   9/4/2018 - None Entered    [address] [address] "       [facility] [id number]         Subscriber Name Subscriber Birth Date Member ID       JALIL DAY 1965 [id number]                     Emergency Contacts        (Rel.) Home Phone Work Phone Mobile Phone    NAYELI DAVIS (Spouse) -- -- [phone]                 Discharge Summary        Simón Robbins MD at [facility]                       [facility]  Discharge Summary    Date of Service: 2024  Patient Name: Jalil Day  : 1965  MRN: [id number]    Date of Admission: 10/30/2024  Discharge Diagnosis: SBO (small bowel obstruction)  Date of Discharge: 2024  Primary Care Physician: Provider, No Known      Presenting Problem:   Small bowel obstruction [K56.609]  SBO (small bowel obstruction) [K56.609]  Other ascites [R18.8]  Abdominal pain, unspecified abdominal location [R10.9]    Active and Resolved Hospital Problems:  Active Hospital Problems    Diagnosis POA    **SBO (small bowel obstruction) [K56.609] Yes    Severe malnutrition [E43] Yes    HIV (human immunodeficiency virus infection) [Z21] Yes    Iron deficiency anemia [D50.9] Yes      Resolved Hospital Problems   No resolved problems to display.         Hospital Course     HPI:  Admitting team HPI   59 y.o. male with a PMH of HIV, who presented to [facility] on 10/30/2024 with  complaints of continued abdominal pain. Patient reports he was admitted here several days ago and was recently discharged for a small bowel obstruction. He states they were supposed to do surgery but canceled it. Patient reports he has been having small bowel movements but had a large bowel movement yesterday. Patient reports some nausea, but denies any recent vomiting. Patient denies any chest pain, shortness of air, or fever.  Patient was given 50 mcs of fentanyl en route via EMS.     Hospital Course:  #Small bowel obstruction  CT abdomen pelvis on admission 10/30 consistent with high-grade  small bowel obstruction.  General surgery and GI consulted patient medically managed by NG tube placement bowel rest.  Diet gradually advanced.  Pain is controlled tolerating diet well cleared for discharge from general surgery's perspective.  Patient to follow-up with GI as outpatient for colonoscopy          #History of HIV  Continue home dose of Bictegravir-Emtricitab-Tenofov (Biktarvy) -25 MG per tablet      #Chronic anemia  Stable        DISCHARGE Follow Up Recommendations for labs and diagnostics: Outpatient follow-up with GI for colonoscopy        Day of Discharge     Vital Signs:  Temp:  [98 °F (36.7 °C)-98.5 °F (36.9 °C)] 98 °F (36.7 °C)  Heart Rate:  [] 101  Resp:  [12-15] 15  BP: ()/(61-77) 110/77    Physical Exam:  Physical Exam   AOx3 NAD  RRR S1 and S2 audible  Lungs with fair air entry  Abdomen soft nontender nondistended  Bowel sounds positive      Pertinent  and/or Most Recent Results     LAB RESULTS:      Lab 11/04/24  0540 11/03/24  0540 11/02/24  0142 11/01/24  0520 10/31/24  1319 10/31/24  0054 10/30/24  0728 10/30/24  0548   WBC  --  6.17 6.77 9.57 8.73 8.55  --  9.98   HEMOGLOBIN  --  8.4* 8.9* 9.2* 9.2* 9.7*  --  10.2*   HEMATOCRIT  --  28.5* 31.1* 32.4* 33.6* 32.7*  --  35.2*   PLATELETS  --  475* 549* 599* 625* 649*  --  663*   NEUTROS ABS  --   --   --  5.22 4.80 4.93  --  5.79   IMMATURE GRANS (ABS)  --   --   --  0.03 0.02 0.02  --  0.03   LYMPHS ABS  --   --   --  3.18* 3.06 2.63  --  3.08   MONOS ABS  --   --   --  0.91* 0.70 0.81  --  0.82   EOS ABS  --   --   --  0.17 0.11 0.12  --  0.21   MCV  --  73.5* 74.6* 75.3* 75.7* 72.8*  --  74.3*   SED RATE  --   --   --  >130*  --   --   --   --    CRP 8.88* 10.80* 15.00* 13.90*  --   --   --   --    PROCALCITONIN  --   --   --   --   --  0.13  --   --    LACTATE  --   --   --   --   --  0.9 0.6  --          Lab 11/03/24  0540 11/02/24  0142 11/01/24  0520 10/31/24  1319 10/31/24  0054 10/30/24  0548   SODIUM 132* 137 136   --  138 137   POTASSIUM 4.0 3.9 4.1  --  4.4 4.3   CHLORIDE 97* 99 98  --  101 100   CO2 28.2 28.4 23.3  --  27.5 28.7   ANION GAP 6.8 9.6 14.7  --  9.5 8.3   BUN 13 18 17  --  15 11   CREATININE 0.92 1.07 1.10  --  0.98 1.03   EGFR 97.0 80.9 78.3  --  89.9 84.7   GLUCOSE 88 72 59*  --  81 136*   CALCIUM 8.0* 8.6 8.5*  --  8.6 8.9   MAGNESIUM  --   --  2.3 2.3 2.4  --    HEMOGLOBIN A1C  --   --   --   --  6.13*  --    TSH  --   --   --   --  2.070 2.730         Lab 10/31/24  0054 10/30/24  0548   TOTAL PROTEIN 8.6* 9.3*   ALBUMIN 3.1* 3.2*   GLOBULIN 5.5 6.1   ALT (SGPT) 7 <5   AST (SGOT) 13 14   BILIRUBIN 0.4 0.4   ALK PHOS 64 69   AMYLASE 74  --    LIPASE  --  8*         Lab 10/31/24  0054   PROBNP 38.5         Lab 10/31/24  0054   CHOLESTEROL 133   LDL CHOL 76   HDL CHOL 32*   TRIGLYCERIDES 140         Lab 10/31/24  0054   IRON 14*   IRON SATURATION (TSAT) 5*   TIBC 307   TRANSFERRIN 206         Brief Urine Lab Results  (Last result in the past 365 days)        Color   Clarity   Blood   Leuk Est   Nitrite   Protein   CREAT   Urine HCG        10/30/24 0724 Dark Yellow   Clear   Negative   Trace   Negative   30 mg/dL (1+)                 Microbiology Results (last 10 days)       ** No results found for the last 240 hours. **            XR Abdomen KUB    Result Date: 11/2/2024  Impression: Impression: There is now oral contrast seen all the way to the level of the descending colon. This would argue against a complete obstruction. The small bowel loops now appear to be only mildly dilated. I would recommend clinical correlation. Electronically Signed: Felipe Palmer MD  11/2/2024 1:57 PM EDT  Workstation ID: [id number]    FL Small Bowel Follow Through Single-Contrast    Result Date: 11/2/2024  Impression: Impression: 1. Findings consistent with a high-grade small bowel obstruction. There is no contrast within the cecum on the 9-hour image. 2. I would consider repeating a KUB today to see if there is any filling of the  colon with contrast. If there is no contrast in the colon, I would recommend surgical consultation if not already performed. Electronically Signed: Felipe Palmer MD  11/2/2024 11:06 AM EDT  Workstation ID: [id number]    XR Abdomen KUB    Result Date: 10/31/2024  Impression: Impression: NG tube tip in the stomach No interval change in diffuse gaseous distention Electronically Signed: Santhosh Hodges MD  10/31/2024 12:48 PM EDT  Workstation ID: [id number]    XR Abdomen KUB    Result Date: 10/30/2024  Impression: Impression: NG tube tip projects to the level of the mid gastric body. Electronically Signed: Nadeen Paris MD  10/30/2024 10:43 AM EDT  Workstation ID: [id number]    CT Abdomen Pelvis With Contrast    Result Date: 10/30/2024  Impression: 1. Findings consistent with high-grade small bowel obstruction. 2. Questionable inflammation versus incomplete distention of segment of the transverse colon and sigmoid colon. 3. Small quantity ascites, slightly increased since 10/15/2024. It is of insufficient quantity for paracentesis purposes. 4.. The lower thoracic esophagus appears thickened and is likely inflamed. This is new since the prior study. Electronically Signed: Nadeen Paris MD  10/30/2024 8:15 AM EDT  Workstation ID: [id number]    XR Abdomen KUB    Result Date: 10/18/2024  Impression: Impression: Interval dilution of contrast material from yesterday's small bowel follow-through, overall not well visualized. Gas and formed stool is noted in the colon, possibly increased compared to yesterday's exams, with otherwise similar bowel gas pattern overall with persistent dilated small bowel loops. Electronically Signed: Daren Heredia MD  10/18/2024 12:08 PM EDT  Workstation ID: [id number]    XR Abdomen KUB    Result Date: 10/17/2024  Impression: Impression: NG tube tip is in the lower cervical region. Electronically Signed: Mendoza Mitchell MD  10/17/2024 11:44 PM EDT  Workstation ID: [id number]    FL Small Bowel Follow Through  Single-Contrast    Result Date: 10/17/2024  Impression: Impression: Abnormally dilated small bowel loops with lack of contrast opacification of the colon at 4 hours, suggesting high-grade small bowel obstruction. Electronically Signed: Nadeen Paris MD  10/17/2024 2:32 PM EDT  Workstation ID: [id number]    US Liver    Result Date: 10/16/2024  Impression: Impression: Normal appearance of the liver. Small amount of abdominal and pelvic ascites. Electronically Signed: Fernanda Garrido MD  10/16/2024 9:57 AM EDT  Workstation ID: [id number]    XR Abdomen KUB    Result Date: 10/16/2024  Impression: Impression: Esophagogastric tube is in the stomach. Electronically Signed: Mendoza Mitchell MD  10/16/2024 2:18 AM EDT  Workstation ID: [id number]    CT Abdomen Pelvis With Contrast    Result Date: 10/15/2024  Impression: Impression: Findings suspicious for a small bowel obstruction with likely high-grade transition point in the mid abdomen. Large volume ascites. Electronically Signed: Kobe Noble  10/15/2024 11:03 PM EDT  Workstation ID: [id number]             Results for orders placed during the hospital encounter of 10/30/24    Adult Transthoracic Echo Complete W/ Cont if Necessary Per Protocol    Interpretation Summary    Left ventricular systolic function is normal. Left ventricular ejection fraction appears to be 56 - 60%.    Left ventricular diastolic function was normal.      Labs Pending at Discharge:      Procedures Performed           Consults:   Consults       Date and Time Order Name Status Description    10/30/2024  5:33 PM Inpatient Gastroenterology Consult Completed     10/30/2024  5:33 PM Inpatient General Surgery Consult      10/30/2024  8:59 AM Hospitalist (on-call MD unless specified)      10/30/2024  8:59 AM Surgery (on-call MD unless specified) Completed     10/16/2024  5:35 AM Inpatient Gastroenterology Consult Completed     10/16/2024  5:35 AM Inpatient General Surgery Consult Completed               Discharge  Details        Discharge Medications        New Medications        Instructions Start Date   ClearLax 17 GM/SCOOP powder  Generic drug: polyethylene glycol   17 g, Oral, Daily   Start Date: November 5, 2024            Continue These Medications        Instructions Start Date   Biktarvy -25 MG per tablet  Generic drug: Bictegravir-Emtricitab-Tenofov   1 tablet, Daily      diphenhydrAMINE 25 mg capsule  Commonly known as: BENADRYL   25 mg, Every 6 Hours PRN      ergocalciferol 1.25 MG (56275 UT) capsule  Commonly known as: ERGOCALCIFEROL   1 capsule, Weekly               No Known Allergies      Discharge Disposition:   Home or Self Care    Diet:  Hospital:  Diet Order   Procedures    Diet: Gastrointestinal; Fiber-Restricted; Fluid Consistency: Thin (IDDSI 0)         Discharge Activity:         CODE STATUS:  There are no questions and answers to display.         No future appointments.        Time spent on Discharge including face to face service:  40 minutes    Signature: Electronically signed by Ian Beavers MD, 11/04/24, 14:21 EST.  [facility] Hospitalist Team      Electronically signed by Ian Beavers MD at 11/04/24 1421       Discharge Order (From admission, onward)       Start     Ordered    11/04/24 1107  Discharge patient  Once        Expected Discharge Date: 11/04/24   Discharge Disposition: Home or Self Care   Physician of Record for Attribution - Please select from Treatment Team: IAN BEAVERS [[id number]]   Review needed by CMO to determine Physician of Record: No      Question Answer Comment   Physician of Record for Attribution - Please select from Treatment Team IAN BEAVERS    Review needed by CMO to determine Physician of Record No        11/04/24 1109

## 2024-11-07 ENCOUNTER — READMISSION MANAGEMENT (OUTPATIENT)
Dept: CALL CENTER | Facility: HOSPITAL | Age: 59
End: 2024-11-07
Payer: COMMERCIAL

## 2024-11-07 NOTE — OUTREACH NOTE
Medical Week 1 Survey      Flowsheet Row Responses   [facility] facility patient discharged from? [facility]   Does the patient have one of the following disease processes/diagnoses(primary or secondary)? Other   Week 1 attempt successful? No   Unsuccessful attempts Attempt 1            Yun SÁNCHEZ - Registered Nurse

## 2024-11-11 ENCOUNTER — OFFICE (AMBULATORY)
Age: 59
End: 2024-11-11
Payer: COMMERCIAL

## 2024-11-11 ENCOUNTER — TRANSCRIBE ORDERS (OUTPATIENT)
Dept: ADMINISTRATIVE | Facility: HOSPITAL | Age: 59
End: 2024-11-11
Payer: COMMERCIAL

## 2024-11-11 ENCOUNTER — OFFICE (AMBULATORY)
Dept: URBAN - METROPOLITAN AREA CLINIC 64 | Facility: CLINIC | Age: 59
End: 2024-11-11
Payer: COMMERCIAL

## 2024-11-11 VITALS
DIASTOLIC BLOOD PRESSURE: 78 MMHG | DIASTOLIC BLOOD PRESSURE: 78 MMHG | SYSTOLIC BLOOD PRESSURE: 111 MMHG | SYSTOLIC BLOOD PRESSURE: 111 MMHG | HEIGHT: 67 IN | WEIGHT: 118 LBS | HEIGHT: 67 IN | DIASTOLIC BLOOD PRESSURE: 78 MMHG | WEIGHT: 118 LBS | WEIGHT: 118 LBS | HEART RATE: 85 BPM | HEART RATE: 85 BPM | HEIGHT: 67 IN | WEIGHT: 118 LBS | HEIGHT: 67 IN | SYSTOLIC BLOOD PRESSURE: 111 MMHG | SYSTOLIC BLOOD PRESSURE: 111 MMHG | HEART RATE: 85 BPM | HEART RATE: 85 BPM | WEIGHT: 118 LBS | HEART RATE: 85 BPM | SYSTOLIC BLOOD PRESSURE: 111 MMHG | DIASTOLIC BLOOD PRESSURE: 78 MMHG | HEART RATE: 85 BPM | WEIGHT: 118 LBS | SYSTOLIC BLOOD PRESSURE: 111 MMHG | HEIGHT: 67 IN | HEIGHT: 67 IN | HEIGHT: 67 IN | DIASTOLIC BLOOD PRESSURE: 78 MMHG | DIASTOLIC BLOOD PRESSURE: 78 MMHG | WEIGHT: 118 LBS | HEART RATE: 85 BPM | SYSTOLIC BLOOD PRESSURE: 111 MMHG | DIASTOLIC BLOOD PRESSURE: 78 MMHG

## 2024-11-11 DIAGNOSIS — R10.9 UNSPECIFIED ABDOMINAL PAIN: ICD-10-CM

## 2024-11-11 DIAGNOSIS — R18.8 OTHER ASCITES: ICD-10-CM

## 2024-11-11 DIAGNOSIS — K56.609 SMALL BOWEL OBSTRUCTION: ICD-10-CM

## 2024-11-11 DIAGNOSIS — Z12.11 ENCOUNTER FOR SCREENING FOR MALIGNANT NEOPLASM OF COLON: ICD-10-CM

## 2024-11-11 DIAGNOSIS — Z12.11 SPECIAL SCREENING FOR MALIGNANT NEOPLASM OF COLON: ICD-10-CM

## 2024-11-11 DIAGNOSIS — Z21 HISTORY OF HIV INFECTION: Primary | ICD-10-CM

## 2024-11-11 DIAGNOSIS — R10.9 ABDOMINAL PAIN, UNSPECIFIED ABDOMINAL LOCATION: ICD-10-CM

## 2024-11-11 DIAGNOSIS — Z21 ASYMPTOMATIC HUMAN IMMUNODEFICIENCY VIRUS [HIV] INFECTION ST: ICD-10-CM

## 2024-11-11 DIAGNOSIS — K56.600 PARTIAL INTESTINAL OBSTRUCTION, UNSPECIFIED AS TO CAUSE: ICD-10-CM

## 2024-11-11 PROCEDURE — 99214 OFFICE O/P EST MOD 30 MIN: CPT | Performed by: NURSE PRACTITIONER

## 2024-11-11 RX ORDER — POLYETHYLENE GLYCOL 3350 17 G/17G
17 POWDER, FOR SOLUTION ORAL
Qty: 1 | Refills: 11 | Status: COMPLETED
Start: 2024-11-11 | End: 2024-11-14

## 2024-11-11 RX ORDER — DICYCLOMINE HYDROCHLORIDE 10 MG/1
CAPSULE ORAL
Qty: 240 | Refills: 11 | Status: ACTIVE
Start: 2024-11-11

## 2024-11-12 ENCOUNTER — READMISSION MANAGEMENT (OUTPATIENT)
Dept: CALL CENTER | Facility: HOSPITAL | Age: 59
End: 2024-11-12
Payer: COMMERCIAL

## 2024-11-12 NOTE — OUTREACH NOTE
Medical Week 1 Survey      Flowsheet Row Responses   [facility] facility patient discharged from? [facility]   Does the patient have one of the following disease processes/diagnoses(primary or secondary)? Other   Week 1 attempt successful? No   Unsuccessful attempts Attempt 2            Mariama PALM - Registered Nurse

## 2024-11-14 ENCOUNTER — OFFICE (AMBULATORY)
Dept: URBAN - METROPOLITAN AREA PATHOLOGY 19 | Facility: PATHOLOGY | Age: 59
End: 2024-11-14
Payer: COMMERCIAL

## 2024-11-14 ENCOUNTER — ON CAMPUS - OUTPATIENT (AMBULATORY)
Age: 59
End: 2024-11-14
Payer: COMMERCIAL

## 2024-11-14 ENCOUNTER — OFFICE (AMBULATORY)
Age: 59
End: 2024-11-14
Payer: COMMERCIAL

## 2024-11-14 ENCOUNTER — ON CAMPUS - OUTPATIENT (AMBULATORY)
Dept: URBAN - METROPOLITAN AREA HOSPITAL 2 | Facility: HOSPITAL | Age: 59
End: 2024-11-14
Payer: COMMERCIAL

## 2024-11-14 VITALS
RESPIRATION RATE: 12 BRPM | DIASTOLIC BLOOD PRESSURE: 83 MMHG | SYSTOLIC BLOOD PRESSURE: 104 MMHG | HEART RATE: 72 BPM | SYSTOLIC BLOOD PRESSURE: 96 MMHG | HEART RATE: 86 BPM | SYSTOLIC BLOOD PRESSURE: 122 MMHG | HEART RATE: 81 BPM | HEART RATE: 70 BPM | HEART RATE: 81 BPM | SYSTOLIC BLOOD PRESSURE: 104 MMHG | OXYGEN SATURATION: 100 % | HEART RATE: 70 BPM | DIASTOLIC BLOOD PRESSURE: 77 MMHG | WEIGHT: 117 LBS | HEART RATE: 87 BPM | OXYGEN SATURATION: 100 % | SYSTOLIC BLOOD PRESSURE: 114 MMHG | HEART RATE: 70 BPM | HEART RATE: 86 BPM | SYSTOLIC BLOOD PRESSURE: 98 MMHG | HEART RATE: 69 BPM | HEART RATE: 75 BPM | DIASTOLIC BLOOD PRESSURE: 74 MMHG | HEART RATE: 75 BPM | RESPIRATION RATE: 19 BRPM | DIASTOLIC BLOOD PRESSURE: 71 MMHG | HEART RATE: 87 BPM | SYSTOLIC BLOOD PRESSURE: 104 MMHG | HEART RATE: 81 BPM | HEART RATE: 81 BPM | HEART RATE: 86 BPM | RESPIRATION RATE: 12 BRPM | DIASTOLIC BLOOD PRESSURE: 71 MMHG | WEIGHT: 117 LBS | HEART RATE: 89 BPM | DIASTOLIC BLOOD PRESSURE: 72 MMHG | RESPIRATION RATE: 12 BRPM | RESPIRATION RATE: 15 BRPM | SYSTOLIC BLOOD PRESSURE: 108 MMHG | HEART RATE: 72 BPM | SYSTOLIC BLOOD PRESSURE: 122 MMHG | SYSTOLIC BLOOD PRESSURE: 110 MMHG | DIASTOLIC BLOOD PRESSURE: 88 MMHG | TEMPERATURE: 97.1 F | TEMPERATURE: 97.1 F | SYSTOLIC BLOOD PRESSURE: 98 MMHG | OXYGEN SATURATION: 100 % | SYSTOLIC BLOOD PRESSURE: 105 MMHG | HEART RATE: 87 BPM | HEART RATE: 92 BPM | SYSTOLIC BLOOD PRESSURE: 123 MMHG | HEART RATE: 75 BPM | RESPIRATION RATE: 19 BRPM | DIASTOLIC BLOOD PRESSURE: 78 MMHG | RESPIRATION RATE: 18 BRPM | SYSTOLIC BLOOD PRESSURE: 120 MMHG | SYSTOLIC BLOOD PRESSURE: 114 MMHG | WEIGHT: 117 LBS | SYSTOLIC BLOOD PRESSURE: 108 MMHG | RESPIRATION RATE: 18 BRPM | SYSTOLIC BLOOD PRESSURE: 105 MMHG | DIASTOLIC BLOOD PRESSURE: 77 MMHG | HEART RATE: 69 BPM | DIASTOLIC BLOOD PRESSURE: 72 MMHG | DIASTOLIC BLOOD PRESSURE: 70 MMHG | OXYGEN SATURATION: 99 % | SYSTOLIC BLOOD PRESSURE: 98 MMHG | DIASTOLIC BLOOD PRESSURE: 88 MMHG | SYSTOLIC BLOOD PRESSURE: 121 MMHG | HEART RATE: 80 BPM | SYSTOLIC BLOOD PRESSURE: 123 MMHG | DIASTOLIC BLOOD PRESSURE: 74 MMHG | SYSTOLIC BLOOD PRESSURE: 114 MMHG | HEART RATE: 81 BPM | HEART RATE: 89 BPM | HEART RATE: 86 BPM | RESPIRATION RATE: 14 BRPM | OXYGEN SATURATION: 99 % | SYSTOLIC BLOOD PRESSURE: 114 MMHG | SYSTOLIC BLOOD PRESSURE: 121 MMHG | SYSTOLIC BLOOD PRESSURE: 121 MMHG | DIASTOLIC BLOOD PRESSURE: 78 MMHG | HEART RATE: 83 BPM | DIASTOLIC BLOOD PRESSURE: 77 MMHG | DIASTOLIC BLOOD PRESSURE: 77 MMHG | SYSTOLIC BLOOD PRESSURE: 120 MMHG | RESPIRATION RATE: 18 BRPM | OXYGEN SATURATION: 99 % | DIASTOLIC BLOOD PRESSURE: 71 MMHG | SYSTOLIC BLOOD PRESSURE: 121 MMHG | TEMPERATURE: 97.1 F | DIASTOLIC BLOOD PRESSURE: 77 MMHG | SYSTOLIC BLOOD PRESSURE: 96 MMHG | SYSTOLIC BLOOD PRESSURE: 121 MMHG | DIASTOLIC BLOOD PRESSURE: 78 MMHG | RESPIRATION RATE: 15 BRPM | RESPIRATION RATE: 15 BRPM | DIASTOLIC BLOOD PRESSURE: 72 MMHG | DIASTOLIC BLOOD PRESSURE: 81 MMHG | HEART RATE: 69 BPM | SYSTOLIC BLOOD PRESSURE: 96 MMHG | RESPIRATION RATE: 16 BRPM | SYSTOLIC BLOOD PRESSURE: 96 MMHG | RESPIRATION RATE: 16 BRPM | RESPIRATION RATE: 16 BRPM | DIASTOLIC BLOOD PRESSURE: 83 MMHG | DIASTOLIC BLOOD PRESSURE: 73 MMHG | HEART RATE: 72 BPM | SYSTOLIC BLOOD PRESSURE: 108 MMHG | HEART RATE: 89 BPM | DIASTOLIC BLOOD PRESSURE: 75 MMHG | HEART RATE: 86 BPM | DIASTOLIC BLOOD PRESSURE: 73 MMHG | SYSTOLIC BLOOD PRESSURE: 114 MMHG | OXYGEN SATURATION: 100 % | SYSTOLIC BLOOD PRESSURE: 121 MMHG | OXYGEN SATURATION: 99 % | DIASTOLIC BLOOD PRESSURE: 70 MMHG | TEMPERATURE: 97.1 F | SYSTOLIC BLOOD PRESSURE: 123 MMHG | DIASTOLIC BLOOD PRESSURE: 88 MMHG | HEART RATE: 92 BPM | SYSTOLIC BLOOD PRESSURE: 104 MMHG | HEART RATE: 69 BPM | SYSTOLIC BLOOD PRESSURE: 123 MMHG | RESPIRATION RATE: 14 BRPM | DIASTOLIC BLOOD PRESSURE: 72 MMHG | SYSTOLIC BLOOD PRESSURE: 110 MMHG | DIASTOLIC BLOOD PRESSURE: 88 MMHG | TEMPERATURE: 97.1 F | HEART RATE: 87 BPM | SYSTOLIC BLOOD PRESSURE: 123 MMHG | HEART RATE: 83 BPM | HEIGHT: 67 IN | DIASTOLIC BLOOD PRESSURE: 70 MMHG | RESPIRATION RATE: 14 BRPM | DIASTOLIC BLOOD PRESSURE: 72 MMHG | TEMPERATURE: 97.1 F | SYSTOLIC BLOOD PRESSURE: 98 MMHG | HEIGHT: 67 IN | SYSTOLIC BLOOD PRESSURE: 108 MMHG | HEART RATE: 70 BPM | DIASTOLIC BLOOD PRESSURE: 74 MMHG | DIASTOLIC BLOOD PRESSURE: 72 MMHG | HEART RATE: 72 BPM | DIASTOLIC BLOOD PRESSURE: 78 MMHG | SYSTOLIC BLOOD PRESSURE: 122 MMHG | DIASTOLIC BLOOD PRESSURE: 75 MMHG | HEART RATE: 80 BPM | HEART RATE: 86 BPM | TEMPERATURE: 97.1 F | SYSTOLIC BLOOD PRESSURE: 122 MMHG | SYSTOLIC BLOOD PRESSURE: 123 MMHG | SYSTOLIC BLOOD PRESSURE: 105 MMHG | DIASTOLIC BLOOD PRESSURE: 77 MMHG | OXYGEN SATURATION: 100 % | HEART RATE: 75 BPM | RESPIRATION RATE: 19 BRPM | HEART RATE: 69 BPM | HEART RATE: 72 BPM | RESPIRATION RATE: 19 BRPM | DIASTOLIC BLOOD PRESSURE: 75 MMHG | SYSTOLIC BLOOD PRESSURE: 96 MMHG | SYSTOLIC BLOOD PRESSURE: 108 MMHG | SYSTOLIC BLOOD PRESSURE: 120 MMHG | DIASTOLIC BLOOD PRESSURE: 81 MMHG | RESPIRATION RATE: 12 BRPM | DIASTOLIC BLOOD PRESSURE: 73 MMHG | DIASTOLIC BLOOD PRESSURE: 83 MMHG | DIASTOLIC BLOOD PRESSURE: 83 MMHG | OXYGEN SATURATION: 99 % | RESPIRATION RATE: 21 BRPM | RESPIRATION RATE: 15 BRPM | HEART RATE: 86 BPM | SYSTOLIC BLOOD PRESSURE: 110 MMHG | RESPIRATION RATE: 16 BRPM | SYSTOLIC BLOOD PRESSURE: 96 MMHG | OXYGEN SATURATION: 100 % | RESPIRATION RATE: 15 BRPM | DIASTOLIC BLOOD PRESSURE: 78 MMHG | HEART RATE: 72 BPM | RESPIRATION RATE: 19 BRPM | HEART RATE: 92 BPM | HEART RATE: 81 BPM | HEART RATE: 89 BPM | HEART RATE: 92 BPM | DIASTOLIC BLOOD PRESSURE: 83 MMHG | SYSTOLIC BLOOD PRESSURE: 105 MMHG | SYSTOLIC BLOOD PRESSURE: 105 MMHG | DIASTOLIC BLOOD PRESSURE: 73 MMHG | HEART RATE: 80 BPM | RESPIRATION RATE: 15 BRPM | RESPIRATION RATE: 18 BRPM | RESPIRATION RATE: 14 BRPM | DIASTOLIC BLOOD PRESSURE: 83 MMHG | RESPIRATION RATE: 21 BRPM | DIASTOLIC BLOOD PRESSURE: 71 MMHG | DIASTOLIC BLOOD PRESSURE: 70 MMHG | HEART RATE: 70 BPM | DIASTOLIC BLOOD PRESSURE: 83 MMHG | DIASTOLIC BLOOD PRESSURE: 75 MMHG | SYSTOLIC BLOOD PRESSURE: 105 MMHG | WEIGHT: 117 LBS | SYSTOLIC BLOOD PRESSURE: 98 MMHG | HEART RATE: 83 BPM | HEIGHT: 67 IN | DIASTOLIC BLOOD PRESSURE: 74 MMHG | RESPIRATION RATE: 21 BRPM | DIASTOLIC BLOOD PRESSURE: 73 MMHG | DIASTOLIC BLOOD PRESSURE: 81 MMHG | HEART RATE: 83 BPM | RESPIRATION RATE: 14 BRPM | SYSTOLIC BLOOD PRESSURE: 110 MMHG | WEIGHT: 117 LBS | SYSTOLIC BLOOD PRESSURE: 108 MMHG | RESPIRATION RATE: 19 BRPM | DIASTOLIC BLOOD PRESSURE: 74 MMHG | SYSTOLIC BLOOD PRESSURE: 110 MMHG | HEART RATE: 83 BPM | HEART RATE: 87 BPM | RESPIRATION RATE: 14 BRPM | HEART RATE: 72 BPM | DIASTOLIC BLOOD PRESSURE: 78 MMHG | HEART RATE: 80 BPM | SYSTOLIC BLOOD PRESSURE: 122 MMHG | HEART RATE: 69 BPM | RESPIRATION RATE: 19 BRPM | SYSTOLIC BLOOD PRESSURE: 120 MMHG | OXYGEN SATURATION: 100 % | DIASTOLIC BLOOD PRESSURE: 70 MMHG | DIASTOLIC BLOOD PRESSURE: 71 MMHG | SYSTOLIC BLOOD PRESSURE: 105 MMHG | SYSTOLIC BLOOD PRESSURE: 120 MMHG | HEART RATE: 70 BPM | RESPIRATION RATE: 18 BRPM | HEART RATE: 83 BPM | SYSTOLIC BLOOD PRESSURE: 110 MMHG | SYSTOLIC BLOOD PRESSURE: 120 MMHG | HEART RATE: 92 BPM | HEART RATE: 80 BPM | DIASTOLIC BLOOD PRESSURE: 75 MMHG | SYSTOLIC BLOOD PRESSURE: 110 MMHG | HEIGHT: 67 IN | HEART RATE: 75 BPM | DIASTOLIC BLOOD PRESSURE: 73 MMHG | HEART RATE: 89 BPM | HEART RATE: 87 BPM | HEART RATE: 89 BPM | DIASTOLIC BLOOD PRESSURE: 70 MMHG | RESPIRATION RATE: 21 BRPM | RESPIRATION RATE: 16 BRPM | HEART RATE: 81 BPM | DIASTOLIC BLOOD PRESSURE: 75 MMHG | SYSTOLIC BLOOD PRESSURE: 121 MMHG | SYSTOLIC BLOOD PRESSURE: 98 MMHG | RESPIRATION RATE: 18 BRPM | HEART RATE: 92 BPM | RESPIRATION RATE: 16 BRPM | DIASTOLIC BLOOD PRESSURE: 71 MMHG | DIASTOLIC BLOOD PRESSURE: 78 MMHG | DIASTOLIC BLOOD PRESSURE: 75 MMHG | DIASTOLIC BLOOD PRESSURE: 77 MMHG | WEIGHT: 117 LBS | SYSTOLIC BLOOD PRESSURE: 122 MMHG | RESPIRATION RATE: 14 BRPM | RESPIRATION RATE: 18 BRPM | SYSTOLIC BLOOD PRESSURE: 104 MMHG | DIASTOLIC BLOOD PRESSURE: 81 MMHG | SYSTOLIC BLOOD PRESSURE: 123 MMHG | DIASTOLIC BLOOD PRESSURE: 88 MMHG | HEIGHT: 67 IN | OXYGEN SATURATION: 99 % | HEART RATE: 75 BPM | DIASTOLIC BLOOD PRESSURE: 74 MMHG | DIASTOLIC BLOOD PRESSURE: 73 MMHG | RESPIRATION RATE: 21 BRPM | SYSTOLIC BLOOD PRESSURE: 104 MMHG | DIASTOLIC BLOOD PRESSURE: 71 MMHG | SYSTOLIC BLOOD PRESSURE: 114 MMHG | HEART RATE: 89 BPM | RESPIRATION RATE: 12 BRPM | WEIGHT: 117 LBS | RESPIRATION RATE: 21 BRPM | DIASTOLIC BLOOD PRESSURE: 81 MMHG | HEART RATE: 92 BPM | SYSTOLIC BLOOD PRESSURE: 98 MMHG | DIASTOLIC BLOOD PRESSURE: 88 MMHG | RESPIRATION RATE: 15 BRPM | HEIGHT: 67 IN | SYSTOLIC BLOOD PRESSURE: 108 MMHG | HEART RATE: 75 BPM | RESPIRATION RATE: 21 BRPM | SYSTOLIC BLOOD PRESSURE: 96 MMHG | HEART RATE: 80 BPM | SYSTOLIC BLOOD PRESSURE: 122 MMHG | HEART RATE: 69 BPM | HEART RATE: 70 BPM | OXYGEN SATURATION: 99 % | DIASTOLIC BLOOD PRESSURE: 72 MMHG | RESPIRATION RATE: 12 BRPM | SYSTOLIC BLOOD PRESSURE: 120 MMHG | HEART RATE: 83 BPM | DIASTOLIC BLOOD PRESSURE: 81 MMHG | HEART RATE: 80 BPM | HEIGHT: 67 IN | DIASTOLIC BLOOD PRESSURE: 70 MMHG | RESPIRATION RATE: 12 BRPM | RESPIRATION RATE: 16 BRPM | SYSTOLIC BLOOD PRESSURE: 114 MMHG | DIASTOLIC BLOOD PRESSURE: 88 MMHG | SYSTOLIC BLOOD PRESSURE: 104 MMHG | DIASTOLIC BLOOD PRESSURE: 74 MMHG | HEART RATE: 87 BPM | DIASTOLIC BLOOD PRESSURE: 81 MMHG

## 2024-11-14 DIAGNOSIS — Z12.11 ENCOUNTER FOR SCREENING FOR MALIGNANT NEOPLASM OF COLON: ICD-10-CM

## 2024-11-14 DIAGNOSIS — K56.609 UNSPECIFIED INTESTINAL OBSTRUCTION, UNSPECIFIED AS TO PARTIA: ICD-10-CM

## 2024-11-14 DIAGNOSIS — A63.0 ANOGENITAL (VENEREAL) WARTS: ICD-10-CM

## 2024-11-14 DIAGNOSIS — D37.4 NEOPLASM OF UNCERTAIN BEHAVIOR OF COLON: ICD-10-CM

## 2024-11-14 DIAGNOSIS — K63.89 OTHER SPECIFIED DISEASES OF INTESTINE: ICD-10-CM

## 2024-11-14 DIAGNOSIS — K52.9 NONINFECTIVE GASTROENTERITIS AND COLITIS, UNSPECIFIED: ICD-10-CM

## 2024-11-14 DIAGNOSIS — K63.3 ULCER OF INTESTINE: ICD-10-CM

## 2024-11-14 PROBLEM — K92.2 GASTROINTESTINAL HEMORRHAGE, UNSPECIFIED: Status: ACTIVE | Noted: 2024-11-14

## 2024-11-14 LAB
GI HISTOLOGY: B. CECUM: (no result)
GI HISTOLOGY: D. DISTAL RECTUM: (no result)
GI HISTOLOGY: PDF REPORT: (no result)
Lab: (no result)

## 2024-11-14 PROCEDURE — 88305 TISSUE EXAM BY PATHOLOGIST: CPT | Performed by: PATHOLOGY

## 2024-11-14 PROCEDURE — 45380 COLONOSCOPY AND BIOPSY: CPT | Mod: 33 | Performed by: INTERNAL MEDICINE

## 2024-11-14 RX ADMIN — ONDANSETRON HYDROCHLORIDE 4 MG: 2 INJECTION, SOLUTION INTRAMUSCULAR; INTRAVENOUS at 15:29

## 2024-11-14 RX ADMIN — ONDANSETRON HYDROCHLORIDE 4 MG: 4 SOLUTION ORAL at 16:33

## 2024-11-14 NOTE — SERVICEHPINOTES
JEMAL TIJERINA  is a  59  male   who presents today for a  Colonoscopy   for   the indications listed below. The updated Patient Profile was reviewed prior to the procedure, in conjunction with the Physical Exam, including medical conditions, surgical procedures, medications, allergies, family history and social history. See Physical Exam time stamp below for date and time of HPI completion.Pre-operatively, I reviewed the indication(s) for the procedure, the risks of the procedure [including but not limited to: unexpected bleeding possibly requiring hospitalization and/or unplanned repeat procedures, perforation possibly requiring surgical treatment, missed lesions and complications of sedation/general anesthesia (also explained by anesthesia staff)]. I have evaluated the patient for risks associated with the planned anesthesia and the procedure to be performed and find the patient an acceptable candidate for IV sedation.Multiple opportunities were provided for any questions or concerns, and all questions were answered satisfactorily before any anesthesia was administered. We will proceed with the planned procedure.jaent

## 2024-11-14 NOTE — SERVICEHPINOTES
TERRI CARBAJAL  is a  59  male   who presents today for a  Colonoscopy   for   the indications listed below. The updated Patient Profile was reviewed prior to the procedure, in conjunction with the Physical Exam, including medical conditions, surgical procedures, medications, allergies, family history and social history. See Physical Exam time stamp below for date and time of HPI completion.Pre-operatively, I reviewed the indication(s) for the procedure, the risks of the procedure [including but not limited to: unexpected bleeding possibly requiring hospitalization and/or unplanned repeat procedures, perforation possibly requiring surgical treatment, missed lesions and complications of sedation/general anesthesia (also explained by anesthesia staff)]. I have evaluated the patient for risks associated with the planned anesthesia and the procedure to be performed and find the patient an acceptable candidate for IV sedation.Multiple opportunities were provided for any questions or concerns, and all questions were answered satisfactorily before any anesthesia was administered. We will proceed with the planned procedure.[email or web address]

## 2024-11-14 NOTE — SERVICEHPINOTES
JORDY VASQUEZ  is a  59  male   who presents today for a  Colonoscopy   for   the indications listed below. The updated Patient Profile was reviewed prior to the procedure, in conjunction with the Physical Exam, including medical conditions, surgical procedures, medications, allergies, family history and social history. See Physical Exam time stamp below for date and time of HPI completion.Pre-operatively, I reviewed the indication(s) for the procedure, the risks of the procedure [including but not limited to: unexpected bleeding possibly requiring hospitalization and/or unplanned repeat procedures, perforation possibly requiring surgical treatment, missed lesions and complications of sedation/general anesthesia (also explained by anesthesia staff)]. I have evaluated the patient for risks associated with the planned anesthesia and the procedure to be performed and find the patient an acceptable candidate for IV sedation.Multiple opportunities were provided for any questions or concerns, and all questions were answered satisfactorily before any anesthesia was administered. We will proceed with the planned procedure.[email or web address]

## 2024-11-14 NOTE — SERVICEHPINOTES
GREY DIALLO  is a  59  male   who presents today for a  Colonoscopy   for   the indications listed below. The updated Patient Profile was reviewed prior to the procedure, in conjunction with the Physical Exam, including medical conditions, surgical procedures, medications, allergies, family history and social history. See Physical Exam time stamp below for date and time of HPI completion.Pre-operatively, I reviewed the indication(s) for the procedure, the risks of the procedure [including but not limited to: unexpected bleeding possibly requiring hospitalization and/or unplanned repeat procedures, perforation possibly requiring surgical treatment, missed lesions and complications of sedation/general anesthesia (also explained by anesthesia staff)]. I have evaluated the patient for risks associated with the planned anesthesia and the procedure to be performed and find the patient an acceptable candidate for IV sedation.Multiple opportunities were provided for any questions or concerns, and all questions were answered satisfactorily before any anesthesia was administered. We will proceed with the planned procedure.maxim

## 2024-11-14 NOTE — SERVICEHPINOTES
LIA BLEVINS  is a  59  male   who presents today for a  Colonoscopy   for   the indications listed below. The updated Patient Profile was reviewed prior to the procedure, in conjunction with the Physical Exam, including medical conditions, surgical procedures, medications, allergies, family history and social history. See Physical Exam time stamp below for date and time of HPI completion.Pre-operatively, I reviewed the indication(s) for the procedure, the risks of the procedure [including but not limited to: unexpected bleeding possibly requiring hospitalization and/or unplanned repeat procedures, perforation possibly requiring surgical treatment, missed lesions and complications of sedation/general anesthesia (also explained by anesthesia staff)]. I have evaluated the patient for risks associated with the planned anesthesia and the procedure to be performed and find the patient an acceptable candidate for IV sedation.Multiple opportunities were provided for any questions or concerns, and all questions were answered satisfactorily before any anesthesia was administered. We will proceed with the planned procedure.[email or web address]

## 2024-11-14 NOTE — SERVICEHPINOTES
LESLIE DALEY  is a  59  male   who presents today for a  Colonoscopy   for   the indications listed below. The updated Patient Profile was reviewed prior to the procedure, in conjunction with the Physical Exam, including medical conditions, surgical procedures, medications, allergies, family history and social history. See Physical Exam time stamp below for date and time of HPI completion.Pre-operatively, I reviewed the indication(s) for the procedure, the risks of the procedure [including but not limited to: unexpected bleeding possibly requiring hospitalization and/or unplanned repeat procedures, perforation possibly requiring surgical treatment, missed lesions and complications of sedation/general anesthesia (also explained by anesthesia staff)]. I have evaluated the patient for risks associated with the planned anesthesia and the procedure to be performed and find the patient an acceptable candidate for IV sedation.Multiple opportunities were provided for any questions or concerns, and all questions were answered satisfactorily before any anesthesia was administered. We will proceed with the planned procedure.dejuan

## 2024-11-14 NOTE — SERVICEHPINOTES
CARLITO ESTEVEZ  is a  59  male   who presents today for a  Colonoscopy   for   the indications listed below. The updated Patient Profile was reviewed prior to the procedure, in conjunction with the Physical Exam, including medical conditions, surgical procedures, medications, allergies, family history and social history. See Physical Exam time stamp below for date and time of HPI completion.Pre-operatively, I reviewed the indication(s) for the procedure, the risks of the procedure [including but not limited to: unexpected bleeding possibly requiring hospitalization and/or unplanned repeat procedures, perforation possibly requiring surgical treatment, missed lesions and complications of sedation/general anesthesia (also explained by anesthesia staff)]. I have evaluated the patient for risks associated with the planned anesthesia and the procedure to be performed and find the patient an acceptable candidate for IV sedation.Multiple opportunities were provided for any questions or concerns, and all questions were answered satisfactorily before any anesthesia was administered. We will proceed with the planned procedure.nika

## 2024-11-20 ENCOUNTER — READMISSION MANAGEMENT (OUTPATIENT)
Dept: CALL CENTER | Facility: HOSPITAL | Age: 59
End: 2024-11-20
Payer: COMMERCIAL

## 2024-11-21 NOTE — OUTREACH NOTE
Medical Week 3 Survey      Flowsheet Row Responses   [facility] patient discharged from? [facility]   Does the patient have one of the following disease processes/diagnoses(primary or secondary)? Other   Week 3 attempt successful? Yes   Call start time 1911   Call end time 1913   Person spoke with today (if not patient) and relationship Patient   Does the patient have a primary care provider?  Yes   Comments regarding PCP Has seen Gastro- had colonoscopy done a week ago- Waiting for results.   Has home health visited the patient within 72 hours of discharge? N/A   Psychosocial issues? No   What is the patient's perception of their health status since discharge? Same   Is the patient/caregiver able to teach back signs and symptoms related to disease process for when to call PCP? Yes   Is the patient/caregiver able to teach back signs and symptoms related to disease process for when to call 911? Yes   Is the patient/caregiver able to teach back the hierarchy of who to call/visit for symptoms/problems? PCP, Specialist, Home health nurse, Urgent Care, ED, 911 Yes   Additional teach back comments Unable to have US done today- patient sick   Week 3 Call Completed? Yes   Wrap up additional comments Patient reports has been sick today- Biopsy results pending still Patient has been in contact with GI MD. No other concerns or questions noted.   Call end time 1913            Yun BOYER - Registered Nurse

## 2024-12-02 ENCOUNTER — OFFICE (AMBULATORY)
Dept: URBAN - METROPOLITAN AREA CLINIC 64 | Facility: CLINIC | Age: 59
End: 2024-12-02
Payer: COMMERCIAL

## 2024-12-02 ENCOUNTER — OFFICE (AMBULATORY)
Age: 59
End: 2024-12-02
Payer: COMMERCIAL

## 2024-12-02 VITALS
WEIGHT: 123 LBS | WEIGHT: 123 LBS | WEIGHT: 123 LBS | DIASTOLIC BLOOD PRESSURE: 79 MMHG | HEART RATE: 96 BPM | DIASTOLIC BLOOD PRESSURE: 102 MMHG | SYSTOLIC BLOOD PRESSURE: 145 MMHG | SYSTOLIC BLOOD PRESSURE: 145 MMHG | SYSTOLIC BLOOD PRESSURE: 145 MMHG | HEIGHT: 67 IN | DIASTOLIC BLOOD PRESSURE: 79 MMHG | DIASTOLIC BLOOD PRESSURE: 79 MMHG | SYSTOLIC BLOOD PRESSURE: 120 MMHG | HEIGHT: 67 IN | HEART RATE: 96 BPM | WEIGHT: 123 LBS | WEIGHT: 123 LBS | HEART RATE: 96 BPM | SYSTOLIC BLOOD PRESSURE: 120 MMHG | SYSTOLIC BLOOD PRESSURE: 120 MMHG | DIASTOLIC BLOOD PRESSURE: 79 MMHG | DIASTOLIC BLOOD PRESSURE: 79 MMHG | SYSTOLIC BLOOD PRESSURE: 120 MMHG | SYSTOLIC BLOOD PRESSURE: 145 MMHG | SYSTOLIC BLOOD PRESSURE: 120 MMHG | WEIGHT: 123 LBS | DIASTOLIC BLOOD PRESSURE: 102 MMHG | HEART RATE: 96 BPM | DIASTOLIC BLOOD PRESSURE: 102 MMHG | HEIGHT: 67 IN | SYSTOLIC BLOOD PRESSURE: 145 MMHG | DIASTOLIC BLOOD PRESSURE: 102 MMHG | DIASTOLIC BLOOD PRESSURE: 102 MMHG | HEART RATE: 96 BPM | DIASTOLIC BLOOD PRESSURE: 102 MMHG | HEIGHT: 67 IN | HEIGHT: 67 IN | DIASTOLIC BLOOD PRESSURE: 79 MMHG | HEIGHT: 67 IN | HEIGHT: 67 IN | WEIGHT: 123 LBS | DIASTOLIC BLOOD PRESSURE: 79 MMHG | DIASTOLIC BLOOD PRESSURE: 102 MMHG | SYSTOLIC BLOOD PRESSURE: 145 MMHG | HEART RATE: 96 BPM | SYSTOLIC BLOOD PRESSURE: 120 MMHG | HEART RATE: 96 BPM | SYSTOLIC BLOOD PRESSURE: 120 MMHG | SYSTOLIC BLOOD PRESSURE: 145 MMHG

## 2024-12-02 DIAGNOSIS — K62.89 OTHER SPECIFIED DISEASES OF ANUS AND RECTUM: ICD-10-CM

## 2024-12-02 DIAGNOSIS — A63.0 ANOGENITAL (VENEREAL) WARTS: ICD-10-CM

## 2024-12-02 DIAGNOSIS — R18.8 OTHER ASCITES: ICD-10-CM

## 2024-12-02 DIAGNOSIS — K56.690 OTHER PARTIAL INTESTINAL OBSTRUCTION: ICD-10-CM

## 2024-12-02 DIAGNOSIS — Z21 ASYMPTOMATIC HUMAN IMMUNODEFICIENCY VIRUS [HIV] INFECTION ST: ICD-10-CM

## 2024-12-02 PROCEDURE — 99214 OFFICE O/P EST MOD 30 MIN: CPT | Performed by: INTERNAL MEDICINE

## 2024-12-02 RX ORDER — PREDNISONE 10 MG/1
TABLET ORAL
Qty: 105 | Refills: 1 | Status: ACTIVE
Start: 2024-11-21

## 2024-12-03 ENCOUNTER — OFFICE (AMBULATORY)
Dept: URBAN - METROPOLITAN AREA CLINIC 64 | Facility: CLINIC | Age: 59
End: 2024-12-03

## 2024-12-03 ENCOUNTER — TELEPHONE (OUTPATIENT)
Age: 59
End: 2024-12-03
Payer: COMMERCIAL

## 2024-12-03 DIAGNOSIS — K56.690 OTHER PARTIAL INTESTINAL OBSTRUCTION: ICD-10-CM

## 2024-12-03 NOTE — TELEPHONE ENCOUNTER
UNABLE TO LVM TO [facility] AN APPT WITH MARIXA. PLEASE CONTACT OFFICE TO [facility] IF PT CALLS BACK.

## 2024-12-06 ENCOUNTER — OFFICE VISIT (OUTPATIENT)
Age: 59
End: 2024-12-06
Payer: COMMERCIAL

## 2024-12-06 VITALS
TEMPERATURE: 98.4 F | HEIGHT: 67 IN | DIASTOLIC BLOOD PRESSURE: 89 MMHG | BODY MASS INDEX: 19.93 KG/M2 | SYSTOLIC BLOOD PRESSURE: 124 MMHG | OXYGEN SATURATION: 100 % | HEART RATE: 91 BPM | WEIGHT: 127 LBS

## 2024-12-06 DIAGNOSIS — K62.89 RECTAL MASS: ICD-10-CM

## 2024-12-06 DIAGNOSIS — R15.9 FULL INCONTINENCE OF FECES: Primary | ICD-10-CM

## 2024-12-06 DIAGNOSIS — B20 SYMPTOMATIC HIV INFECTION: ICD-10-CM

## 2024-12-06 RX ORDER — PREDNISONE 10 MG/1
105 TABLET ORAL
COMMUNITY
Start: 2024-11-21

## 2024-12-06 RX ORDER — SODIUM CHLORIDE 9 MG/ML
40 INJECTION, SOLUTION INTRAVENOUS AS NEEDED
OUTPATIENT
Start: 2024-12-06

## 2024-12-06 RX ORDER — SODIUM CHLORIDE 0.9 % (FLUSH) 0.9 %
3-10 SYRINGE (ML) INJECTION AS NEEDED
OUTPATIENT
Start: 2024-12-06

## 2024-12-06 RX ORDER — MESALAMINE 1.2 G/1
120 TABLET, DELAYED RELEASE ORAL
COMMUNITY
Start: 2024-11-21

## 2024-12-06 RX ORDER — SODIUM CHLORIDE 0.9 % (FLUSH) 0.9 %
3 SYRINGE (ML) INJECTION EVERY 12 HOURS SCHEDULED
OUTPATIENT
Start: 2024-12-06

## 2024-12-06 NOTE — PROGRESS NOTES
Colorectal Surgery Consultation Note    ID:  Felipe Nieves;   : 1965  DATE OF VISIT: 2024    Chief Complaint  Consult (NP referred by Dr Coyle for rectal mass)       History of Present Illness  Felipe Nieves is a 59 y.o. male who I was asked to see in consultation by Alonso Maya MD to evaluate for anorectal mass.     Patient has not been feeling well for several months. He has been admitted to the hospital few times with high grade small bowel obstruction. He reports weight loss, worsening fatigue, fecal urgency and fecal incontinence. He reports blood in the stool.     Patient states he has a bowel movement several times per day. It is loose in consistency. he has no pain with bowel movements; he has itching;  he has no protrusion of tissue while straining.    Patient does not take supplemental fiber.    He is HIV positive and reports disease well controlled with medication and his recent Cd4 count was over 400. He is currently not sexually active. He denies any recent travels. He denies any family history of colon or rectal cancer. He denies past history of anal warts but reports STI.     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  A rectosigmoid mass, negative for any malignancy. Multiple ulceration in the cecum and terminal ileum were negative for opportunistic infection including CMV.      Never Less than once a month Once a month or more, but less than once a week Once a week or more, but less than once a day Daily   Points 0 1 2 3 4   Cannot control:  solid stool    x    Cannot control:  liquid stool     x   Cannot control:  gas     x   Need to wear a pad     x   Need to alter your lifestyle     x     Score: 19/20      Past Medical History  Past Medical History:   Diagnosis Date    HIV (human immunodeficiency virus infection)     Skin cancer      Past Surgical History  Past Surgical History:   Procedure Laterality Date     CYSTOSCOPY, URETEROSCOPY, RETROGRADE PYELOGRAM, STENT INSERTION Left 5/30/2024    Procedure: CYSTOSCOPY URETEROSCOPY HOLMIUM LASER STENT INSERTION;  Surgeon: Wale Taylor MD;  Location: [facility] MAIN OR;  Service: Urology;  Laterality: Left;    ENDOSCOPY N/A 6/1/2024    Procedure: ESOPHAGOGASTRODUODENOSCOPY, biopsy x2 areas;  Surgeon: Nelly Obando MD;  Location: [facility] ENDOSCOPY;  Service: Gastroenterology;  Laterality: N/A;  post: gastritis    HERNIA REPAIR      SKIN CANCER EXCISION       Family History  Family History   Problem Relation Age of Onset    Heart disease Mother     Cancer Mother     Heart disease Father     Diabetes Brother     Heart disease Brother      No colorectal cancer history in immediate family members.  Social History  Social History     Tobacco Use    Smoking status: Never    Smokeless tobacco: Never   Vaping Use    Vaping status: Never Used   Substance Use Topics    Alcohol use: Not Currently    Drug use: Defer     For further details please see Health History Questionnaire scanned in Epic.  Medication List    Current Outpatient Medications:     Bictegravir-Emtricitab-Tenofov (Biktarvy) -25 MG per tablet, Take 1 tablet by mouth Daily., Disp: , Rfl:     ergocalciferol (ERGOCALCIFEROL) 1.25 MG (25186 UT) capsule, Take 1 capsule by mouth 1 (One) Time Per Week. Tuesdays, Disp: , Rfl:     mesalamine (LIALDA) 1.2 g EC tablet, 120 tablets., Disp: , Rfl:     predniSONE (DELTASONE) 10 MG tablet, 105 tablets., Disp: , Rfl:     diphenhydrAMINE (BENADRYL) 25 mg capsule, Take 1 capsule by mouth Every 6 (Six) Hours As Needed for Sleep., Disp: , Rfl:    Allergies  No Known Allergies  Review of Systems  All systems reviewed and are otherwise negative, pertinent positives noted in the HPI and Health History Questionnaire scanned in Epic.      Physical Exam  General:  No acute distress  Head: Normocephalic, atraumatic  Neuro: Alert and oriented    Abdomen:  Soft, non-tender,  slightly-distended, no masses, no hernias, no hepatomegaly, no splenomegaly. No abnormal, audible bowel sounds.    Groin: no lymphadenopathy       External anorectal exam: mild skin irritation, no external lesion noted   Digital rectal: mild tenderness with exam, circumferential palpable mass and anal stenosis    Procedure Note  Patient didn't want to have anoscope due to discomfort       Assessment  - anorectal mass: path low grade AIN  - Rectosigmoid mass: no findings of malignancy or adenoma   - Fecal incontinence  -Anorectal pain   -Anorectal bleeding       Plan / Recommendations    1. We discussed the pathophysiology of AIN and anal cancer. We discussed risk associated with AIN including HIV and MSM. We also discussed the management of low grade AIN.     Currently his pathology doesn't show any anal cancer. However, on examination his anorectal mass shows stenosis and involvement of the sphincter complex. Given he was not able to tolerate complete examination and proctoscopy, my plan is to examine him under anesthesia and repeat biopsy. If no malignancy found and only AIN, we will proceed with excision and fulguration. We also discussed application of topical 5-fu and podophyllin as local chemotherapy. We will make a decision after completion of examination.      I will also plan for flexible sigmoidoscopy at the same time to evaluate the rectosigmoid mass and possible take more biopsies.     2. Regarding his fecal incontinence, I explained to the patient that fecal incontinence can generally be placed into 3 categories based on the cause- sphincter problems (of which nerve conduction/sensation problems and muscle damage are included), irregular or loose bowel movements, and prolapse of tissue which stents the sphincters open and doesn't allow for complete closure. Often times, there is a combination of all of these causes that leads to fecal incontinence.    I explained that the first step to treating his  incontinence, I would like for him to try to normalize his stools by adding supplemental fiber in the form of Konsyl or Metamucil or any of the other psyllium based products. I have recommended that he start by taking 2 teaspoons in a glass of water once per day for a week and to gradually work up to taking it twice per day. I explained that it is normal to have increased gas and bloating when first starting on fiber, but that this ought to get better after ~ 3 weeks to 3 months.      Additionally, I recommend that patient try to thicken the stools by taking a 1/2 tablet of  imodium daily.     3. I have also personally reviewed the colonoscopy reports, pathology, laboratory studies, CT scans as discussed above     4. Follow up at the time of surgery.       Xander Pa MD  Colon and Rectal Surgery   [facility]

## 2024-12-06 NOTE — H&P (VIEW-ONLY)
Colorectal Surgery Consultation Note    ID:  Felipe Nieves;   : 1965  DATE OF VISIT: 2024    Chief Complaint  Consult (NP referred by Dr Coyle for rectal mass)       History of Present Illness  Felipe Nieves is a 59 y.o. male who I was asked to see in consultation by Alonso Maya MD to evaluate for anorectal mass.     Patient has not been feeling well for several months. He has been admitted to the hospital few times with high grade small bowel obstruction. He reports weight loss, worsening fatigue, fecal urgency and fecal incontinence. He reports blood in the stool.     Patient states he has a bowel movement several times per day. It is loose in consistency. he has no pain with bowel movements; he has itching;  he has no protrusion of tissue while straining.    Patient does not take supplemental fiber.    He is HIV positive and reports disease well controlled with medication and his recent Cd4 count was over 400. He is currently not sexually active. He denies any recent travels. He denies any family history of colon or rectal cancer. He denies past history of anal warts but reports STI.     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  A rectosigmoid mass, negative for any malignancy. Multiple ulceration in the cecum and terminal ileum were negative for opportunistic infection including CMV.      Never Less than once a month Once a month or more, but less than once a week Once a week or more, but less than once a day Daily   Points 0 1 2 3 4   Cannot control:  solid stool    x    Cannot control:  liquid stool     x   Cannot control:  gas     x   Need to wear a pad     x   Need to alter your lifestyle     x     Score: 19/20      Past Medical History  Past Medical History:   Diagnosis Date    HIV (human immunodeficiency virus infection)     Skin cancer      Past Surgical History  Past Surgical History:   Procedure Laterality Date     CYSTOSCOPY, URETEROSCOPY, RETROGRADE PYELOGRAM, STENT INSERTION Left 5/30/2024    Procedure: CYSTOSCOPY URETEROSCOPY HOLMIUM LASER STENT INSERTION;  Surgeon: Wale Taylor MD;  Location: [facility] MAIN OR;  Service: Urology;  Laterality: Left;    ENDOSCOPY N/A 6/1/2024    Procedure: ESOPHAGOGASTRODUODENOSCOPY, biopsy x2 areas;  Surgeon: Nelly Obando MD;  Location: [facility] ENDOSCOPY;  Service: Gastroenterology;  Laterality: N/A;  post: gastritis    HERNIA REPAIR      SKIN CANCER EXCISION       Family History  Family History   Problem Relation Age of Onset    Heart disease Mother     Cancer Mother     Heart disease Father     Diabetes Brother     Heart disease Brother      No colorectal cancer history in immediate family members.  Social History  Social History     Tobacco Use    Smoking status: Never    Smokeless tobacco: Never   Vaping Use    Vaping status: Never Used   Substance Use Topics    Alcohol use: Not Currently    Drug use: Defer     For further details please see Health History Questionnaire scanned in Epic.  Medication List    Current Outpatient Medications:     Bictegravir-Emtricitab-Tenofov (Biktarvy) -25 MG per tablet, Take 1 tablet by mouth Daily., Disp: , Rfl:     ergocalciferol (ERGOCALCIFEROL) 1.25 MG (16602 UT) capsule, Take 1 capsule by mouth 1 (One) Time Per Week. Tuesdays, Disp: , Rfl:     mesalamine (LIALDA) 1.2 g EC tablet, 120 tablets., Disp: , Rfl:     predniSONE (DELTASONE) 10 MG tablet, 105 tablets., Disp: , Rfl:     diphenhydrAMINE (BENADRYL) 25 mg capsule, Take 1 capsule by mouth Every 6 (Six) Hours As Needed for Sleep., Disp: , Rfl:    Allergies  No Known Allergies  Review of Systems  All systems reviewed and are otherwise negative, pertinent positives noted in the HPI and Health History Questionnaire scanned in Epic.      Physical Exam  General:  No acute distress  Head: Normocephalic, atraumatic  Neuro: Alert and oriented    Abdomen:  Soft, non-tender,  slightly-distended, no masses, no hernias, no hepatomegaly, no splenomegaly. No abnormal, audible bowel sounds.    Groin: no lymphadenopathy       External anorectal exam: mild skin irritation, no external lesion noted   Digital rectal: mild tenderness with exam, circumferential palpable mass and anal stenosis    Procedure Note  Patient didn't want to have anoscope due to discomfort       Assessment  - anorectal mass: path low grade AIN  - Rectosigmoid mass: no findings of malignancy or adenoma   - Fecal incontinence  -Anorectal pain   -Anorectal bleeding       Plan / Recommendations    1. We discussed the pathophysiology of AIN and anal cancer. We discussed risk associated with AIN including HIV and MSM. We also discussed the management of low grade AIN.     Currently his pathology doesn't show any anal cancer. However, on examination his anorectal mass shows stenosis and involvement of the sphincter complex. Given he was not able to tolerate complete examination and proctoscopy, my plan is to examine him under anesthesia and repeat biopsy. If no malignancy found and only AIN, we will proceed with excision and fulguration. We also discussed application of topical 5-fu and podophyllin as local chemotherapy. We will make a decision after completion of examination.      I will also plan for flexible sigmoidoscopy at the same time to evaluate the rectosigmoid mass and possible take more biopsies.     2. Regarding his fecal incontinence, I explained to the patient that fecal incontinence can generally be placed into 3 categories based on the cause- sphincter problems (of which nerve conduction/sensation problems and muscle damage are included), irregular or loose bowel movements, and prolapse of tissue which stents the sphincters open and doesn't allow for complete closure. Often times, there is a combination of all of these causes that leads to fecal incontinence.    I explained that the first step to treating his  incontinence, I would like for him to try to normalize his stools by adding supplemental fiber in the form of Konsyl or Metamucil or any of the other psyllium based products. I have recommended that he start by taking 2 teaspoons in a glass of water once per day for a week and to gradually work up to taking it twice per day. I explained that it is normal to have increased gas and bloating when first starting on fiber, but that this ought to get better after ~ 3 weeks to 3 months.      Additionally, I recommend that patient try to thicken the stools by taking a 1/2 tablet of  imodium daily.     3. I have also personally reviewed the colonoscopy reports, pathology, laboratory studies, CT scans as discussed above     4. Follow up at the time of surgery.       Xander Pa MD  Colon and Rectal Surgery   [facility]

## 2024-12-09 ENCOUNTER — LAB (OUTPATIENT)
Dept: LAB | Facility: HOSPITAL | Age: 59
End: 2024-12-09
Payer: COMMERCIAL

## 2024-12-09 LAB
ALBUMIN SERPL-MCNC: 3.5 G/DL (ref 3.5–5.2)
ALBUMIN/GLOB SERPL: 0.6 G/DL
ALP SERPL-CCNC: 66 U/L (ref 39–117)
ALT SERPL W P-5'-P-CCNC: 6 U/L (ref 1–41)
ANION GAP SERPL CALCULATED.3IONS-SCNC: 9.6 MMOL/L (ref 5–15)
AST SERPL-CCNC: 9 U/L (ref 1–40)
BILIRUB SERPL-MCNC: 0.3 MG/DL (ref 0–1.2)
BUN SERPL-MCNC: 19 MG/DL (ref 6–20)
BUN/CREAT SERPL: 18.6 (ref 7–25)
CALCIUM SPEC-SCNC: 8.6 MG/DL (ref 8.6–10.5)
CHLORIDE SERPL-SCNC: 104 MMOL/L (ref 98–107)
CO2 SERPL-SCNC: 26.4 MMOL/L (ref 22–29)
CREAT SERPL-MCNC: 1.02 MG/DL (ref 0.76–1.27)
DEPRECATED RDW RBC AUTO: 52.2 FL (ref 37–54)
EGFRCR SERPLBLD CKD-EPI 2021: 85.7 ML/MIN/1.73
ERYTHROCYTE [DISTWIDTH] IN BLOOD BY AUTOMATED COUNT: 19.6 % (ref 12.3–15.4)
GLOBULIN UR ELPH-MCNC: 5.5 GM/DL
GLUCOSE SERPL-MCNC: 120 MG/DL (ref 65–99)
HCT VFR BLD AUTO: 30.5 % (ref 37.5–51)
HGB BLD-MCNC: 9.2 G/DL (ref 13–17.7)
MCH RBC QN AUTO: 22.7 PG (ref 26.6–33)
MCHC RBC AUTO-ENTMCNC: 30.2 G/DL (ref 31.5–35.7)
MCV RBC AUTO: 75.3 FL (ref 79–97)
PLATELET # BLD AUTO: 672 10*3/MM3 (ref 140–450)
PMV BLD AUTO: 8.8 FL (ref 6–12)
POTASSIUM SERPL-SCNC: 3.7 MMOL/L (ref 3.5–5.2)
PROT SERPL-MCNC: 9 G/DL (ref 6–8.5)
RBC # BLD AUTO: 4.05 10*6/MM3 (ref 4.14–5.8)
SODIUM SERPL-SCNC: 140 MMOL/L (ref 136–145)
WBC NRBC COR # BLD AUTO: 13.56 10*3/MM3 (ref 3.4–10.8)

## 2024-12-09 PROCEDURE — 85027 COMPLETE CBC AUTOMATED: CPT | Performed by: STUDENT IN AN ORGANIZED HEALTH CARE EDUCATION/TRAINING PROGRAM

## 2024-12-09 PROCEDURE — 36415 COLL VENOUS BLD VENIPUNCTURE: CPT | Performed by: STUDENT IN AN ORGANIZED HEALTH CARE EDUCATION/TRAINING PROGRAM

## 2024-12-09 PROCEDURE — 80053 COMPREHEN METABOLIC PANEL: CPT | Performed by: STUDENT IN AN ORGANIZED HEALTH CARE EDUCATION/TRAINING PROGRAM

## 2024-12-10 ENCOUNTER — PATIENT ROUNDING (BHMG ONLY) (OUTPATIENT)
Age: 59
End: 2024-12-10
Payer: COMMERCIAL

## 2024-12-10 NOTE — PROGRESS NOTES
December 10, 2024    Hello, may I speak with Felipe Nieves?    My name is Alanis      I am  with [facility]  [facility]  [address] [place] [address] [phone].    Before we get started may I verify your date of birth? 1965    I am calling to officially welcome you to our practice and ask about your recent visit. Is this a good time to talk? yes    Tell me about your visit with us. What things went well?  Patient stated appointment went went with with no issues.        We're always looking for ways to make our patients' experiences even better. Do you have recommendations on ways we may improve?  no    Overall were you satisfied with your first visit to our practice? yes       I appreciate you taking the time to speak with me today. Is there anything else I can do for you? no      Thank you, and have a great day.

## 2024-12-12 ENCOUNTER — ANESTHESIA (OUTPATIENT)
Dept: PERIOP | Facility: HOSPITAL | Age: 59
End: 2024-12-12
Payer: COMMERCIAL

## 2024-12-12 ENCOUNTER — ANESTHESIA EVENT (OUTPATIENT)
Dept: PERIOP | Facility: HOSPITAL | Age: 59
End: 2024-12-12
Payer: COMMERCIAL

## 2024-12-12 ENCOUNTER — HOSPITAL ENCOUNTER (OUTPATIENT)
Facility: HOSPITAL | Age: 59
Discharge: HOME | End: 2024-12-12
Attending: STUDENT IN AN ORGANIZED HEALTH CARE EDUCATION/TRAINING PROGRAM | Admitting: STUDENT IN AN ORGANIZED HEALTH CARE EDUCATION/TRAINING PROGRAM
Payer: COMMERCIAL

## 2024-12-12 VITALS
HEART RATE: 73 BPM | WEIGHT: 121.8 LBS | SYSTOLIC BLOOD PRESSURE: 129 MMHG | TEMPERATURE: 97.3 F | OXYGEN SATURATION: 100 % | DIASTOLIC BLOOD PRESSURE: 88 MMHG | BODY MASS INDEX: 19.12 KG/M2 | HEIGHT: 67 IN | RESPIRATION RATE: 15 BRPM

## 2024-12-12 DIAGNOSIS — K62.89 RECTAL MASS: ICD-10-CM

## 2024-12-12 DIAGNOSIS — R15.9 FULL INCONTINENCE OF FECES: ICD-10-CM

## 2024-12-12 LAB
GLUCOSE BLDC GLUCOMTR-MCNC: 110 MG/DL (ref 70–105)
GLUCOSE BLDC GLUCOMTR-MCNC: 62 MG/DL (ref 70–105)

## 2024-12-12 PROCEDURE — 87205 SMEAR GRAM STAIN: CPT | Performed by: STUDENT IN AN ORGANIZED HEALTH CARE EDUCATION/TRAINING PROGRAM

## 2024-12-12 PROCEDURE — 87070 CULTURE OTHR SPECIMN AEROBIC: CPT | Performed by: STUDENT IN AN ORGANIZED HEALTH CARE EDUCATION/TRAINING PROGRAM

## 2024-12-12 PROCEDURE — 25010000002 ONDANSETRON PER 1 MG

## 2024-12-12 PROCEDURE — 25010000002 CEFTRIAXONE PER 250 MG: Performed by: STUDENT IN AN ORGANIZED HEALTH CARE EDUCATION/TRAINING PROGRAM

## 2024-12-12 PROCEDURE — 82948 REAGENT STRIP/BLOOD GLUCOSE: CPT

## 2024-12-12 PROCEDURE — 25010000002 DEXAMETHASONE PER 1 MG

## 2024-12-12 PROCEDURE — 25810000003 LACTATED RINGERS PER 1000 ML

## 2024-12-12 PROCEDURE — 25010000002 BUPIVACAINE 0.25 % SOLUTION: Performed by: STUDENT IN AN ORGANIZED HEALTH CARE EDUCATION/TRAINING PROGRAM

## 2024-12-12 PROCEDURE — 87176 TISSUE HOMOGENIZATION CULTR: CPT | Performed by: STUDENT IN AN ORGANIZED HEALTH CARE EDUCATION/TRAINING PROGRAM

## 2024-12-12 PROCEDURE — 25010000002 LIDOCAINE PF 1% 1 % SOLUTION

## 2024-12-12 PROCEDURE — 87075 CULTR BACTERIA EXCEPT BLOOD: CPT | Performed by: STUDENT IN AN ORGANIZED HEALTH CARE EDUCATION/TRAINING PROGRAM

## 2024-12-12 PROCEDURE — 25810000003 LACTATED RINGERS PER 1000 ML: Performed by: STUDENT IN AN ORGANIZED HEALTH CARE EDUCATION/TRAINING PROGRAM

## 2024-12-12 PROCEDURE — 25010000002 MIDAZOLAM PER 1 MG

## 2024-12-12 PROCEDURE — 25010000002 FENTANYL CITRATE (PF) 100 MCG/2ML SOLUTION

## 2024-12-12 PROCEDURE — 87077 CULTURE AEROBIC IDENTIFY: CPT | Performed by: STUDENT IN AN ORGANIZED HEALTH CARE EDUCATION/TRAINING PROGRAM

## 2024-12-12 PROCEDURE — 25010000002 LIDOCAINE PF 1% 1 % SOLUTION 5 ML VIAL: Performed by: STUDENT IN AN ORGANIZED HEALTH CARE EDUCATION/TRAINING PROGRAM

## 2024-12-12 PROCEDURE — 25010000002 PROPOFOL 1000 MG/100ML EMULSION

## 2024-12-12 PROCEDURE — 25010000002 SUGAMMADEX 200 MG/2ML SOLUTION: Performed by: NURSE ANESTHETIST, CERTIFIED REGISTERED

## 2024-12-12 PROCEDURE — 88305 TISSUE EXAM BY PATHOLOGIST: CPT | Performed by: STUDENT IN AN ORGANIZED HEALTH CARE EDUCATION/TRAINING PROGRAM

## 2024-12-12 PROCEDURE — 87186 SC STD MICRODIL/AGAR DIL: CPT | Performed by: STUDENT IN AN ORGANIZED HEALTH CARE EDUCATION/TRAINING PROGRAM

## 2024-12-12 RX ORDER — ONDANSETRON 2 MG/ML
INJECTION INTRAMUSCULAR; INTRAVENOUS AS NEEDED
Status: DISCONTINUED | OUTPATIENT
Start: 2024-12-12 | End: 2024-12-12 | Stop reason: SURG

## 2024-12-12 RX ORDER — MIDAZOLAM HYDROCHLORIDE 1 MG/ML
INJECTION, SOLUTION INTRAMUSCULAR; INTRAVENOUS AS NEEDED
Status: DISCONTINUED | OUTPATIENT
Start: 2024-12-12 | End: 2024-12-12 | Stop reason: SURG

## 2024-12-12 RX ORDER — LIDOCAINE HYDROCHLORIDE 10 MG/ML
0.5 INJECTION, SOLUTION EPIDURAL; INFILTRATION; INTRACAUDAL; PERINEURAL ONCE AS NEEDED
Status: DISCONTINUED | OUTPATIENT
Start: 2024-12-12 | End: 2024-12-12 | Stop reason: HOSPADM

## 2024-12-12 RX ORDER — BUPIVACAINE HYDROCHLORIDE 2.5 MG/ML
INJECTION, SOLUTION INFILTRATION; PERINEURAL AS NEEDED
Status: DISCONTINUED | OUTPATIENT
Start: 2024-12-12 | End: 2024-12-12 | Stop reason: HOSPADM

## 2024-12-12 RX ORDER — FLUMAZENIL 0.1 MG/ML
0.2 INJECTION INTRAVENOUS AS NEEDED
Status: DISCONTINUED | OUTPATIENT
Start: 2024-12-12 | End: 2024-12-12 | Stop reason: HOSPADM

## 2024-12-12 RX ORDER — SODIUM CHLORIDE, SODIUM LACTATE, POTASSIUM CHLORIDE, CALCIUM CHLORIDE 600; 310; 30; 20 MG/100ML; MG/100ML; MG/100ML; MG/100ML
INJECTION, SOLUTION INTRAVENOUS CONTINUOUS PRN
Status: DISCONTINUED | OUTPATIENT
Start: 2024-12-12 | End: 2024-12-12 | Stop reason: SURG

## 2024-12-12 RX ORDER — PROPOFOL 10 MG/ML
INJECTION, EMULSION INTRAVENOUS CONTINUOUS PRN
Status: DISCONTINUED | OUTPATIENT
Start: 2024-12-12 | End: 2024-12-12 | Stop reason: SURG

## 2024-12-12 RX ORDER — OXYCODONE HYDROCHLORIDE 5 MG/1
5 TABLET ORAL ONCE AS NEEDED
Status: DISCONTINUED | OUTPATIENT
Start: 2024-12-12 | End: 2024-12-12 | Stop reason: HOSPADM

## 2024-12-12 RX ORDER — DOXYCYCLINE 100 MG/1
100 CAPSULE ORAL 2 TIMES DAILY
Qty: 14 CAPSULE | Refills: 0 | Status: SHIPPED | OUTPATIENT
Start: 2024-12-12

## 2024-12-12 RX ORDER — HYDRALAZINE HYDROCHLORIDE 20 MG/ML
5 INJECTION INTRAMUSCULAR; INTRAVENOUS
Status: DISCONTINUED | OUTPATIENT
Start: 2024-12-12 | End: 2024-12-12 | Stop reason: HOSPADM

## 2024-12-12 RX ORDER — SODIUM CHLORIDE 0.9 % (FLUSH) 0.9 %
3-10 SYRINGE (ML) INJECTION AS NEEDED
Status: DISCONTINUED | OUTPATIENT
Start: 2024-12-12 | End: 2024-12-12 | Stop reason: HOSPADM

## 2024-12-12 RX ORDER — LABETALOL HYDROCHLORIDE 5 MG/ML
5 INJECTION, SOLUTION INTRAVENOUS
Status: DISCONTINUED | OUTPATIENT
Start: 2024-12-12 | End: 2024-12-12 | Stop reason: HOSPADM

## 2024-12-12 RX ORDER — SODIUM CHLORIDE, SODIUM LACTATE, POTASSIUM CHLORIDE, CALCIUM CHLORIDE 600; 310; 30; 20 MG/100ML; MG/100ML; MG/100ML; MG/100ML
1000 INJECTION, SOLUTION INTRAVENOUS ONCE
Status: COMPLETED | OUTPATIENT
Start: 2024-12-12 | End: 2024-12-12

## 2024-12-12 RX ORDER — NALOXONE HCL 0.4 MG/ML
0.4 VIAL (ML) INJECTION AS NEEDED
Status: DISCONTINUED | OUTPATIENT
Start: 2024-12-12 | End: 2024-12-12 | Stop reason: HOSPADM

## 2024-12-12 RX ORDER — SODIUM CHLORIDE 9 MG/ML
40 INJECTION, SOLUTION INTRAVENOUS AS NEEDED
Status: DISCONTINUED | OUTPATIENT
Start: 2024-12-12 | End: 2024-12-12 | Stop reason: HOSPADM

## 2024-12-12 RX ORDER — DIPHENHYDRAMINE HYDROCHLORIDE 50 MG/ML
12.5 INJECTION INTRAMUSCULAR; INTRAVENOUS ONCE AS NEEDED
Status: DISCONTINUED | OUTPATIENT
Start: 2024-12-12 | End: 2024-12-12 | Stop reason: HOSPADM

## 2024-12-12 RX ORDER — FENTANYL CITRATE 50 UG/ML
INJECTION, SOLUTION INTRAMUSCULAR; INTRAVENOUS AS NEEDED
Status: DISCONTINUED | OUTPATIENT
Start: 2024-12-12 | End: 2024-12-12 | Stop reason: SURG

## 2024-12-12 RX ORDER — DIPHENHYDRAMINE HYDROCHLORIDE 50 MG/ML
12.5 INJECTION INTRAMUSCULAR; INTRAVENOUS
Status: DISCONTINUED | OUTPATIENT
Start: 2024-12-12 | End: 2024-12-12 | Stop reason: HOSPADM

## 2024-12-12 RX ORDER — OXYCODONE HYDROCHLORIDE 5 MG/1
10 TABLET ORAL EVERY 4 HOURS PRN
Status: DISCONTINUED | OUTPATIENT
Start: 2024-12-12 | End: 2024-12-12 | Stop reason: HOSPADM

## 2024-12-12 RX ORDER — SODIUM CHLORIDE 0.9 % (FLUSH) 0.9 %
10 SYRINGE (ML) INJECTION AS NEEDED
Status: DISCONTINUED | OUTPATIENT
Start: 2024-12-12 | End: 2024-12-12 | Stop reason: HOSPADM

## 2024-12-12 RX ORDER — SODIUM CHLORIDE 0.9 % (FLUSH) 0.9 %
3 SYRINGE (ML) INJECTION EVERY 12 HOURS SCHEDULED
Status: DISCONTINUED | OUTPATIENT
Start: 2024-12-12 | End: 2024-12-12 | Stop reason: HOSPADM

## 2024-12-12 RX ORDER — ONDANSETRON 2 MG/ML
4 INJECTION INTRAMUSCULAR; INTRAVENOUS ONCE AS NEEDED
Status: DISCONTINUED | OUTPATIENT
Start: 2024-12-12 | End: 2024-12-12 | Stop reason: HOSPADM

## 2024-12-12 RX ORDER — LIDOCAINE HYDROCHLORIDE 10 MG/ML
INJECTION, SOLUTION EPIDURAL; INFILTRATION; INTRACAUDAL; PERINEURAL AS NEEDED
Status: DISCONTINUED | OUTPATIENT
Start: 2024-12-12 | End: 2024-12-12 | Stop reason: SURG

## 2024-12-12 RX ORDER — IPRATROPIUM BROMIDE AND ALBUTEROL SULFATE 2.5; .5 MG/3ML; MG/3ML
3 SOLUTION RESPIRATORY (INHALATION) ONCE AS NEEDED
Status: DISCONTINUED | OUTPATIENT
Start: 2024-12-12 | End: 2024-12-12 | Stop reason: HOSPADM

## 2024-12-12 RX ORDER — ROCURONIUM BROMIDE 10 MG/ML
INJECTION, SOLUTION INTRAVENOUS AS NEEDED
Status: DISCONTINUED | OUTPATIENT
Start: 2024-12-12 | End: 2024-12-12 | Stop reason: SURG

## 2024-12-12 RX ORDER — DEXAMETHASONE SODIUM PHOSPHATE 4 MG/ML
INJECTION, SOLUTION INTRA-ARTICULAR; INTRALESIONAL; INTRAMUSCULAR; INTRAVENOUS; SOFT TISSUE AS NEEDED
Status: DISCONTINUED | OUTPATIENT
Start: 2024-12-12 | End: 2024-12-12 | Stop reason: SURG

## 2024-12-12 RX ORDER — EPHEDRINE SULFATE 5 MG/ML
5 INJECTION INTRAVENOUS ONCE AS NEEDED
Status: DISCONTINUED | OUTPATIENT
Start: 2024-12-12 | End: 2024-12-12 | Stop reason: HOSPADM

## 2024-12-12 RX ADMIN — SUGAMMADEX 200 MG: 100 INJECTION, SOLUTION INTRAVENOUS at 15:31

## 2024-12-12 RX ADMIN — FENTANYL CITRATE 50 MCG: 50 INJECTION, SOLUTION INTRAMUSCULAR; INTRAVENOUS at 14:29

## 2024-12-12 RX ADMIN — LIDOCAINE HYDROCHLORIDE 500 MG: 10 INJECTION, SOLUTION EPIDURAL; INFILTRATION; INTRACAUDAL; PERINEURAL at 16:57

## 2024-12-12 RX ADMIN — MIDAZOLAM 2 MG: 1 INJECTION INTRAMUSCULAR; INTRAVENOUS at 14:24

## 2024-12-12 RX ADMIN — ONDANSETRON 4 MG: 2 INJECTION INTRAMUSCULAR; INTRAVENOUS at 14:24

## 2024-12-12 RX ADMIN — PROPOFOL INJECTABLE EMULSION 120 MG: 10 INJECTION, EMULSION INTRAVENOUS at 14:29

## 2024-12-12 RX ADMIN — SODIUM CHLORIDE, SODIUM LACTATE, POTASSIUM CHLORIDE, AND CALCIUM CHLORIDE: .6; .31; .03; .02 INJECTION, SOLUTION INTRAVENOUS at 14:24

## 2024-12-12 RX ADMIN — SODIUM CHLORIDE, POTASSIUM CHLORIDE, SODIUM LACTATE AND CALCIUM CHLORIDE 1000 ML: 600; 310; 30; 20 INJECTION, SOLUTION INTRAVENOUS at 14:21

## 2024-12-12 RX ADMIN — DEXAMETHASONE SODIUM PHOSPHATE 4 MG: 4 INJECTION, SOLUTION INTRAMUSCULAR; INTRAVENOUS at 14:31

## 2024-12-12 RX ADMIN — ROCURONIUM BROMIDE 30 MG: 10 INJECTION, SOLUTION INTRAVENOUS at 14:29

## 2024-12-12 RX ADMIN — LIDOCAINE HYDROCHLORIDE 60 MG: 10 INJECTION, SOLUTION EPIDURAL; INFILTRATION; INTRACAUDAL; PERINEURAL at 14:29

## 2024-12-12 RX ADMIN — FENTANYL CITRATE 50 MCG: 50 INJECTION, SOLUTION INTRAMUSCULAR; INTRAVENOUS at 14:47

## 2024-12-12 RX ADMIN — ROCURONIUM BROMIDE 10 MG: 10 INJECTION, SOLUTION INTRAVENOUS at 15:18

## 2024-12-12 RX ADMIN — PROPOFOL INJECTABLE EMULSION 150 MCG/KG/MIN: 10 INJECTION, EMULSION INTRAVENOUS at 14:39

## 2024-12-12 NOTE — ANESTHESIA PREPROCEDURE EVALUATION
Anesthesia Evaluation     Patient summary reviewed and Nursing notes reviewed   NPO Solid Status: > 8 hours  NPO Liquid Status: > 8 hours           Airway   Mallampati: II  TM distance: >3 FB  Neck ROM: full  No difficulty expected  Dental - normal exam     Pulmonary - normal exam   Cardiovascular - normal exam    ECG reviewed        Neuro/Psych  GI/Hepatic/Renal/Endo      Musculoskeletal     Abdominal  - normal exam    Bowel sounds: normal.   Substance History      OB/GYN          Other        ROS/Med Hx Other: HIV+, undetectable on antiviral therapy              Anesthesia Plan    ASA 3     general     intravenous induction     Anesthetic plan, risks, benefits, and alternatives have been provided, discussed and informed consent has been obtained with: patient.    Plan discussed with CRNA.    CODE STATUS:

## 2024-12-12 NOTE — DISCHARGE INSTRUCTIONS
[facility]  Postoperative Instructions after Anorectal Surgery    After your surgery, the following may occur and SHOULD NOT alarm you:   a) Drainage of bloody discharge (a tablespoonful or less)   b) Some swelling around the anus   c) Soreness, burning, itching, or dull aching   d) Pain with bowel movement   e) Occasional passage of bright red blood   f) Mild fatigue   g) Mild fever (less than 101.4)   h) odor.    Diet:   1. Eat a regular high fiber diet with a considerable amount of cooked vegetables, fruits and fruit juices.  2. Avoid spicy foods.  3. Drink greater than 64 ounces of water or liquids without caffeine daily.    Wound Care:  1. Use ice packs for the first two days following surgery if you feel that it helps your discomfort.  2. Take warm soaks/sitzbaths/showers 1-2 times a day, water as warm as can be tolerated.  3. Expect bleeding/drainage with bowel movements, so wear pads to protect underwear.    Pain Meds:  1. Motrin 600mg every 6 hours as needed  2. Narcotic pain medication as prescribed  3. If nauseated or vomiting, take phenergan or zofran as prescribed (call my office for a prescription).  Try to stay hydrated.  If nausea persists, you need to be seen in the office.   4. Do not drive while taking narcotics    Constipated:   1. To avoid constipation:   A. Take Fiber (example: metamucil) 4 grams daily   B. Miralax 17 grams nightly as needed   C. Milk of Magnesia 2 tablespoons am/pm as needed  2. If bowel movements become too loose, stop MOM but stay on fiber supplements.    No Bowel Movements:  1. If unable to have a bowel movement in 36-48 hours:   A. Take 3 Dulcolax laxative tablets   B. If no bowel movement within 4-6 hours after Dulcolax, take 5 ounces of Magnesium Citrate    If Unable to Urinate:  1. Try to urinate in a hot shower or bath.   2. If still unable to urinate, go to the Emergency Room to have a catheter placed.  The doctor in the office will remove  this catheter in a few days.     When to Call Us:  1. Fever higher than 101.5  2. Persistent nausea and/or vomiting  3. Excessive bleeding (bloody diarrhea).  It is normal to pass some blood with bowel movements but passing a cup of blood at a time is abnormal.  4. Increasing pain not relieved with above instructions.     Follow up:   Generally in 2-3 weeks unless otherwise directed.  Please call to make an appointment.         Xander Pa MD  Colon and Rectal Surgery   [facility]

## 2024-12-12 NOTE — ANESTHESIA POSTPROCEDURE EVALUATION
Patient: Felipe Nieves    Procedure Summary       Date: 12/12/24 Room / Location: [facility] OR 10 / [facility] MAIN OR    Anesthesia Start: 1424 Anesthesia Stop: 1549    Procedures:       EXAM  UNDER ANESTHESIA, RECTAL BIOPSY (Rectum)      FLEXIBLE   SIGMOIDOSCOPY (Anus) Diagnosis:       Full incontinence of feces      Rectal mass      (Full incontinence of feces [R15.9])      (Rectal mass [K62.89])    Surgeons: Xander Pa MD Provider: John Bolanos CRNA    Anesthesia Type: general ASA Status: 3            Anesthesia Type: general    Vitals  Vitals Value Taken Time   /93 12/12/24 1701   Temp 97.2 °F (36.2 °C) 12/12/24 1700   Pulse 60 12/12/24 1704   Resp 13 12/12/24 1700   SpO2 100 % 12/12/24 1704   Vitals shown include unfiled device data.        Post Anesthesia Care and Evaluation    Patient location during evaluation: PACU  Patient participation: complete - patient participated  Level of consciousness: awake  Pain scale: See nurse's notes for pain score.  Pain management: adequate    Airway patency: patent  Anesthetic complications: No anesthetic complications  PONV Status: none  Cardiovascular status: acceptable  Respiratory status: acceptable and spontaneous ventilation  Hydration status: acceptable    Comments: Patient seen and examined postoperatively; vital signs stable; SpO2 greater than or equal to 90%; cardiopulmonary status stable; nausea/vomiting adequately controlled; pain adequately controlled; no apparent anesthesia complications; patient discharged from anesthesia care when discharge criteria were met

## 2024-12-12 NOTE — ANESTHESIA PROCEDURE NOTES
Airway  Urgency: elective    Date/Time: 12/12/2024 2:34 PM  Airway not difficult    General Information and Staff    Patient location during procedure: OR  Anesthesiologist: Edi Tsang MD  CRNA/CAA: Gale England CRNA    Indications and Patient Condition  Indications for airway management: airway protection    Preoxygenated: yes  Mask difficulty assessment: 1 - vent by mask    Final Airway Details  Final airway type: endotracheal airway      Successful airway: ETT  Cuffed: yes   Successful intubation technique: video laryngoscopy  Facilitating devices/methods: intubating stylet  Endotracheal tube insertion site: oral  Blade: Arias  Blade size: 4  ETT size (mm): 7.0  Cormack-Lehane Classification: grade IIa - partial view of glottis  Placement verified by: chest auscultation and capnometry   Cuff volume (mL): 7  Measured from: lips  ETT/EBT  to lips (cm): 21  Number of attempts at approach: 1  Assessment: lips, teeth, and gum same as pre-op and atraumatic intubation

## 2024-12-15 LAB
BACTERIA SPEC AEROBE CULT: ABNORMAL
BACTERIA SPEC ANAEROBE CULT: NORMAL
GRAM STN SPEC: ABNORMAL

## 2024-12-15 NOTE — OP NOTE
[facility] Operative Note   Name: Felipe Nieves  : 1965    Date of Surgery: 12/15/2024  Pre-op Diagnosis: Full incontinence of feces [R15.9]  Rectal mass [K62.89]   Post-op Diagnosis: same  Procedure:   Flexible sigmoidoscopy   Rectal examination with biopsy  Anal dilatation   Surgeon: Xander Pa MD   Assistants: Dr. Allan Fontaine was need for assistance. He participated in decision making, rectal biopsy, dissection, anal dilatation.    Anesthesia: Local/general  IV Fluids: refer to anesthesia record  Estimated Blood Loss: minimal  Drains: none  Implants: none  Specimen: none  Findings     1. sever proctitis with ulceration extending to sigmoid colon.   2. Severe stenosis and fibrotic ring just proximal from the dentate line in the distal rectum   3. No mass of discrete lesion identified     Operative Procedure   After appropriate consent including risks, benefits and alternatives, the patient was brought into the operating suite, and anesthesia administered.   The patient was placed in a left lateral position, all maya prominences were adequately padded. A flexible sigmoidoscopy was performed.  We noted a severely ulcerated inflamed and thickened rectum extending to the distal sigmoid colon.  The descending colon was noted to have a normal mucosal lining.  Multiple biopsies were taken from the sigmoid colon.  Retroflexion of the scope did not show any discrete rectal mass.    The patient was then transferred to the operating room table and placed in prone position.  All bony prominences were padded.  The patient was placed in jackknife position. Patient was then prepped and drapped in the usual sterile fashion.     Digital rectal examination revealed significantly stenotic distal rectum I was not able to pass my finger through.  A Melchor retractor was placed and was noted again severely stenotic distal rectum.  There was no discrete lesion in the anus.  Multiple biopsies at the site of the stenosis were  taken and sent for both pathological analysis as well as opportunistic infection including CMV, HSV, EBV, gonorrhea and chlamydia.    The strictured area was gently dilated using a proctoscope starting with a smaller size.  After dilatation was able to pass my fingers through the strictured area without difficulty.  Dressings were then applied and the case concluded.  The patient tolerated the procedure well and was taken to the recovery room in stable condition.      Counts: Instrument, sponge, and needle counts were reported to be correct prior to closure and at the conclusion of the case.    Disposition  The patient was taken to Recovery Room in good condition.    Complications   No complications

## 2024-12-16 LAB
LAB AP CASE REPORT: NORMAL
LAB AP DIAGNOSIS COMMENT: NORMAL
PATH REPORT.FINAL DX SPEC: NORMAL
PATH REPORT.GROSS SPEC: NORMAL

## 2024-12-17 LAB
BACTERIA SPEC ANAEROBE CULT: NORMAL

## 2025-01-10 ENCOUNTER — HOSPITAL ENCOUNTER (OUTPATIENT)
Facility: HOSPITAL | Age: 60
Setting detail: OBSERVATION
Discharge: HOME | End: 2025-01-12
Attending: INTERNAL MEDICINE | Admitting: INTERNAL MEDICINE
Payer: COMMERCIAL

## 2025-01-10 ENCOUNTER — APPOINTMENT (OUTPATIENT)
Dept: CT IMAGING | Facility: HOSPITAL | Age: 60
End: 2025-01-10
Payer: COMMERCIAL

## 2025-01-10 DIAGNOSIS — R18.8 OTHER ASCITES: ICD-10-CM

## 2025-01-10 DIAGNOSIS — K56.609 SMALL BOWEL OBSTRUCTION: Primary | ICD-10-CM

## 2025-01-10 DIAGNOSIS — R10.84 GENERALIZED ABDOMINAL PAIN: ICD-10-CM

## 2025-01-10 PROBLEM — R10.9 ABDOMINAL PAIN: Status: ACTIVE | Noted: 2025-01-10

## 2025-01-10 LAB
ACANTHOCYTES BLD QL SMEAR: ABNORMAL
ALBUMIN SERPL-MCNC: 3.8 G/DL (ref 3.5–5.2)
ALBUMIN/GLOB SERPL: 0.8 G/DL
ALP SERPL-CCNC: 92 U/L (ref 39–117)
ALT SERPL W P-5'-P-CCNC: 20 U/L (ref 1–41)
ANION GAP SERPL CALCULATED.3IONS-SCNC: 9.6 MMOL/L (ref 5–15)
AST SERPL-CCNC: 19 U/L (ref 1–40)
BASOPHILS # BLD MANUAL: 0.13 10*3/MM3 (ref 0–0.2)
BASOPHILS NFR BLD MANUAL: 1 % (ref 0–1.5)
BILIRUB SERPL-MCNC: 0.4 MG/DL (ref 0–1.2)
BILIRUB UR QL STRIP: NEGATIVE
BUN SERPL-MCNC: 14 MG/DL (ref 6–20)
BUN/CREAT SERPL: 12.6 (ref 7–25)
CALCIUM SPEC-SCNC: 9.4 MG/DL (ref 8.6–10.5)
CHLORIDE SERPL-SCNC: 99 MMOL/L (ref 98–107)
CLARITY UR: CLEAR
CO2 SERPL-SCNC: 31.4 MMOL/L (ref 22–29)
COLOR UR: YELLOW
CREAT SERPL-MCNC: 1.11 MG/DL (ref 0.76–1.27)
DEPRECATED RDW RBC AUTO: 54.5 FL (ref 37–54)
EGFRCR SERPLBLD CKD-EPI 2021: 77.5 ML/MIN/1.73
ERYTHROCYTE [DISTWIDTH] IN BLOOD BY AUTOMATED COUNT: 19.1 % (ref 12.3–15.4)
GLOBULIN UR ELPH-MCNC: 4.6 GM/DL
GLUCOSE SERPL-MCNC: 115 MG/DL (ref 65–99)
GLUCOSE UR STRIP-MCNC: NEGATIVE MG/DL
HCT VFR BLD AUTO: 40.2 % (ref 37.5–51)
HGB BLD-MCNC: 11.7 G/DL (ref 13–17.7)
HGB UR QL STRIP.AUTO: NEGATIVE
HOLD SPECIMEN: NORMAL
KETONES UR QL STRIP: NEGATIVE
LARGE PLATELETS: ABNORMAL
LEUKOCYTE ESTERASE UR QL STRIP.AUTO: NEGATIVE
LYMPHOCYTES # BLD MANUAL: 5.14 10*3/MM3 (ref 0.7–3.1)
LYMPHOCYTES NFR BLD MANUAL: 8 % (ref 5–12)
MCH RBC QN AUTO: 22.8 PG (ref 26.6–33)
MCHC RBC AUTO-ENTMCNC: 29.1 G/DL (ref 31.5–35.7)
MCV RBC AUTO: 78.4 FL (ref 79–97)
MONOCYTES # BLD: 1 10*3/MM3 (ref 0.1–0.9)
NEUTROPHILS # BLD AUTO: 6.27 10*3/MM3 (ref 1.7–7)
NEUTROPHILS NFR BLD MANUAL: 47 % (ref 42.7–76)
NEUTS BAND NFR BLD MANUAL: 3 % (ref 0–5)
NITRITE UR QL STRIP: NEGATIVE
PH UR STRIP.AUTO: 6 [PH] (ref 5–8)
PLATELET # BLD AUTO: 564 10*3/MM3 (ref 140–450)
PMV BLD AUTO: 8.3 FL (ref 6–12)
POTASSIUM SERPL-SCNC: 3.6 MMOL/L (ref 3.5–5.2)
PROT SERPL-MCNC: 8.4 G/DL (ref 6–8.5)
PROT UR QL STRIP: ABNORMAL
RBC # BLD AUTO: 5.13 10*6/MM3 (ref 4.14–5.8)
SCAN SLIDE: NORMAL
SODIUM SERPL-SCNC: 140 MMOL/L (ref 136–145)
SP GR UR STRIP: 1.06 (ref 1–1.03)
UROBILINOGEN UR QL STRIP: ABNORMAL
VARIANT LYMPHS NFR BLD MANUAL: 41 % (ref 19.6–45.3)
WBC MORPH BLD: NORMAL
WBC NRBC COR # BLD AUTO: 12.53 10*3/MM3 (ref 3.4–10.8)

## 2025-01-10 PROCEDURE — G0378 HOSPITAL OBSERVATION PER HR: HCPCS

## 2025-01-10 PROCEDURE — 25810000003 SODIUM CHLORIDE 0.9 % SOLUTION: Performed by: NURSE PRACTITIONER

## 2025-01-10 PROCEDURE — 96361 HYDRATE IV INFUSION ADD-ON: CPT

## 2025-01-10 PROCEDURE — 74177 CT ABD & PELVIS W/CONTRAST: CPT

## 2025-01-10 PROCEDURE — 96376 TX/PRO/DX INJ SAME DRUG ADON: CPT

## 2025-01-10 PROCEDURE — 96374 THER/PROPH/DIAG INJ IV PUSH: CPT

## 2025-01-10 PROCEDURE — 81003 URINALYSIS AUTO W/O SCOPE: CPT | Performed by: NURSE PRACTITIONER

## 2025-01-10 PROCEDURE — 85025 COMPLETE CBC W/AUTO DIFF WBC: CPT | Performed by: NURSE PRACTITIONER

## 2025-01-10 PROCEDURE — 25810000003 LACTATED RINGERS PER 1000 ML

## 2025-01-10 PROCEDURE — 99285 EMERGENCY DEPT VISIT HI MDM: CPT

## 2025-01-10 PROCEDURE — 99204 OFFICE O/P NEW MOD 45 MIN: CPT | Performed by: SURGERY

## 2025-01-10 PROCEDURE — 25010000002 MORPHINE PER 10 MG

## 2025-01-10 PROCEDURE — 25010000002 ONDANSETRON PER 1 MG: Performed by: NURSE PRACTITIONER

## 2025-01-10 PROCEDURE — 25010000002 MORPHINE PER 10 MG: Performed by: NURSE PRACTITIONER

## 2025-01-10 PROCEDURE — 80053 COMPREHEN METABOLIC PANEL: CPT | Performed by: NURSE PRACTITIONER

## 2025-01-10 PROCEDURE — 25510000001 IOPAMIDOL PER 1 ML: Performed by: NURSE PRACTITIONER

## 2025-01-10 PROCEDURE — 63710000001 PREDNISONE PER 5 MG

## 2025-01-10 PROCEDURE — 96375 TX/PRO/DX INJ NEW DRUG ADDON: CPT

## 2025-01-10 PROCEDURE — 85007 BL SMEAR W/DIFF WBC COUNT: CPT | Performed by: NURSE PRACTITIONER

## 2025-01-10 RX ORDER — SODIUM CHLORIDE 0.9 % (FLUSH) 0.9 %
10 SYRINGE (ML) INJECTION AS NEEDED
Status: DISCONTINUED | OUTPATIENT
Start: 2025-01-10 | End: 2025-01-12 | Stop reason: HOSPADM

## 2025-01-10 RX ORDER — SODIUM CHLORIDE, SODIUM LACTATE, POTASSIUM CHLORIDE, CALCIUM CHLORIDE 600; 310; 30; 20 MG/100ML; MG/100ML; MG/100ML; MG/100ML
100 INJECTION, SOLUTION INTRAVENOUS CONTINUOUS
Status: DISPENSED | OUTPATIENT
Start: 2025-01-10 | End: 2025-01-11

## 2025-01-10 RX ORDER — MESALAMINE 1.2 G/1
2.4 TABLET, DELAYED RELEASE ORAL 2 TIMES DAILY
Status: DISCONTINUED | OUTPATIENT
Start: 2025-01-10 | End: 2025-01-12 | Stop reason: HOSPADM

## 2025-01-10 RX ORDER — SODIUM CHLORIDE 0.9 % (FLUSH) 0.9 %
10 SYRINGE (ML) INJECTION EVERY 12 HOURS SCHEDULED
Status: DISCONTINUED | OUTPATIENT
Start: 2025-01-10 | End: 2025-01-12 | Stop reason: HOSPADM

## 2025-01-10 RX ORDER — ONDANSETRON 2 MG/ML
4 INJECTION INTRAMUSCULAR; INTRAVENOUS ONCE
Status: COMPLETED | OUTPATIENT
Start: 2025-01-10 | End: 2025-01-10

## 2025-01-10 RX ORDER — POLYETHYLENE GLYCOL 3350 17 G/17G
17 POWDER, FOR SOLUTION ORAL DAILY
COMMUNITY

## 2025-01-10 RX ORDER — PREDNISONE 10 MG/1
10 TABLET ORAL DAILY
Status: DISCONTINUED | OUTPATIENT
Start: 2025-01-10 | End: 2025-01-12 | Stop reason: HOSPADM

## 2025-01-10 RX ORDER — IOPAMIDOL 755 MG/ML
100 INJECTION, SOLUTION INTRAVASCULAR
Status: COMPLETED | OUTPATIENT
Start: 2025-01-10 | End: 2025-01-10

## 2025-01-10 RX ORDER — NALOXONE HCL 0.4 MG/ML
0.4 VIAL (ML) INJECTION
Status: DISCONTINUED | OUTPATIENT
Start: 2025-01-10 | End: 2025-01-12 | Stop reason: HOSPADM

## 2025-01-10 RX ORDER — SODIUM CHLORIDE 9 MG/ML
40 INJECTION, SOLUTION INTRAVENOUS AS NEEDED
Status: DISCONTINUED | OUTPATIENT
Start: 2025-01-10 | End: 2025-01-12 | Stop reason: HOSPADM

## 2025-01-10 RX ORDER — DICYCLOMINE HYDROCHLORIDE 10 MG/1
1-2 CAPSULE ORAL 4 TIMES DAILY PRN
COMMUNITY

## 2025-01-10 RX ORDER — MORPHINE SULFATE 2 MG/ML
2 INJECTION, SOLUTION INTRAMUSCULAR; INTRAVENOUS EVERY 4 HOURS PRN
Status: DISCONTINUED | OUTPATIENT
Start: 2025-01-10 | End: 2025-01-11

## 2025-01-10 RX ORDER — ONDANSETRON 2 MG/ML
4 INJECTION INTRAMUSCULAR; INTRAVENOUS EVERY 6 HOURS PRN
Status: DISCONTINUED | OUTPATIENT
Start: 2025-01-10 | End: 2025-01-12 | Stop reason: HOSPADM

## 2025-01-10 RX ORDER — MORPHINE SULFATE 2 MG/ML
1 INJECTION, SOLUTION INTRAMUSCULAR; INTRAVENOUS EVERY 4 HOURS PRN
Status: DISCONTINUED | OUTPATIENT
Start: 2025-01-10 | End: 2025-01-11

## 2025-01-10 RX ADMIN — MORPHINE SULFATE 4 MG: 4 INJECTION, SOLUTION INTRAMUSCULAR; INTRAVENOUS at 09:47

## 2025-01-10 RX ADMIN — IOPAMIDOL 100 ML: 755 INJECTION, SOLUTION INTRAVENOUS at 08:56

## 2025-01-10 RX ADMIN — PREDNISONE 10 MG: 10 TABLET ORAL at 14:07

## 2025-01-10 RX ADMIN — Medication 10 ML: at 20:25

## 2025-01-10 RX ADMIN — ONDANSETRON 4 MG: 2 INJECTION INTRAMUSCULAR; INTRAVENOUS at 09:46

## 2025-01-10 RX ADMIN — SODIUM CHLORIDE, POTASSIUM CHLORIDE, SODIUM LACTATE AND CALCIUM CHLORIDE 100 ML/HR: 600; 310; 30; 20 INJECTION, SOLUTION INTRAVENOUS at 09:58

## 2025-01-10 RX ADMIN — Medication 10 ML: at 11:06

## 2025-01-10 RX ADMIN — ONDANSETRON 4 MG: 2 INJECTION INTRAMUSCULAR; INTRAVENOUS at 07:37

## 2025-01-10 RX ADMIN — BICTEGRAVIR SODIUM, EMTRICITABINE, AND TENOFOVIR ALAFENAMIDE FUMARATE 1 TABLET: 50; 200; 25 TABLET ORAL at 14:07

## 2025-01-10 RX ADMIN — MORPHINE SULFATE 2 MG: 2 INJECTION, SOLUTION INTRAMUSCULAR; INTRAVENOUS at 14:07

## 2025-01-10 RX ADMIN — SODIUM CHLORIDE, POTASSIUM CHLORIDE, SODIUM LACTATE AND CALCIUM CHLORIDE 100 ML/HR: 600; 310; 30; 20 INJECTION, SOLUTION INTRAVENOUS at 20:25

## 2025-01-10 RX ADMIN — Medication 5 MG: at 22:25

## 2025-01-10 RX ADMIN — MORPHINE SULFATE 4 MG: 4 INJECTION, SOLUTION INTRAMUSCULAR; INTRAVENOUS at 07:37

## 2025-01-10 RX ADMIN — SODIUM CHLORIDE 1000 ML: 0.9 INJECTION, SOLUTION INTRAVENOUS at 07:47

## 2025-01-10 NOTE — ED PROVIDER NOTES
Subjective   History of Present Illness  Patient is 59 year old male who presents with generalized abdominal pain for one day with no complaints of nausea, vomiting, or diarrhea. Patient and  state that his abdomen is newly distended today and was like this when he was diagnosed with a small bowel obstruction in October 2024.      Review of Systems   Gastrointestinal:  Positive for abdominal distention and abdominal pain.       Past Medical History:   Diagnosis Date    HIV (human immunodeficiency virus infection)     Skin cancer        No Known Allergies    Past Surgical History:   Procedure Laterality Date    CYSTOSCOPY, URETEROSCOPY, RETROGRADE PYELOGRAM, STENT INSERTION Left 5/30/2024    Procedure: CYSTOSCOPY URETEROSCOPY HOLMIUM LASER STENT INSERTION;  Surgeon: Wale Taylor MD;  Location: [facility] MAIN OR;  Service: Urology;  Laterality: Left;    ENDOSCOPY N/A 6/1/2024    Procedure: ESOPHAGOGASTRODUODENOSCOPY, biopsy x2 areas;  Surgeon: Nelly Obando MD;  Location: [facility] ENDOSCOPY;  Service: Gastroenterology;  Laterality: N/A;  post: gastritis    HERNIA REPAIR      RECTAL EXAMINATION UNDER ANESTHESIA N/A 12/12/2024    Procedure: EXAM  UNDER ANESTHESIA, RECTAL BIOPSY;  Surgeon: Xander Pa MD;  Location: [facility] MAIN OR;  Service: General;  Laterality: N/A;    SIGMOIDOSCOPY N/A 12/12/2024    Procedure: FLEXIBLE   SIGMOIDOSCOPY;  Surgeon: Xander Pa MD;  Location: [facility] MAIN OR;  Service: General;  Laterality: N/A;    SKIN CANCER EXCISION         Family History   Problem Relation Age of Onset    Heart disease Mother     Cancer Mother     Heart disease Father     Diabetes Brother     Heart disease Brother        Social History     Socioeconomic History    Marital status:    Tobacco Use    Smoking status: Never    Smokeless tobacco: Never   Vaping Use    Vaping status: Never Used   Substance and Sexual Activity    Alcohol use: Not Currently    Drug use: Never           Objective    Physical Exam  Vital signs and triage nurse note reviewed.  Constitutional: Awake, alert; well-developed and well-nourished. Moderate acute distress is noted.  HEENT: Normocephalic, atraumatic; pupils are PERRL with intact EOM; oropharynx is pink and moist without exudate or erythema.  No drooling or pooling of oral secretions.  Neck: Supple, full range of motion without pain; no cervical lymphadenopathy. Normal phonation.  Cardiovascular: Regular rate and rhythm, normal S1-S2.  No murmur noted.  Pulmonary: Respiratory effort regular nonlabored, breath sounds clear to auscultation all fields.  Abdomen: Distention and tenderness in lower abdomen with normoactive bowel sounds; no rebound or guarding.  Musculoskeletal: Independent range of motion of all extremities with no palpable tenderness or edema.  Neuro: Alert oriented x3, speech is clear and appropriate, GCS 15.    Skin: Flesh tone, warm, dry, intact; no erythematous or petechial rash or lesion.    Procedures           ED Course  Labs Reviewed   COMPREHENSIVE METABOLIC PANEL - Abnormal; Notable for the following components:       Result Value    Glucose 115 (*)     CO2 31.4 (*)     All other components within normal limits    Narrative:     GFR Categories in Chronic Kidney Disease (CKD)      GFR Category          GFR (mL/min/1.73)    Interpretation  G1                     90 or greater         Normal or high (1)  G2                      60-89                Mild decrease (1)  G3a                   45-59                Mild to moderate decrease  G3b                   30-44                Moderate to severe decrease  G4                    15-29                Severe decrease  G5                    14 or less           Kidney failure          (1)In the absence of evidence of kidney disease, neither GFR category G1 or G2 fulfill the criteria for CKD.    eGFR calculation 2021 CKD-EPI creatinine equation, which does not include race as a factor   URINALYSIS W/  MICROSCOPIC IF INDICATED (NO CULTURE) - Abnormal; Notable for the following components:    Specific Gravity, UA 1.060 (*)     Protein, UA Trace (*)     All other components within normal limits    Narrative:     Urine microscopic not indicated.   CBC WITH AUTO DIFFERENTIAL - Abnormal; Notable for the following components:    WBC 12.53 (*)     Hemoglobin 11.7 (*)     MCV 78.4 (*)     MCH 22.8 (*)     MCHC 29.1 (*)     RDW 19.1 (*)     RDW-SD 54.5 (*)     Platelets 564 (*)     All other components within normal limits    Narrative:     The previously reported component NRBC is no longer being reported. Previous result was 0.0 /100 WBC (Reference Range: 0.0-0.2 /100 WBC) on 1/10/2025 at 0746 EST.   MANUAL DIFFERENTIAL - Abnormal; Notable for the following components:    Lymphocytes Absolute 5.14 (*)     Monocytes Absolute 1.00 (*)     All other components within normal limits   SCAN SLIDE   CBC AND DIFFERENTIAL    Narrative:     The following orders were created for panel order CBC & Differential.  Procedure                               Abnormality         Status                     ---------                               -----------         ------                     CBC Auto Differential[[id number]]        Abnormal            Final result               Scan Slide[[id number]]                                       Final result                 Please view results for these tests on the individual orders.   EXTRA TUBES    Narrative:     The following orders were created for panel order Extra Tubes.  Procedure                               Abnormality         Status                     ---------                               -----------         ------                     Gold Top - SST[[id number]]                                   Final result                 Please view results for these tests on the individual orders.   GOLD TOP - SST     CT Abdomen Pelvis With Contrast    Result Date: 1/10/2025  Impression: 1.Findings  remain consistent with a mid to distal small bowel obstruction. There are what appear to be multiple transition points within the central abdomen suggesting possibility of internal hernia, similar to prior examination. 2.Sigmoid colitis and proctitis. While potentially infectious, this may be result of inflammatory bowel disease. Correlate with history. 3.Small volume ascites insufficient for paracentesis. Electronically Signed: Yao Kendall MD  1/10/2025 9:06 AM EST  Workstation ID: [id number]   Medications   morphine injection 2 mg (has no administration in time range)   ondansetron (ZOFRAN) injection 4 mg (has no administration in time range)   lactated ringers infusion (has no administration in time range)   morphine injection 4 mg (4 mg Intravenous Given 1/10/25 0737)   ondansetron (ZOFRAN) injection 4 mg (4 mg Intravenous Given 1/10/25 0737)   sodium chloride 0.9 % bolus 1,000 mL (0 mL Intravenous Stopped 1/10/25 0817)   iopamidol (ISOVUE-370) 76 % injection 100 mL (100 mL Intravenous Given 1/10/25 0856)   morphine injection 4 mg (4 mg Intravenous Given 1/10/25 0947)   ondansetron (ZOFRAN) injection 4 mg (4 mg Intravenous Given 1/10/25 0946)                                                          Medical Decision Making  Patient is a 59-year-old male who presents to the emergency department with exquisite generalized abdominal pain and distention x 1 day.  Patient has a complicated abdominal history including history of small bowel obstruction and recent rectal biopsy.  Patient does not complain of any changes to bowel or bladder habits prior to start of abdominal pain yesterday.  Patient denies any recent illness or any known contacts and is afebrile at this time.  Labs obtained and are significant for 3% bands and WBC 12.53. CT abdomen pelvis with IV contrast obtained; findings include mid to distal small bowel obstruction, sigmoid colitis and proctitis, and a small volume of ascites.  He was given a couple  doses of pain and nausea medication here in the ED.  He has not had a liter of IV fluids.  Findings were discussed with patient.  Consult placed for general surgery, spoke with Dr. Saucedo, and patient to be admitted to hospitalist.  Spoke with the hospitalist nurse practitioner.    Problems Addressed:  Generalized abdominal pain: complicated acute illness or injury  Other ascites: complicated acute illness or injury  Small bowel obstruction: complicated acute illness or injury    Amount and/or Complexity of Data Reviewed  Labs: ordered.  Radiology: ordered.    Risk  Prescription drug management.  Decision regarding hospitalization.        Final diagnoses:   Small bowel obstruction   Other ascites   Generalized abdominal pain       ED Disposition  ED Disposition       ED Disposition   Decision to Admit    Condition   --    Comment   Level of Care: Med/Surg [1]   Admitting Physician: GREG PAVON [[id number]]   Attending Physician: GREG PAVON [[id number]]                 No follow-up provider specified.       Medication List      No changes were made to your prescriptions during this visit.            Lara Mccrary, DRU  01/10/25 0959

## 2025-01-10 NOTE — Clinical Note
Level of Care: Med/Surg [1]   Diagnosis: Abdominal pain [262170]   Admitting Physician: GREG PAVON [[id number]]   Attending Physician: GREG PAVON [[id number]]

## 2025-01-10 NOTE — CONSULTS
General Surgery Consult Note      Name: Felipe Nieves ADMIT: 1/10/2025   : 1965  PCP: Provider, No Known    MRN: [id number] LOS: 0 days   AGE/SEX: 59 y.o. male  ROOM:    [facility]      Patient Care Team:  Provider, No Known as PCP - Xander Delcid MD as Consulting Physician (Colon and Rectal Surgery)  Chief Complaint   Patient presents with    Abdominal Pain       HPI  59 y.o. male day history of worsening abdominal distention and abdominal pain.  No nausea or vomiting.  Recently admitted for bowel obstruction versus bowel dysmotility.  He clinically improved without any surgical intervention.  Last bowel movement was yesterday and he reports he continues to pass flatus    Past Medical History:   Diagnosis Date    HIV (human immunodeficiency virus infection)     Skin cancer      Past Surgical History:   Procedure Laterality Date    CYSTOSCOPY, URETEROSCOPY, RETROGRADE PYELOGRAM, STENT INSERTION Left 2024    Procedure: CYSTOSCOPY URETEROSCOPY HOLMIUM LASER STENT INSERTION;  Surgeon: Wale Taylor MD;  Location: [facility] MAIN OR;  Service: Urology;  Laterality: Left;    ENDOSCOPY N/A 2024    Procedure: ESOPHAGOGASTRODUODENOSCOPY, biopsy x2 areas;  Surgeon: Nelly Obando MD;  Location: [facility] ENDOSCOPY;  Service: Gastroenterology;  Laterality: N/A;  post: gastritis    HERNIA REPAIR      RECTAL EXAMINATION UNDER ANESTHESIA N/A 2024    Procedure: EXAM  UNDER ANESTHESIA, RECTAL BIOPSY;  Surgeon: Xander Pa MD;  Location: [facility] MAIN OR;  Service: General;  Laterality: N/A;    SIGMOIDOSCOPY N/A 2024    Procedure: FLEXIBLE   SIGMOIDOSCOPY;  Surgeon: Xander Pa MD;  Location: [facility] MAIN OR;  Service: General;  Laterality: N/A;    SKIN CANCER EXCISION     Abdominal-history of bilateral inguinal hernia repair and umbilical hernia repair    Family History   Problem Relation Age of Onset    Heart disease Mother     Cancer Mother     Heart disease Father     Diabetes  Brother     Heart disease Brother      Social History     Tobacco Use    Smoking status: Never    Smokeless tobacco: Never   Vaping Use    Vaping status: Never Used   Substance Use Topics    Alcohol use: Not Currently    Drug use: Never     (Not in a hospital admission)    sodium chloride, 10 mL, Intravenous, Q12H      lactated ringers, 100 mL/hr, Last Rate: 100 mL/hr (01/10/25 0958)        Calcium Replacement - Follow Nurse / BPA Driven Protocol    Magnesium Standard Dose Replacement - Follow Nurse / BPA Driven Protocol    melatonin    Morphine **AND** naloxone    Morphine    ondansetron    Phosphorus Replacement - Follow Nurse / BPA Driven Protocol    Potassium Replacement - Follow Nurse / BPA Driven Protocol    sodium chloride    sodium chloride  Patient has no known allergies.    Review of Systems:   As noted above in HPI    Vitals:  Temp:  [97.6 °F (36.4 °C)] 97.6 °F (36.4 °C)  Heart Rate:  [66-82] 71  Resp:  [16-20] 17  BP: ()/(72-98) 123/92     Physical Exam:   Distress, alert  Nonlabored respirations  Abdomen soft, distended, mildly tender to palpation diffusely, nonperitonitic  Extremities warm and well-perfused with no gross deformities    Labs:  Results from last 7 days   Lab Units 01/10/25  0739   WBC 10*3/mm3 12.53*   HEMOGLOBIN g/dL 11.7*   HEMATOCRIT % 40.2   PLATELETS 10*3/mm3 564*   MONOCYTES % % 8.0     Results from last 7 days   Lab Units 01/10/25  0739   SODIUM mmol/L 140   POTASSIUM mmol/L 3.6   CHLORIDE mmol/L 99   CO2 mmol/L 31.4*   BUN mg/dL 14   CREATININE mg/dL 1.11   CALCIUM mg/dL 9.4   BILIRUBIN mg/dL 0.4   ALK PHOS U/L 92   ALT (SGPT) U/L 20   AST (SGOT) U/L 19   GLUCOSE mg/dL 115*     Imaging:  CT abdomen/pelvis 1/10/2025  Impression:  1.Findings remain consistent with a mid to distal small bowel obstruction. There are what appear to be multiple transition points within the central abdomen suggesting possibility of internal hernia, similar to prior examination.  2.Sigmoid colitis  and proctitis. While potentially infectious, this may be result of inflammatory bowel disease. Correlate with history.  3.Small volume ascites insufficient for paracentesis.    Assessment and Plan:  59 y.o. male admitted with a 1 day history of abdominal pain and distention.  CT scan with small bowel obstruction, similar to prior imaging in October.    -Unclear if patient truly has bowel obstruction versus dysmotility as it appears his colon is also relatively dilated  -N.p.o., NG tube, IV fluids  -If no improvement will need exploration    This note was created using Dragon Voice Recognition software.    Ning Saucedo MD  01/10/25  12:58 EST     NG tube was not placed by ED nurse.  After contacting the nurse on the floor she reported he has had a bowel movement since arriving.  To hold off on NG tube and will place order for small bowel follow-through study.

## 2025-01-10 NOTE — H&P
[facility] Medicine Services  History & Physical    Patient Name: Felipe Nieves  : 1965  MRN: [id number]  Primary Care Physician:  Provider, No Known  Date of admission: 1/10/2025  Date and Time of Service: 1/10/2025 at 1259    Subjective      Chief Complaint: abdominal pain    History of Present Illness: Felipe Nieves is a very pleasant 59 y.o. male with a CMH of HIV, rectal mass, hx of SBO, history of hernia repair who presented to [facility] on 1/10/2025 with  abdominal pain.    Patient presented with gradually worsening, constant abdominal pain since yesterday.  The pain is generalized and severe.  Nothing seems to make the pain better or worse.  Denies radiation of the pain.  He has had associated nausea, but no vomiting.  He states he has had a couple of large stools since the pain began, denies diarrhea and no report of blood in his stool.  Today, his abdomen became distended. He was hospitalized twice this past October with SBO.  He also recently had a rectal sigmoidoscopy with rectal mass biopsy and anal dilatation on 2024. He was noted to have severe proctitis with ulceration extending to the sigmoid colon. Biopsy showed moderate nonspecific acute and chronic colitis, no dysplasia or malignancy.  He has some urinary hesitancy but denies any dysuria, hematuria, fevers, chills, CP, SOA or other acute sx at this time.     ED course: Vitals 97.6-82-/% on room air.  Labs are remarkable for WBC count 12.5, hemoglobin 11.7, bicarb 31.4, glucose 115.  UA with trace protein.  CT abdomen pelvis showed mid to distal small bowel obstruction with multiple transition points within the central abdomen suggesting possibility of internal hernia, similar to prior imaging, as well as findings of sigmoid colitis and proctitis, small volume ascites.  Surgery was consulted and agreed to evaluate patient.  He was given fluids, Zofran, morphine in the ED.  He is being admitted for  further management.      Review of Systems   Constitutional:  Negative for chills and fever.   HENT: Negative.     Eyes: Negative.    Respiratory:  Negative for cough and shortness of breath.    Cardiovascular:  Negative for chest pain, palpitations and leg swelling.   Gastrointestinal:  Positive for abdominal distention, abdominal pain and nausea. Negative for anal bleeding, blood in stool, constipation, diarrhea and vomiting.   Genitourinary:  Positive for difficulty urinating. Negative for dysuria, flank pain, frequency, hematuria and urgency.   Musculoskeletal: Negative.    Skin: Negative.    Neurological: Negative.        Personal History     Past Medical History:   Diagnosis Date    HIV (human immunodeficiency virus infection)     Skin cancer        Past Surgical History:   Procedure Laterality Date    CYSTOSCOPY, URETEROSCOPY, RETROGRADE PYELOGRAM, STENT INSERTION Left 5/30/2024    Procedure: CYSTOSCOPY URETEROSCOPY HOLMIUM LASER STENT INSERTION;  Surgeon: Wale Taylor MD;  Location: [facility] OR;  Service: Urology;  Laterality: Left;    ENDOSCOPY N/A 6/1/2024    Procedure: ESOPHAGOGASTRODUODENOSCOPY, biopsy x2 areas;  Surgeon: Nelly Obando MD;  Location: [facility] ENDOSCOPY;  Service: Gastroenterology;  Laterality: N/A;  post: gastritis    HERNIA REPAIR      RECTAL EXAMINATION UNDER ANESTHESIA N/A 12/12/2024    Procedure: EXAM  UNDER ANESTHESIA, RECTAL BIOPSY;  Surgeon: Xander Pa MD;  Location: [facility] MAIN OR;  Service: General;  Laterality: N/A;    SIGMOIDOSCOPY N/A 12/12/2024    Procedure: FLEXIBLE   SIGMOIDOSCOPY;  Surgeon: Xander Pa MD;  Location: [facility] MAIN OR;  Service: General;  Laterality: N/A;    SKIN CANCER EXCISION         Family History: family history includes Cancer in his mother; Diabetes in his brother; Heart disease in his brother, father, and mother. Otherwise pertinent FHx was reviewed and not pertinent to current issue.    Social History:  reports that he has never  smoked. He has never used smokeless tobacco. He reports that he does not currently use alcohol. He reports that he does not use drugs.    Home Medications:  Prior to Admission Medications       Prescriptions Last Dose Informant Patient Reported? Taking?    Bictegravir-Emtricitab-Tenofov (Biktarvy) -25 MG per tablet Past Week  Yes Yes    Take 1 tablet by mouth Daily.    dicyclomine (BENTYL) 10 MG capsule 1/9/2025  Yes Yes    Take 1-2 capsules by mouth 4 (Four) Times a Day As Needed for Abdominal Cramping.    mesalamine (LIALDA) 1.2 g EC tablet Past Week  Yes Yes    Take 2 tablets by mouth 2 (Two) Times a Day.    polyethylene glycol (MIRALAX) 17 g packet Past Week  Yes Yes    Take 17 g by mouth Daily.    predniSONE (DELTASONE) 10 MG tablet Past Week  Yes Yes    Take 1 tablet by mouth Daily. Taper dose    ergocalciferol (ERGOCALCIFEROL) 1.25 MG (41607 UT) capsule 12/31/2024  Yes No    Take 1 capsule by mouth 1 (One) Time Per Week. Tuesdays              Allergies:  No Known Allergies    Objective      Vitals:   Temp:  [97.6 °F (36.4 °C)] 97.6 °F (36.4 °C)  Heart Rate:  [66-82] 71  Resp:  [16-20] 17  BP: ()/(72-98) 123/92  Body mass index is 19.23 kg/m².  Physical Exam  Constitutional:       Appearance: Normal appearance. He is not ill-appearing.   HENT:      Head: Normocephalic and atraumatic.      Mouth/Throat:      Mouth: Mucous membranes are moist.   Eyes:      Extraocular Movements: Extraocular movements intact.   Cardiovascular:      Rate and Rhythm: Normal rate and regular rhythm.   Pulmonary:      Effort: Pulmonary effort is normal.      Breath sounds: Normal breath sounds.   Abdominal:      General: There is distension.      Tenderness: There is abdominal tenderness (diffuse). There is guarding. There is no rebound.      Comments: Hyperactive bowel sounds in LUQ, decreased bowel sounds in LLQ   Musculoskeletal:         General: Normal range of motion.      Cervical back: Normal range of motion.       Right lower leg: No edema.      Left lower leg: No edema.   Skin:     General: Skin is warm.   Neurological:      General: No focal deficit present.      Mental Status: He is alert and oriented to person, place, and time.         Diagnostic Data:  Lab Results (last 24 hours)       Procedure Component Value Units Date/Time    Urinalysis With Microscopic If Indicated (No Culture) - Urine, Catheter [[id number]]  (Abnormal) Collected: 01/10/25 0941    Specimen: Urine, Catheter Updated: 01/10/25 0954     Color, UA Yellow     Appearance, UA Clear     pH, UA 6.0     Specific Gravity, UA 1.060     Glucose, UA Negative     Ketones, UA Negative     Bilirubin, UA Negative     Blood, UA Negative     Protein, UA Trace     Leuk Esterase, UA Negative     Nitrite, UA Negative     Urobilinogen, UA 0.2 E.U./dL    Narrative:      Urine microscopic not indicated.    CBC & Differential [[id number]]  (Abnormal) Collected: 01/10/25 0739    Specimen: Blood Updated: 01/10/25 0811    Narrative:      The following orders were created for panel order CBC & Differential.  Procedure                               Abnormality         Status                     ---------                               -----------         ------                     CBC Auto Differential[[id number]]        Abnormal            Final result               Scan Slide[[id number]]                                       Final result                 Please view results for these tests on the individual orders.    CBC Auto Differential [[id number]]  (Abnormal) Collected: 01/10/25 0739    Specimen: Blood Updated: 01/10/25 0811     WBC 12.53 10*3/mm3      RBC 5.13 10*6/mm3      Hemoglobin 11.7 g/dL      Hematocrit 40.2 %      MCV 78.4 fL      MCH 22.8 pg      MCHC 29.1 g/dL      RDW 19.1 %      RDW-SD 54.5 fl      MPV 8.3 fL      Platelets 564 10*3/mm3     Narrative:      The previously reported component NRBC is no longer being reported. Previous result was 0.0 /100 WBC (Reference  Range: 0.0-0.2 /100 WBC) on 1/10/2025 at 0746 EST.    Scan Slide [[id number]] Collected: 01/10/25 0739    Specimen: Blood Updated: 01/10/25 0811     Scan Slide --     Comment: See Manual Differential Results       Manual Differential [[id number]]  (Abnormal) Collected: 01/10/25 0739    Specimen: Blood Updated: 01/10/25 0811     Neutrophil % 47.0 %      Lymphocyte % 41.0 %      Monocyte % 8.0 %      Basophil % 1.0 %      Bands %  3.0 %      Neutrophils Absolute 6.27 10*3/mm3      Lymphocytes Absolute 5.14 10*3/mm3      Monocytes Absolute 1.00 10*3/mm3      Basophils Absolute 0.13 10*3/mm3      Acanthocytes Slight/1+     WBC Morphology Normal     Large Platelets Slight/1+    Comprehensive Metabolic Panel [[id number]]  (Abnormal) Collected: 01/10/25 0739    Specimen: Blood Updated: 01/10/25 0809     Glucose 115 mg/dL      BUN 14 mg/dL      Creatinine 1.11 mg/dL      Sodium 140 mmol/L      Potassium 3.6 mmol/L      Chloride 99 mmol/L      CO2 31.4 mmol/L      Calcium 9.4 mg/dL      Total Protein 8.4 g/dL      Albumin 3.8 g/dL      ALT (SGPT) 20 U/L      AST (SGOT) 19 U/L      Alkaline Phosphatase 92 U/L      Total Bilirubin 0.4 mg/dL      Globulin 4.6 gm/dL      A/G Ratio 0.8 g/dL      BUN/Creatinine Ratio 12.6     Anion Gap 9.6 mmol/L      eGFR 77.5 mL/min/1.73     Narrative:      GFR Categories in Chronic Kidney Disease (CKD)      GFR Category          GFR (mL/min/1.73)    Interpretation  G1                     90 or greater         Normal or high (1)  G2                      60-89                Mild decrease (1)  G3a                   45-59                Mild to moderate decrease  G3b                   30-44                Moderate to severe decrease  G4                    15-29                Severe decrease  G5                    14 or less           Kidney failure          (1)In the absence of evidence of kidney disease, neither GFR category G1 or G2 fulfill the criteria for CKD.    eGFR calculation 2021 CKD-EPI  creatinine equation, which does not include race as a factor    Extra Tubes [[id number]] Collected: 01/10/25 0739    Specimen: Blood, Venous Line Updated: 01/10/25 0800    Narrative:      The following orders were created for panel order Extra Tubes.  Procedure                               Abnormality         Status                     ---------                               -----------         ------                     Gold Top - SST[[id number]]                                   Final result                 Please view results for these tests on the individual orders.    [facility] [[id number]] Collected: 01/10/25 0739    Specimen: Blood Updated: 01/10/25 0800     Extra Tube Hold for add-ons.     Comment: Auto resulted.                Imaging Results (Last 24 Hours)       Procedure Component Value Units Date/Time    CT Abdomen Pelvis With Contrast [[id number]] Collected: 01/10/25 0900     Updated: 01/10/25 0908    Narrative:      CT ABDOMEN PELVIS W CONTRAST    Date of Exam: 1/10/2025 8:51 AM EST    Indication: abd pain.    Comparison: 10/30/2024    Technique: Axial CT images were obtained of the abdomen and pelvis following the uneventful intravenous administration of iodinated contrast. Sagittal and coronal reconstructions were performed.  Automated exposure control and iterative reconstruction   methods were used.        Findings:  LUNG BASES:  Unremarkable without mass or infiltrate.    LIVER:  Unremarkable parenchyma without focal lesion.  BILIARY/GALLBLADDER:  Unremarkable  SPLEEN:  Unremarkable  PANCREAS:  Unremarkable  ADRENAL:  Unremarkable  KIDNEYS:  Unremarkable parenchyma with no solid mass identified. No obstruction.  There is a similar 3 mm calculus in the mid to upper right kidney.  GASTROINTESTINAL/MESENTERY: Dilated small intestine measuring up to 4 cm in diameter. There appear to be several transition points within the left central abdomen with some swirling of the mesentery suggest the  possibility of internal hernia (images   74-90, series 3). In general, these findings are similar to prior examination. There is mural inflammation involving the sigmoid colon and rectum consistent with sigmoid colitis and proctitis. Correlate for history of inflammatory bowel disease. There is   large volume stool within the more proximal colon.  MESENTERIC VESSELS:  Patent.  AORTA/IVC:  Normal caliber.    RETROPERITONEUM/LYMPH NODES:  Unremarkable    REPRODUCTIVE:  Unremarkable  BLADDER:  Unremarkable    OSSEUS STRUCTURES:  Typical for age with no acute process identified.    There is small volume ascites insufficient for paracentesis.        Impression:      Impression:  1.Findings remain consistent with a mid to distal small bowel obstruction. There are what appear to be multiple transition points within the central abdomen suggesting possibility of internal hernia, similar to prior examination.  2.Sigmoid colitis and proctitis. While potentially infectious, this may be result of inflammatory bowel disease. Correlate with history.  3.Small volume ascites insufficient for paracentesis.            Electronically Signed: Yao Kendall MD    1/10/2025 9:06 AM EST    Workstation ID: [id number]              Assessment & Plan        This is a 59 y.o. male with:    Active and Resolved Problems  Active Hospital Problems    Diagnosis  POA    **Abdominal pain [R10.9]  Yes      Resolved Hospital Problems   No resolved problems to display.       SBO  -Patient presents with recurrent small bowel obstruction.  He also has proctitis and sigmoid colitis which is subacute/chronic. He is on mesalamine and prednisone.   -Will defer need for NGT to surgery, he is distended on exam, but is having bowel movements and is not vomiting  -Bowel rest/n.p.o.   -Pain control, antiemetics  -Continuous IVFs  -Serial abdominal exams  -Surgery consulted - appreciate recommendations    HIV  -Continue Biktarvy.  [facility].  His  recent CD4 count was >400.       VTE Prophylaxis:  Mechanical VTE prophylaxis orders are present.        The patient desires to be as follows:    CODE STATUS:    Code Status (Patient has no pulse and is not breathing): CPR (Attempt to Resuscitate)  Medical Interventions (Patient has pulse or is breathing): Full Support        Chandler Coon, who can be contacted at [phone], is the designated person to make medical decisions on the patient's behalf if He is incapable of doing so. This was clarified with patient and/or next of kin on 1/10/2025 during the course of this H&P.    Admission Status:  I believe this patient meets obs status.    Expected Length of Stay: 1 day    PDMP and Medication Dispenses via Sidebar reviewed and consistent with patient reported medications.    I discussed the patient's findings and my recommendations with patient, nursing staff, and consulting provider.      Signature:     This document has been electronically signed by Kristina Card PA-C on January 10, 2025 12:59 [facility]

## 2025-01-10 NOTE — PLAN OF CARE
Problem: Adult Inpatient Plan of Care  Goal: Plan of Care Review  Outcome: Progressing  Flowsheets (Taken 1/10/2025 4800)  Progress: no change  Plan of Care Reviewed With: patient  Goal: Patient-Specific Goal (Individualized)  Outcome: Progressing  Goal: Absence of Hospital-Acquired Illness or Injury  Outcome: Progressing  Goal: Optimal Comfort and Wellbeing  Outcome: Progressing  Goal: Readiness for Transition of Care  Outcome: Progressing  Intervention: Mutually Develop Transition Plan  Recent Flowsheet Documentation  Taken 1/10/2025 6205 by Jhoana San RN  Equipment Currently Used at Home: none  Transportation Anticipated: family or friend will provide  Patient/Family Anticipated Services at Transition: none  Patient/Family Anticipates Transition to: home with family     Problem: Pain Acute  Goal: Optimal Pain Control and Function  Outcome: Progressing   Goal Outcome Evaluation:  Plan of Care Reviewed With: patient        Progress: no change

## 2025-01-11 ENCOUNTER — APPOINTMENT (OUTPATIENT)
Dept: GENERAL RADIOLOGY | Facility: HOSPITAL | Age: 60
End: 2025-01-11
Payer: COMMERCIAL

## 2025-01-11 LAB
ANION GAP SERPL CALCULATED.3IONS-SCNC: 13.9 MMOL/L (ref 5–15)
ANION GAP SERPL CALCULATED.3IONS-SCNC: 8.4 MMOL/L (ref 5–15)
BASOPHILS # BLD AUTO: 0.03 10*3/MM3 (ref 0–0.2)
BASOPHILS # BLD AUTO: 0.04 10*3/MM3 (ref 0–0.2)
BASOPHILS NFR BLD AUTO: 0.4 % (ref 0–1.5)
BASOPHILS NFR BLD AUTO: 0.5 % (ref 0–1.5)
BUN SERPL-MCNC: 11 MG/DL (ref 6–20)
BUN SERPL-MCNC: 14 MG/DL (ref 6–20)
BUN/CREAT SERPL: 11.6 (ref 7–25)
BUN/CREAT SERPL: 13 (ref 7–25)
CALCIUM SPEC-SCNC: 7.9 MG/DL (ref 8.6–10.5)
CALCIUM SPEC-SCNC: 8.6 MG/DL (ref 8.6–10.5)
CHLORIDE SERPL-SCNC: 98 MMOL/L (ref 98–107)
CHLORIDE SERPL-SCNC: 99 MMOL/L (ref 98–107)
CO2 SERPL-SCNC: 25.6 MMOL/L (ref 22–29)
CO2 SERPL-SCNC: 28.1 MMOL/L (ref 22–29)
CREAT SERPL-MCNC: 0.95 MG/DL (ref 0.76–1.27)
CREAT SERPL-MCNC: 1.08 MG/DL (ref 0.76–1.27)
DEPRECATED RDW RBC AUTO: 51.8 FL (ref 37–54)
DEPRECATED RDW RBC AUTO: 53 FL (ref 37–54)
EGFRCR SERPLBLD CKD-EPI 2021: 80 ML/MIN/1.73
EGFRCR SERPLBLD CKD-EPI 2021: 93.4 ML/MIN/1.73
EOSINOPHIL # BLD AUTO: 0.06 10*3/MM3 (ref 0–0.4)
EOSINOPHIL # BLD AUTO: 0.1 10*3/MM3 (ref 0–0.4)
EOSINOPHIL NFR BLD AUTO: 0.8 % (ref 0.3–6.2)
EOSINOPHIL NFR BLD AUTO: 1.4 % (ref 0.3–6.2)
ERYTHROCYTE [DISTWIDTH] IN BLOOD BY AUTOMATED COUNT: 18.6 % (ref 12.3–15.4)
ERYTHROCYTE [DISTWIDTH] IN BLOOD BY AUTOMATED COUNT: 18.7 % (ref 12.3–15.4)
GLUCOSE SERPL-MCNC: 136 MG/DL (ref 65–99)
GLUCOSE SERPL-MCNC: 97 MG/DL (ref 65–99)
HCT VFR BLD AUTO: 34.7 % (ref 37.5–51)
HCT VFR BLD AUTO: 37.8 % (ref 37.5–51)
HGB BLD-MCNC: 10.5 G/DL (ref 13–17.7)
HGB BLD-MCNC: 11 G/DL (ref 13–17.7)
IMM GRANULOCYTES # BLD AUTO: 0.01 10*3/MM3 (ref 0–0.05)
IMM GRANULOCYTES # BLD AUTO: 0.03 10*3/MM3 (ref 0–0.05)
IMM GRANULOCYTES NFR BLD AUTO: 0.1 % (ref 0–0.5)
IMM GRANULOCYTES NFR BLD AUTO: 0.4 % (ref 0–0.5)
LYMPHOCYTES # BLD AUTO: 2.33 10*3/MM3 (ref 0.7–3.1)
LYMPHOCYTES # BLD AUTO: 2.47 10*3/MM3 (ref 0.7–3.1)
LYMPHOCYTES NFR BLD AUTO: 29.3 % (ref 19.6–45.3)
LYMPHOCYTES NFR BLD AUTO: 34.8 % (ref 19.6–45.3)
MAGNESIUM SERPL-MCNC: 2.4 MG/DL (ref 1.6–2.6)
MAGNESIUM SERPL-MCNC: 2.7 MG/DL (ref 1.6–2.6)
MCH RBC QN AUTO: 22.5 PG (ref 26.6–33)
MCH RBC QN AUTO: 23.2 PG (ref 26.6–33)
MCHC RBC AUTO-ENTMCNC: 29.1 G/DL (ref 31.5–35.7)
MCHC RBC AUTO-ENTMCNC: 30.3 G/DL (ref 31.5–35.7)
MCV RBC AUTO: 76.6 FL (ref 79–97)
MCV RBC AUTO: 77.5 FL (ref 79–97)
MONOCYTES # BLD AUTO: 0.65 10*3/MM3 (ref 0.1–0.9)
MONOCYTES # BLD AUTO: 0.76 10*3/MM3 (ref 0.1–0.9)
MONOCYTES NFR BLD AUTO: 10.7 % (ref 5–12)
MONOCYTES NFR BLD AUTO: 8.2 % (ref 5–12)
NEUTROPHILS NFR BLD AUTO: 3.73 10*3/MM3 (ref 1.7–7)
NEUTROPHILS NFR BLD AUTO: 4.84 10*3/MM3 (ref 1.7–7)
NEUTROPHILS NFR BLD AUTO: 52.6 % (ref 42.7–76)
NEUTROPHILS NFR BLD AUTO: 60.8 % (ref 42.7–76)
NRBC BLD AUTO-RTO: 0 /100 WBC (ref 0–0.2)
NRBC BLD AUTO-RTO: 0 /100 WBC (ref 0–0.2)
PLATELET # BLD AUTO: 466 10*3/MM3 (ref 140–450)
PLATELET # BLD AUTO: 499 10*3/MM3 (ref 140–450)
PMV BLD AUTO: 8 FL (ref 6–12)
PMV BLD AUTO: 8.2 FL (ref 6–12)
POTASSIUM SERPL-SCNC: 4.5 MMOL/L (ref 3.5–5.2)
POTASSIUM SERPL-SCNC: 4.7 MMOL/L (ref 3.5–5.2)
RBC # BLD AUTO: 4.53 10*6/MM3 (ref 4.14–5.8)
RBC # BLD AUTO: 4.88 10*6/MM3 (ref 4.14–5.8)
SODIUM SERPL-SCNC: 132 MMOL/L (ref 136–145)
SODIUM SERPL-SCNC: 141 MMOL/L (ref 136–145)
WBC NRBC COR # BLD AUTO: 7.1 10*3/MM3 (ref 3.4–10.8)
WBC NRBC COR # BLD AUTO: 7.95 10*3/MM3 (ref 3.4–10.8)

## 2025-01-11 PROCEDURE — 99231 SBSQ HOSP IP/OBS SF/LOW 25: CPT | Performed by: SURGERY

## 2025-01-11 PROCEDURE — 74250 X-RAY XM SM INT 1CNTRST STD: CPT

## 2025-01-11 PROCEDURE — 83735 ASSAY OF MAGNESIUM: CPT

## 2025-01-11 PROCEDURE — 25010000002 MORPHINE PER 10 MG

## 2025-01-11 PROCEDURE — 80048 BASIC METABOLIC PNL TOTAL CA: CPT

## 2025-01-11 PROCEDURE — G0378 HOSPITAL OBSERVATION PER HR: HCPCS

## 2025-01-11 PROCEDURE — 85025 COMPLETE CBC W/AUTO DIFF WBC: CPT

## 2025-01-11 PROCEDURE — 25510000001 IOPAMIDOL PER 1 ML: Performed by: INTERNAL MEDICINE

## 2025-01-11 PROCEDURE — 96376 TX/PRO/DX INJ SAME DRUG ADON: CPT

## 2025-01-11 RX ORDER — IOPAMIDOL 755 MG/ML
200 INJECTION, SOLUTION INTRAVASCULAR
Status: COMPLETED | OUTPATIENT
Start: 2025-01-11 | End: 2025-01-11

## 2025-01-11 RX ORDER — ACETAMINOPHEN 325 MG/1
650 TABLET ORAL EVERY 6 HOURS PRN
Status: DISCONTINUED | OUTPATIENT
Start: 2025-01-11 | End: 2025-01-12 | Stop reason: HOSPADM

## 2025-01-11 RX ADMIN — MORPHINE SULFATE 1 MG: 2 INJECTION, SOLUTION INTRAMUSCULAR; INTRAVENOUS at 09:00

## 2025-01-11 RX ADMIN — IOPAMIDOL 200 ML: 755 INJECTION, SOLUTION INTRAVENOUS at 09:00

## 2025-01-11 RX ADMIN — MESALAMINE 2.4 G: 1.2 TABLET, DELAYED RELEASE ORAL at 20:30

## 2025-01-11 RX ADMIN — Medication 5 MG: at 20:30

## 2025-01-11 RX ADMIN — Medication 10 ML: at 20:30

## 2025-01-11 RX ADMIN — BICTEGRAVIR SODIUM, EMTRICITABINE, AND TENOFOVIR ALAFENAMIDE FUMARATE 1 TABLET: 50; 200; 25 TABLET ORAL at 20:34

## 2025-01-11 NOTE — PROGRESS NOTES
General Surgery Inpatient Progress Note      Name: Felipe Nieves ADMIT: 1/10/2025   : 1965  PCP: Provider, No Known    MRN: [id number] LOS: 0 days   AGE/SEX: 59 y.o. male  ROOM: [address] [place] [address]      Patient Care Team:  Provider, No Known as PCP - Xander Delcid MD as Consulting Physician (Colon and Rectal Surgery)  Chief Complaint   Patient presents with    Abdominal Pain       Subjective:  Patient seen examined.  Still with abdominal soreness but overall pain improved.  Reports he has had multiple bowel movements.  Wants to eat.    Medications:  Bictegravir-Emtricitab-Tenofov, 1 tablet, Oral, Daily  mesalamine, 2.4 g, Oral, BID  predniSONE, 10 mg, Oral, Daily  sodium chloride, 10 mL, Intravenous, Q12H         Calcium Replacement - Follow Nurse / BPA Driven Protocol    Magnesium Standard Dose Replacement - Follow Nurse / BPA Driven Protocol    melatonin    Morphine **AND** naloxone    Morphine    ondansetron    Phosphorus Replacement - Follow Nurse / BPA Driven Protocol    Potassium Replacement - Follow Nurse / BPA Driven Protocol    sodium chloride    sodium chloride     Vitals:  Temp:  [97.6 °F (36.4 °C)-98.2 °F (36.8 °C)] 97.6 °F (36.4 °C)  Heart Rate:  [] 65  Resp:  [16-18] 18  BP: (107-131)/(74-82) 131/79     Physical Exam:  No acute distress, alert  Nonlabored respirations  Abdomen soft, minimally distended, mildly tender to palpation diffusely, nonperitoneal neck  Extremities warm well-perfused with no gross deformities    Labs:  Results from last 7 days   Lab Units 25  0423 01/10/25  0739   WBC 10*3/mm3 7.95 12.53*   HEMOGLOBIN g/dL 11.0* 11.7*   HEMATOCRIT % 37.8 40.2   PLATELETS 10*3/mm3 466* 564*   MONOCYTES % %  --  8.0     Results from last 7 days   Lab Units 25  0423 01/10/25  0739   SODIUM mmol/L 132* 140   POTASSIUM mmol/L 4.7 3.6   CHLORIDE mmol/L 98 99   CO2 mmol/L 25.6 31.4*   BUN mg/dL 11 14   CREATININE mg/dL 0.95 1.11   CALCIUM mg/dL 8.6 9.4   BILIRUBIN  mg/dL  --  0.4   ALK PHOS U/L  --  92   ALT (SGPT) U/L  --  20   AST (SGOT) U/L  --  19   GLUCOSE mg/dL 97 115*     Imaging:  Small bowel follow-through 1/11/2020  Small bowel follow-through completed and awaiting report.  On my review it does appear that contrast reaches the colon.    Assessment and Plan:  59 y.o. male admitted for recurrent small bowel obstruction.  Clinically improved, having some bowel function.    -Will await small bowel follow-through study report and if okay then we will start diet and monitor for tolerance  -No indication for acute surgical intervention at this time  -Discussed with patient if he continues to have symptoms may need exploration however we did discuss potential risk of not finding mechanical bowel obstruction and this may be secondary to dysmotility    This note was created using Dragon Voice Recognition software.    Ning Saucedo MD  01/11/25  13:51 EST

## 2025-01-11 NOTE — PLAN OF CARE
Goal Outcome Evaluation:         Plan for SBFT today. Pt having small Bms. He is passing flatus. Pain minimal. IVF infusing

## 2025-01-11 NOTE — PROGRESS NOTES
[facility] MEDICINE SERVICE  DAILY PROGRESS NOTE    NAME: Felipe Nieves  : 1965  MRN: [id number]      LOS: 0 days     PROVIDER OF SERVICE: Gino Bae MD    Chief Complaint: Abdominal pain    Subjective:     Interval History:  History taken from: patient         History of Present Illness: Felipe Nieves is a very pleasant 59 y.o. male with a CMH of HIV, rectal mass, hx of SBO, history of hernia repair who [facility] with  abdominal pain.     Patient presented with gradually worsening, constant abdominal pain since yesterday.  The pain is generalized and severe.  Nothing seems to make the pain better or worse.  Denies radiation of the pain.  He has had associated nausea, but no vomiting.  He states he has had a couple of large stools since the pain began, denies diarrhea and no report of blood in his stool.  Today, his abdomen became distended. He was hospitalized twice this past October with SBO.  He also recently had a rectal sigmoidoscopy with rectal mass biopsy and anal dilatation on 2024. He was noted to have severe proctitis with ulceration extending to the sigmoid colon. Biopsy showed moderate nonspecific acute and chronic colitis, no dysplasia or malignancy.  He has some urinary hesitancy but denies any dysuria, hematuria, fevers, chills, CP, SOA or other acute sx at this time.      ED course: Vitals 97.6-82-/% on room air.  Labs are remarkable for WBC count 12.5, hemoglobin 11.7, bicarb 31.4, glucose 115.  UA with trace protein.  CT abdomen pelvis showed mid to distal small bowel obstruction with multiple transition points within the central abdomen suggesting possibility of internal hernia, similar to prior imaging, as well as findings of sigmoid colitis and proctitis, small volume ascites.  Surgery was consulted and agreed to evaluate patient.  He was given fluids, Zofran, morphine in the ED.  He is being admitted for  further management.     1/11/25 Seen in bed NAD, C/O Abd pain improved, very hungry.       Review of Systems  Constitutional:  Negative for chills and fever.   HENT: Negative.     Eyes: Negative.    Respiratory:  Negative for cough and shortness of breath.    Cardiovascular:  Negative for chest pain, palpitations and leg swelling.   Gastrointestinal:  Positive for abdominal distention, abdominal pain and nausea. Negative for anal bleeding, blood in stool, constipation, diarrhea and vomiting.   Genitourinary:  Positive for difficulty urinating. Negative for dysuria, flank pain, frequency, hematuria and urgency.   Musculoskeletal: Negative.    Skin: Negative.    Neurological: Negative.    Objective:     Vital Signs  Temp:  [97.6 °F (36.4 °C)-98.2 °F (36.8 °C)] 97.6 °F (36.4 °C)  Heart Rate:  [] 65  Resp:  [16-18] 18  BP: (107-131)/(74-92) 131/79   Body mass index is 19.23 kg/m².    Physical Exam        Appearance: Normal appearance. He is not ill-appearing.   HENT:      Head: Normocephalic and atraumatic.      Mouth/Throat:      Mouth: Mucous membranes are moist.   Eyes:      Extraocular Movements: Extraocular movements intact.   Cardiovascular:      Rate and Rhythm: Normal rate and regular rhythm.   Pulmonary:      Effort: Pulmonary effort is normal.      Breath sounds: Normal breath sounds.   Abdominal:      General: There is distension.      Tenderness: There is abdominal tenderness (diffuse). There is guarding. There is no rebound.      Comments: Hyperactive bowel sounds in LUQ, decreased bowel sounds in LLQ   Musculoskeletal:         General: Normal range of motion.      Cervical back: Normal range of motion.      Right lower leg: No edema.      Left lower leg: No edema.   Skin:     General: Skin is warm.   Neurological:      General: No focal deficit present.      Mental Status: He is alert and oriented to person, place, and time.        Diagnostic Data    Results from last 7 days   Lab Units  01/11/25  0423 01/10/25  0739   WBC 10*3/mm3 7.95 12.53*   HEMOGLOBIN g/dL 11.0* 11.7*   HEMATOCRIT % 37.8 40.2   PLATELETS 10*3/mm3 466* 564*   GLUCOSE mg/dL 97 115*   CREATININE mg/dL 0.95 1.11   BUN mg/dL 11 14   SODIUM mmol/L 132* 140   POTASSIUM mmol/L 4.7 3.6   AST (SGOT) U/L  --  19   ALT (SGPT) U/L  --  20   ALK PHOS U/L  --  92   BILIRUBIN mg/dL  --  0.4   ANION GAP mmol/L 8.4 9.6       CT Abdomen Pelvis With Contrast    Result Date: 1/10/2025  Impression: 1.Findings remain consistent with a mid to distal small bowel obstruction. There are what appear to be multiple transition points within the central abdomen suggesting possibility of internal hernia, similar to prior examination. 2.Sigmoid colitis and proctitis. While potentially infectious, this may be result of inflammatory bowel disease. Correlate with history. 3.Small volume ascites insufficient for paracentesis. Electronically Signed: Yao Kendall MD  1/10/2025 9:06 AM EST  Workstation ID: [id number]       I reviewed the patient's new clinical results.    Assessment/Plan:     Active and Resolved Problems  Active Hospital Problems    Diagnosis  POA    **Abdominal pain [R10.9]  Yes      Resolved Hospital Problems   No resolved problems to display.       SBO  -Patient presents with recurrent small bowel obstruction.  He also has proctitis and sigmoid colitis which is subacute/chronic. He is on mesalamine and prednisone.   -Will defer need for NGT to surgery, he is distended on exam, but is having bowel movements and is not vomiting  -Bowel rest/n.p.o.   -Pain control, antiemetics  -Continuous IVFs  -Serial abdominal exams  -Surgery consulted - appreciate recommendations>-Unclear if patient truly has bowel obstruction versus dysmotility as it appears his colon is also relatively dilated  -N.p.o., NG tube, IV fluids  -If no improvement will need exploration     HIV  -Continue Biktarvy.  He follows with [facility].  His recent CD4 count was >400.     VTE  Prophylaxis:  Mechanical VTE prophylaxis orders are present.      Disposition Planning:     Barriers to Discharge:SBO  Anticipated Date of Discharge: 1/14  Place of Discharge: HOME      Time: 35 minutes     Code Status and Medical Interventions: CPR (Attempt to Resuscitate); Full Support   Ordered at: 01/10/25 0950     Code Status (Patient has no pulse and is not breathing):    CPR (Attempt to Resuscitate)     Medical Interventions (Patient has pulse or is breathing):    Full Support       Signature: Electronically signed by Gino Bae MD, 01/11/25, 11:57 EST.  [facility] Hospitalist Team

## 2025-01-11 NOTE — PLAN OF CARE
Goal Outcome Evaluation:      Patient is A&O x4 and able to make needs known. Ambulates per self. Diet advanced to clears, tolerating well. Pain treated per MAR regimen. Education provided, call light within reach.

## 2025-01-12 VITALS
HEART RATE: 86 BPM | OXYGEN SATURATION: 98 % | SYSTOLIC BLOOD PRESSURE: 111 MMHG | BODY MASS INDEX: 19.27 KG/M2 | HEIGHT: 67 IN | WEIGHT: 122.8 LBS | TEMPERATURE: 98.1 F | RESPIRATION RATE: 12 BRPM | DIASTOLIC BLOOD PRESSURE: 72 MMHG

## 2025-01-12 PROCEDURE — 99232 SBSQ HOSP IP/OBS MODERATE 35: CPT

## 2025-01-12 PROCEDURE — G0378 HOSPITAL OBSERVATION PER HR: HCPCS

## 2025-01-12 PROCEDURE — 63710000001 PREDNISONE PER 5 MG

## 2025-01-12 RX ORDER — MAGNESIUM GLYCINATE 100 MG
1 TABLET ORAL 2 TIMES DAILY
Qty: 60 TABLET | Refills: 0 | Status: SHIPPED | OUTPATIENT
Start: 2025-01-12

## 2025-01-12 RX ORDER — VITAMIN K2 90 MCG
1 CAPSULE ORAL DAILY
Qty: 30 CAPSULE | Refills: 0 | Status: SHIPPED | OUTPATIENT
Start: 2025-01-12

## 2025-01-12 RX ADMIN — MESALAMINE 2.4 G: 1.2 TABLET, DELAYED RELEASE ORAL at 09:49

## 2025-01-12 RX ADMIN — Medication 10 ML: at 09:50

## 2025-01-12 RX ADMIN — PREDNISONE 10 MG: 10 TABLET ORAL at 09:49

## 2025-01-12 NOTE — PLAN OF CARE
Goal Outcome Evaluation:      Patient is A&O x4 and able to make needs known. Ambulates per self. Tolerating regular diet well. Education provided, call light within reach. Plan of care ongoing.

## 2025-01-12 NOTE — PROGRESS NOTES
General Surgery Progress Note    Name: Felipe Nieves ADMIT: 1/10/2025   : 1965  PCP: Provider, No Known    MRN: [id number] LOS: 0 days   AGE/SEX: 59 y.o. male  ROOM: [address] [place] [address]    Chief Complaint   Patient presents with    Abdominal Pain     Subjective     Patient seen and examined.  Vital signs stable, afebrile.  Denies pain.  Reports multiple bowel movements overnight.  Has been tolerating full liquids without nausea and vomiting.    Objective     Scheduled Medications:   Bictegravir-Emtricitab-Tenofov, 1 tablet, Oral, Daily  mesalamine, 2.4 g, Oral, BID  predniSONE, 10 mg, Oral, Daily  sodium chloride, 10 mL, Intravenous, Q12H        Active Infusions:       As Needed Medications:    acetaminophen    Calcium Replacement - Follow Nurse / BPA Driven Protocol    Magnesium Standard Dose Replacement - Follow Nurse / BPA Driven Protocol    melatonin    [DISCONTINUED] Morphine **AND** naloxone    ondansetron    Phosphorus Replacement - Follow Nurse / BPA Driven Protocol    Potassium Replacement - Follow Nurse / BPA Driven Protocol    sodium chloride    sodium chloride    Vital Signs  Vital Signs Patient Vitals for the past 24 hrs:   BP Temp Temp src Pulse Resp SpO2   25 0343 117/81 98 °F (36.7 °C) Oral 73 14 97 %   25 2053 109/74 98 °F (36.7 °C) Oral 86 16 99 %   25 1350 127/84 98.2 °F (36.8 °C) Oral 75 18 99 %     I/O:  I/O last 3 completed shifts:  In: 1240 [P.O.:1240]  Out: -     Physical Exam:  Physical Exam  Constitutional:       General: He is not in acute distress.  Cardiovascular:      Rate and Rhythm: Normal rate.   Pulmonary:      Effort: Pulmonary effort is normal. No respiratory distress.   Abdominal:      Palpations: Abdomen is soft.      Tenderness: There is no abdominal tenderness. There is no guarding.      Comments: Soft, some distention, nontender to palpation.   Neurological:      Mental Status: He is alert. Mental status is at baseline.   Psychiatric:         Mood  and Affect: Mood normal.         Behavior: Behavior normal.         Results Review:     CBC    Results from last 7 days   Lab Units 01/11/25 2230 01/11/25 0423 01/10/25  0739   WBC 10*3/mm3 7.10 7.95 12.53*   HEMOGLOBIN g/dL 10.5* 11.0* 11.7*   PLATELETS 10*3/mm3 499* 466* 564*     BMP   Results from last 7 days   Lab Units 01/11/25  2230 01/11/25  0423 01/10/25  0739   SODIUM mmol/L 141 132* 140   POTASSIUM mmol/L 4.5 4.7 3.6   CHLORIDE mmol/L 99 98 99   CO2 mmol/L 28.1 25.6 31.4*   BUN mg/dL 14 11 14   CREATININE mg/dL 1.08 0.95 1.11   GLUCOSE mg/dL 136* 97 115*   MAGNESIUM mg/dL 2.7* 2.4  --      Radiology(recent) No radiology results for the last day    I reviewed the patient's new clinical results.    Assessment & Plan       Abdominal pain      59 y.o. male admitted with abdominal pain on 1/10/2025, found to have recurrent small bowel obstruction    -Advance diet to regular, monitor for tolerance  -Antiemetics and Tylenol as needed  -Needs to increase mobilization as tolerated, out of bed to chair  -Monitor and replace electrolytes as needed  -Okay to discharge from general surgery perspective once tolerating a regular diet.        This note was created using Dragon Voice Recognition software.    COMFORT Raphael  01/12/25  09:36 EST

## 2025-01-12 NOTE — DISCHARGE SUMMARY
[facility] Medicine Services  Discharge Summary    Date of Service: 2025  Patient Name: Felipe Nieves  : 1965  MRN: [id number]    Date of Admission: 1/10/2025  Discharge Diagnosis: Abdominal pain  Date of Discharge: 2025  Primary Care Physician: Provider, No Known      Presenting Problem:   Small bowel obstruction [K56.609]  Abdominal pain [R10.9]  Generalized abdominal pain [R10.84]  Other ascites [R18.8]    Active and Resolved Hospital Problems:  Active Hospital Problems    Diagnosis POA    **Abdominal pain [R10.9] Yes      Resolved Hospital Problems   No resolved problems to display.       SBO  -Patient presents with recurrent small bowel obstruction.  He also has proctitis and sigmoid colitis which is subacute/chronic. He is on mesalamine and prednisone.   -Will defer need for NGT to surgery, he is distended on exam, but is having bowel movements and is not vomiting  -Bowel rest/n.p.o.   -Pain control, antiemetics  -Continuous IVFs  -Serial abdominal exams  -Surgery consulted - appreciate recommendations>-Unclear if patient truly has bowel obstruction versus dysmotility as it appears his colon is also relatively dilated  -N.p.o., NG tube, IV fluids  -If no improvement will need exploration     HIV  -Continue Biktarvy.  He [facility].  His recent CD4 count was >400.     Hospital Course     History of Present Illness: Felipe Nieves is a very pleasant 59 y.o. male with a CMH of HIV, rectal mass, hx of SBO, history of hernia repair who presented to [facility] on 1/10/2025 with  abdominal pain.     Patient presented with gradually worsening, constant abdominal pain since yesterday.  The pain is generalized and severe.  Nothing seems to make the pain better or worse.  Denies radiation of the pain.  He has had associated nausea, but no vomiting.  He states he has had a couple of large stools since the pain began, denies diarrhea and no report of blood in  his stool.  Today, his abdomen became distended. He was hospitalized twice this past October with SBO.  He also recently had a rectal sigmoidoscopy with rectal mass biopsy and anal dilatation on 12/12/2024. He was noted to have severe proctitis with ulceration extending to the sigmoid colon. Biopsy showed moderate nonspecific acute and chronic colitis, no dysplasia or malignancy.  He has some urinary hesitancy but denies any dysuria, hematuria, fevers, chills, CP, SOA or other acute sx at this time.      ED course: Vitals 97.6-82-/% on room air.  Labs are remarkable for WBC count 12.5, hemoglobin 11.7, bicarb 31.4, glucose 115.  UA with trace protein.  CT abdomen pelvis showed mid to distal small bowel obstruction with multiple transition points within the central abdomen suggesting possibility of internal hernia, similar to prior imaging, as well as findings of sigmoid colitis and proctitis, small volume ascites.  Surgery was consulted and agreed to evaluate patient.  He was given fluids, Zofran, morphine in the ED.  He is being admitted for further management.     1/11/25 Seen in bed NAD, C/O Abd pain improved, very hungry.   1/12 seen in bed NAD, vss, tolerating po, will dc home, condition on dc stable     DISCHARGE Follow Up Recommendations for labs and diagnostics: follow with pcp in one week  Day of Discharge     Vital Signs:  Temp:  [98 °F (36.7 °C)-98.2 °F (36.8 °C)] 98.1 °F (36.7 °C)  Heart Rate:  [73-86] 86  Resp:  [12-18] 12  BP: (109-127)/(72-84) 111/72      Physical Exam      Appearance: Normal appearance. He is not ill-appearing.   HENT:      Head: Normocephalic and atraumatic.      Mouth/Throat:      Mouth: Mucous membranes are moist.   Eyes:      Extraocular Movements: Extraocular movements intact.   Cardiovascular:      Rate and Rhythm: Normal rate and regular rhythm.   Pulmonary:      Effort: Pulmonary effort is normal.      Breath sounds: Normal breath sounds.   Abdominal:       General: There is distension.      Tenderness: There is abdominal tenderness (diffuse). There is guarding. There is no rebound.      Comments: Hyperactive bowel sounds in LUQ, decreased bowel sounds in LLQ   Musculoskeletal:         General: Normal range of motion.      Cervical back: Normal range of motion.      Right lower leg: No edema.      Left lower leg: No edema.   Skin:     General: Skin is warm.   Neurological:      General: No focal deficit present.      Mental Status: He is alert and oriented to person, place, and time.       Pertinent  and/or Most Recent Results     LAB RESULTS:      Lab 01/11/25 2230 01/11/25  0423 01/10/25  0739   WBC 7.10 7.95 12.53*   HEMOGLOBIN 10.5* 11.0* 11.7*   HEMATOCRIT 34.7* 37.8 40.2   PLATELETS 499* 466* 564*   NEUTROS ABS 3.73 4.84 6.27   IMMATURE GRANS (ABS) 0.01 0.03  --    LYMPHS ABS 2.47 2.33  --    MONOS ABS 0.76 0.65  --    EOS ABS 0.10 0.06  --    MCV 76.6* 77.5* 78.4*         Lab 01/11/25 2230 01/11/25 0423 01/10/25  0739   SODIUM 141 132* 140   POTASSIUM 4.5 4.7 3.6   CHLORIDE 99 98 99   CO2 28.1 25.6 31.4*   ANION GAP 13.9 8.4 9.6   BUN 14 11 14   CREATININE 1.08 0.95 1.11   EGFR 80.0 93.4 77.5   GLUCOSE 136* 97 115*   CALCIUM 7.9* 8.6 9.4   MAGNESIUM 2.7* 2.4  --          Lab 01/10/25  0739   TOTAL PROTEIN 8.4   ALBUMIN 3.8   GLOBULIN 4.6   ALT (SGPT) 20   AST (SGOT) 19   BILIRUBIN 0.4   ALK PHOS 92                     Brief Urine Lab Results  (Last result in the past 365 days)        Color   Clarity   Blood   Leuk Est   Nitrite   Protein   CREAT   Urine HCG        01/10/25 0941 Yellow   Clear   Negative   Negative   Negative   Trace                 Microbiology Results (last 10 days)       ** No results found for the last 240 hours. **            CT Abdomen Pelvis With Contrast    Result Date: 1/10/2025  Impression: Impression: 1.Findings remain consistent with a mid to distal small bowel obstruction. There are what appear to be multiple transition points  within the central abdomen suggesting possibility of internal hernia, similar to prior examination. 2.Sigmoid colitis and proctitis. While potentially infectious, this may be result of inflammatory bowel disease. Correlate with history. 3.Small volume ascites insufficient for paracentesis. Electronically Signed: Yao Kendall MD  1/10/2025 9:06 AM EST  Workstation ID: [id number]             Results for orders placed during the hospital encounter of 10/30/24    Adult Transthoracic Echo Complete W/ Cont if Necessary Per Protocol    Interpretation Summary    Left ventricular systolic function is normal. Left ventricular ejection fraction appears to be 56 - 60%.    Left ventricular diastolic function was normal.      Labs Pending at Discharge:  Pending Results       Procedure [Order ID] Specimen - Date/Time    FL Small Bowel Follow Through Single-Contrast [[id number]] Resulted: 01/11/25 0900     Updated: 01/11/25 1311            Procedures Performed           Consults:   Consults       Date and Time Order Name Status Description    1/10/2025  9:35 AM Surgery (on-call MD unless specified) Completed     1/10/2025  9:35 AM Hospitalist (on-call MD unless specified)              Assessment & Plan    Abdominal pain        59 y.o. male admitted with abdominal pain on 1/10/2025, found to have recurrent small bowel obstruction     -Advance diet to regular, monitor for tolerance  -Antiemetics and Tylenol as needed  -Needs to increase mobilization as tolerated, out of bed to chair  -Monitor and replace electrolytes as needed  -Okay to discharge from general surgery perspective once tolerating a regular diet.           This note was created using Dragon Voice Recognition software.     COMFORT Raphael  01/12/25    Discharge Details        Discharge Medications        New Medications        Instructions Start Date   Mag Glycinate 100 MG tablet   1 tablet, Oral, 2 Times Daily      Methyl-Folate 1000 MCG capsule  Generic drug:  Levomefolate Glucosamine   1 tablet, Oral, Daily             Continue These Medications        Instructions Start Date   Biktarvy -25 MG per tablet  Generic drug: Bictegravir-Emtricitab-Tenofov   1 tablet, Daily      dicyclomine 10 MG capsule  Commonly known as: BENTYL   1-2 capsules, 4 Times Daily PRN      ergocalciferol 1.25 MG (90177 UT) capsule  Commonly known as: ERGOCALCIFEROL   1 capsule, Weekly      mesalamine 1.2 g EC tablet  Commonly known as: LIALDA   2,400 mg, 2 Times Daily      polyethylene glycol 17 g packet  Commonly known as: MIRALAX   17 g, Daily      predniSONE 10 MG tablet  Commonly known as: DELTASONE   10 mg, Daily               No Known Allergies      Discharge Disposition:   Home or Self Care    Diet:  Hospital:  Diet Order   Procedures    Diet: Regular/House; Fluid Consistency: Thin (IDDSI 0)         Discharge Activity:   Activity Instructions       Gradually Increase Activity Until at Pre-Hospitalization Level                CODE STATUS:  Code Status and Medical Interventions: CPR (Attempt to Resuscitate); Full Support   Ordered at: 01/10/25 0950     Code Status (Patient has no pulse and is not breathing):    CPR (Attempt to Resuscitate)     Medical Interventions (Patient has pulse or is breathing):    Full Support         No future appointments.    Additional Instructions for the Follow-ups that You Need to Schedule       Discharge Follow-up with PCP   As directed       Currently Documented PCP:    Provider, No Known    PCP Phone Number:    None     Follow Up Details: 1 week                Time spent on Discharge including face to face service:  34 minutes    Signature: Electronically signed by Gino Bae MD, 01/12/25, 13:05 EST.  [facility] Hospitalist Team

## 2025-01-12 NOTE — PLAN OF CARE
Goal Outcome Evaluation:      Patient alert oriented able to make needs known. Tolerated clear liquid diet and was advanced to full liquid diet. Patient has had no complaints of pain or discomfort this shift. Patient ambulates independently in room. Patient reports BM this shift.

## 2025-01-13 NOTE — CASE MANAGEMENT/SOCIAL WORK
Case Management Discharge Note      Final Note: HOME   DISCHARGED PRIOR TO CASE MANAGEMENT SEEING PATIENT         Selected Continued Care - Discharged on 1/12/2025 Admission date: 1/10/2025 - Discharge disposition: Home or Self Care            Transportation Services  Private: Car    Final Discharge Disposition Code: 01 - home or self-care   No difficulties

## 2025-01-16 ENCOUNTER — OFFICE (AMBULATORY)
Dept: URBAN - METROPOLITAN AREA CLINIC 64 | Facility: CLINIC | Age: 60
End: 2025-01-16
Payer: COMMERCIAL

## 2025-01-16 VITALS
SYSTOLIC BLOOD PRESSURE: 156 MMHG | HEART RATE: 91 BPM | WEIGHT: 124 LBS | HEIGHT: 67 IN | DIASTOLIC BLOOD PRESSURE: 97 MMHG

## 2025-01-16 DIAGNOSIS — Z21 ASYMPTOMATIC HUMAN IMMUNODEFICIENCY VIRUS [HIV] INFECTION ST: ICD-10-CM

## 2025-01-16 DIAGNOSIS — K62.89 OTHER SPECIFIED DISEASES OF ANUS AND RECTUM: ICD-10-CM

## 2025-01-16 DIAGNOSIS — A63.0 ANOGENITAL (VENEREAL) WARTS: ICD-10-CM

## 2025-01-16 DIAGNOSIS — R15.9 FULL INCONTINENCE OF FECES: ICD-10-CM

## 2025-01-16 DIAGNOSIS — R19.7 DIARRHEA, UNSPECIFIED: ICD-10-CM

## 2025-01-16 DIAGNOSIS — R19.8 OTHER SPECIFIED SYMPTOMS AND SIGNS INVOLVING THE DIGESTIVE S: ICD-10-CM

## 2025-01-16 DIAGNOSIS — K56.600 PARTIAL INTESTINAL OBSTRUCTION, UNSPECIFIED AS TO CAUSE: ICD-10-CM

## 2025-01-16 PROCEDURE — 99213 OFFICE O/P EST LOW 20 MIN: CPT | Performed by: NURSE PRACTITIONER

## 2025-02-20 ENCOUNTER — TELEPHONE (OUTPATIENT)
Age: 60
End: 2025-02-20
Payer: COMMERCIAL

## 2025-02-20 NOTE — TELEPHONE ENCOUNTER
Patient called req results from [facility] 12/12/24 and that he has called multiple times about getting said results. Offered patient an appt to come in to talk to  pt declinded and req for  to call him and to [facility] if pt doesn't . Message has been sent to

## (undated) DEVICE — Device

## (undated) DEVICE — SINGLE-USE BIOPSY FORCEPS: Brand: RADIAL JAW 4

## (undated) DEVICE — FIBR LASR HOLMIUM 365 MICRON DISP

## (undated) DEVICE — NITINOL WIRE WITH HYDROPHILIC TIP: Brand: SENSOR

## (undated) DEVICE — SYR LL TP 10ML STRL

## (undated) DEVICE — PK MINOR LAPAROTOMY 50

## (undated) DEVICE — GLV SURG SIGNATURE ESSENTIAL PF LTX SZ7

## (undated) DEVICE — CONTAINER,SPECIMEN,OR STERILE,4OZ: Brand: MEDLINE

## (undated) DEVICE — GLOVE,SURG,SENSICARE SLT,LF,PF,6.5: Brand: MEDLINE

## (undated) DEVICE — SPNG LAP PREWSH SFTPK 18X18IN STRL PK/5

## (undated) DEVICE — 2-PIECE DRAINABLE OSTOMY POUCH: Brand: NEW IMAGE

## (undated) DEVICE — SYR 10ML

## (undated) DEVICE — VESSEL LOOPS X-RAY DETECTABLE: Brand: DEROYAL

## (undated) DEVICE — KT SURG TURNOVER 050

## (undated) DEVICE — PAD,ABDOMINAL,5"X9",STERILE,LF,1/PK: Brand: MEDLINE INDUSTRIES, INC.

## (undated) DEVICE — SYR ANGIO FNGR FIX CONTRL MLL 10ML

## (undated) DEVICE — SOL IRRG H2O BG 3000ML STRL

## (undated) DEVICE — SUT SILK 3/0 SH CR8 30IN C017D

## (undated) DEVICE — SUT GUT CHRM SH1 27IN

## (undated) DEVICE — SYR LUERLOK 30CC

## (undated) DEVICE — BITEBLOCK ENDO W/STRAP 60F A/ LF DISP

## (undated) DEVICE — TUBING, SUCTION, 1/4" X 12', STRAIGHT: Brand: MEDLINE

## (undated) DEVICE — SUT PDS LP 1 TP1 96IN VIO PDP880GA

## (undated) DEVICE — SUT SILK 3/0 TIES 18IN A184H

## (undated) DEVICE — SUT PERMAHAND SILK 3/0 SH1 18IN

## (undated) DEVICE — SYR LUERLOK 20CC BX/50

## (undated) DEVICE — SUT SILK 2/0 SH CR8 18IN CR8 C012D

## (undated) DEVICE — SOLUTION,WATER,IRRIGATION,1000ML,STERILE: Brand: MEDLINE

## (undated) DEVICE — ANTIBACTERIAL UNDYED BRAIDED (POLYGLACTIN 910), SYNTHETIC ABSORBABLE SUTURE: Brand: COATED VICRYL

## (undated) DEVICE — 8FR FRAZIER SUCTION HANDLE: Brand: CARDINAL HEALTH

## (undated) DEVICE — PK ENDO GI 50

## (undated) DEVICE — PREP SPRY PVPI 10P 2OZ

## (undated) DEVICE — GAUZE SPONGES,8 PLY: Brand: CURITY

## (undated) DEVICE — PK MAJ LAPAROTOMY 50

## (undated) DEVICE — THE STERILE LIGHT HANDLE COVER IS USED WITH STERIS SURGICAL LIGHTING AND VISUALIZATION SYSTEMS.

## (undated) DEVICE — UNDRGLV SURG BIOGEL PIMICROINDICATOR SYNTH SZ7.5PF STRL PR/2

## (undated) DEVICE — NITINOL STONE RETRIEVAL BASKET: Brand: ZERO TIP

## (undated) DEVICE — MATERNITY KNIT PANTS,SEAMLESS: Brand: WINGS

## (undated) DEVICE — THE STERILE CAMERA HANDLE COVER IS FOR USE WITH THE STERIS SURGICAL LIGHTING AND VISUALIZATION SYSTEMS.

## (undated) DEVICE — PK CYSTO 50

## (undated) DEVICE — NDL HYPO ECLPS SFTY 22G 1 1/2IN